# Patient Record
Sex: FEMALE | Race: WHITE | Employment: OTHER | URBAN - NONMETROPOLITAN AREA
[De-identification: names, ages, dates, MRNs, and addresses within clinical notes are randomized per-mention and may not be internally consistent; named-entity substitution may affect disease eponyms.]

---

## 2018-11-30 RX ORDER — INDAPAMIDE 2.5 MG/1
2.5 TABLET, FILM COATED ORAL EVERY MORNING
COMMUNITY
End: 2019-05-14 | Stop reason: SDUPTHER

## 2018-11-30 RX ORDER — CLOPIDOGREL BISULFATE 75 MG/1
75 TABLET ORAL DAILY
COMMUNITY

## 2018-11-30 RX ORDER — CYANOCOBALAMIN (VITAMIN B-12) 1000 MCG
1 TABLET, EXTENDED RELEASE ORAL 2 TIMES DAILY WITH MEALS
COMMUNITY

## 2018-11-30 RX ORDER — ASPIRIN 325 MG
325 TABLET ORAL DAILY
Status: ON HOLD | COMMUNITY
End: 2019-01-15 | Stop reason: HOSPADM

## 2018-11-30 RX ORDER — LOSARTAN POTASSIUM 100 MG/1
100 TABLET ORAL DAILY
COMMUNITY

## 2018-11-30 RX ORDER — RANOLAZINE 1000 MG/1
500 TABLET, EXTENDED RELEASE ORAL 2 TIMES DAILY
COMMUNITY

## 2018-11-30 RX ORDER — M-VIT,TX,IRON,MINS/CALC/FOLIC 27MG-0.4MG
1 TABLET ORAL DAILY
COMMUNITY

## 2018-11-30 RX ORDER — ATORVASTATIN CALCIUM 80 MG/1
80 TABLET, FILM COATED ORAL DAILY
COMMUNITY

## 2018-12-03 ENCOUNTER — OFFICE VISIT (OUTPATIENT)
Dept: CARDIOLOGY | Age: 68
End: 2018-12-03
Payer: MEDICARE

## 2018-12-03 VITALS
DIASTOLIC BLOOD PRESSURE: 68 MMHG | HEIGHT: 61 IN | BODY MASS INDEX: 43.46 KG/M2 | WEIGHT: 230.2 LBS | SYSTOLIC BLOOD PRESSURE: 134 MMHG | OXYGEN SATURATION: 96 % | HEART RATE: 52 BPM

## 2018-12-03 DIAGNOSIS — R07.89 OTHER CHEST PAIN: Primary | ICD-10-CM

## 2018-12-03 DIAGNOSIS — E78.2 MIXED HYPERLIPIDEMIA: ICD-10-CM

## 2018-12-03 DIAGNOSIS — I10 ESSENTIAL HYPERTENSION: ICD-10-CM

## 2018-12-03 DIAGNOSIS — R07.2 PRECORDIAL PAIN: ICD-10-CM

## 2018-12-03 DIAGNOSIS — E74.39 GLUCOSE INTOLERANCE: ICD-10-CM

## 2018-12-03 DIAGNOSIS — M47.27 LUMBOSACRAL RADICULOPATHY DUE TO DEGENERATIVE JOINT DISEASE OF SPINE: ICD-10-CM

## 2018-12-03 DIAGNOSIS — I25.118 CORONARY ARTERY DISEASE INVOLVING NATIVE CORONARY ARTERY OF NATIVE HEART WITH OTHER FORM OF ANGINA PECTORIS (HCC): ICD-10-CM

## 2018-12-03 PROBLEM — M19.90 DJD (DEGENERATIVE JOINT DISEASE): Status: ACTIVE | Noted: 2018-12-03

## 2018-12-03 PROCEDURE — G8417 CALC BMI ABV UP PARAM F/U: HCPCS | Performed by: INTERNAL MEDICINE

## 2018-12-03 PROCEDURE — G8427 DOCREV CUR MEDS BY ELIG CLIN: HCPCS | Performed by: INTERNAL MEDICINE

## 2018-12-03 PROCEDURE — G8598 ASA/ANTIPLAT THER USED: HCPCS | Performed by: INTERNAL MEDICINE

## 2018-12-03 PROCEDURE — 1036F TOBACCO NON-USER: CPT | Performed by: INTERNAL MEDICINE

## 2018-12-03 PROCEDURE — 93000 ELECTROCARDIOGRAM COMPLETE: CPT | Performed by: INTERNAL MEDICINE

## 2018-12-03 PROCEDURE — 99212 OFFICE O/P EST SF 10 MIN: CPT | Performed by: INTERNAL MEDICINE

## 2018-12-03 PROCEDURE — G8400 PT W/DXA NO RESULTS DOC: HCPCS | Performed by: INTERNAL MEDICINE

## 2018-12-03 PROCEDURE — 1101F PT FALLS ASSESS-DOCD LE1/YR: CPT | Performed by: INTERNAL MEDICINE

## 2018-12-03 PROCEDURE — G8484 FLU IMMUNIZE NO ADMIN: HCPCS | Performed by: INTERNAL MEDICINE

## 2018-12-03 PROCEDURE — 1090F PRES/ABSN URINE INCON ASSESS: CPT | Performed by: INTERNAL MEDICINE

## 2018-12-03 PROCEDURE — 3017F COLORECTAL CA SCREEN DOC REV: CPT | Performed by: INTERNAL MEDICINE

## 2018-12-03 PROCEDURE — 4040F PNEUMOC VAC/ADMIN/RCVD: CPT | Performed by: INTERNAL MEDICINE

## 2018-12-03 PROCEDURE — 1123F ACP DISCUSS/DSCN MKR DOCD: CPT | Performed by: INTERNAL MEDICINE

## 2018-12-03 RX ORDER — CETIRIZINE HYDROCHLORIDE 10 MG/1
10 TABLET ORAL DAILY
COMMUNITY

## 2018-12-03 RX ORDER — KRILL/OM-3/DHA/EPA/PHOSPHO/AST 500-110 MG
CAPSULE ORAL DAILY
COMMUNITY

## 2018-12-03 RX ORDER — CARVEDILOL 12.5 MG/1
12.5 TABLET ORAL 2 TIMES DAILY WITH MEALS
COMMUNITY
End: 2022-01-25 | Stop reason: DRUGHIGH

## 2018-12-03 ASSESSMENT — ENCOUNTER SYMPTOMS
COUGH: 0
SHORTNESS OF BREATH: 1
CHEST TIGHTNESS: 0
WHEEZING: 0
APNEA: 1

## 2018-12-03 NOTE — LETTER
Dear Oscar Santso,    Thank you for allowing me to participate in the care of Ms. Erich Light. She presents today at the [address] [place] [address] in the [facility]. 41-year-old lady with a history of hypertension dyslipidemia glucose intolerance and coronary disease returns for follow-up. Cardiac history dates back to 2008 at which time she underwent bypass grafting ×3. Restudy that same year demonstrated occlusion of her RCA graft with subsequent attempt at stenting unsuccessful. In 2011 they succeeded in placing a stent in her right coronary artery. She was restudied in 2015 first in March and subsequently in September with the latter showing preserved left ventricular function, both vein grafts occluded, patent LIMA to the LAD, patent RCA stent, and moderate circumflex disease. Most recently she has been exercising regularly without difficulty. She has had multiple episodes of nocturnal precordial chest discomfort that is described as an aching pain that responds to one or 2 nitroglycerin over about 1-3 minute period. The rest of the recent review of systems is benign  she has recently lost 20 pounds. Patient Active Problem List   Diagnosis    Coronary artery disease    Hypertension    Hyperlipidemia    Sleep apnea    Glucose intolerance    DJD (degenerative joint disease)    Lumbosacral radiculopathy due to degenerative joint disease of spine    Precordial pain         1. CAD - given moderate circumflex disease in 2015 the presence of recurrent chest discomfort relieved by angina warrants a stress test.   2. Hypertension - marginal control  3.  Bradycardia - slow given only Carvedilol as a negative chrontrophic agent      Sincerely yours,    Eloy Martell MD  [facility]

## 2018-12-14 ENCOUNTER — HOSPITAL ENCOUNTER (OUTPATIENT)
Dept: NON INVASIVE DIAGNOSTICS | Age: 68
Discharge: HOME | End: 2018-12-14
Payer: MEDICARE

## 2018-12-14 VITALS
BODY MASS INDEX: 43.32 KG/M2 | WEIGHT: 229.28 LBS | SYSTOLIC BLOOD PRESSURE: 114 MMHG | DIASTOLIC BLOOD PRESSURE: 63 MMHG | HEART RATE: 89 BPM

## 2018-12-14 DIAGNOSIS — I25.118 CORONARY ARTERY DISEASE INVOLVING NATIVE CORONARY ARTERY OF NATIVE HEART WITH OTHER FORM OF ANGINA PECTORIS (HCC): ICD-10-CM

## 2018-12-14 DIAGNOSIS — R07.89 OTHER CHEST PAIN: ICD-10-CM

## 2018-12-14 DIAGNOSIS — R07.2 PRECORDIAL PAIN: ICD-10-CM

## 2018-12-14 DIAGNOSIS — E78.2 MIXED HYPERLIPIDEMIA: ICD-10-CM

## 2018-12-14 PROCEDURE — 6370000000 HC RX 637 (ALT 250 FOR IP): Performed by: INTERNAL MEDICINE

## 2018-12-14 PROCEDURE — 6360000002 HC RX W HCPCS: Performed by: INTERNAL MEDICINE

## 2018-12-14 PROCEDURE — C8928 TTE W OR W/O FOL W/CON,STRES: HCPCS

## 2018-12-14 PROCEDURE — 2580000003 HC RX 258: Performed by: INTERNAL MEDICINE

## 2018-12-14 RX ORDER — SODIUM CHLORIDE 9 MG/ML
INJECTION, SOLUTION INTRAVENOUS
Status: COMPLETED | OUTPATIENT
Start: 2018-12-14 | End: 2018-12-14

## 2018-12-14 RX ORDER — NITROGLYCERIN 0.4 MG/1
0.4 TABLET SUBLINGUAL EVERY 5 MIN PRN
Status: DISCONTINUED | OUTPATIENT
Start: 2018-12-14 | End: 2018-12-16 | Stop reason: HOSPADM

## 2018-12-14 RX ORDER — DOBUTAMINE HYDROCHLORIDE 200 MG/100ML
10 INJECTION INTRAVENOUS CONTINUOUS PRN
Status: ACTIVE | OUTPATIENT
Start: 2018-12-14 | End: 2018-12-15

## 2018-12-14 RX ADMIN — NITROGLYCERIN 0.4 MG: 0.4 TABLET SUBLINGUAL at 09:04

## 2018-12-14 RX ADMIN — DOBUTAMINE HYDROCHLORIDE 20 MCG/KG/MIN: 200 INJECTION INTRAVENOUS at 08:54

## 2018-12-14 RX ADMIN — DOBUTAMINE HYDROCHLORIDE 10 MCG/KG/MIN: 200 INJECTION INTRAVENOUS at 08:50

## 2018-12-14 RX ADMIN — SODIUM CHLORIDE: 9 INJECTION, SOLUTION INTRAVENOUS at 08:50

## 2018-12-26 ENCOUNTER — OFFICE VISIT (OUTPATIENT)
Dept: CARDIOLOGY | Age: 68
End: 2018-12-26
Payer: MEDICARE

## 2018-12-26 ENCOUNTER — HOSPITAL ENCOUNTER (OUTPATIENT)
Dept: GENERAL RADIOLOGY | Age: 68
Discharge: HOME | End: 2018-12-26
Payer: MEDICARE

## 2018-12-26 VITALS
HEART RATE: 50 BPM | HEIGHT: 61 IN | DIASTOLIC BLOOD PRESSURE: 68 MMHG | SYSTOLIC BLOOD PRESSURE: 110 MMHG | WEIGHT: 228 LBS | BODY MASS INDEX: 43.05 KG/M2

## 2018-12-26 DIAGNOSIS — R07.2 PRECORDIAL PAIN: ICD-10-CM

## 2018-12-26 DIAGNOSIS — E78.2 MIXED HYPERLIPIDEMIA: ICD-10-CM

## 2018-12-26 DIAGNOSIS — I25.118 CORONARY ARTERY DISEASE INVOLVING NATIVE CORONARY ARTERY OF NATIVE HEART WITH OTHER FORM OF ANGINA PECTORIS (HCC): ICD-10-CM

## 2018-12-26 DIAGNOSIS — I25.118 CORONARY ARTERY DISEASE INVOLVING NATIVE CORONARY ARTERY OF NATIVE HEART WITH OTHER FORM OF ANGINA PECTORIS (HCC): Primary | ICD-10-CM

## 2018-12-26 LAB
ANION GAP SERPL CALCULATED.3IONS-SCNC: 13 MMOL/L (ref 7–19)
BUN BLDV-MCNC: 14 MG/DL (ref 8–23)
CALCIUM SERPL-MCNC: 10.4 MG/DL (ref 8.8–10.2)
CHLORIDE BLD-SCNC: 98 MMOL/L (ref 98–111)
CO2: 29 MMOL/L (ref 22–29)
CREAT SERPL-MCNC: 0.7 MG/DL (ref 0.5–0.9)
GFR NON-AFRICAN AMERICAN: >60
GLUCOSE BLD-MCNC: 81 MG/DL (ref 74–109)
HCT VFR BLD CALC: 41.4 % (ref 37–47)
HEMOGLOBIN: 13.8 G/DL (ref 12–16)
MCH RBC QN AUTO: 30.3 PG (ref 27–31)
MCHC RBC AUTO-ENTMCNC: 33.3 G/DL (ref 33–37)
MCV RBC AUTO: 90.8 FL (ref 81–99)
PDW BLD-RTO: 13 % (ref 11.5–14.5)
PLATELET # BLD: 178 K/UL (ref 130–400)
PMV BLD AUTO: 10.7 FL (ref 9.4–12.3)
POTASSIUM SERPL-SCNC: 3.4 MMOL/L (ref 3.5–5)
RBC # BLD: 4.56 M/UL (ref 4.2–5.4)
SODIUM BLD-SCNC: 140 MMOL/L (ref 136–145)
WBC # BLD: 7.4 K/UL (ref 4.8–10.8)

## 2018-12-26 PROCEDURE — 71046 X-RAY EXAM CHEST 2 VIEWS: CPT

## 2018-12-26 PROCEDURE — G8427 DOCREV CUR MEDS BY ELIG CLIN: HCPCS | Performed by: INTERNAL MEDICINE

## 2018-12-26 PROCEDURE — 99213 OFFICE O/P EST LOW 20 MIN: CPT | Performed by: INTERNAL MEDICINE

## 2018-12-26 PROCEDURE — 1090F PRES/ABSN URINE INCON ASSESS: CPT | Performed by: INTERNAL MEDICINE

## 2018-12-26 PROCEDURE — 4040F PNEUMOC VAC/ADMIN/RCVD: CPT | Performed by: INTERNAL MEDICINE

## 2018-12-26 PROCEDURE — G8417 CALC BMI ABV UP PARAM F/U: HCPCS | Performed by: INTERNAL MEDICINE

## 2018-12-26 PROCEDURE — G8400 PT W/DXA NO RESULTS DOC: HCPCS | Performed by: INTERNAL MEDICINE

## 2018-12-26 PROCEDURE — 1036F TOBACCO NON-USER: CPT | Performed by: INTERNAL MEDICINE

## 2018-12-26 PROCEDURE — G8598 ASA/ANTIPLAT THER USED: HCPCS | Performed by: INTERNAL MEDICINE

## 2018-12-26 PROCEDURE — 3017F COLORECTAL CA SCREEN DOC REV: CPT | Performed by: INTERNAL MEDICINE

## 2018-12-26 PROCEDURE — 1123F ACP DISCUSS/DSCN MKR DOCD: CPT | Performed by: INTERNAL MEDICINE

## 2018-12-26 PROCEDURE — 1101F PT FALLS ASSESS-DOCD LE1/YR: CPT | Performed by: INTERNAL MEDICINE

## 2018-12-26 PROCEDURE — G8484 FLU IMMUNIZE NO ADMIN: HCPCS | Performed by: INTERNAL MEDICINE

## 2018-12-26 NOTE — PROGRESS NOTES
72-year-old lady with long history of coronary disease returns for follow-up after stress echo. Underwent bypass grafting in 2008 with the need for stenting 3 years later in 2011. In 2015 underwent restudy revealing patent stent with both vein grafts occluded and moderate circumflex disease. She was referred for stress testing because of episodic aching precordial chest pain responded to sublingual nitroglycerin. On exam today carries 228 pounds on a 5 foot 1 inch frame. Pressure is 110/68 with a pulse of 50. EOMs full sclerae and conjunctiva normal.  Oriented ×3 in judgment normal.  Stress echo revealed no EKG or echocardiographic evidence of ischemia. She developed discomfort under her left breast which reportedly was responsive to nitroglycerin [but she denies]. Assessment/plan:  1. CAD - given her history of moderate disease in her circumflex 3 years ago with early graft occlusion and episodic chest discomfort feel that she deserves restudy. Stress echo was equivocal things considered. 2. Hypertension - controlled  3. Lipids - last reported 7/17 were well controlled.

## 2018-12-27 ENCOUNTER — TELEPHONE (OUTPATIENT)
Dept: CARDIOLOGY | Age: 68
End: 2018-12-27

## 2019-01-14 ENCOUNTER — HOSPITAL ENCOUNTER (OUTPATIENT)
Dept: CARDIAC CATH/INVASIVE PROCEDURES | Age: 69
Setting detail: OBSERVATION
Discharge: HOME | End: 2019-01-15
Attending: INTERNAL MEDICINE | Admitting: INTERNAL MEDICINE
Payer: MEDICARE

## 2019-01-14 PROBLEM — R94.39 ABNORMAL STRESS ECHO: Status: ACTIVE | Noted: 2019-01-14

## 2019-01-14 LAB
CHOLESTEROL, TOTAL: 130 MG/DL (ref 160–199)
HDLC SERPL-MCNC: 56 MG/DL (ref 65–121)
LDL CHOLESTEROL CALCULATED: 54 MG/DL
TRIGL SERPL-MCNC: 101 MG/DL (ref 0–149)
TROPONIN: <0.01 NG/ML (ref 0–0.03)

## 2019-01-14 PROCEDURE — C1725 CATH, TRANSLUMIN NON-LASER: HCPCS

## 2019-01-14 PROCEDURE — 93459 L HRT ART/GRFT ANGIO: CPT | Performed by: INTERNAL MEDICINE

## 2019-01-14 PROCEDURE — 93005 ELECTROCARDIOGRAM TRACING: CPT

## 2019-01-14 PROCEDURE — 2580000003 HC RX 258: Performed by: INTERNAL MEDICINE

## 2019-01-14 PROCEDURE — 99152 MOD SED SAME PHYS/QHP 5/>YRS: CPT | Performed by: INTERNAL MEDICINE

## 2019-01-14 PROCEDURE — 84484 ASSAY OF TROPONIN QUANT: CPT

## 2019-01-14 PROCEDURE — 36415 COLL VENOUS BLD VENIPUNCTURE: CPT

## 2019-01-14 PROCEDURE — 92928 PRQ TCAT PLMT NTRAC ST 1 LES: CPT | Performed by: INTERNAL MEDICINE

## 2019-01-14 PROCEDURE — 80061 LIPID PANEL: CPT

## 2019-01-14 PROCEDURE — C1894 INTRO/SHEATH, NON-LASER: HCPCS

## 2019-01-14 PROCEDURE — 2709999900 HC NON-CHARGEABLE SUPPLY

## 2019-01-14 PROCEDURE — 6370000000 HC RX 637 (ALT 250 FOR IP)

## 2019-01-14 PROCEDURE — C1760 CLOSURE DEV, VASC: HCPCS

## 2019-01-14 PROCEDURE — C1769 GUIDE WIRE: HCPCS

## 2019-01-14 PROCEDURE — 6360000002 HC RX W HCPCS: Performed by: INTERNAL MEDICINE

## 2019-01-14 PROCEDURE — C1874 STENT, COATED/COV W/DEL SYS: HCPCS

## 2019-01-14 PROCEDURE — 6360000002 HC RX W HCPCS

## 2019-01-14 PROCEDURE — 99153 MOD SED SAME PHYS/QHP EA: CPT | Performed by: INTERNAL MEDICINE

## 2019-01-14 PROCEDURE — C1887 CATHETER, GUIDING: HCPCS

## 2019-01-14 PROCEDURE — G0378 HOSPITAL OBSERVATION PER HR: HCPCS

## 2019-01-14 PROCEDURE — 6370000000 HC RX 637 (ALT 250 FOR IP): Performed by: INTERNAL MEDICINE

## 2019-01-14 RX ORDER — CETIRIZINE HYDROCHLORIDE 10 MG/1
10 TABLET ORAL DAILY
Status: DISCONTINUED | OUTPATIENT
Start: 2019-01-15 | End: 2019-01-15 | Stop reason: HOSPADM

## 2019-01-14 RX ORDER — OYSTER SHELL CALCIUM WITH VITAMIN D 500; 200 MG/1; [IU]/1
1 TABLET, FILM COATED ORAL 2 TIMES DAILY WITH MEALS
Status: DISCONTINUED | OUTPATIENT
Start: 2019-01-14 | End: 2019-01-15 | Stop reason: HOSPADM

## 2019-01-14 RX ORDER — LOSARTAN POTASSIUM 100 MG/1
100 TABLET ORAL DAILY
Status: DISCONTINUED | OUTPATIENT
Start: 2019-01-14 | End: 2019-01-15 | Stop reason: HOSPADM

## 2019-01-14 RX ORDER — ATORVASTATIN CALCIUM 80 MG/1
80 TABLET, FILM COATED ORAL NIGHTLY
Status: DISCONTINUED | OUTPATIENT
Start: 2019-01-14 | End: 2019-01-15 | Stop reason: HOSPADM

## 2019-01-14 RX ORDER — HYDROCHLOROTHIAZIDE 25 MG/1
50 TABLET ORAL DAILY
Status: DISCONTINUED | OUTPATIENT
Start: 2019-01-14 | End: 2019-01-15 | Stop reason: HOSPADM

## 2019-01-14 RX ORDER — ASPIRIN 81 MG/1
81 TABLET, CHEWABLE ORAL DAILY
Status: DISCONTINUED | OUTPATIENT
Start: 2019-01-15 | End: 2019-01-15 | Stop reason: HOSPADM

## 2019-01-14 RX ORDER — RANOLAZINE 500 MG/1
500 TABLET, EXTENDED RELEASE ORAL 2 TIMES DAILY
Status: DISCONTINUED | OUTPATIENT
Start: 2019-01-14 | End: 2019-01-15 | Stop reason: HOSPADM

## 2019-01-14 RX ORDER — ACETAMINOPHEN 325 MG/1
650 TABLET ORAL EVERY 4 HOURS PRN
Status: DISCONTINUED | OUTPATIENT
Start: 2019-01-14 | End: 2019-01-15 | Stop reason: HOSPADM

## 2019-01-14 RX ORDER — SODIUM CHLORIDE 9 MG/ML
INJECTION, SOLUTION INTRAVENOUS CONTINUOUS
Status: DISCONTINUED | OUTPATIENT
Start: 2019-01-14 | End: 2019-01-15 | Stop reason: HOSPADM

## 2019-01-14 RX ORDER — ONDANSETRON 2 MG/ML
4 INJECTION INTRAMUSCULAR; INTRAVENOUS EVERY 6 HOURS PRN
Status: DISCONTINUED | OUTPATIENT
Start: 2019-01-14 | End: 2019-01-15 | Stop reason: HOSPADM

## 2019-01-14 RX ORDER — SODIUM CHLORIDE 0.9 % (FLUSH) 0.9 %
10 SYRINGE (ML) INJECTION PRN
Status: DISCONTINUED | OUTPATIENT
Start: 2019-01-14 | End: 2019-01-15 | Stop reason: HOSPADM

## 2019-01-14 RX ORDER — CLOPIDOGREL BISULFATE 75 MG/1
75 TABLET ORAL DAILY
Status: DISCONTINUED | OUTPATIENT
Start: 2019-01-15 | End: 2019-01-15 | Stop reason: HOSPADM

## 2019-01-14 RX ORDER — M-VIT,TX,IRON,MINS/CALC/FOLIC 27MG-0.4MG
1 TABLET ORAL DAILY
Status: DISCONTINUED | OUTPATIENT
Start: 2019-01-15 | End: 2019-01-15 | Stop reason: HOSPADM

## 2019-01-14 RX ORDER — SODIUM CHLORIDE 0.9 % (FLUSH) 0.9 %
10 SYRINGE (ML) INJECTION EVERY 12 HOURS SCHEDULED
Status: DISCONTINUED | OUTPATIENT
Start: 2019-01-14 | End: 2019-01-15 | Stop reason: HOSPADM

## 2019-01-14 RX ORDER — CARVEDILOL 12.5 MG/1
12.5 TABLET ORAL 2 TIMES DAILY WITH MEALS
Status: DISCONTINUED | OUTPATIENT
Start: 2019-01-14 | End: 2019-01-15 | Stop reason: HOSPADM

## 2019-01-14 RX ADMIN — HYDROCHLOROTHIAZIDE 50 MG: 25 TABLET ORAL at 17:41

## 2019-01-14 RX ADMIN — LOSARTAN POTASSIUM 100 MG: 100 TABLET ORAL at 21:19

## 2019-01-14 RX ADMIN — ATORVASTATIN CALCIUM 80 MG: 80 TABLET, FILM COATED ORAL at 21:20

## 2019-01-14 RX ADMIN — CARVEDILOL 12.5 MG: 12.5 TABLET, FILM COATED ORAL at 17:41

## 2019-01-14 RX ADMIN — CALCIUM CARBONATE-VITAMIN D TAB 500 MG-200 UNIT 1 TABLET: 500-200 TAB at 21:20

## 2019-01-14 RX ADMIN — SODIUM CHLORIDE: 9 INJECTION, SOLUTION INTRAVENOUS at 15:53

## 2019-01-14 RX ADMIN — BIVALIRUDIN 1 MG/KG/HR: 250 INJECTION, POWDER, LYOPHILIZED, FOR SOLUTION INTRAVENOUS at 22:41

## 2019-01-14 RX ADMIN — RANOLAZINE 500 MG: 500 TABLET, FILM COATED, EXTENDED RELEASE ORAL at 21:20

## 2019-01-14 ASSESSMENT — PAIN SCALES - GENERAL
PAINLEVEL_OUTOF10: 0

## 2019-01-15 VITALS
WEIGHT: 227.8 LBS | TEMPERATURE: 96.8 F | OXYGEN SATURATION: 97 % | SYSTOLIC BLOOD PRESSURE: 128 MMHG | DIASTOLIC BLOOD PRESSURE: 74 MMHG | RESPIRATION RATE: 18 BRPM | HEART RATE: 54 BPM | HEIGHT: 61 IN | BODY MASS INDEX: 43.01 KG/M2

## 2019-01-15 LAB
EKG P AXIS: 19 DEGREES
EKG P-R INTERVAL: 174 MS
EKG Q-T INTERVAL: 482 MS
EKG QRS DURATION: 96 MS
EKG QTC CALCULATION (BAZETT): 456 MS
EKG T AXIS: 77 DEGREES
HCT VFR BLD CALC: 37.8 % (ref 37–47)
HEMOGLOBIN: 13.1 G/DL (ref 12–16)
LV EF: 55 %
LVEF MODALITY: NORMAL
MCH RBC QN AUTO: 30.7 PG (ref 27–31)
MCHC RBC AUTO-ENTMCNC: 34.7 G/DL (ref 33–37)
MCV RBC AUTO: 88.5 FL (ref 81–99)
PDW BLD-RTO: 13.2 % (ref 11.5–14.5)
PLATELET # BLD: 148 K/UL (ref 130–400)
PMV BLD AUTO: 10.5 FL (ref 9.4–12.3)
RBC # BLD: 4.27 M/UL (ref 4.2–5.4)
TROPONIN: <0.01 NG/ML (ref 0–0.03)
TROPONIN: <0.01 NG/ML (ref 0–0.03)
WBC # BLD: 6.5 K/UL (ref 4.8–10.8)

## 2019-01-15 PROCEDURE — 84484 ASSAY OF TROPONIN QUANT: CPT

## 2019-01-15 PROCEDURE — 6370000000 HC RX 637 (ALT 250 FOR IP): Performed by: INTERNAL MEDICINE

## 2019-01-15 PROCEDURE — 2580000003 HC RX 258: Performed by: INTERNAL MEDICINE

## 2019-01-15 PROCEDURE — G0378 HOSPITAL OBSERVATION PER HR: HCPCS

## 2019-01-15 PROCEDURE — 36415 COLL VENOUS BLD VENIPUNCTURE: CPT

## 2019-01-15 PROCEDURE — 99217 PR OBSERVATION CARE DISCHARGE MANAGEMENT: CPT | Performed by: INTERNAL MEDICINE

## 2019-01-15 PROCEDURE — 85027 COMPLETE CBC AUTOMATED: CPT

## 2019-01-15 RX ORDER — ASPIRIN 81 MG/1
81 TABLET, CHEWABLE ORAL DAILY
Qty: 30 TABLET | Refills: 3 | COMMUNITY
Start: 2019-01-15

## 2019-01-15 RX ADMIN — CARVEDILOL 12.5 MG: 12.5 TABLET, FILM COATED ORAL at 08:05

## 2019-01-15 RX ADMIN — Medication 10 ML: at 08:05

## 2019-01-15 RX ADMIN — MULTIPLE VITAMINS W/ MINERALS TAB 1 TABLET: TAB at 08:04

## 2019-01-15 RX ADMIN — RANOLAZINE 500 MG: 500 TABLET, FILM COATED, EXTENDED RELEASE ORAL at 08:04

## 2019-01-15 RX ADMIN — CETIRIZINE HYDROCHLORIDE 10 MG: 10 TABLET, FILM COATED ORAL at 08:04

## 2019-01-15 RX ADMIN — CALCIUM CARBONATE-VITAMIN D TAB 500 MG-200 UNIT 1 TABLET: 500-200 TAB at 08:04

## 2019-01-15 RX ADMIN — VITAMIN D, TAB 1000IU (100/BT) 1000 UNITS: 25 TAB at 08:04

## 2019-01-15 RX ADMIN — CLOPIDOGREL BISULFATE 75 MG: 75 TABLET ORAL at 08:05

## 2019-01-15 ASSESSMENT — PAIN SCALES - GENERAL
PAINLEVEL_OUTOF10: 0

## 2019-02-28 ENCOUNTER — OFFICE VISIT (OUTPATIENT)
Dept: CARDIOLOGY | Age: 69
End: 2019-02-28
Payer: MEDICARE

## 2019-02-28 VITALS
DIASTOLIC BLOOD PRESSURE: 74 MMHG | WEIGHT: 231 LBS | HEART RATE: 70 BPM | HEIGHT: 62 IN | BODY MASS INDEX: 42.51 KG/M2 | SYSTOLIC BLOOD PRESSURE: 134 MMHG

## 2019-02-28 DIAGNOSIS — E78.2 MIXED HYPERLIPIDEMIA: ICD-10-CM

## 2019-02-28 DIAGNOSIS — Z95.1 HX OF CABG: ICD-10-CM

## 2019-02-28 DIAGNOSIS — Z95.5 PRESENCE OF DRUG-ELUTING STENT IN LEFT CIRCUMFLEX CORONARY ARTERY: ICD-10-CM

## 2019-02-28 DIAGNOSIS — I10 ESSENTIAL HYPERTENSION: ICD-10-CM

## 2019-02-28 DIAGNOSIS — I25.10 CORONARY ARTERY DISEASE INVOLVING NATIVE CORONARY ARTERY OF NATIVE HEART WITHOUT ANGINA PECTORIS: Primary | ICD-10-CM

## 2019-02-28 PROCEDURE — 1090F PRES/ABSN URINE INCON ASSESS: CPT | Performed by: NURSE PRACTITIONER

## 2019-02-28 PROCEDURE — 3017F COLORECTAL CA SCREEN DOC REV: CPT | Performed by: NURSE PRACTITIONER

## 2019-02-28 PROCEDURE — 1123F ACP DISCUSS/DSCN MKR DOCD: CPT | Performed by: NURSE PRACTITIONER

## 2019-02-28 PROCEDURE — 99213 OFFICE O/P EST LOW 20 MIN: CPT | Performed by: NURSE PRACTITIONER

## 2019-02-28 PROCEDURE — G8427 DOCREV CUR MEDS BY ELIG CLIN: HCPCS | Performed by: NURSE PRACTITIONER

## 2019-02-28 PROCEDURE — 1036F TOBACCO NON-USER: CPT | Performed by: NURSE PRACTITIONER

## 2019-02-28 PROCEDURE — G8484 FLU IMMUNIZE NO ADMIN: HCPCS | Performed by: NURSE PRACTITIONER

## 2019-02-28 PROCEDURE — G8400 PT W/DXA NO RESULTS DOC: HCPCS | Performed by: NURSE PRACTITIONER

## 2019-02-28 PROCEDURE — 4040F PNEUMOC VAC/ADMIN/RCVD: CPT | Performed by: NURSE PRACTITIONER

## 2019-02-28 PROCEDURE — G8598 ASA/ANTIPLAT THER USED: HCPCS | Performed by: NURSE PRACTITIONER

## 2019-02-28 PROCEDURE — G8417 CALC BMI ABV UP PARAM F/U: HCPCS | Performed by: NURSE PRACTITIONER

## 2019-02-28 PROCEDURE — 1101F PT FALLS ASSESS-DOCD LE1/YR: CPT | Performed by: NURSE PRACTITIONER

## 2019-03-29 ENCOUNTER — OFFICE VISIT (OUTPATIENT)
Dept: CARDIOLOGY | Age: 69
End: 2019-03-29
Payer: MEDICARE

## 2019-03-29 VITALS
SYSTOLIC BLOOD PRESSURE: 90 MMHG | HEART RATE: 50 BPM | BODY MASS INDEX: 42.48 KG/M2 | WEIGHT: 225 LBS | HEIGHT: 61 IN | DIASTOLIC BLOOD PRESSURE: 62 MMHG

## 2019-03-29 DIAGNOSIS — I10 ESSENTIAL HYPERTENSION: ICD-10-CM

## 2019-03-29 DIAGNOSIS — R07.2 PRECORDIAL PAIN: Primary | ICD-10-CM

## 2019-03-29 PROCEDURE — 4040F PNEUMOC VAC/ADMIN/RCVD: CPT | Performed by: INTERNAL MEDICINE

## 2019-03-29 PROCEDURE — 1123F ACP DISCUSS/DSCN MKR DOCD: CPT | Performed by: INTERNAL MEDICINE

## 2019-03-29 PROCEDURE — 1036F TOBACCO NON-USER: CPT | Performed by: INTERNAL MEDICINE

## 2019-03-29 PROCEDURE — 99212 OFFICE O/P EST SF 10 MIN: CPT | Performed by: INTERNAL MEDICINE

## 2019-03-29 PROCEDURE — 93000 ELECTROCARDIOGRAM COMPLETE: CPT | Performed by: INTERNAL MEDICINE

## 2019-03-29 PROCEDURE — G8400 PT W/DXA NO RESULTS DOC: HCPCS | Performed by: INTERNAL MEDICINE

## 2019-03-29 PROCEDURE — G8598 ASA/ANTIPLAT THER USED: HCPCS | Performed by: INTERNAL MEDICINE

## 2019-03-29 PROCEDURE — G8417 CALC BMI ABV UP PARAM F/U: HCPCS | Performed by: INTERNAL MEDICINE

## 2019-03-29 PROCEDURE — 3017F COLORECTAL CA SCREEN DOC REV: CPT | Performed by: INTERNAL MEDICINE

## 2019-03-29 PROCEDURE — G8427 DOCREV CUR MEDS BY ELIG CLIN: HCPCS | Performed by: INTERNAL MEDICINE

## 2019-03-29 PROCEDURE — 1090F PRES/ABSN URINE INCON ASSESS: CPT | Performed by: INTERNAL MEDICINE

## 2019-03-29 PROCEDURE — G8484 FLU IMMUNIZE NO ADMIN: HCPCS | Performed by: INTERNAL MEDICINE

## 2019-05-14 RX ORDER — INDAPAMIDE 2.5 MG/1
2.5 TABLET, FILM COATED ORAL EVERY MORNING
Qty: 90 TABLET | Refills: 1 | Status: SHIPPED | OUTPATIENT
Start: 2019-05-14 | End: 2019-11-04 | Stop reason: SDUPTHER

## 2019-08-01 ENCOUNTER — TELEPHONE (OUTPATIENT)
Dept: CARDIOLOGY | Age: 69
End: 2019-08-01

## 2019-08-01 NOTE — TELEPHONE ENCOUNTER
NO Answer; Unable to contact pt; Pt appt location has changed to [address] [place] [address]; This is located in the [facility]; the building with the [employer]"; Please confirm in the appt notes.   Thank you

## 2019-11-04 RX ORDER — INDAPAMIDE 2.5 MG/1
2.5 TABLET, FILM COATED ORAL EVERY MORNING
Qty: 90 TABLET | Refills: 3 | Status: SHIPPED | OUTPATIENT
Start: 2019-11-04 | End: 2020-09-30

## 2019-11-11 ENCOUNTER — OFFICE VISIT (OUTPATIENT)
Dept: CARDIOLOGY | Age: 69
End: 2019-11-11
Payer: MEDICARE

## 2019-11-11 VITALS
HEIGHT: 60 IN | WEIGHT: 225 LBS | SYSTOLIC BLOOD PRESSURE: 130 MMHG | BODY MASS INDEX: 44.17 KG/M2 | DIASTOLIC BLOOD PRESSURE: 84 MMHG | HEART RATE: 54 BPM

## 2019-11-11 DIAGNOSIS — R07.89 OTHER CHEST PAIN: ICD-10-CM

## 2019-11-11 DIAGNOSIS — R94.31 ABNORMAL EKG: ICD-10-CM

## 2019-11-11 DIAGNOSIS — I10 ESSENTIAL HYPERTENSION: Primary | ICD-10-CM

## 2019-11-11 PROCEDURE — 93000 ELECTROCARDIOGRAM COMPLETE: CPT | Performed by: INTERNAL MEDICINE

## 2019-11-11 PROCEDURE — 99213 OFFICE O/P EST LOW 20 MIN: CPT | Performed by: INTERNAL MEDICINE

## 2019-11-11 PROCEDURE — G8427 DOCREV CUR MEDS BY ELIG CLIN: HCPCS | Performed by: INTERNAL MEDICINE

## 2019-11-11 PROCEDURE — 1123F ACP DISCUSS/DSCN MKR DOCD: CPT | Performed by: INTERNAL MEDICINE

## 2019-11-11 PROCEDURE — 3017F COLORECTAL CA SCREEN DOC REV: CPT | Performed by: INTERNAL MEDICINE

## 2019-11-11 PROCEDURE — 4040F PNEUMOC VAC/ADMIN/RCVD: CPT | Performed by: INTERNAL MEDICINE

## 2019-11-11 PROCEDURE — G8400 PT W/DXA NO RESULTS DOC: HCPCS | Performed by: INTERNAL MEDICINE

## 2019-11-11 PROCEDURE — G8484 FLU IMMUNIZE NO ADMIN: HCPCS | Performed by: INTERNAL MEDICINE

## 2019-11-11 PROCEDURE — G8417 CALC BMI ABV UP PARAM F/U: HCPCS | Performed by: INTERNAL MEDICINE

## 2019-11-11 PROCEDURE — 1090F PRES/ABSN URINE INCON ASSESS: CPT | Performed by: INTERNAL MEDICINE

## 2019-11-11 PROCEDURE — G8598 ASA/ANTIPLAT THER USED: HCPCS | Performed by: INTERNAL MEDICINE

## 2019-11-11 PROCEDURE — 1036F TOBACCO NON-USER: CPT | Performed by: INTERNAL MEDICINE

## 2019-11-21 ENCOUNTER — HOSPITAL ENCOUNTER (OUTPATIENT)
Dept: NON INVASIVE DIAGNOSTICS | Age: 69
Discharge: HOME | End: 2019-11-21
Payer: MEDICARE

## 2019-11-21 VITALS — WEIGHT: 225 LBS | BODY MASS INDEX: 43.94 KG/M2

## 2019-11-21 DIAGNOSIS — R07.89 OTHER CHEST PAIN: ICD-10-CM

## 2019-11-21 DIAGNOSIS — R94.31 ABNORMAL EKG: ICD-10-CM

## 2019-11-21 PROCEDURE — 6360000002 HC RX W HCPCS: Performed by: INTERNAL MEDICINE

## 2019-11-21 PROCEDURE — 93350 STRESS TTE ONLY: CPT

## 2019-11-21 PROCEDURE — 2580000003 HC RX 258: Performed by: INTERNAL MEDICINE

## 2019-11-21 RX ORDER — ATROPINE SULFATE 0.1 MG/ML
1 INJECTION INTRAVENOUS PRN
Status: DISCONTINUED | OUTPATIENT
Start: 2019-11-21 | End: 2019-11-22 | Stop reason: HOSPADM

## 2019-11-21 RX ORDER — DOBUTAMINE HYDROCHLORIDE 200 MG/100ML
10 INJECTION INTRAVENOUS CONTINUOUS PRN
Status: DISCONTINUED | OUTPATIENT
Start: 2019-11-21 | End: 2019-11-22 | Stop reason: HOSPADM

## 2019-11-21 RX ORDER — SODIUM CHLORIDE 9 MG/ML
INJECTION, SOLUTION INTRAVENOUS
Status: COMPLETED | OUTPATIENT
Start: 2019-11-21 | End: 2019-11-21

## 2019-11-21 RX ADMIN — ATROPINE SULFATE 1 MG: 0.1 INJECTION PARENTERAL at 14:19

## 2019-11-21 RX ADMIN — SODIUM CHLORIDE: 9 INJECTION, SOLUTION INTRAVENOUS at 14:05

## 2019-11-21 RX ADMIN — DOBUTAMINE HYDROCHLORIDE 10 MCG/KG/MIN: 200 INJECTION INTRAVENOUS at 14:05

## 2020-02-13 ENCOUNTER — TELEPHONE (OUTPATIENT)
Dept: CARDIOLOGY | Age: 70
End: 2020-02-13

## 2020-05-01 ENCOUNTER — VIRTUAL VISIT (OUTPATIENT)
Dept: CARDIOLOGY | Age: 70
End: 2020-05-01
Payer: MEDICARE

## 2020-05-01 VITALS — WEIGHT: 218 LBS | HEIGHT: 62 IN | BODY MASS INDEX: 40.12 KG/M2

## 2020-05-01 PROCEDURE — 99441 PR PHYS/QHP TELEPHONE EVALUATION 5-10 MIN: CPT | Performed by: INTERNAL MEDICINE

## 2020-05-01 NOTE — PROGRESS NOTES
scheduled within the next 24 hours.     Patient identification was verified at the start of the visit: Yes    Total Time: 5-10 Minutes    Note: not billable if this call serves to triage the patient into an appointment for the relevant concern      [address]

## 2020-05-01 NOTE — PATIENT INSTRUCTIONS
1.  Exercise regularly and contact us in the event of any change in status  2. Avoid people and wear a mask when out  3.   Get lipid recheck when safe

## 2020-09-30 RX ORDER — INDAPAMIDE 2.5 MG/1
TABLET, FILM COATED ORAL
Qty: 90 TABLET | Refills: 3 | Status: SHIPPED | OUTPATIENT
Start: 2020-09-30

## 2021-10-14 RX ORDER — INDAPAMIDE 2.5 MG/1
TABLET, FILM COATED ORAL
Qty: 90 TABLET | Refills: 3 | OUTPATIENT
Start: 2021-10-14

## 2021-11-16 ENCOUNTER — TELEPHONE (OUTPATIENT)
Dept: CARDIOLOGY CLINIC | Age: 71
End: 2021-11-16

## 2021-11-16 NOTE — TELEPHONE ENCOUNTER
Patient called stating she was having arm pain and sweating yesterday. She feels ok today. She wanted to see Dr. Iza Arora. Offered her his first available of 1/25/22 at 215. Strongly encouraged that she she APRN sooner but she stated, \"I don't want to see second in command. \" Advised that if she has symptoms again to report to ED. She voiced understanding.

## 2022-01-25 ENCOUNTER — OFFICE VISIT (OUTPATIENT)
Dept: CARDIOLOGY CLINIC | Age: 72
End: 2022-01-25
Payer: MEDICARE

## 2022-01-25 VITALS
WEIGHT: 239 LBS | SYSTOLIC BLOOD PRESSURE: 120 MMHG | DIASTOLIC BLOOD PRESSURE: 66 MMHG | BODY MASS INDEX: 45.12 KG/M2 | HEART RATE: 48 BPM | HEIGHT: 61 IN | OXYGEN SATURATION: 97 %

## 2022-01-25 DIAGNOSIS — E78.2 MIXED HYPERLIPIDEMIA: ICD-10-CM

## 2022-01-25 DIAGNOSIS — Z95.5 PRESENCE OF DRUG-ELUTING STENT IN LEFT CIRCUMFLEX CORONARY ARTERY: ICD-10-CM

## 2022-01-25 DIAGNOSIS — I10 HYPERTENSION, UNSPECIFIED TYPE: ICD-10-CM

## 2022-01-25 DIAGNOSIS — I25.10 CAD IN NATIVE ARTERY: Primary | ICD-10-CM

## 2022-01-25 DIAGNOSIS — R00.1 BRADYCARDIA: ICD-10-CM

## 2022-01-25 DIAGNOSIS — Z95.1 HX OF CABG: ICD-10-CM

## 2022-01-25 PROCEDURE — G8427 DOCREV CUR MEDS BY ELIG CLIN: HCPCS | Performed by: NURSE PRACTITIONER

## 2022-01-25 PROCEDURE — G8484 FLU IMMUNIZE NO ADMIN: HCPCS | Performed by: NURSE PRACTITIONER

## 2022-01-25 PROCEDURE — 99214 OFFICE O/P EST MOD 30 MIN: CPT | Performed by: NURSE PRACTITIONER

## 2022-01-25 PROCEDURE — G8417 CALC BMI ABV UP PARAM F/U: HCPCS | Performed by: NURSE PRACTITIONER

## 2022-01-25 PROCEDURE — 1036F TOBACCO NON-USER: CPT | Performed by: NURSE PRACTITIONER

## 2022-01-25 PROCEDURE — G8400 PT W/DXA NO RESULTS DOC: HCPCS | Performed by: NURSE PRACTITIONER

## 2022-01-25 PROCEDURE — 93000 ELECTROCARDIOGRAM COMPLETE: CPT | Performed by: NURSE PRACTITIONER

## 2022-01-25 PROCEDURE — 4040F PNEUMOC VAC/ADMIN/RCVD: CPT | Performed by: NURSE PRACTITIONER

## 2022-01-25 PROCEDURE — 3017F COLORECTAL CA SCREEN DOC REV: CPT | Performed by: NURSE PRACTITIONER

## 2022-01-25 PROCEDURE — 1090F PRES/ABSN URINE INCON ASSESS: CPT | Performed by: NURSE PRACTITIONER

## 2022-01-25 PROCEDURE — 1123F ACP DISCUSS/DSCN MKR DOCD: CPT | Performed by: NURSE PRACTITIONER

## 2022-01-25 RX ORDER — CARVEDILOL 6.25 MG/1
6.25 TABLET ORAL 2 TIMES DAILY
Qty: 180 TABLET | Refills: 3 | Status: SHIPPED | OUTPATIENT
Start: 2022-01-25

## 2022-01-25 NOTE — PATIENT INSTRUCTIONS
New instructions for today:  Reduce Coreg to 6.25 mg (1) twice daily. This was done for slow heart rate. Monitor your blood pressure and heart rate with the medication change. Indapamide 2.5 mg - this is your water pill. Take in AM.     Patient Instructions:  Continue current medications as prescribed. Always keep a current medication list. Bring your medications to every office visit. Continue to follow up with primary care provider for non cardiac medical problems. Call the office with any problems, questions or concerns at [phone]. If you have been asked to keep a blood pressure log, do so for 2 weeks. Call the office to report readings to the triage nurse at [phone]. Follow up with cardiologist as scheduled. The following educational material has been included in this after visit summary for your review: Life simple 7. Heart health. Life simple 7  1) Manage blood pressure - high blood pressure is a major risk factor for heart disease and stroke. Keeping blood pressure in health range reduces strain on your heart, arteries and kidneys. Blood pressure goal is less than 130/80. 2) Control cholesterol - contributes to plaque, which can clog arteries and lead to heart disease and stroke. When you control your cholesterol you are giving your arteries their best chance to remain clear. It is recommended that you get cholesterol lab work done once a year. 3) Reduce blood sugar - most of the food we eat is turning into glucose or blood sugar that our body uses for energy. Over time, high levels of blood sugar can damage your heart, kidneys, eyes and nerves. 4) Get active - living an active life is one of the most rewarding gifts you can give yourself and those you love. Simply put, daily physical activity increases your length and quality of life. Strive to exercise 15 minutes most days of the week.   5)  Eat better - A healthy diet is one of your best weapons for fighting cardiovascular disease. When you eat a heart healthy diet, you improve your chances for feeling good and staying healthy for life. 6)  Lose weight - when you shed extra fat an unnecessary pounds, you reduce the burden on your hear, lungs, blood vessels and skeleton. You give yourself the gift of active living, you lower your blood pressure and help yourself feel better. 7) Stop smoking - cigarette smokers have a higher risk of developing cardiovascular disease. If  You smoke, quitting is the best thing you can do for your health. Check American Heart Association on line for more information on Life's Simple 7 and tips for healthy living. A Healthy Heart: Care Instructions  Your Care Instructions     Coronary artery disease, also called heart disease, occurs when a substance called plaque builds up in the vessels that supply oxygen-rich blood to your heart muscle. This can narrow the blood vessels and reduce blood flow. A heart attack happens when blood flow is completely blocked. A high-fat diet, smoking, and other factors increase the risk of heart disease. Your doctor has found that you have a chance of having heart disease. You can do lots of things to keep your heart healthy. It may not be easy, but you can change your diet, exercise more, and quit smoking. These steps really work to lower your chance of heart disease. Follow-up care is a key part of your treatment and safety. Be sure to make and go to all appointments, and call your doctor if you are having problems. It's also a good idea to know your test results and keep a list of the medicines you take. How can you care for yourself at home? Diet  · Use less salt when you cook and eat. This helps lower your blood pressure. Taste food before salting. Add only a little salt when you think you need it. With time, your taste buds will adjust to less salt.   · Eat fewer snack items, fast foods, canned soups, and other high-salt, high-fat, processed foods.  · Read food labels and try to avoid saturated and trans fats. They increase your risk of heart disease by raising cholesterol levels. · Limit the amount of solid fat-butter, margarine, and shortening-you eat. Use olive, peanut, or canola oil when you cook. Bake, broil, and steam foods instead of frying them. · Eat a variety of fruit and vegetables every day. Dark green, deep orange, red, or yellow fruits and vegetables are especially good for you. Examples include spinach, carrots, peaches, and berries. · Foods high in fiber can reduce your cholesterol and provide important vitamins and minerals. High-fiber foods include whole-grain cereals and breads, oatmeal, beans, brown rice, citrus fruits, and apples. · Eat lean proteins. Heart-healthy proteins include seafood, lean meats and poultry, eggs, beans, peas, nuts, seeds, and soy products. · Limit drinks and foods with added sugar. These include candy, desserts, and soda pop. Lifestyle changes  · If your doctor recommends it, get more exercise. Walking is a good choice. Bit by bit, increase the amount you walk every day. Try for at least 30 minutes on most days of the week. You also may want to swim, bike, or do other activities. · Do not smoke. If you need help quitting, talk to your doctor about stop-smoking programs and medicines. These can increase your chances of quitting for good. Quitting smoking may be the most important step you can take to protect your heart. It is never too late to quit. · Limit alcohol to 2 drinks a day for men and 1 drink a day for women. Too much alcohol can cause health problems. · Manage other health problems such as diabetes, high blood pressure, and high cholesterol. If you think you may have a problem with alcohol or drug use, talk to your doctor. Medicines  · Take your medicines exactly as prescribed. Call your doctor if you think you are having a problem with your medicine.   · If your doctor recommends aspirin, take the amount directed each day. Make sure you take aspirin and not another kind of pain reliever, such as acetaminophen (Tylenol). When should you call for help? [id number] if you have symptoms of a heart attack. These may include:  · Chest pain or pressure, or a strange feeling in the chest.  · Sweating. · Shortness of breath. · Pain, pressure, or a strange feeling in the back, neck, jaw, or upper belly or in one or both shoulders or arms. · Lightheadedness or sudden weakness. · A fast or irregular heartbeat. After you call 911, the  may tell you to chew 1 adult-strength or 2 to 4 low-dose aspirin. Wait for an ambulance. Do not try to drive yourself. Watch closely for changes in your health, and be sure to contact your doctor if you have any problems. Where can you learn more? Go to https://Kextil.[employer]ister[employer]. org and sign in to your [employer] account. Enter M928 in the Enswers box to learn more about \"A Healthy Heart: Care Instructions. \"     If you do not have an account, please click on the \"Sign Up Now\" link. Current as of: December 16, 2019               Content Version: 12.5  © 9412-5901 Healthwise, [employer]. Care instructions adapted under license by [facility] ([facility]). If you have questions about a medical condition or this instruction, always ask your healthcare professional. [place] [address] any warranty or liability for your use of this information. Patient Education        Leg and Ankle Edema: Care Instructions  Your Care Instructions  Swelling in the legs, ankles, and feet is called edema. It is common after you sit or stand for a while. Long plane flights or car rides often cause swelling in the legs and feet. You may also have swelling if you have to stand for long periods of time at your job. Problems with the veins in the legs (varicose veins) and changes in hormones can also cause swelling.  Sometimes the swelling in the ankles and feet is caused by a more serious problem, such as heart failure, infection, blood clots, or liver or kidney disease. Follow-up care is a key part of your treatment and safety. Be sure to make and go to all appointments, and call your doctor if you are having problems. It's also a good idea to know your test results and keep a list of the medicines you take. How can you care for yourself at home? · If your doctor gave you medicine, take it as prescribed. Call your doctor if you think you are having a problem with your medicine. · Whenever you are resting, raise your legs up. Try to keep the swollen area higher than the level of your heart. · Take breaks from standing or sitting in one position. ? Walk around to increase the blood flow in your lower legs. ? Move your feet and ankles often while you stand, or tighten and relax your leg muscles. · Wear support stockings. Put them on in the morning, before swelling gets worse. · Eat a balanced diet. Lose weight if you need to. · Limit the amount of salt (sodium) in your diet. Salt holds fluid in the body and may increase swelling. When should you call for help? Call 911 anytime you think you may need emergency care. For example, call if:    · You have symptoms of a blood clot in your lung (called a pulmonary embolism). These may include:  ? Sudden chest pain. ? Trouble breathing. ? Coughing up blood. Call your doctor now or seek immediate medical care if:    · You have signs of a blood clot, such as:  ? Pain in your calf, back of the knee, thigh, or groin. ? Redness and swelling in your leg or groin.     · You have symptoms of infection, such as:  ? Increased pain, swelling, warmth, or redness. ? Red streaks or pus. ? A fever. Watch closely for changes in your health, and be sure to contact your doctor if:    · Your swelling is getting worse.     · You have new or worsening pain in your legs.     · You do not get better as expected.    Where can you learn more? Go to https://chpepiceweb."[employer]. org and sign in to your [employer] account. Enter V433 in the [facility] box to learn more about \"Leg and Ankle Edema: Care Instructions. \"     If you do not have an account, please click on the \"Sign Up Now\" link. Current as of: July 1, 2021               Content Version: 13.1  © 2006-2021 [employer]. Care instructions adapted under license by [facility] ([facility]). If you have questions about a medical condition or this instruction, always ask your healthcare professional. [place] [address] any warranty or liability for your use of this information. How to take:  NITROGLYCERIN (Nitrostat) 0.4 mg tablets, sublingual.  Nitroglycerin is in a group of drugs called nitrates. Nitroglycerin dilates (widens) blood vessels, making it easier for blood to flow through them and easier for the heart to pump. Dosing Guidelines for Nitroglycerin Tablets  · At the start of an angina (chest pain) attack, place one tablet under the tongue or between the cheek and gum. Do not swallow or chew the tablet; let it dissolve on its own. If necessary, a second and third tablet may be used, with five minutes between using each tablet. If you use a third tablet and your chest pain continues, it is time to seek immediate medical attention. Call 911 immediately and have someone drive you to the emergency room. You may be having a heart attack or other serious heart problem. · To prevent angina from exercise or stress, use 1 tablet 5 to 10 minutes before the activity.

## 2022-01-25 NOTE — PROGRESS NOTES
[facility], [place]. [address] On license of [facility]  [phone] office  [phone] fax      OFFICE VISIT:  2022    Lauri Hannah - : 1950  Reason For Visit:  Uri Johnson is a 80 y.o. female who is here for Follow-up (Patient complains of edema in both ankles off and on. ), Coronary Artery Disease, and Hypertension    History:   Diagnosis Orders   1. CAD in native artery     2. Presence of drug-eluting stent in left circumflex coronary artery     3. Hx of CABG     4. Hypertension, unspecified type  EKG 12 lead   5. Mixed hyperlipidemia     6. Bradycardia       The patient presents today for cardiology follow up. Uri Johnson reports no incident of angina. Over the past few days, she has noticed some mild ankle swelling L>R. She reports \"I really think it is because I have been keeping a baby and sitting a lot more. \"  No report of calf pain, SOA or orthopnea. The patient has sleep apnea and is compliant with CPAP. The patient reports having COVID 19 in . She has opted not to get vaccinated. The patient denies symptoms to suggest myocardial ischemia, overt heart failure or arrhythmia. BP is well controlled on current regimen. EKG today shows heart rate at 49 bpm.  She reports occasional lightheadedness but no syncope or near syncope. The patient's PCP monitors cholesterol. Subjective  Uri Johnson denies exertional chest pain, shortness of breath, orthopnea, paroxysmal nocturnal dyspnea, syncope, presyncope, sensed arrhythmia and fatigue. The patient denies numbness or weakness to suggest cerebrovascular accident or transient ischemic attack. + mild ankle edema last few days.       Lauri Hannah has the following history as recorded in [facility]:  Patient Active Problem List   Diagnosis Code    Coronary artery disease I25.10    Hypertension I10    Hyperlipidemia E78.5    Sleep apnea G47.30    Glucose intolerance E74.39  DJD (degenerative joint disease) M19.90    Lumbosacral radiculopathy due to degenerative joint disease of spine M47.27    Precordial pain R07.2    Abnormal stress echo R94.39    Hx of CABG Z95.1    Presence of drug-eluting stent in left circumflex coronary artery Z95.5     Past Medical History:   Diagnosis Date    Arthritis     Cerebral artery occlusion with cerebral infarction ([address]     TIA    Coronary artery disease     Hyperlipidemia     Hypertension     Sleep apnea      Past Surgical History:   Procedure Laterality Date    CARDIAC CATHETERIZATION  3/23/15  MDL    EF 60%    CHOLECYSTECTOMY      CORONARY ARTERY BYPASS GRAFT      DIAGNOSTIC CARDIAC CATH LAB PROCEDURE      JOINT REPLACEMENT Right     Knee    KNEE SURGERY      TUBAL LIGATION       Family History   Problem Relation Age of Onset    Diabetes Mother     Heart Disease Father     Cancer Father     High Blood Pressure Father      Social History     Tobacco Use    Smoking status: Never Smoker    Smokeless tobacco: Never Used   Substance Use Topics    Alcohol use: No      Current Outpatient Medications   Medication Sig Dispense Refill    indapamide (LOZOL) 2.5 MG tablet TAKE ONE TABLET BY MOUTH EVERY MORNING 90 tablet 3    Turmeric Curcumin 500 MG CAPS Take by mouth      aspirin 81 MG chewable tablet Take 1 tablet by mouth daily 30 tablet 3    Krill Oil (OMEGA-3) 500 MG CAPS Take by mouth daily      cetirizine (ZYRTEC) 10 MG tablet Take 10 mg by mouth daily      carvedilol (COREG) 12.5 MG tablet Take 12.5 mg by mouth 2 times daily (with meals)      calcium citrate-vitamin D (CITRICAL + D) 315-250 MG-UNIT TABS per tablet Take 1 tablet by mouth 2 times daily (with meals)      atorvastatin (LIPITOR) 80 MG tablet Take 80 mg by mouth daily      Multiple Vitamins-Minerals (THERAPEUTIC MULTIVITAMIN-MINERALS) tablet Take 1 tablet by mouth daily      clopidogrel (PLAVIX) 75 MG tablet Take 75 mg by mouth daily      ranolazine (RANEXA) 1000 MG extended release tablet Take 500 mg by mouth 2 times daily      vitamin D (CHOLECALCIFEROL) 1000 UNIT TABS tablet Take 1,000 Units by mouth daily      losartan (COZAAR) 100 MG tablet Take 100 mg by mouth daily       No current facility-administered medications for this visit. Allergies: Azithromycin, Codeine, Tramadol, and Valium [diazepam]    Review of Systems  Constitutional  no appetite change, or unexpected weight change. No fever, chills or diaphoresis. No significant change in activity level or new onset of fatigue. HEENT  no significant rhinorrhea or epistaxis. No tinnitus or significant hearing loss. Eyes  no sudden vision change or amaurosis. No corneal arcus, xantholasma, subconjunctival hemorrhage or discharge. Respiratory  no significant wheezing, stridor, apnea or cough. No dyspnea on exertion or shortness of air. Cardiovascular  no exertional chest pain to suggest myocardial ischemia. No orthopnea or PND. No sensation of sustained arrythmia. No occurrence of slow heart rate. No palpitations. No claudication. Gastrointestinal  no abdominal swelling or pain. No blood in stool. No severe constipation, diarrhea, nausea, or vomiting. Genitourinary  no dysuria, frequency, or urgency. No flank pain or hematuria. Musculoskeletal  no back pain or myalgia. No problems with gait. Extremities - no clubbing or cyanosis. + mild ankle edema last few days. Skin  no color change or rash. No pallor. No new surgical incision. Neurologic  no speech difficulty, facial asymmetry or lateralizing weakness. No seizures, presyncope or syncope.  + occasional lightheadedness. Hematologic  no easy bruising or excessive bleeding. Psychiatric  no severe anxiety or insomnia. No confusion. All other review of systems are negative.     Objective  Vital Signs - /66   Pulse (!) 48   Ht 5' 1\" (1.549 m)   Wt 239 lb (108.4 kg)   SpO2 97%   BMI 45.16 kg/m² General - Charlee Hernandez is alert, cooperative, and pleasant. Well groomed. No acute distress. Body habitus - Body mass index is 45.16 kg/m². HEENT  Head is normocephalic. No circumoral cyanosis. Dentition is normal.  EYES -   Lids normal without ptosis. No discharge, edema or subconjunctival hemorrhage. Neck - Symmetrical without apparent mass or lymphadenopathy. Respiratory - Normal respiratory effort without use of accessory muscles. Ausculatation reveals vesicular breath sounds without crackles, wheezes, rub or rhonchi. Cardiovascular  No jugular venous distention. Auscultation reveals regular rate and rhythm. No audible clicks, gallop or rub. No murmur. No lower extremity varicosities. No carotid bruits. Abdominal -  No visible distention, mass or pulsations. Extremities - No clubbing or cyanosis. No statis dermatitis or ulcers. No edema. Musculoskeletal -   No Osler's nodes. No kyphosis or scoliosis. Gait is even and regular without limp or shuffle. Ambulates without assistance. Skin -  Warm and dry; no rash or pallor. No new surgical wound. Neurological - No focal neurological deficits. Thought processes coherent. No apparent tremor. Oriented to person, place and time. Psychiatric -  Appropriate affect and mood. Data reviewed:  Prior Cardiac History -   7/08 CABG x 3  2009 Failed RCA PTCA  2011 Stent RCA  9/15 Restudy LV Normal, Occluded SVGs, LIMA-LAD Patent, Cx Mod disease, RCA Stent Patent  8/16 Negative Stress ECHO  12/14/18 Clinically positive stress ECHO (EKG and ECHO negative), AUC indication 20, AUC score 7   1/14/19  Cath  80% mid circ, 2.5 x 8 12 GERBER, closed vein grafts, patent left ANDREZ, normal LVFX     EKG today reviewed: NSR 49 bpm; QTc .461; no acute ischemic changes or ectopy.      BP Readings from Last 3 Encounters:   01/25/22 120/66   11/11/19 130/84   03/29/19 90/62    Pulse Readings from Last 3 Encounters:   01/25/22 (!) 48   11/11/19 54   03/29/19 50 Wt Readings from Last 3 Encounters:   01/25/22 239 lb (108.4 kg)   05/01/20 218 lb (98.9 kg)   11/21/19 225 lb (102.1 kg)     Assessment/Plan:   Diagnosis Orders   1. CAD in native artery     2. Presence of drug-eluting stent in left circumflex coronary artery     3. Hx of CABG     4. Hypertension, unspecified type  EKG 12 lead   5. Mixed hyperlipidemia     6. Bradycardia       Stable CV status without overt heart failure, sensed arrhythmia or angina. CAD s/p CABG and stenting - stable on current medical management. HTN - normotensive on current regimen. BP goal lesss than 130/80. Continue same. Bradycardia - decrease Coreg to 6.25 mg BID. Advised to monitor BP and heart rate with change. Hyperlipidemia - PCP follows lipids. Obtain labs for review. Continue Lipitor 80 mg daily. Patient is compliant with medication regimen. Previous cardiac history and records reviewed. Continue current medical management for cardiac related condition. Continue other current medications as directed. Continue to follow up with primary care provider for non cardiac medical problems. [place] [facility] [place] please request that your labs be faxed to this office at [phone]. BP goal 130/80 or less. Call the office with any problems, questions or concerns at [phone]. Cardiology follow up as scheduled in [address] appointments. Educational included in patient instructions. Heart health. NTG SL.      MAR Lawrence

## 2022-03-29 RX ORDER — LOSARTAN POTASSIUM 50 MG/1
50 TABLET ORAL DAILY
Qty: 90 TABLET | Refills: 3 | Status: SHIPPED | OUTPATIENT
Start: 2022-03-29 | End: 2022-04-20 | Stop reason: DRUGHIGH

## 2022-03-29 RX ORDER — CLOPIDOGREL BISULFATE 75 MG/1
75 TABLET ORAL DAILY
Qty: 90 TABLET | Refills: 3 | Status: SHIPPED | OUTPATIENT
Start: 2022-03-29 | End: 2023-03-27

## 2022-03-29 RX ORDER — INDAPAMIDE 2.5 MG/1
2.5 TABLET, FILM COATED ORAL EVERY MORNING
Qty: 90 TABLET | Refills: 3 | Status: SHIPPED | OUTPATIENT
Start: 2022-03-29

## 2022-03-29 NOTE — TELEPHONE ENCOUNTER
Rx Refill Note  Requested Prescriptions     Pending Prescriptions Disp Refills   • indapamide (LOZOL) 2.5 MG tablet 90 tablet 1     Sig: Take 1 tablet by mouth Every Morning.      Last office visit with prescribing clinician: Visit date not found      Next office visit with prescribing clinician: Visit date not found   Barbie Gonzalez MA  03/29/22, 15:00 CDT

## 2022-03-29 NOTE — TELEPHONE ENCOUNTER
Rx Refill Note  Requested Prescriptions     Pending Prescriptions Disp Refills   • clopidogrel (PLAVIX) 75 MG tablet 90 tablet 1     Sig: Take 1 tablet by mouth Daily.      Last office visit with prescribing clinician: Visit date not found      Next office visit with prescribing clinician: Visit date not found   Barbie Gonzalez MA  03/29/22, 15:01 CDT

## 2022-03-29 NOTE — TELEPHONE ENCOUNTER
Rx Refill Note  Requested Prescriptions     Pending Prescriptions Disp Refills   • losartan (Cozaar) 50 MG tablet 90 tablet 1     Sig: Take 1 tablet by mouth Daily.      Last office visit with prescribing clinician: Visit date not found      Next office visit with prescribing clinician: Visit date not found   {Barbie Gonzalez MA  03/29/22, 14:57 CDT

## 2022-03-30 RX ORDER — HYDROCHLOROTHIAZIDE 12.5 MG/1
12.5 TABLET ORAL DAILY
Qty: 90 TABLET | Refills: 3 | Status: SHIPPED | OUTPATIENT
Start: 2022-03-30 | End: 2023-03-15

## 2022-03-30 NOTE — TELEPHONE ENCOUNTER
Rx Refill Note  Requested Prescriptions     Pending Prescriptions Disp Refills   • hydroCHLOROthiazide (HYDRODIURIL) 12.5 MG tablet 90 tablet 3     Sig: Take 1 tablet by mouth Daily.      Last office visit with prescribing clinician: Visit date not found      Next office visit with prescribing clinician: Visit date not found   Barbie Gonzalez MA  03/30/22, 09:21 CDT

## 2022-04-15 ENCOUNTER — LAB (OUTPATIENT)
Dept: FAMILY MEDICINE CLINIC | Facility: CLINIC | Age: 72
End: 2022-04-15

## 2022-04-15 DIAGNOSIS — I10 ESSENTIAL HYPERTENSION, MALIGNANT: Primary | ICD-10-CM

## 2022-04-15 DIAGNOSIS — E78.2 MIXED HYPERLIPIDEMIA: ICD-10-CM

## 2022-04-15 DIAGNOSIS — R53.83 FATIGUE, UNSPECIFIED TYPE: ICD-10-CM

## 2022-04-16 LAB
ALBUMIN SERPL-MCNC: 3.8 G/DL (ref 3.8–4.8)
ALBUMIN/GLOB SERPL: 1.1 {RATIO} (ref 1.2–2.2)
ALP SERPL-CCNC: 50 IU/L (ref 44–121)
ALT SERPL-CCNC: 36 IU/L (ref 0–32)
AST SERPL-CCNC: 40 IU/L (ref 0–40)
BASOPHILS # BLD AUTO: 0 X10E3/UL (ref 0–0.2)
BASOPHILS NFR BLD AUTO: 1 %
BILIRUB DIRECT SERPL-MCNC: 0.19 MG/DL (ref 0–0.4)
BILIRUB SERPL-MCNC: 0.5 MG/DL (ref 0–1.2)
BUN SERPL-MCNC: 20 MG/DL (ref 8–27)
BUN/CREAT SERPL: 26 (ref 12–28)
CALCIUM SERPL-MCNC: 9.7 MG/DL (ref 8.7–10.3)
CHLORIDE SERPL-SCNC: 100 MMOL/L (ref 96–106)
CHOLEST SERPL-MCNC: 134 MG/DL (ref 100–199)
CO2 SERPL-SCNC: 24 MMOL/L (ref 20–29)
CREAT SERPL-MCNC: 0.77 MG/DL (ref 0.57–1)
EGFRCR SERPLBLD CKD-EPI 2021: 83 ML/MIN/1.73
EOSINOPHIL # BLD AUTO: 0.2 X10E3/UL (ref 0–0.4)
EOSINOPHIL NFR BLD AUTO: 3 %
ERYTHROCYTE [DISTWIDTH] IN BLOOD BY AUTOMATED COUNT: 12.5 % (ref 11.7–15.4)
GLOBULIN SER CALC-MCNC: 3.6 G/DL (ref 1.5–4.5)
GLUCOSE SERPL-MCNC: 130 MG/DL (ref 65–99)
HCT VFR BLD AUTO: 36.9 % (ref 34–46.6)
HDLC SERPL-MCNC: 61 MG/DL
HGB BLD-MCNC: 12.7 G/DL (ref 11.1–15.9)
IMM GRANULOCYTES # BLD AUTO: 0 X10E3/UL (ref 0–0.1)
IMM GRANULOCYTES NFR BLD AUTO: 0 %
LDLC SERPL CALC-MCNC: 56 MG/DL (ref 0–99)
LDLC/HDLC SERPL: 0.9 RATIO (ref 0–3.2)
LYMPHOCYTES # BLD AUTO: 2.2 X10E3/UL (ref 0.7–3.1)
LYMPHOCYTES NFR BLD AUTO: 40 %
MCH RBC QN AUTO: 31.9 PG (ref 26.6–33)
MCHC RBC AUTO-ENTMCNC: 34.4 G/DL (ref 31.5–35.7)
MCV RBC AUTO: 93 FL (ref 79–97)
MONOCYTES # BLD AUTO: 0.6 X10E3/UL (ref 0.1–0.9)
MONOCYTES NFR BLD AUTO: 12 %
NEUTROPHILS # BLD AUTO: 2.4 X10E3/UL (ref 1.4–7)
NEUTROPHILS NFR BLD AUTO: 44 %
PLATELET # BLD AUTO: 135 X10E3/UL (ref 150–450)
POTASSIUM SERPL-SCNC: 3.6 MMOL/L (ref 3.5–5.2)
PROT SERPL-MCNC: 7.4 G/DL (ref 6–8.5)
RBC # BLD AUTO: 3.98 X10E6/UL (ref 3.77–5.28)
SODIUM SERPL-SCNC: 137 MMOL/L (ref 134–144)
T4 SERPL-MCNC: 9.5 UG/DL (ref 4.5–12)
TRIGL SERPL-MCNC: 91 MG/DL (ref 0–149)
TSH SERPL DL<=0.005 MIU/L-ACNC: 1.86 UIU/ML (ref 0.45–4.5)
VLDLC SERPL CALC-MCNC: 17 MG/DL (ref 5–40)
WBC # BLD AUTO: 5.5 X10E3/UL (ref 3.4–10.8)

## 2022-04-19 LAB
HBA1C MFR BLD: 5.4 % (ref 4.8–5.6)
WRITTEN AUTHORIZATION: NORMAL

## 2022-04-20 ENCOUNTER — OFFICE VISIT (OUTPATIENT)
Dept: FAMILY MEDICINE CLINIC | Facility: CLINIC | Age: 72
End: 2022-04-20

## 2022-04-20 VITALS
HEART RATE: 53 BPM | HEIGHT: 63 IN | OXYGEN SATURATION: 96 % | BODY MASS INDEX: 42.7 KG/M2 | SYSTOLIC BLOOD PRESSURE: 115 MMHG | TEMPERATURE: 97.8 F | DIASTOLIC BLOOD PRESSURE: 68 MMHG | WEIGHT: 241 LBS

## 2022-04-20 DIAGNOSIS — E78.2 MIXED HYPERLIPIDEMIA: Primary | ICD-10-CM

## 2022-04-20 DIAGNOSIS — I10 PRIMARY HYPERTENSION: ICD-10-CM

## 2022-04-20 PROCEDURE — 99202 OFFICE O/P NEW SF 15 MIN: CPT | Performed by: NURSE PRACTITIONER

## 2022-04-20 RX ORDER — MELATONIN
1000 DAILY
COMMUNITY

## 2022-04-20 RX ORDER — MULTIPLE VITAMINS W/ MINERALS TAB 9MG-400MCG
1 TAB ORAL DAILY
COMMUNITY

## 2022-04-20 RX ORDER — ATORVASTATIN CALCIUM 80 MG/1
80 TABLET, FILM COATED ORAL DAILY
COMMUNITY
End: 2023-03-27

## 2022-04-20 RX ORDER — LOSARTAN POTASSIUM 100 MG/1
100 TABLET ORAL DAILY
COMMUNITY

## 2022-04-20 RX ORDER — KRILL/OM-3/DHA/EPA/PHOSPHO/AST 500-110 MG
1 CAPSULE ORAL DAILY
COMMUNITY

## 2022-04-20 RX ORDER — CARVEDILOL 6.25 MG/1
1 TABLET ORAL 2 TIMES DAILY
COMMUNITY
Start: 2022-01-25

## 2022-04-20 RX ORDER — RANOLAZINE 1000 MG/1
500 TABLET, EXTENDED RELEASE ORAL EVERY 12 HOURS SCHEDULED
COMMUNITY
End: 2023-03-27 | Stop reason: SDUPTHER

## 2022-04-20 RX ORDER — CETIRIZINE HYDROCHLORIDE 10 MG/1
1 TABLET ORAL DAILY
COMMUNITY

## 2022-04-20 NOTE — PROGRESS NOTES
"Chief Complaint   Patient presents with   • Follow-up     Medication refill        Subjective   Priscilla Santos is a 71 y.o. female who presents today for fu 6 mo labs    HPI   Pt with hyperlipidemia and HTN. Verbalizes no complaints today.    No Known Allergies      OBJECTIVE:  Vitals:    04/20/22 1251   BP: 115/68   BP Location: Right arm   Patient Position: Sitting   Cuff Size: Adult   Pulse: 53   Temp: 97.8 °F (36.6 °C)   SpO2: 96%   Weight: 109 kg (241 lb)   Height: 160 cm (63\")     A1C-5.4  AST-40  ALT-36  GFR-83  All labs reviewed with patient.    Physical Exam  Vitals and nursing note reviewed.   Constitutional:       Appearance: Normal appearance.   HENT:      Head: Normocephalic and atraumatic.      Nose: Nose normal.      Mouth/Throat:      Mouth: Mucous membranes are moist.   Eyes:      Extraocular Movements: Extraocular movements intact.      Pupils: Pupils are equal, round, and reactive to light.   Cardiovascular:      Rate and Rhythm: Normal rate and regular rhythm.      Pulses: Normal pulses.      Heart sounds: Normal heart sounds.   Pulmonary:      Effort: Pulmonary effort is normal.      Breath sounds: Normal breath sounds.   Abdominal:      General: Abdomen is flat. Bowel sounds are normal.   Musculoskeletal:         General: Normal range of motion.      Cervical back: Normal range of motion and neck supple.   Skin:     General: Skin is warm.      Capillary Refill: Capillary refill takes less than 2 seconds.   Neurological:      General: No focal deficit present.      Mental Status: She is alert and oriented to person, place, and time.   Psychiatric:         Mood and Affect: Mood normal.         Behavior: Behavior normal.         Patient's Body mass index is 42.69 kg/m². indicating that she is morbidly obese (BMI > 40 or > 35 with obesity - related health condition). Obesity-related health conditions include the following: hypertension and dyslipidemias. Obesity is unchanged. BMI is is above " average; BMI management plan is completed. We discussed low calorie, low carb based diet program, portion control and increasing exercise..        ASSESSMENT/ PLAN:    Diagnoses and all orders for this visit:    1. Mixed hyperlipidemia (Primary)    2. Primary hypertension          Management Plan:     An After Visit Summary was printed and given to the patient at discharge.    Follow-up: No follow-ups on file.    I spent 20 minutes caring for Priscilla on this date of service. This time includes time spent by me in the following activities: preparing for the visit, obtaining and/or reviewing a separately obtained history, performing a medically appropriate examination and/or evaluation, ordering medications, tests, or procedures and documenting information in the medical record     KENDRA Epperson 4/20/2022 13:20 CDT  This note was electronically signed.

## 2022-05-04 ENCOUNTER — OFFICE VISIT (OUTPATIENT)
Dept: FAMILY MEDICINE CLINIC | Facility: CLINIC | Age: 72
End: 2022-05-04

## 2022-05-04 VITALS
OXYGEN SATURATION: 100 % | HEIGHT: 63 IN | DIASTOLIC BLOOD PRESSURE: 65 MMHG | SYSTOLIC BLOOD PRESSURE: 100 MMHG | HEART RATE: 62 BPM | WEIGHT: 234.2 LBS | TEMPERATURE: 97.9 F | BODY MASS INDEX: 41.5 KG/M2

## 2022-05-04 DIAGNOSIS — S05.02XA: Primary | ICD-10-CM

## 2022-05-04 DIAGNOSIS — E66.01 CLASS 3 SEVERE OBESITY DUE TO EXCESS CALORIES WITH SERIOUS COMORBIDITY AND BODY MASS INDEX (BMI) OF 40.0 TO 44.9 IN ADULT: ICD-10-CM

## 2022-05-04 PROCEDURE — 99213 OFFICE O/P EST LOW 20 MIN: CPT | Performed by: NURSE PRACTITIONER

## 2022-05-04 RX ORDER — PREDNISOLONE ACETATE 10 MG/ML
2 SUSPENSION/ DROPS OPHTHALMIC 4 TIMES DAILY
Qty: 10 ML | Refills: 2 | Status: SHIPPED | OUTPATIENT
Start: 2022-05-04 | End: 2022-09-09

## 2022-05-04 NOTE — PROGRESS NOTES
"Chief Complaint   Patient presents with   • Eye Problem     Left eye swollen and red. Patient states she got sawdust in eye on 5/3/22 and it has irritated her eye        Subjective   Priscilla Santos is a 71 y.o. female who presents today for left eye.    Eye Problem   The left eye is affected. This is a new problem. The current episode started yesterday. The problem occurs constantly. The problem has been unchanged. The injury mechanism was a foreign body. The pain is at a severity of 7/10. The pain is moderate. There is no known exposure to pink eye. She does not wear contacts. Associated symptoms include a foreign body sensation. She has tried nothing for the symptoms.          Allergies   Allergen Reactions   • Azithromycin Provider Review Needed   • Codeine Provider Review Needed   • Diazepam Provider Review Needed   • Tramadol Provider Review Needed         OBJECTIVE:  Vitals:    05/04/22 1007   BP: 100/65   BP Location: Right arm   Patient Position: Sitting   Cuff Size: Large Adult   Pulse: 62   Temp: 97.9 °F (36.6 °C)   TempSrc: Infrared   SpO2: 100%   Weight: 106 kg (234 lb 3.2 oz)   Height: 160 cm (62.99\")     Physical Exam  Constitutional:       Appearance: Normal appearance.   HENT:      Mouth/Throat:      Mouth: Mucous membranes are moist.   Eyes:     Cardiovascular:      Pulses: Normal pulses.      Heart sounds: Normal heart sounds.   Pulmonary:      Effort: Pulmonary effort is normal.      Breath sounds: Normal breath sounds.   Skin:     General: Skin is warm and dry.   Neurological:      General: No focal deficit present.      Mental Status: She is alert and oriented to person, place, and time.   Psychiatric:         Mood and Affect: Mood normal.         Behavior: Behavior normal.         Thought Content: Thought content normal.         Judgment: Judgment normal.         Class 3 Severe Obesity (BMI >=40). Obesity-related health conditions include the following: hypertension, coronary heart disease and " dyslipidemias. Obesity is unchanged. BMI is is above average; BMI management plan is completed. We discussed low calorie, low carb based diet program, portion control and increasing exercise.        ASSESSMENT/ PLAN:    Diagnoses and all orders for this visit:    1. Scratched cornea, left, initial encounter (Primary)  -     prednisoLONE acetate (Pred Forte) 1 % ophthalmic suspension; Administer 2 drops into the left eye 4 (Four) Times a Day.  Dispense: 10 mL; Refill: 2    2. Class 3 severe obesity due to excess calories with serious comorbidity and body mass index (BMI) of 40.0 to 44.9 in adult (HCC)    Patch applied to left eye      Management Plan:     An After Visit Summary was printed and given to the patient at discharge.    Follow-up: Return if symptoms worsen or fail to improve.    I spent 20 minutes caring for Priscilla on this date of service. This time includes time spent by me in the following activities: preparing for the visit, obtaining and/or reviewing a separately obtained history, performing a medically appropriate examination and/or evaluation, ordering medications, tests, or procedures and documenting information in the medical record     Reza Charles, KENDRA 5/4/2022 10:33 CDT  This note was electronically signed.

## 2022-05-25 ENCOUNTER — OFFICE VISIT (OUTPATIENT)
Dept: CARDIOLOGY CLINIC | Age: 72
End: 2022-05-25
Payer: MEDICARE

## 2022-05-25 ENCOUNTER — TELEPHONE (OUTPATIENT)
Dept: CARDIOLOGY CLINIC | Age: 72
End: 2022-05-25

## 2022-05-25 VITALS
WEIGHT: 238 LBS | HEIGHT: 61 IN | HEART RATE: 64 BPM | SYSTOLIC BLOOD PRESSURE: 132 MMHG | BODY MASS INDEX: 44.93 KG/M2 | DIASTOLIC BLOOD PRESSURE: 78 MMHG

## 2022-05-25 DIAGNOSIS — I25.10 CORONARY ARTERY DISEASE INVOLVING NATIVE CORONARY ARTERY OF NATIVE HEART WITHOUT ANGINA PECTORIS: Primary | ICD-10-CM

## 2022-05-25 PROCEDURE — 1090F PRES/ABSN URINE INCON ASSESS: CPT | Performed by: INTERNAL MEDICINE

## 2022-05-25 PROCEDURE — 99214 OFFICE O/P EST MOD 30 MIN: CPT | Performed by: INTERNAL MEDICINE

## 2022-05-25 PROCEDURE — G8400 PT W/DXA NO RESULTS DOC: HCPCS | Performed by: INTERNAL MEDICINE

## 2022-05-25 PROCEDURE — G8417 CALC BMI ABV UP PARAM F/U: HCPCS | Performed by: INTERNAL MEDICINE

## 2022-05-25 PROCEDURE — 1036F TOBACCO NON-USER: CPT | Performed by: INTERNAL MEDICINE

## 2022-05-25 PROCEDURE — G8427 DOCREV CUR MEDS BY ELIG CLIN: HCPCS | Performed by: INTERNAL MEDICINE

## 2022-05-25 PROCEDURE — 1123F ACP DISCUSS/DSCN MKR DOCD: CPT | Performed by: INTERNAL MEDICINE

## 2022-05-25 PROCEDURE — 3017F COLORECTAL CA SCREEN DOC REV: CPT | Performed by: INTERNAL MEDICINE

## 2022-05-25 NOTE — PROGRESS NOTES
HISTORY  78-year-old lady with a history of glucose intolerance, sleep apnea, dyslipidemia, hypertension, and coronary disease returns for routine follow-up visit. History of bypass grafting in 2008 with stenting of her right coronary artery in 2011 and of her mid circumflex in January 2019 at which time her vein grafts were occluded, the mammary to the LAD was patent, and her left ventricular function was preserved. On return today she relates no change in her chronic nonexertional chest discomfort. She denies symptoms of overt left ventricular dysfunction or dysrhythmia. She has not been vaccinated for COVID-19. PHYSICAL EXAM  On exam she carries 238 pounds in a 5 feet 1 inch frame. Pressure is 132/78 pulse is 64. Obese middle-aged lady. EOMs full, sclerae and conjunctiva normal. PERRLA. Mask in place. Trachea midline with no neck masses. Assessment of internal jugular veins reveals no elevation of central venous pressure at 45 degrees. Carotid pulses normal without delay or bruit. Thyroid normal to palpation. Midline sternotomy scar. Chest exam reveals normal respiratory effort, no abnormal breath sounds and normal expiratory phase. No skin lesions seen. PMI normal. S1, S2 normal with 1/6 systolic murmur. Obese abdomen. Normal bowel sounds without palpable mass or bruit. No clubbing or acrocyanosis. Plus pretibial edema. .  General motor strength appears to be within normal limits. Normal range of motion with normal gait. Alert, oriented x 3, memory and cognition normal as reflected by history and conversation. ASSESSMENT/PLAN:   1. Coronary disease -[place] stable now 3 years post circumflex stenting. Continue carvedilol, Ranexa, urine, and Plavix. 2.  Hypertension -marginal control. Continue carvedilol, famotidine losartan  3. Dyslipidemia -recent values LDL 56, HDL 61, triglycerides 91.   Continue Lipitor and krill oil

## 2022-07-05 DIAGNOSIS — M62.838 MUSCLE SPASM: Primary | ICD-10-CM

## 2022-07-05 RX ORDER — CYCLOBENZAPRINE HCL 10 MG
10 TABLET ORAL 3 TIMES DAILY PRN
Qty: 90 TABLET | Refills: 0 | Status: SHIPPED | OUTPATIENT
Start: 2022-07-05

## 2022-07-06 ENCOUNTER — TELEPHONE (OUTPATIENT)
Dept: FAMILY MEDICINE CLINIC | Facility: CLINIC | Age: 72
End: 2022-07-06

## 2022-07-06 NOTE — TELEPHONE ENCOUNTER
PA approved cyclobenzaprine 10mg tablets 1/1/2022-10/4/2022. Spoke with Blaire at pharmacy to notify, will notify pt.

## 2022-07-14 RX ORDER — FLUTICASONE PROPIONATE 50 MCG
2 SPRAY, SUSPENSION (ML) NASAL DAILY
Qty: 18 ML | Refills: 5 | Status: SHIPPED | OUTPATIENT
Start: 2022-07-14 | End: 2023-01-27

## 2022-07-14 NOTE — TELEPHONE ENCOUNTER
Rx Refill Note  Requested Prescriptions     Pending Prescriptions Disp Refills   • fluticasone (Flonase) 50 MCG/ACT nasal spray       Si sprays into the nostril(s) as directed by provider Daily.      Last office visit with prescribing clinician: 2022      Next office visit with prescribing clinician: 10/27/2022     Daija Ng MA  22, 10:10 CDT

## 2022-09-09 ENCOUNTER — OFFICE VISIT (OUTPATIENT)
Dept: FAMILY MEDICINE CLINIC | Facility: CLINIC | Age: 72
End: 2022-09-09

## 2022-09-09 VITALS
TEMPERATURE: 97.5 F | DIASTOLIC BLOOD PRESSURE: 70 MMHG | SYSTOLIC BLOOD PRESSURE: 101 MMHG | WEIGHT: 227.6 LBS | OXYGEN SATURATION: 99 % | HEIGHT: 63 IN | HEART RATE: 52 BPM | BODY MASS INDEX: 40.33 KG/M2

## 2022-09-09 DIAGNOSIS — J06.9 VIRAL URI WITH COUGH: Primary | ICD-10-CM

## 2022-09-09 DIAGNOSIS — Z01.84 COVID-19 VIRUS ANTIBODY NEGATIVE: ICD-10-CM

## 2022-09-09 DIAGNOSIS — R11.0 NAUSEA: ICD-10-CM

## 2022-09-09 PROCEDURE — 87428 SARSCOV & INF VIR A&B AG IA: CPT | Performed by: NURSE PRACTITIONER

## 2022-09-09 PROCEDURE — 96372 THER/PROPH/DIAG INJ SC/IM: CPT | Performed by: NURSE PRACTITIONER

## 2022-09-09 PROCEDURE — 99213 OFFICE O/P EST LOW 20 MIN: CPT | Performed by: NURSE PRACTITIONER

## 2022-09-09 RX ORDER — ONDANSETRON 4 MG/1
4 TABLET, ORALLY DISINTEGRATING ORAL EVERY 8 HOURS PRN
Qty: 30 TABLET | Refills: 0 | Status: SHIPPED | OUTPATIENT
Start: 2022-09-09 | End: 2022-10-27

## 2022-09-09 RX ORDER — TRIAMCINOLONE ACETONIDE 40 MG/ML
80 INJECTION, SUSPENSION INTRA-ARTICULAR; INTRAMUSCULAR ONCE
Status: COMPLETED | OUTPATIENT
Start: 2022-09-09 | End: 2022-09-09

## 2022-09-09 RX ADMIN — TRIAMCINOLONE ACETONIDE 80 MG: 40 INJECTION, SUSPENSION INTRA-ARTICULAR; INTRAMUSCULAR at 16:24

## 2022-09-09 NOTE — PROGRESS NOTES
"Chief Complaint  Fatigue, Fever, Headache, Cough, and Shortness of Breath    Subjective        Priscilla Santos presents to [facility]  History of Present Illness  Symptoms present x 1 week.  Her symptoms have waxed and waned since then.   Main symptom still present is dry, sore throat and nausea.  She has had a close exposure to Covid.  She is not vaccinated.  \"We don't believe in Covid so you can't catch what you don't believe in.\"    Objective   Vital Signs:  /70 (BP Location: Right arm, Patient Position: Sitting, Cuff Size: Adult)   Pulse 52   Temp 97.5 °F (36.4 °C)   Ht 160 cm (62.99\")   Wt 103 kg (227 lb 9.6 oz)   SpO2 99%   BMI 40.33 kg/m²   Estimated body mass index is 40.33 kg/m² as calculated from the following:    Height as of this encounter: 160 cm (62.99\").    Weight as of this encounter: 103 kg (227 lb 9.6 oz).      Physical Exam  Vitals and nursing note reviewed.   Constitutional:       General: She is not in acute distress.     Appearance: Normal appearance. She is obese. She is not ill-appearing.   HENT:      Head: Normocephalic and atraumatic.      Right Ear: Tympanic membrane and ear canal normal.      Left Ear: Tympanic membrane and ear canal normal.      Nose: Nose normal.      Mouth/Throat:      Pharynx: Oropharynx is clear. No oropharyngeal exudate or posterior oropharyngeal erythema.      Comments: Oral space is dry.  Cardiovascular:      Rate and Rhythm: Bradycardia present.      Heart sounds: Normal heart sounds. No murmur heard.  Pulmonary:      Effort: Pulmonary effort is normal.      Breath sounds: Normal breath sounds.   Abdominal:      General: Bowel sounds are normal.      Palpations: Abdomen is soft.   Skin:     General: Skin is warm and dry.   Neurological:      Mental Status: She is alert and oriented to person, place, and time.        Result Review :                Assessment and Plan   Diagnoses and all orders for this visit:    1. Viral " URI with cough (Primary)  -     triamcinolone acetonide (KENALOG-40) injection 80 mg    2. Nausea  -     ondansetron ODT (Zofran ODT) 4 MG disintegrating tablet; Place 1 tablet on the tongue Every 8 (Eight) Hours As Needed for Nausea.  Dispense: 30 tablet; Refill: 0             Follow Up   Return if symptoms worsen or fail to improve.  Patient was given instructions and counseling regarding her condition or for health maintenance advice. Please see specific information pulled into the AVS if appropriate.     KENDRA Canela  This note is electronically signed.

## 2022-09-12 LAB
EXPIRATION DATE: NORMAL
FLUAV AG UPPER RESP QL IA.RAPID: NOT DETECTED
FLUBV AG UPPER RESP QL IA.RAPID: NOT DETECTED
INTERNAL CONTROL: NORMAL
Lab: NORMAL
SARS-COV-2 AG UPPER RESP QL IA.RAPID: NOT DETECTED

## 2022-10-27 ENCOUNTER — OFFICE VISIT (OUTPATIENT)
Dept: FAMILY MEDICINE CLINIC | Facility: CLINIC | Age: 72
End: 2022-10-27

## 2022-10-27 VITALS
HEIGHT: 63 IN | WEIGHT: 222.8 LBS | HEART RATE: 57 BPM | SYSTOLIC BLOOD PRESSURE: 117 MMHG | OXYGEN SATURATION: 98 % | TEMPERATURE: 97.6 F | DIASTOLIC BLOOD PRESSURE: 78 MMHG | BODY MASS INDEX: 39.48 KG/M2

## 2022-10-27 DIAGNOSIS — Z78.0 POST-MENOPAUSAL: ICD-10-CM

## 2022-10-27 DIAGNOSIS — E66.01 CLASS 2 SEVERE OBESITY DUE TO EXCESS CALORIES WITH SERIOUS COMORBIDITY AND BODY MASS INDEX (BMI) OF 39.0 TO 39.9 IN ADULT: ICD-10-CM

## 2022-10-27 DIAGNOSIS — M25.50 MULTIPLE JOINT PAIN: ICD-10-CM

## 2022-10-27 DIAGNOSIS — Z96.653 HISTORY OF BILATERAL KNEE REPLACEMENT: ICD-10-CM

## 2022-10-27 DIAGNOSIS — Z12.11 SCREENING FOR COLON CANCER: ICD-10-CM

## 2022-10-27 DIAGNOSIS — Z00.00 ENCOUNTER FOR SUBSEQUENT ANNUAL WELLNESS VISIT IN MEDICARE PATIENT: Primary | ICD-10-CM

## 2022-10-27 DIAGNOSIS — I10 PRIMARY HYPERTENSION: ICD-10-CM

## 2022-10-27 DIAGNOSIS — K42.9 UMBILICAL HERNIA WITHOUT OBSTRUCTION AND WITHOUT GANGRENE: ICD-10-CM

## 2022-10-27 DIAGNOSIS — I10 HYPERTENSION, WELL CONTROLLED: ICD-10-CM

## 2022-10-27 DIAGNOSIS — Z95.5 HISTORY OF CORONARY ARTERY STENT PLACEMENT: ICD-10-CM

## 2022-10-27 DIAGNOSIS — E78.2 MIXED HYPERLIPIDEMIA: ICD-10-CM

## 2022-10-27 DIAGNOSIS — Z12.31 ENCOUNTER FOR SCREENING MAMMOGRAM FOR MALIGNANT NEOPLASM OF BREAST: ICD-10-CM

## 2022-10-27 DIAGNOSIS — E55.9 VITAMIN D DEFICIENCY: ICD-10-CM

## 2022-10-27 PROBLEM — E66.812 CLASS 2 SEVERE OBESITY DUE TO EXCESS CALORIES WITH SERIOUS COMORBIDITY AND BODY MASS INDEX (BMI) OF 39.0 TO 39.9 IN ADULT: Status: ACTIVE | Noted: 2022-10-27

## 2022-10-27 PROCEDURE — 1126F AMNT PAIN NOTED NONE PRSNT: CPT | Performed by: NURSE PRACTITIONER

## 2022-10-27 PROCEDURE — 1159F MED LIST DOCD IN RCRD: CPT | Performed by: NURSE PRACTITIONER

## 2022-10-27 PROCEDURE — 99213 OFFICE O/P EST LOW 20 MIN: CPT | Performed by: NURSE PRACTITIONER

## 2022-10-27 PROCEDURE — 1170F FXNL STATUS ASSESSED: CPT | Performed by: NURSE PRACTITIONER

## 2022-10-27 PROCEDURE — G0439 PPPS, SUBSEQ VISIT: HCPCS | Performed by: NURSE PRACTITIONER

## 2022-10-27 NOTE — PROGRESS NOTES
"The ABCs of the Annual Wellness Visit  Subsequent Medicare Wellness Visit    Chief Complaint   Patient presents with   • Medicare Wellness-subsequent      Subjective    History of Present Illness:  Priscilla Santos is a 71 y.o. female who presents for a Subsequent Medicare Wellness Visit.  She complains of a hernia that is beneath the site where she had a hernia repair surgery before. She states that sometimes she can't \"push it back in\" and it gives her pain. She would like to see a surgeon.   The following portions of the patient's history were reviewed and   updated as appropriate: allergies, current medications, past family history, past medical history, past social history, past surgical history and problem list.    Compared to one year ago, the patient feels her physical   health is the same.    Compared to one year ago, the patient feels her mental   health is the same.    Recent Hospitalizations:  She was not admitted to the hospital during the last year.       Current Medical Providers:  Patient Care Team:  Reza Charles APRN as PCP - General (Family Medicine)    Outpatient Medications Prior to Visit   Medication Sig Dispense Refill   • atorvastatin (LIPITOR) 80 MG tablet Take 80 mg by mouth Daily.     • calcium citrate-vitamin d (CALCITRATE) 315-250 MG-UNIT tablet tablet Take 1 tablet by mouth.     • carvedilol (COREG) 6.25 MG tablet Take 1 tablet by mouth 2 (Two) Times a Day.     • cetirizine (zyrTEC) 10 MG tablet Take 1 tablet by mouth Daily.     • cholecalciferol (VITAMIN D3) 25 MCG (1000 UT) tablet Take 1,000 Units by mouth.     • clopidogrel (PLAVIX) 75 MG tablet Take 1 tablet by mouth Daily. 90 tablet 3   • cyclobenzaprine (FLEXERIL) 10 MG tablet Take 1 tablet by mouth 3 (Three) Times a Day As Needed for Muscle Spasms. 90 tablet 0   • fluticasone (Flonase) 50 MCG/ACT nasal spray 2 sprays into the nostril(s) as directed by provider Daily. 18 mL 5   • hydroCHLOROthiazide (HYDRODIURIL) 12.5 MG tablet " "Take 1 tablet by mouth Daily. 90 tablet 3   • indapamide (LOZOL) 2.5 MG tablet Take 1 tablet by mouth Every Morning. 90 tablet 3   • Krill Oil (Omega-3) 500 MG capsule Take  by mouth.     • losartan (COZAAR) 100 MG tablet Take 100 mg by mouth Daily.     • multivitamin with minerals tablet tablet Take 1 tablet by mouth Daily.     • ranolazine (RANEXA) 1000 MG 12 hr tablet Take 500 mg by mouth.     • Turmeric Curcumin 500 MG capsule Take 1 capsule by mouth Daily.     • ondansetron ODT (Zofran ODT) 4 MG disintegrating tablet Place 1 tablet on the tongue Every 8 (Eight) Hours As Needed for Nausea. 30 tablet 0     No facility-administered medications prior to visit.       No opioid medication identified on active medication list. I have reviewed chart for other potential  high risk medication/s and harmful drug interactions in the elderly.          Aspirin is not on active medication list.  Patient takes an aspirin a day.    Patient Active Problem List   Diagnosis   • Umbilical hernia without obstruction and without gangrene   • Post-menopausal   • Multiple joint pain   • History of coronary artery stent placement   • Hypertension, well controlled   • Vitamin D deficiency   • Mixed hyperlipidemia     Advance Care Planning  Advance Directive is not on file.  ACP discussion was declined by the patient. Patient does not have an advance directive, declines further assistance.          Objective    Vitals:    10/27/22 1312   BP: 117/78   BP Location: Left arm   Patient Position: Sitting   Cuff Size: Adult   Pulse: 57   Temp: 97.6 °F (36.4 °C)   SpO2: 98%   Weight: 101 kg (222 lb 12.8 oz)   Height: 160 cm (62.99\")   PainSc: 0-No pain     Estimated body mass index is 39.48 kg/m² as calculated from the following:    Height as of this encounter: 160 cm (62.99\").    Weight as of this encounter: 101 kg (222 lb 12.8 oz).    Class 2 Severe Obesity (BMI >=35 and <=39.9). Obesity-related health conditions include the following: " hypertension, coronary heart disease, dyslipidemias, GERD and osteoarthritis. Obesity is unchanged. BMI is is above average; BMI management plan is completed. We discussed low calorie, low carb based diet program, portion control and increasing exercise.      Does the patient have evidence of cognitive impairment? No    Physical Exam  Vitals and nursing note reviewed.   Constitutional:       Appearance: Normal appearance.   HENT:      Head: Normocephalic and atraumatic.      Right Ear: Tympanic membrane, ear canal and external ear normal.      Left Ear: Tympanic membrane, ear canal and external ear normal.      Nose: Nose normal.      Mouth/Throat:      Mouth: Mucous membranes are moist.      Pharynx: Oropharynx is clear.   Eyes:      Extraocular Movements: Extraocular movements intact.      Pupils: Pupils are equal, round, and reactive to light.   Cardiovascular:      Rate and Rhythm: Normal rate and regular rhythm.      Pulses: Normal pulses.      Heart sounds: Normal heart sounds.   Pulmonary:      Effort: Pulmonary effort is normal.      Breath sounds: Normal breath sounds.   Abdominal:      General: Abdomen is flat. Bowel sounds are normal.      Palpations: Abdomen is soft.      Hernia: A hernia is present.      Comments: Hernia to the right of the umbilicus. Reduceable.   Musculoskeletal:         General: Normal range of motion.      Cervical back: Normal range of motion and neck supple.      Comments: Bilateral knees with vertical scars from total knee replacements.   Skin:     General: Skin is warm and dry.      Capillary Refill: Capillary refill takes less than 2 seconds.   Neurological:      General: No focal deficit present.      Mental Status: She is alert and oriented to person, place, and time.   Psychiatric:         Mood and Affect: Mood normal.         Behavior: Behavior normal.         Thought Content: Thought content normal.         Judgment: Judgment normal.       Lab Results   Component Value  Date    CHLPL 136 10/24/2022    TRIG 69 10/24/2022    HDL 69 10/24/2022    LDL 53 10/24/2022    VLDL 14 10/24/2022    HGBA1C 5.4 10/24/2022   All labs reviewed with patient who voices understanding.          HEALTH RISK ASSESSMENT    Smoking Status:  Social History     Tobacco Use   Smoking Status Never   Smokeless Tobacco Never     Alcohol Consumption:  Social History     Substance and Sexual Activity   Alcohol Use Never     Fall Risk Screen:    STEADI Fall Risk Assessment was completed, and patient is at LOW risk for falls.Assessment completed on:10/27/2022    Depression Screening:  PHQ-2/PHQ-9 Depression Screening 10/27/2022   Little Interest or Pleasure in Doing Things 0-->not at all   Feeling Down, Depressed or Hopeless 0-->not at all   PHQ-9: Brief Depression Severity Measure Score 0       Health Habits and Functional and Cognitive Screening:  Functional & Cognitive Status 10/27/2022   Do you have difficulty preparing food and eating? No   Do you have difficulty bathing yourself, getting dressed or grooming yourself? No   Do you have difficulty using the toilet? No   Do you have difficulty moving around from place to place? No   Do you have trouble with steps or getting out of a bed or a chair? No   Current Diet Well Balanced Diet   Dental Exam Up to date   Eye Exam Up to date   Exercise (times per week) 0 times per week   Current Exercises Include No Regular Exercise;House Cleaning   Do you need help using the phone?  No   Are you deaf or do you have serious difficulty hearing?  No   Do you need help with transportation? No   Do you need help shopping? No   Do you need help preparing meals?  No   Do you need help with housework?  No   Do you need help with laundry? No   Do you need help taking your medications? No   Do you need help managing money? No   Do you ever drive or ride in a car without wearing a seat belt? No   Have you felt unusual stress, anger or loneliness in the last month? No   Who do you live  with? Spouse   If you need help, do you have trouble finding someone available to you? No   Have you been bothered in the last four weeks by sexual problems? No   Do you have difficulty concentrating, remembering or making decisions? No       Age-appropriate Screening Schedule:  Refer to the list below for future screening recommendations based on patient's age, sex and/or medical conditions. Orders for these recommended tests are listed in the plan section. The patient has been provided with a written plan.    Health Maintenance   Topic Date Due   • MAMMOGRAM  Never done   • URINE MICROALBUMIN  Never done   • DXA SCAN  Never done   • ZOSTER VACCINE (1 of 2) Never done   • TDAP/TD VACCINES (2 - Td or Tdap) 02/20/2022   • DIABETIC FOOT EXAM  Never done   • DIABETIC EYE EXAM  Never done   • INFLUENZA VACCINE  Never done   • HEMOGLOBIN A1C  04/24/2023   • LIPID PANEL  10/24/2023              Assessment & Plan   CMS Preventative Services Quick Reference  Risk Factors Identified During Encounter  Cardiovascular Disease  Immunizations Discussed/Encouraged (specific Immunizations; Tdap, Influenza, Pneumococcal 23, Prevnar 20 (Pneumococcal 20-valent conjugate), Shingrix and COVID19  Inactivity/Sedentary  Obesity/Overweight   Polypharmacy  The above risks/problems have been discussed with the patient.  Follow up actions/plans if indicated are seen below in the Assessment/Plan Section.  Pertinent information has been shared with the patient in the After Visit Summary.    Diagnoses and all orders for this visit:    1. Encounter for subsequent annual wellness visit in Medicare patient (Primary)    2. Mixed hyperlipidemia    3. Vitamin D deficiency    4. Primary hypertension    5. Hypertension, well controlled    6. History of coronary artery stent placement    7. Multiple joint pain    8. Encounter for screening mammogram for malignant neoplasm of breast  -     Mammo Screening Bilateral With CAD; Future    9. Post-menopausal  -      DEXA Bone Density Axial; Future    10. Screening for colon cancer  -     Cologuard - Stool, Per Rectum; Future    11. Umbilical hernia without obstruction and without gangrene  -     Ambulatory Referral to General Surgery    12. History of bilateral knee replacement    13. Class 2 severe obesity due to excess calories with serious comorbidity and body mass index (BMI) of 39.0 to 39.9 in adult (HCC)    Hyperlipidemia and HTN controlled with medication.  Discontinue A1Cs  Encouraged to eat a diet rich in fruit, vegetables, and protein.  Encouraged to start an exercise plan.   Encouraged to drink 64 oz of water daily.   Encouraged to keep appointments with cardiology.  Follow Up:   Return in about 6 months (around 4/27/2023) for Next scheduled follow up with 6 month labs prior.     An After Visit Summary and PPPS were made available to the patient.

## 2022-11-10 NOTE — PROGRESS NOTES
Lara Conley MD FACS History and Physical     Referring Provider: Reza Charles APRN      Chief complaint   Chief Complaint   Patient presents with   • Hernia     Mrs. Santos is here today wit complaints of a hernia         Subjective .     History of present illness:  Priscilla Santos is a 71 y.o. female who presents complaining of a bulge at the umbilicus.  She denies any associated fevers, chills, nausea, or vomiting.  She is status post primary repair several years ago.    History  Past Medical History:   Diagnosis Date   • Hyperlipidemia    • Hypertension    ,   Past Surgical History:   Procedure Laterality Date   • ADENOIDECTOMY     • APPENDECTOMY     • CARDIAC SURGERY      Tripple bypass   • CAROTID STENT     • CARPAL TUNNEL RELEASE Bilateral    • CHOLECYSTECTOMY     • COLON SURGERY     • CORONARY STENT PLACEMENT     • HERNIA REPAIR     • JOINT REPLACEMENT     • KNEE SURGERY Bilateral    • TONSILLECTOMY     • TUBAL ABDOMINAL LIGATION     ,   Family History   Problem Relation Age of Onset   • Diabetes Mother    • Cancer Father    • Heart disease Father    • No Known Problems Maternal Grandmother    • Diabetes Maternal Grandfather    • No Known Problems Paternal Grandmother    • Cancer Paternal Grandfather    ,   Social History     Tobacco Use   • Smoking status: Never   • Smokeless tobacco: Never   Vaping Use   • Vaping Use: Never used   Substance Use Topics   • Alcohol use: Never   • Drug use: Never   , (Not in a hospital admission)   and Allergies:  Azithromycin, Codeine, Diazepam, and Tramadol    Current Outpatient Medications:   •  atorvastatin (LIPITOR) 80 MG tablet, Take 80 mg by mouth Daily., Disp: , Rfl:   •  calcium citrate-vitamin d (CALCITRATE) 315-250 MG-UNIT tablet tablet, Take 1 tablet by mouth., Disp: , Rfl:   •  carvedilol (COREG) 6.25 MG tablet, Take 1 tablet by mouth 2 (Two) Times a Day., Disp: , Rfl:   •  cetirizine (zyrTEC) 10 MG tablet, Take 1 tablet by mouth Daily., Disp: , Rfl:  "  •  cholecalciferol (VITAMIN D3) 25 MCG (1000 UT) tablet, Take 1,000 Units by mouth., Disp: , Rfl:   •  clopidogrel (PLAVIX) 75 MG tablet, Take 1 tablet by mouth Daily., Disp: 90 tablet, Rfl: 3  •  cyclobenzaprine (FLEXERIL) 10 MG tablet, Take 1 tablet by mouth 3 (Three) Times a Day As Needed for Muscle Spasms., Disp: 90 tablet, Rfl: 0  •  fluticasone (Flonase) 50 MCG/ACT nasal spray, 2 sprays into the nostril(s) as directed by provider Daily., Disp: 18 mL, Rfl: 5  •  hydroCHLOROthiazide (HYDRODIURIL) 12.5 MG tablet, Take 1 tablet by mouth Daily., Disp: 90 tablet, Rfl: 3  •  indapamide (LOZOL) 2.5 MG tablet, Take 1 tablet by mouth Every Morning., Disp: 90 tablet, Rfl: 3  •  Krill Oil (Omega-3) 500 MG capsule, Take  by mouth., Disp: , Rfl:   •  losartan (COZAAR) 100 MG tablet, Take 100 mg by mouth Daily., Disp: , Rfl:   •  multivitamin with minerals tablet tablet, Take 1 tablet by mouth Daily., Disp: , Rfl:   •  prednisoLONE acetate (PRED FORTE) 1 % ophthalmic suspension, , Disp: , Rfl:   •  ranolazine (RANEXA) 1000 MG 12 hr tablet, Take 500 mg by mouth., Disp: , Rfl:   •  Turmeric Curcumin 500 MG capsule, Take 1 capsule by mouth Daily., Disp: , Rfl:     Review of Systems:    All organ systems were evaluated and found negative except those which are mentioned in the History of Present Illness.    Objective     Physical Exam:    Vital Signs   /72   Pulse 64   Temp 97.8 °F (36.6 °C)   Ht 157.5 cm (62\")   Wt 101 kg (222 lb)   SpO2 98%   BMI 40.60 kg/m²        Constitutional:    Well-developed, well-nourished in no acute distress  Eyes:     Extraocular movements intact; pupils equal, round, and reactive  Ears, Nose, Mouth, Throat:  Hearing intact, nose midline, no mucosal lesions  Cardiovascular:   Regular rate and rhythm   Respiratory:    Clear to auscultation bilaterally  Gastrointestinal:   Soft, nontender, nondistended, bulge at umbilicus, partially " reducible  Genitourinary:    Deferred  Musculoskeletal:   Full range of motion, no muscle wasting, no weakness  Skin:     No rashes or excoriations  Neurological:    Moves all extremities, sensation intact  Psychiatric:    Alert and oriented to person, place, and time  Hematologic/Lymphatic/Immune: No lymphadenopathy       Results Review:    Class 3 Severe Obesity (BMI >=40). Obesity-related health conditions include the following: none. Obesity is newly identified. BMI is is above average; BMI management plan is completed. We discussed portion control and increasing exercise.    Assessment & Plan       Diagnoses and all orders for this visit:    1. Umbilical hernia without obstruction and without gangrene (Primary)           The patient will be scheduled for open umbilical hernia repair with possible placement of mesh.    The patient will be scheduled for prework testing including:  COVID; cbc, cmp, EKG, and CXR will be performed dependent upon anesthesia requirements.    An extensive review of patient intake forms, referring physician documents, laboratories, and imaging was performed in the medical decision making and surgical planning of this patient.      The risks including:  bleeding, infection, injury to surrounding structures, seroma formation, chronic pain, and recurrence were discussed as well as the benefits, complications, and possible alternatives of the above procedures and the patient agreed to proceed.      Lara Conley MD

## 2022-11-14 ENCOUNTER — OFFICE VISIT (OUTPATIENT)
Dept: SURGERY | Facility: CLINIC | Age: 72
End: 2022-11-14

## 2022-11-14 ENCOUNTER — PREP FOR SURGERY (OUTPATIENT)
Dept: OTHER | Facility: HOSPITAL | Age: 72
End: 2022-11-14

## 2022-11-14 VITALS
BODY MASS INDEX: 40.85 KG/M2 | TEMPERATURE: 97.8 F | HEIGHT: 62 IN | WEIGHT: 222 LBS | DIASTOLIC BLOOD PRESSURE: 72 MMHG | HEART RATE: 64 BPM | SYSTOLIC BLOOD PRESSURE: 153 MMHG | OXYGEN SATURATION: 98 %

## 2022-11-14 DIAGNOSIS — K42.9 UMBILICAL HERNIA WITHOUT OBSTRUCTION AND WITHOUT GANGRENE: Primary | ICD-10-CM

## 2022-11-14 PROCEDURE — 99203 OFFICE O/P NEW LOW 30 MIN: CPT | Performed by: SPECIALIST

## 2022-11-14 RX ORDER — BUPIVACAINE HCL/0.9 % NACL/PF 0.1 %
2 PLASTIC BAG, INJECTION (ML) EPIDURAL ONCE
Status: CANCELLED | OUTPATIENT
Start: 2022-11-14 | End: 2022-11-14

## 2022-11-14 RX ORDER — PREDNISOLONE ACETATE 10 MG/ML
1 SUSPENSION/ DROPS OPHTHALMIC DAILY
COMMUNITY
Start: 2022-10-03

## 2022-11-14 NOTE — PATIENT INSTRUCTIONS
Surgery is scheduled for 1/5/2023 at 6am arrival .  Prework is scheduled for 12/29 at 1145.  Nothing to eat or drink after midnight before surgery.  No Aspirin, Vitamins or Blood Thinners 5 days prior to surgery.  Please report to the hospital main registration for check in on both days.

## 2022-12-21 ENCOUNTER — OFFICE VISIT (OUTPATIENT)
Dept: CARDIOLOGY CLINIC | Age: 72
End: 2022-12-21
Payer: MEDICARE

## 2022-12-21 VITALS
HEART RATE: 69 BPM | SYSTOLIC BLOOD PRESSURE: 128 MMHG | DIASTOLIC BLOOD PRESSURE: 80 MMHG | HEIGHT: 61 IN | WEIGHT: 219 LBS | BODY MASS INDEX: 41.35 KG/M2

## 2022-12-21 DIAGNOSIS — R60.0 EDEMA OF LEFT LOWER EXTREMITY: ICD-10-CM

## 2022-12-21 DIAGNOSIS — M79.605 LEFT LEG PAIN: ICD-10-CM

## 2022-12-21 DIAGNOSIS — I25.10 CAD IN NATIVE ARTERY: Primary | ICD-10-CM

## 2022-12-21 LAB — D DIMER: 0.62 UG/ML FEU (ref 0–0.48)

## 2022-12-21 PROCEDURE — 1036F TOBACCO NON-USER: CPT | Performed by: INTERNAL MEDICINE

## 2022-12-21 PROCEDURE — G8427 DOCREV CUR MEDS BY ELIG CLIN: HCPCS | Performed by: INTERNAL MEDICINE

## 2022-12-21 PROCEDURE — 3078F DIAST BP <80 MM HG: CPT | Performed by: INTERNAL MEDICINE

## 2022-12-21 PROCEDURE — 1090F PRES/ABSN URINE INCON ASSESS: CPT | Performed by: INTERNAL MEDICINE

## 2022-12-21 PROCEDURE — 3074F SYST BP LT 130 MM HG: CPT | Performed by: INTERNAL MEDICINE

## 2022-12-21 PROCEDURE — 99214 OFFICE O/P EST MOD 30 MIN: CPT | Performed by: INTERNAL MEDICINE

## 2022-12-21 PROCEDURE — 3017F COLORECTAL CA SCREEN DOC REV: CPT | Performed by: INTERNAL MEDICINE

## 2022-12-21 PROCEDURE — G8400 PT W/DXA NO RESULTS DOC: HCPCS | Performed by: INTERNAL MEDICINE

## 2022-12-21 PROCEDURE — 1123F ACP DISCUSS/DSCN MKR DOCD: CPT | Performed by: INTERNAL MEDICINE

## 2022-12-21 PROCEDURE — 93000 ELECTROCARDIOGRAM COMPLETE: CPT | Performed by: INTERNAL MEDICINE

## 2022-12-21 PROCEDURE — G8484 FLU IMMUNIZE NO ADMIN: HCPCS | Performed by: INTERNAL MEDICINE

## 2022-12-21 PROCEDURE — G8417 CALC BMI ABV UP PARAM F/U: HCPCS | Performed by: INTERNAL MEDICINE

## 2022-12-21 NOTE — PATIENT INSTRUCTIONS
Patient's contact number:  [phone] (home)      Venous Ultrasound of the Legs (Lower Extremity Doppler)    No preparation is necessary. What happens when the test is performed? After squirting some clear jelly onto the inside of one of your thighs to help the ultrasound sensor slide around easily, a technician or doctor places the sensor against your skin. Once it's in place, an image appears on a video screen, and the technician or doctor moves the sensor up and down along your leg - from the groin to the calf - to view the veins from different angles. The examiner presses the sensor into your skin firmly every few inches to see if the veins change shape under pressure. He or she then checks your other leg in the same way. As the machine measures the blood flowing through a vein, it makes a swishing noise in time with the rhythm of your heartbeat. This test usually takes 15-30 minutes. Most people don't feel any discomfort, but if your leg was swollen and sensitive to the touch before the test, the pressure of the sensor might cause some tenderness.

## 2022-12-21 NOTE — PROGRESS NOTES
HISTORY  36-year-old lady with history of glucose intolerance, sleep apnea, dyslipidemia, hypertension, and coronary disease returns for follow-up visit. History of bypass grafting in 2008 with stenting of the right coronary artery 3 years later in 2011 and of her circumflex in January 2019. At the time of this letter restudy her vein grafts were demonstrated to be occluded and her mammary was patent. Her left ventricular systolic function has remained normal.  Her blood pressure has been addressed and managed over the years as have her lipids with most recent profile from October LDL 53, HDL 69, and triglycerides also 69. On return today she relates some swelling of her left lower extremity over the past 2 weeks. No recognized trauma to her leg. On questioning she gives a history of having had a pulmonary embolus some 30 years ago. He has had no chest discomfort or episodic shortness of breath. She has not been vaccinated for COVID-19. PHYSICAL EXAM  On exam she carries 220 pounds in a 5 feet 1 inch frame. Pressure is 120/80 with a pulse of 69. Pleasant endomorphic middle-aged lady. EOMs full, sclerae and conjunctiva normal. PERRLA. Mask in place. Trachea midline with no neck masses. Assessment of internal jugular veins reveals no elevation of central venous pressure at 45 degrees. Carotid pulses normal without delay or bruit. Thyroid normal to palpation. Chest exam reveals normal respiratory effort, no abnormal breath sounds and normal expiratory phase. No skin lesions seen. PMI normal. S1, S2 normal without murmur or [place] or click. Abdominal obesity. Normal bowel sounds without palpable mass or bruit. No clubbing or acrocyanosis. Trace right lower extremity edema with 1+ left lower extremity edema. Left calf dimension appears slightly larger than the right. Negative Homans. General motor strength appears to be within normal limits. Normal range of motion with normal gait.  Alert, oriented x 3, memory and cognition normal as reflected by history and conversation. EKG reveals normal sinus rhythm with a right ventricular conduction delay and poor initial anterior forces. ASSESSMENT/PLAN:   Left lower extremity swelling -we will obtain a D-dimer today and a venous Doppler tomorrow. Discussed the concerns with patient. Hypertension -adequate control. Continue carvedilol, indapamide, and losartan  Dyslipidemia -well-controlled as noted above. Continue Krill oil and Lipitor  Coronary disease -angina free. Negative dobutamine stress echo last obtained 11/19.   Continue carvedilol, aspirin, and Plavix along with Ranexa  Obstructive sleep apnea -continue CPAP  Endemic response -unvaccinated

## 2022-12-22 ENCOUNTER — HOSPITAL ENCOUNTER (OUTPATIENT)
Dept: VASCULAR LAB | Age: 72
Discharge: HOME | End: 2022-12-22
Payer: MEDICARE

## 2022-12-22 ENCOUNTER — TELEPHONE (OUTPATIENT)
Dept: CARDIOLOGY CLINIC | Age: 72
End: 2022-12-22

## 2022-12-22 DIAGNOSIS — M79.605 LEFT LEG PAIN: ICD-10-CM

## 2022-12-22 DIAGNOSIS — R60.0 EDEMA OF LEFT LOWER EXTREMITY: ICD-10-CM

## 2022-12-22 PROCEDURE — 93970 EXTREMITY STUDY: CPT

## 2022-12-22 NOTE — TELEPHONE ENCOUNTER
Assessment/Plan:    1  Moderate episode of recurrent major depressive disorder [facility])  Assessment & Plan:  Not controlled  Depression had worsened when she stopped her lexapro  lexapro restarted and will continue wellbutrin  I let her know that I strongly support her starting therapy this week as scheduled and going to [facility]     Orders:  -     escitalopram (LEXAPRO) 20 mg tablet; Take 1 tablet (20 mg total) by mouth daily    2  Primary insomnia  -     zolpidem (AMBIEN CR) 12 5 MG CR tablet; Take 1 tablet (12 5 mg total) by mouth daily at bedtime as needed for sleep            [place] prescription monitoring program report reviewed and is appropriate  There are no Patient Instructions on file for this visit  Return in about 1 month (around 3/21/2022) for Next scheduled follow up with Dr Sang Barajas  Subjective:      Patient ID: Dusty Wise is a 69 y o  female  Chief Complaint   Patient presents with    Depression     mz cma       She has been feeling really depressed for the past month  She is having memory issues  She doesn't want to hurt herself, but doesn't want to be alive  She is having a hard time sleeping  She is waking up tired  She has an appointment with a therapist this week  After discussing her medication history it was discovered that her symptoms for depression started worsening after she changed from Lexapro to Wellbutrin  Even the Lexapro still her medication list she thought she was supposed to stop it  The following portions of the patient's history were reviewed and updated as appropriate: allergies, current medications, past family history, past medical history, past social history, past surgical history and problem list     Review of Systems      Current Outpatient Medications   Medication Sig Dispense Refill    acetaminophen (TYLENOL) 325 mg tablet Take 2 tablets (650 mg total) by mouth every 6 (six) hours as needed for mild pain, headaches or fever  Reviewed results with , Per  informed Pt that D-Dimer was mildly abnormal, but the more definitive test, the Venous doppler, was normal, so not too concerned with possibility of clot. Continue current treatment plan, and scheduled to see APRN in 6 mon. Discussed with the PT, Pt voiced her understanding and scheduled a 6mon appt as well as 1 yr appt with Rashel Nance. 30 tablet 0    albuterol (2 5 mg/3 mL) 0 083 % nebulizer solution Take 3 mL (2 5 mg total) by nebulization every 6 (six) hours as needed for wheezing or shortness of breath 150 mL 3    albuterol (PROVENTIL HFA,VENTOLIN HFA) 90 mcg/act inhaler Inhale 2 puffs every 6 (six) hours as needed for wheezing 54 g 3    allopurinol (ZYLOPRIM) 100 mg tablet Take 1 tablet (100 mg total) by mouth daily 90 tablet 3    ALPRAZolam (XANAX) 0 25 mg tablet Take 1 tablet (0 25 mg total) by mouth daily as needed for anxiety 30 tablet 0    Apple Cider Vinegar 188 MG CAPS Take by mouth daily      aspirin (ECOTRIN LOW STRENGTH) 81 mg EC tablet Take 81 mg by mouth daily      buPROPion (WELLBUTRIN XL) 150 mg 24 hr tablet Take 1 tablet (150 mg total) by mouth every morning 30 tablet 5    Diclofenac Sodium (VOLTAREN) 1 % Apply 2 g topically 4 (four) times a day 3 g 3    ergocalciferol (VITAMIN D2) 50,000 units Take 50,000 Units by mouth once a week  On wednesday      escitalopram (LEXAPRO) 20 mg tablet Take 1 tablet (20 mg total) by mouth daily 90 tablet 1    esomeprazole (NexIUM) 20 mg capsule Take 1 capsule (20 mg total) by mouth daily in the early morning 90 capsule 3    fluticasone (FLONASE) 50 mcg/act nasal spray 2 sprays into each nostril daily 48 g 3    fluticasone-salmeterol (ADVAIR HFA) 115-21 MCG/ACT inhaler Inhale 1 puff daily 48 g 3    Multiple Vitamin (MULTIVITAMIN) tablet Take 1 tablet by mouth daily   nebivolol (BYSTOLIC) 2 5 mg tablet Take 1 tablet (2 5 mg total) by mouth every morning 90 tablet 3    olopatadine (Patanol) 0 1 % ophthalmic solution Apply 1 drop to eye 2 (two) times a day 15 mL 3    zolpidem (AMBIEN CR) 12 5 MG CR tablet Take 1 tablet (12 5 mg total) by mouth daily at bedtime as needed for sleep 30 tablet 1     No current facility-administered medications for this visit         Objective:    /78   Pulse 61   Temp (!) 97 4 °F (36 3 °C)   Resp 16   Ht 4' 11" (1 499 m)   Wt 64 4 kg (142 lb)   LMP  (LMP Unknown)   SpO2 98%   BMI 28 68 kg/m²        Physical Exam  Vitals and nursing note reviewed  Constitutional:       Appearance: She is well-developed  HENT:      Head: Normocephalic and atraumatic  Right Ear: Tympanic membrane and external ear normal       Left Ear: Tympanic membrane and external ear normal    Cardiovascular:      Rate and Rhythm: Normal rate and regular rhythm  Heart sounds: Normal heart sounds  No murmur heard  No friction rub  Pulmonary:      Effort: Pulmonary effort is normal  No respiratory distress  Breath sounds: Normal breath sounds  No wheezing or rales  Musculoskeletal:      Right lower leg: No edema  Left lower leg: No edema  Psychiatric:         Mood and Affect: Affect is tearful                  Usha Kwong,

## 2022-12-22 NOTE — TELEPHONE ENCOUNTER
----- Message from Selena Guzmáng sent at 12/21/2022  3:29 PM CST -----  Dr. Rashel Nance ordered a STAT LE venous scan to screen for DVT due to swelling/pain of left leg. He is also getting a D-dimer. I am off tomorrow but Dr. Rashel Nance will be reading downstairs. If someone wouldn't mind to just look at the scan and see if it shows evidence of DVT tomorrow after she has it, Dr. Rashel Nance should be able to advise what to do. If not him, any of the NP's in the office can look at it as well. Thank you guys so much!

## 2022-12-30 ENCOUNTER — TELEPHONE (OUTPATIENT)
Dept: SURGERY | Facility: CLINIC | Age: 72
End: 2022-12-30

## 2023-01-26 ENCOUNTER — PRE-ADMISSION TESTING (OUTPATIENT)
Dept: PREADMISSION TESTING | Facility: HOSPITAL | Age: 73
End: 2023-01-26
Payer: MEDICARE

## 2023-01-26 VITALS
SYSTOLIC BLOOD PRESSURE: 120 MMHG | RESPIRATION RATE: 22 BRPM | DIASTOLIC BLOOD PRESSURE: 67 MMHG | OXYGEN SATURATION: 100 % | HEIGHT: 62 IN | WEIGHT: 217.15 LBS | HEART RATE: 60 BPM | BODY MASS INDEX: 39.96 KG/M2

## 2023-01-26 LAB
ANION GAP SERPL CALCULATED.3IONS-SCNC: 11 MMOL/L (ref 5–15)
BUN SERPL-MCNC: 11 MG/DL (ref 8–23)
BUN/CREAT SERPL: 15.3 (ref 7–25)
CALCIUM SPEC-SCNC: 9.7 MG/DL (ref 8.6–10.5)
CHLORIDE SERPL-SCNC: 100 MMOL/L (ref 98–107)
CO2 SERPL-SCNC: 28 MMOL/L (ref 22–29)
CREAT SERPL-MCNC: 0.72 MG/DL (ref 0.57–1)
DEPRECATED RDW RBC AUTO: 43.3 FL (ref 37–54)
EGFRCR SERPLBLD CKD-EPI 2021: 89.5 ML/MIN/1.73
ERYTHROCYTE [DISTWIDTH] IN BLOOD BY AUTOMATED COUNT: 12.4 % (ref 12.3–15.4)
GLUCOSE SERPL-MCNC: 180 MG/DL (ref 65–99)
HCT VFR BLD AUTO: 40.6 % (ref 34–46.6)
HGB BLD-MCNC: 13.4 G/DL (ref 12–15.9)
MCH RBC QN AUTO: 31.1 PG (ref 26.6–33)
MCHC RBC AUTO-ENTMCNC: 33 G/DL (ref 31.5–35.7)
MCV RBC AUTO: 94.2 FL (ref 79–97)
PLATELET # BLD AUTO: 148 10*3/MM3 (ref 140–450)
PMV BLD AUTO: 10.2 FL (ref 6–12)
POTASSIUM SERPL-SCNC: 3.1 MMOL/L (ref 3.5–5.2)
RBC # BLD AUTO: 4.31 10*6/MM3 (ref 3.77–5.28)
SODIUM SERPL-SCNC: 139 MMOL/L (ref 136–145)
WBC NRBC COR # BLD: 6.45 10*3/MM3 (ref 3.4–10.8)

## 2023-01-26 PROCEDURE — 80048 BASIC METABOLIC PNL TOTAL CA: CPT

## 2023-01-26 PROCEDURE — 85027 COMPLETE CBC AUTOMATED: CPT

## 2023-01-26 PROCEDURE — 36415 COLL VENOUS BLD VENIPUNCTURE: CPT

## 2023-01-26 PROCEDURE — 93010 ELECTROCARDIOGRAM REPORT: CPT | Performed by: INTERNAL MEDICINE

## 2023-01-26 PROCEDURE — 93005 ELECTROCARDIOGRAM TRACING: CPT

## 2023-01-26 RX ORDER — ASPIRIN 81 MG/1
81 TABLET ORAL DAILY
COMMUNITY

## 2023-01-26 NOTE — DISCHARGE INSTRUCTIONS
Before you come to the hospital        Arrival time: AS DIRECTED BY OFFICE     YOU MAY TAKE THE FOLLOWING MEDICATION(S) THE MORNING OF SURGERY WITH A SIP OF WATER: CoReg   Hold Losartan(Cozaar)           ALL OTHER HOME MEDICATION CHECK WITH YOUR PHYSICIAN (especially if   you are taking diabetes medicines or blood thinners)          If you were given and instructed to use a germ- killing soap, use as directed the night before surgery and again the morning of surgery or as directed by your surgeon. (Use one-half of the bottle with each shower.)   See attached information for How to Use Chlorhexidine for Bathing if applicable.            Eating and drinking restrictions prior to scheduled arrival time    2 Hours before arrival time STOP   Drinking Clear liquids (water, apple juice-no pulp)     6 Hours before arrival time STOP   Milk or drinks that contain milk, full liquids    6 Hours before arrival time STOP   Light meals or foods, such as toast or cereal    8 Hours before arrival time STOP   Heavy foods, such as meat, fried foods, or fatty foods    (It is extremely important that you follow these guidelines to prevent delay or cancelation of your procedure)     Clear Liquids  Water and flavored water                                                                      Clear Fruit juices, such as cranberry juice and apple juice.  Black coffee (NO cream of any kind, including powdered).  Plain tea  Clear bouillon or broth.  Flavored gelatin.  Soda.  Gatorade or Powerade.  Full liquid examples  Juices that have pulp.  Frozen ice pops that contain fruit pieces.  Coffee with creamer  Milk.  Yogurt.                MANAGING PAIN AFTER SURGERY    We know you are probably wondering what your pain will be like after surgery.  Following surgery it is unrealistic to expect you will not have pain.   Pain is how our bodies let us know that something is wrong or cautions us to be careful.  That said, our goal is to make your pain  tolerable.    Methods we may use to treat your pain include (oral or IV medications, PCAs, epidurals, nerve blocks, etc.)   While some procedures require IV pain medications for a short time after surgery, transitioning to pain medications by mouth allows for better management of pain.   Your nurse will encourage you to take oral pain medications whenever possible.  IV medications work almost immediately, but only last a short while.  Taking medications by mouth allows for a more constant level of medication in your blood stream for a longer period of time.      Once your pain is out of control it is harder to get back under control.  It is important you are aware when your next dose of pain medication is due.  If you are admitted, your nurse may write the time of your next dose on the white board in your room to help you remember.      We are interested in your pain and encourage you to inform us about aggravating factors during your visit.   Many times a simple repositioning every few hours can make a big difference.    If your physician says it is okay, do not let your pain prevent you from getting out of bed. Be sure to call your nurse for assistance prior to getting up so you do not fall.      Before surgery, please decide your tolerable pain goal.  These faces help describe the pain ratings we use on a 0-10 scale.   Be prepared to tell us your goal and whether or not you take pain or anxiety medications at home.          Preparing for Surgery  Preparing for surgery is an important part of your care. It can make things go more smoothly and help you avoid complications. The steps leading up to surgery may vary among hospitals. Follow all instructions given to you by your health care providers. Ask questions if you do not understand something. Talk about any concerns that you have.  Here are some questions to consider asking before your surgery:  If my surgery is not an emergency (is elective), when would be the  best time to have the surgery?  What arrangements do I need to make for work, home, or school?  What will my recovery be like? How long will it be before I can return to normal activities?  Will I need to prepare my home? Will I need to arrange care for me or my children?  Should I expect to have pain after surgery? What are my pain management options? Are there nonmedical options that I can try for pain?  Tell a health care provider about:  Any allergies you have.  All medicines you are taking, including vitamins, herbs, eye drops, creams, and over-the-counter medicines.  Any problems you or family members have had with anesthetic medicines.  Any blood disorders you have.  Any surgeries you have had.  Any medical conditions you have.  Whether you are pregnant or may be pregnant.  What are the risks?  The risks and complications of surgery depend on the specific procedure that you have. Discuss all the risks with your health care providers before your surgery. Ask about common surgical complications, which may include:  Infection.  Bleeding or a need for blood replacement (transfusion).  Allergic reactions to medicines.  Damage to surrounding nerves, tissues, or structures.  A blood clot.  Scarring.  Failure of the surgery to correct the problem.  Follow these instructions before the procedure:  Several days or weeks before your procedure  You may have a physical exam by your primary health care provider to make sure it is safe for you to have surgery.  You may have testing. This may include a chest X-ray, blood and urine tests, electrocardiogram (ECG), or other testing.  Ask your health care provider about:  Changing or stopping your regular medicines. This is especially important if you are taking diabetes medicines or blood thinners.  Taking medicines such as aspirin and ibuprofen. These medicines can thin your blood. Do not take these medicines unless your health care provider tells you to take them.  Taking  over-the-counter medicines, vitamins, herbs, and supplements.  Do not use any products that contain nicotine or tobacco, such as cigarettes and e-cigarettes. If you need help quitting, ask your health care provider.  Avoid alcohol.  Ask your health care provider if there are exercises you can do to prepare for surgery.  Eat a healthy diet.   Plan to have someone take you home from the hospital or clinic.  Plan to have a responsible adult care for you for at least 24 hours after you leave the hospital or clinic. This is important.  The day before your procedure  You may be given antibiotic medicine to take by mouth to help prevent infection. Take it as told by your health care provider.  You may be asked to shower with a germ-killing soap.  Follow instructions from your health care provider about eating and drinking restrictions. This includes gum, mints and hard candy.  Pack comfortable clothes according to your procedure.   The day of your procedure  You may need to take another shower with a germ-killing soap before you leave home in the morning.  With a small sip of water, take only the medicines that you are told to take.  Remove all jewelry including rings.   Leave anything you consider valuable at home except hearing aids if needed.  You do not need to bring your home medications into the hospital.   Do not wear any makeup, nail polish, powder, deodorant, lotion, hair accessories, or anything on your skin or body except your clothes.  If you will be staying in the hospital, bring a case to hold your glasses, contacts, or dentures. You may also want to bring your robe and non-skid footwear.       (Do not use denture adhesives since you will be asked to remove them during  surgery).   If you wear oxygen at home, bring it with you the day of surgery.  If instructed by your health care provider, bring your sleep apnea device with you on the day of your surgery (if this applies to you).  You may want to leave your  suitcase and sleep apnea device in the car until after surgery.   Arrive at the hospital as scheduled.  Bring a friend or family member with you who can help to answer questions and be present while you meet with your health care provider.  At the hospital  When you arrive at the hospital:  Go to registration located at the main entrance of the hospital. You will be registered and given a beeper and a sticker sheet. Take the stickers to the Outpatient nurses desk and place in the black tray. This is to notify staff that you have arrived. Then return to the lobby to wait.   When your beeper lights up and vibrates proceed through the double doors, under the stairs, and a member of the Outpatient Surgery staff will escort you to your preoperative room.  You may have to wear compression sleeves. These help to prevent blood clots and reduce swelling in your legs.  An IV may be inserted into one of your veins.              In the operating room, you may be given one or more of the following:        A medicine to help you relax (sedative).        A medicine to numb the area (local anesthetic).        A medicine to make you fall asleep (general anesthetic).        A medicine that is injected into an area of your body to numb everything below the                      injection site (regional anesthetic).  You may be given an antibiotic through your IV to help prevent infection.  Your surgical site will be marked or identified.    Contact a health care provider if you:  Develop a fever of more than 100.4°F (38°C) or other feelings of illness during the 48 hours before your surgery.  Have symptoms that get worse.  Have questions or concerns about your surgery.  Summary  Preparing for surgery can make the procedure go more smoothly and lower your risk of complications.  Before surgery, make a list of questions and concerns to discuss with your surgeon. Ask about the risks and possible complications.  In the days or weeks before  your surgery, follow all instructions from your health care provider. You may need to stop smoking, avoid alcohol, follow eating restrictions, and change or stop your regular medicines.  Contact your surgeon if you develop a fever or other signs of illness during the few days before your surgery.  This information is not intended to replace advice given to you by your health care provider. Make sure you discuss any questions you have with your health care provider.  Document Revised: 12/21/2018 Document Reviewed: 10/23/2018  Elsevier Patient Education © 2021 [employer]

## 2023-01-27 ENCOUNTER — TELEPHONE (OUTPATIENT)
Dept: CARDIOLOGY CLINIC | Age: 73
End: 2023-01-27

## 2023-01-27 LAB
QT INTERVAL: 444 MS
QTC INTERVAL: 439 MS

## 2023-01-27 RX ORDER — FLUTICASONE PROPIONATE 50 MCG
SPRAY, SUSPENSION (ML) NASAL
Qty: 18 G | Refills: 5 | Status: SHIPPED | OUTPATIENT
Start: 2023-01-27

## 2023-01-27 NOTE — TELEPHONE ENCOUNTER
Rx Refill Note  Requested Prescriptions     Pending Prescriptions Disp Refills   • fluticasone (FLONASE) 50 MCG/ACT nasal spray [Pharmacy Med Name: FLUTICASONE PROPIONATE 50 SUSP] 18 g 5     Sig: USE TWO SPRAYS INTO THE NOSTRIL(S) AS DIRECTED BY PROVIDER DAILY      Last office visit with prescribing clinician: 10/27/2022   Last telemedicine visit with prescribing clinician: Visit date not found   Next office visit with prescribing clinician: Visit date not found       Kia Nettles MA  01/27/23, 14:02 CST

## 2023-01-27 NOTE — TELEPHONE ENCOUNTER
Received a call from patient, she is scheduled to have surgery on 2/2/23 and they are asking her to hold her plavix 5 days prior to procedure. They have not requested a clearance and are not requesting one however patient is extremely concerned about holding her plavix for 5 days because she states she has a history of blood clots. She wants to make sure it's okay with Dr. Melo Martell office that she do this. She is extremely worried about being off of it so long and was upset that they did not check with our office prior.

## 2023-01-27 NOTE — TELEPHONE ENCOUNTER
No recent intervention and blood clot was 30+ years ago. Okay to hold Plavix 5 days. What type of surgery is she having?   Sometimes they can do surgery on Plavix

## 2023-02-02 ENCOUNTER — ANESTHESIA (OUTPATIENT)
Dept: PERIOP | Facility: HOSPITAL | Age: 73
End: 2023-02-02
Payer: MEDICARE

## 2023-02-02 ENCOUNTER — ANESTHESIA EVENT (OUTPATIENT)
Dept: PERIOP | Facility: HOSPITAL | Age: 73
End: 2023-02-02
Payer: MEDICARE

## 2023-02-02 ENCOUNTER — HOSPITAL ENCOUNTER (OUTPATIENT)
Facility: HOSPITAL | Age: 73
Setting detail: HOSPITAL OUTPATIENT SURGERY
Discharge: HOME | End: 2023-02-02
Attending: SPECIALIST | Admitting: SPECIALIST
Payer: MEDICARE

## 2023-02-02 VITALS
TEMPERATURE: 97.8 F | DIASTOLIC BLOOD PRESSURE: 56 MMHG | OXYGEN SATURATION: 95 % | SYSTOLIC BLOOD PRESSURE: 124 MMHG | HEART RATE: 51 BPM | RESPIRATION RATE: 16 BRPM

## 2023-02-02 DIAGNOSIS — K42.9 UMBILICAL HERNIA WITHOUT OBSTRUCTION AND WITHOUT GANGRENE: ICD-10-CM

## 2023-02-02 PROCEDURE — 25010000002 DROPERIDOL PER 5 MG: Performed by: ANESTHESIOLOGY

## 2023-02-02 PROCEDURE — C1781 MESH (IMPLANTABLE): HCPCS | Performed by: SPECIALIST

## 2023-02-02 PROCEDURE — 25010000002 FENTANYL CITRATE (PF) 100 MCG/2ML SOLUTION: Performed by: NURSE ANESTHETIST, CERTIFIED REGISTERED

## 2023-02-02 PROCEDURE — 25010000002 EPINEPHRINE PER 0.1 MG: Performed by: SPECIALIST

## 2023-02-02 PROCEDURE — 25010000002 DEXAMETHASONE PER 1 MG: Performed by: NURSE ANESTHETIST, CERTIFIED REGISTERED

## 2023-02-02 PROCEDURE — 25010000002 CEFAZOLIN PER 500 MG: Performed by: SPECIALIST

## 2023-02-02 PROCEDURE — 49593 RPR AA HRN 1ST 3-10 RDC: CPT | Performed by: SPECIALIST

## 2023-02-02 PROCEDURE — 25010000002 ONDANSETRON PER 1 MG: Performed by: NURSE ANESTHETIST, CERTIFIED REGISTERED

## 2023-02-02 PROCEDURE — S0260 H&P FOR SURGERY: HCPCS | Performed by: SPECIALIST

## 2023-02-02 PROCEDURE — 25010000002 VANCOMYCIN 1 G RECONSTITUTED SOLUTION 1 EACH VIAL: Performed by: SPECIALIST

## 2023-02-02 PROCEDURE — 25010000002 FENTANYL CITRATE (PF) 50 MCG/ML SOLUTION: Performed by: ANESTHESIOLOGY

## 2023-02-02 PROCEDURE — 25010000002 PROPOFOL 10 MG/ML EMULSION: Performed by: NURSE ANESTHETIST, CERTIFIED REGISTERED

## 2023-02-02 DEVICE — VENTRALEX HERNIA PATCH, 8.0 CM (3.2"), LARGE CIRCLE WITH STRAP
Type: IMPLANTABLE DEVICE | Site: ABDOMEN | Status: FUNCTIONAL
Brand: VENTRALEX

## 2023-02-02 RX ORDER — ASPIRIN 81 MG/1
81 TABLET, CHEWABLE ORAL ONCE
Status: COMPLETED | OUTPATIENT
Start: 2023-02-02 | End: 2023-02-02

## 2023-02-02 RX ORDER — ONDANSETRON 2 MG/ML
4 INJECTION INTRAMUSCULAR; INTRAVENOUS ONCE AS NEEDED
Status: DISCONTINUED | OUTPATIENT
Start: 2023-02-02 | End: 2023-02-02 | Stop reason: HOSPADM

## 2023-02-02 RX ORDER — SODIUM CHLORIDE 0.9 % (FLUSH) 0.9 %
3 SYRINGE (ML) INJECTION EVERY 12 HOURS SCHEDULED
Status: DISCONTINUED | OUTPATIENT
Start: 2023-02-02 | End: 2023-02-02 | Stop reason: HOSPADM

## 2023-02-02 RX ORDER — DROPERIDOL 2.5 MG/ML
0.62 INJECTION, SOLUTION INTRAMUSCULAR; INTRAVENOUS ONCE AS NEEDED
Status: COMPLETED | OUTPATIENT
Start: 2023-02-02 | End: 2023-02-02

## 2023-02-02 RX ORDER — CALCIUM CHLORIDE 100 MG/ML
INJECTION INTRAVENOUS; INTRAVENTRICULAR AS NEEDED
Status: DISCONTINUED | OUTPATIENT
Start: 2023-02-02 | End: 2023-02-02 | Stop reason: SURG

## 2023-02-02 RX ORDER — ACETAMINOPHEN 500 MG
1000 TABLET ORAL ONCE
Status: COMPLETED | OUTPATIENT
Start: 2023-02-02 | End: 2023-02-02

## 2023-02-02 RX ORDER — PROPOFOL 10 MG/ML
VIAL (ML) INTRAVENOUS AS NEEDED
Status: DISCONTINUED | OUTPATIENT
Start: 2023-02-02 | End: 2023-02-02 | Stop reason: SURG

## 2023-02-02 RX ORDER — ONDANSETRON 2 MG/ML
INJECTION INTRAMUSCULAR; INTRAVENOUS AS NEEDED
Status: DISCONTINUED | OUTPATIENT
Start: 2023-02-02 | End: 2023-02-02 | Stop reason: SURG

## 2023-02-02 RX ORDER — OXYCODONE AND ACETAMINOPHEN 10; 325 MG/1; MG/1
1 TABLET ORAL EVERY 4 HOURS PRN
Status: DISCONTINUED | OUTPATIENT
Start: 2023-02-02 | End: 2023-02-02 | Stop reason: HOSPADM

## 2023-02-02 RX ORDER — FENTANYL CITRATE 50 UG/ML
25 INJECTION, SOLUTION INTRAMUSCULAR; INTRAVENOUS
Status: DISCONTINUED | OUTPATIENT
Start: 2023-02-02 | End: 2023-02-02 | Stop reason: HOSPADM

## 2023-02-02 RX ORDER — SODIUM CHLORIDE 0.9 % (FLUSH) 0.9 %
3-10 SYRINGE (ML) INJECTION AS NEEDED
Status: DISCONTINUED | OUTPATIENT
Start: 2023-02-02 | End: 2023-02-02 | Stop reason: HOSPADM

## 2023-02-02 RX ORDER — SODIUM CHLORIDE, SODIUM LACTATE, POTASSIUM CHLORIDE, CALCIUM CHLORIDE 600; 310; 30; 20 MG/100ML; MG/100ML; MG/100ML; MG/100ML
100 INJECTION, SOLUTION INTRAVENOUS CONTINUOUS
Status: DISCONTINUED | OUTPATIENT
Start: 2023-02-02 | End: 2023-02-02 | Stop reason: HOSPADM

## 2023-02-02 RX ORDER — OXYCODONE HYDROCHLORIDE AND ACETAMINOPHEN 5; 325 MG/1; MG/1
1 TABLET ORAL ONCE AS NEEDED
Status: DISCONTINUED | OUTPATIENT
Start: 2023-02-02 | End: 2023-02-02 | Stop reason: HOSPADM

## 2023-02-02 RX ORDER — LABETALOL HYDROCHLORIDE 5 MG/ML
5 INJECTION, SOLUTION INTRAVENOUS
Status: DISCONTINUED | OUTPATIENT
Start: 2023-02-02 | End: 2023-02-02 | Stop reason: HOSPADM

## 2023-02-02 RX ORDER — NALOXONE HCL 0.4 MG/ML
0.4 VIAL (ML) INJECTION AS NEEDED
Status: DISCONTINUED | OUTPATIENT
Start: 2023-02-02 | End: 2023-02-02 | Stop reason: HOSPADM

## 2023-02-02 RX ORDER — SODIUM CHLORIDE, SODIUM LACTATE, POTASSIUM CHLORIDE, CALCIUM CHLORIDE 600; 310; 30; 20 MG/100ML; MG/100ML; MG/100ML; MG/100ML
1000 INJECTION, SOLUTION INTRAVENOUS CONTINUOUS
Status: DISCONTINUED | OUTPATIENT
Start: 2023-02-02 | End: 2023-02-02 | Stop reason: HOSPADM

## 2023-02-02 RX ORDER — ROCURONIUM BROMIDE 10 MG/ML
INJECTION, SOLUTION INTRAVENOUS AS NEEDED
Status: DISCONTINUED | OUTPATIENT
Start: 2023-02-02 | End: 2023-02-02 | Stop reason: SURG

## 2023-02-02 RX ORDER — SODIUM CHLORIDE 0.9 % (FLUSH) 0.9 %
3 SYRINGE (ML) INJECTION AS NEEDED
Status: DISCONTINUED | OUTPATIENT
Start: 2023-02-02 | End: 2023-02-02 | Stop reason: HOSPADM

## 2023-02-02 RX ORDER — EPHEDRINE SULFATE 50 MG/ML
INJECTION INTRAVENOUS AS NEEDED
Status: DISCONTINUED | OUTPATIENT
Start: 2023-02-02 | End: 2023-02-02 | Stop reason: SURG

## 2023-02-02 RX ORDER — LIDOCAINE HYDROCHLORIDE 10 MG/ML
0.5 INJECTION, SOLUTION EPIDURAL; INFILTRATION; INTRACAUDAL; PERINEURAL ONCE AS NEEDED
Status: DISCONTINUED | OUTPATIENT
Start: 2023-02-02 | End: 2023-02-02 | Stop reason: HOSPADM

## 2023-02-02 RX ORDER — OXYCODONE HYDROCHLORIDE AND ACETAMINOPHEN 5; 325 MG/1; MG/1
1 TABLET ORAL EVERY 4 HOURS PRN
Qty: 30 TABLET | Refills: 0 | Status: SHIPPED | OUTPATIENT
Start: 2023-02-02 | End: 2023-03-16

## 2023-02-02 RX ORDER — FENTANYL CITRATE 50 UG/ML
INJECTION, SOLUTION INTRAMUSCULAR; INTRAVENOUS AS NEEDED
Status: DISCONTINUED | OUTPATIENT
Start: 2023-02-02 | End: 2023-02-02 | Stop reason: SURG

## 2023-02-02 RX ORDER — SODIUM CHLORIDE 9 MG/ML
40 INJECTION, SOLUTION INTRAVENOUS AS NEEDED
Status: DISCONTINUED | OUTPATIENT
Start: 2023-02-02 | End: 2023-02-02 | Stop reason: HOSPADM

## 2023-02-02 RX ORDER — BUPIVACAINE HCL/0.9 % NACL/PF 0.1 %
2 PLASTIC BAG, INJECTION (ML) EPIDURAL ONCE
Status: COMPLETED | OUTPATIENT
Start: 2023-02-02 | End: 2023-02-02

## 2023-02-02 RX ORDER — LIDOCAINE HYDROCHLORIDE 20 MG/ML
INJECTION, SOLUTION EPIDURAL; INFILTRATION; INTRACAUDAL; PERINEURAL AS NEEDED
Status: DISCONTINUED | OUTPATIENT
Start: 2023-02-02 | End: 2023-02-02 | Stop reason: SURG

## 2023-02-02 RX ORDER — DEXAMETHASONE SODIUM PHOSPHATE 4 MG/ML
INJECTION, SOLUTION INTRA-ARTICULAR; INTRALESIONAL; INTRAMUSCULAR; INTRAVENOUS; SOFT TISSUE AS NEEDED
Status: DISCONTINUED | OUTPATIENT
Start: 2023-02-02 | End: 2023-02-02 | Stop reason: SURG

## 2023-02-02 RX ORDER — IBUPROFEN 600 MG/1
600 TABLET ORAL ONCE AS NEEDED
Status: DISCONTINUED | OUTPATIENT
Start: 2023-02-02 | End: 2023-02-02 | Stop reason: HOSPADM

## 2023-02-02 RX ADMIN — SUGAMMADEX 200 MG: 100 INJECTION, SOLUTION INTRAVENOUS at 11:01

## 2023-02-02 RX ADMIN — ONDANSETRON 4 MG: 2 INJECTION INTRAMUSCULAR; INTRAVENOUS at 10:35

## 2023-02-02 RX ADMIN — ACETAMINOPHEN 1000 MG: 500 TABLET, FILM COATED ORAL at 07:49

## 2023-02-02 RX ADMIN — Medication 2 G: at 10:16

## 2023-02-02 RX ADMIN — FENTANYL CITRATE 100 MCG: 50 INJECTION INTRAMUSCULAR; INTRAVENOUS at 10:11

## 2023-02-02 RX ADMIN — ASPIRIN 81 MG: 81 TABLET, CHEWABLE ORAL at 07:49

## 2023-02-02 RX ADMIN — CALCIUM CHLORIDE 200 MG: 100 INJECTION INTRAVENOUS; INTRAVENTRICULAR at 10:22

## 2023-02-02 RX ADMIN — SODIUM CHLORIDE, POTASSIUM CHLORIDE, SODIUM LACTATE AND CALCIUM CHLORIDE 1000 ML: 600; 310; 30; 20 INJECTION, SOLUTION INTRAVENOUS at 07:16

## 2023-02-02 RX ADMIN — DROPERIDOL 0.62 MG: 2.5 INJECTION, SOLUTION INTRAMUSCULAR; INTRAVENOUS at 11:21

## 2023-02-02 RX ADMIN — LIDOCAINE HYDROCHLORIDE 100 MG: 20 INJECTION, SOLUTION EPIDURAL; INFILTRATION; INTRACAUDAL; PERINEURAL at 11:08

## 2023-02-02 RX ADMIN — ROCURONIUM BROMIDE 30 MG: 10 SOLUTION INTRAVENOUS at 10:14

## 2023-02-02 RX ADMIN — CALCIUM CHLORIDE 200 MG: 100 INJECTION INTRAVENOUS; INTRAVENTRICULAR at 10:25

## 2023-02-02 RX ADMIN — FENTANYL CITRATE 25 MCG: 50 INJECTION INTRAMUSCULAR; INTRAVENOUS at 11:17

## 2023-02-02 RX ADMIN — EPHEDRINE SULFATE 20 MG: 50 INJECTION INTRAVENOUS at 10:19

## 2023-02-02 RX ADMIN — ROCURONIUM BROMIDE 20 MG: 10 SOLUTION INTRAVENOUS at 10:51

## 2023-02-02 RX ADMIN — LIDOCAINE HYDROCHLORIDE 50 MG: 20 INJECTION, SOLUTION EPIDURAL; INFILTRATION; INTRACAUDAL; PERINEURAL at 10:14

## 2023-02-02 RX ADMIN — PROPOFOL INJECTABLE EMULSION 30 MG: 10 INJECTION, EMULSION INTRAVENOUS at 10:51

## 2023-02-02 RX ADMIN — FENTANYL CITRATE 25 MCG: 50 INJECTION INTRAMUSCULAR; INTRAVENOUS at 11:22

## 2023-02-02 RX ADMIN — DEXAMETHASONE SODIUM PHOSPHATE 4 MG: 4 INJECTION, SOLUTION INTRA-ARTICULAR; INTRALESIONAL; INTRAMUSCULAR; INTRAVENOUS; SOFT TISSUE at 10:35

## 2023-02-02 RX ADMIN — PROPOFOL INJECTABLE EMULSION 125 MG: 10 INJECTION, EMULSION INTRAVENOUS at 10:14

## 2023-02-02 NOTE — ANESTHESIA PREPROCEDURE EVALUATION
Anesthesia Evaluation     no history of anesthetic complications:  NPO Solid Status: > 8 hours  NPO Liquid Status: > 8 hours           Airway   Mallampati: I  No difficulty expected  Dental    (+) edentulous    Pulmonary    (+) sleep apnea,   Cardiovascular   Exercise tolerance: poor (<4 METS)    (+) hypertension, CAD, CABG (2008) >6 Months, cardiac stents (multiple stents most recent 2019) more than 12 months ago CHF , hyperlipidemia,     ROS comment: Records reviewed- pt was approved to hold plavix by cardiologist, no recommendations were made for aspirin. Will give aspirin in preop    Neuro/Psych  (-) seizures, TIA  GI/Hepatic/Renal/Endo    (+) morbid obesity,    (-) liver disease, no renal disease, diabetes    Musculoskeletal     Abdominal    Substance History      OB/GYN          Other   arthritis,                      Anesthesia Plan    ASA 3     general     intravenous induction     Anesthetic plan, risks, benefits, and alternatives have been provided, discussed and informed consent has been obtained with: patient.        CODE STATUS:

## 2023-02-02 NOTE — OP NOTE
Preoperative diagnosis:     Ventral hernia  Postoperative diagnosis:    same  Surgeon:     Lara Conley MD FACS  Procedure:    Open ventral hernia repair with placement of mesh  Anesthesia:    Get loc   EBL:     minimal  IVF:     See anesthesia notes  Indications:     The patient is a 72-year-old female who presents for ventral incisional hernia repair.  The risks, benefits, complications, and possible alternatives of the procedure were discussed with the patient who agreed to proceed.  Description of procedure:  The patient was laid supine.  The abdomen was prepped and draped in the usual sterile fashion.  There was a 3cm midline infraumbilical incisional scar.  It was reopened.  A hernia sac was immediately encountered.  The sac was removed from the surrounding subcutaneous tissues with bovie.  The fascial defect was 4cm in diameter.  A prolene suture was identified from closure of the previous procedure.  It was removed.  A large ventralex mesh was placed in an underlay fashion.  It was anchored to the fascia with 0 prolene in interrupted vertical mattress fashion.  The wound was then irrigated with sterile saline with vancomycin.  The raven's fascia was closed with 2-0 vicryl.  The skin was closed with 3-0 and 4-0 vicyr.  Mastisol, steri strips, and dry dressings were applied.  The sponge, needle, and instrument counts were correct at the end of the case.  Findings:    4cm fascial defect just inferior to umbilicus  Complications:   None  Disposition:    Good to PACU

## 2023-02-02 NOTE — H&P
Lara Conley MD [facility] History and Physical     Referring Provider: Lara Conley MD    Patient Care Team:  Reza Charles APRN as PCP - General (Family Medicine)  Lara Conley MD as Surgeon (General Surgery)    Chief complaint:  hernia    Subjective .   History of present illness:  The patient is a 72 y.o. female who presents for umbilical hernia repair.    History:    Past Medical History:   Diagnosis Date   • Arthritis    • CHF (congestive heart failure) (HCC)    • Coronary artery disease    • History of transfusion    • Hyperlipidemia    • Hypertension    • Sleep apnea    • UTI (urinary tract infection)    ,   Past Surgical History:   Procedure Laterality Date   • ADENOIDECTOMY     • APPENDECTOMY     • CARDIAC SURGERY  2008    Tripple bypass   • CAROTID STENT     • CARPAL TUNNEL RELEASE Bilateral    • CHOLECYSTECTOMY     • COLON SURGERY     • CORONARY STENT PLACEMENT  2021    total of 4-5 heart stents in past   • HERNIA REPAIR     • JOINT REPLACEMENT     • KNEE SURGERY Bilateral    • TONSILLECTOMY     • TUBAL ABDOMINAL LIGATION  1977   ,   Family History   Problem Relation Age of Onset   • Diabetes Mother    • Cancer Father    • Heart disease Father    • No Known Problems Maternal Grandmother    • Diabetes Maternal Grandfather    • No Known Problems Paternal Grandmother    • Cancer Paternal Grandfather    ,   Social History     Tobacco Use   • Smoking status: Never   • Smokeless tobacco: Never   Vaping Use   • Vaping Use: Never used   Substance Use Topics   • Alcohol use: Never   • Drug use: Never   ,   Medications Prior to Admission   Medication Sig Dispense Refill Last Dose   • fluticasone (FLONASE) 50 MCG/ACT nasal spray USE TWO SPRAYS INTO THE NOSTRIL(S) AS DIRECTED BY PROVIDER DAILY 18 g 5    • Ascorbic Acid (VITAMIN C PO) Take 1 tablet by mouth Daily.      • aspirin 81 MG EC tablet Take 81 mg by mouth Daily.      • atorvastatin (LIPITOR) 80 MG tablet Take 80 mg by mouth Daily.      • calcium  citrate-vitamin d (CALCITRATE) 315-250 MG-UNIT tablet tablet Take 1 tablet by mouth Daily.      • carvedilol (COREG) 6.25 MG tablet Take 1 tablet by mouth 2 (Two) Times a Day.      • cetirizine (zyrTEC) 10 MG tablet Take 1 tablet by mouth Daily.      • cholecalciferol (VITAMIN D3) 25 MCG (1000 UT) tablet Take 1,000 Units by mouth Daily.      • clopidogrel (PLAVIX) 75 MG tablet Take 1 tablet by mouth Daily. 90 tablet 3    • cyclobenzaprine (FLEXERIL) 10 MG tablet Take 1 tablet by mouth 3 (Three) Times a Day As Needed for Muscle Spasms. 90 tablet 0    • hydroCHLOROthiazide (HYDRODIURIL) 12.5 MG tablet Take 1 tablet by mouth Daily. 90 tablet 3    • indapamide (LOZOL) 2.5 MG tablet Take 1 tablet by mouth Every Morning. 90 tablet 3    • Krill Oil (Omega-3) 500 MG capsule Take 1 tablet by mouth Daily.      • losartan (COZAAR) 100 MG tablet Take 100 mg by mouth Daily.      • multivitamin with minerals tablet tablet Take 1 tablet by mouth Daily.      • prednisoLONE acetate (PRED FORTE) 1 % ophthalmic suspension Administer 1 drop to both eyes Daily.      • ranolazine (RANEXA) 1000 MG 12 hr tablet Take 500 mg by mouth Every 12 (Twelve) Hours.      • Turmeric Curcumin 500 MG capsule Take 1 capsule by mouth Daily.       and Allergies:  Diazepam and Codeine  No current facility-administered medications for this encounter.    Review of Systems:    All organ systems were evaluated and found negative except those which are mentioned in the History of Present Illness.      Objective     Physical Exam:    Vital Signs:       Constitutional:    Well-developed, well-nourished in no acute distress  Eyes:     Extraocular movements intact; pupils equal, round, and reactive  Ears, Nose, Mouth, Throat:  Hearing intact, nose midline, no mucosal lesions  Cardiovascular:   Regular rate and rhythm   Respiratory:    Clear to auscultation bilaterally  Gastrointestinal:   Soft, partially reducible umbilical  hernia  Genitourinary:    Deferred  Musculoskeletal:   Full range of motion, no muscle wasting, no weakness  Skin:     No rashes or excoriations  Neurological:    Moves all extremities, sensation intact  Psychiatric:    Alert and oriented to person, place, and time  Hematologic/Lymphatic/Immune: No lymphadenopathy      Results Review:     Lab Results (last 24 hours)     ** No results found for the last 24 hours. **        Imaging Results (Last 24 Hours)     ** No results found for the last 24 hours. **            Assessment & Plan     Umbilical hernia    She will undergo open umbilical hernia repair with possible placement of mesh.  The risks, benefits, complications, and possible alternatives were discussed with the patient who agreed to proceed.      Lara Conley MD  02/02/23  06:42 CST

## 2023-02-02 NOTE — ANESTHESIA POSTPROCEDURE EVALUATION
Patient: Priscilla Santos    Procedure Summary     Date: 02/02/23 Room / Location:  [facility] OR 04 /  [facility]    Anesthesia Start: 1011 Anesthesia Stop: 1111    Procedure: UMBILICAL HERNIA REPAIR with possible mesh (Abdomen) Diagnosis:       Umbilical hernia without obstruction and without gangrene      (Umbilical hernia without obstruction and without gangrene [K42.9])    Surgeons: Lara Conley MD Provider: Calos Neri CRNA    Anesthesia Type: general ASA Status: 3          Anesthesia Type: general    Vitals  Vitals Value Taken Time   /63 02/02/23 1156   Temp 97.8 °F (36.6 °C) 02/02/23 1155   Pulse 57 02/02/23 1158   Resp 16 02/02/23 1155   SpO2 94 % 02/02/23 1158   Vitals shown include unvalidated device data.        Post Anesthesia Care and Evaluation    PONV Status: none  Comments: Patient d/c from PACU prior to anes eval based on James score.  Please see RN notes for details of d/c criteria.    Blood pressure 129/59, pulse 59, temperature 97.8 °F (36.6 °C), temperature source Temporal, resp. rate 16, SpO2 96 %, not currently breastfeeding.

## 2023-02-05 ENCOUNTER — HOSPITAL ENCOUNTER (EMERGENCY)
Facility: HOSPITAL | Age: 73
Discharge: HOME | End: 2023-02-05
Attending: EMERGENCY MEDICINE | Admitting: EMERGENCY MEDICINE
Payer: MEDICARE

## 2023-02-05 VITALS
WEIGHT: 218 LBS | TEMPERATURE: 97.8 F | RESPIRATION RATE: 18 BRPM | OXYGEN SATURATION: 99 % | DIASTOLIC BLOOD PRESSURE: 71 MMHG | SYSTOLIC BLOOD PRESSURE: 115 MMHG | BODY MASS INDEX: 40.12 KG/M2 | HEIGHT: 62 IN | HEART RATE: 62 BPM

## 2023-02-05 DIAGNOSIS — Z87.19 STATUS POST HERNIA REPAIR: Primary | ICD-10-CM

## 2023-02-05 DIAGNOSIS — R23.8 SKIN IRRITATION: ICD-10-CM

## 2023-02-05 DIAGNOSIS — Z98.890 STATUS POST HERNIA REPAIR: Primary | ICD-10-CM

## 2023-02-05 PROCEDURE — 99282 EMERGENCY DEPT VISIT SF MDM: CPT

## 2023-02-05 NOTE — ED PROVIDER NOTES
Subjective   History of Present Illness  Patient is a 72-year-old lady who had a hernia repair periumbilical hernia repair done recently she noticed some redness around the incision site and she wanted her dressing to be removed she had tried to remove the dressing at home but she was scared that she can take the Steri-Strips off therefore came to the ER there is no vomiting there is no abdominal pain there is no fever there is no discharge no other complaints or concerns.    Illness  Location:  Status post hernia repair concerned about skin infection.  Severity:  Mild  Chronicity:  New  Associated symptoms: no abdominal pain, no chest pain, no congestion, no cough, no diarrhea, no fatigue, no fever, no headaches, no loss of consciousness, no myalgias, no rash, no rhinorrhea, no shortness of breath, no sore throat, no vomiting and no wheezing        Review of Systems   Constitutional: Negative.  Negative for fatigue and fever.   HENT: Negative.  Negative for congestion, rhinorrhea and sore throat.    Eyes: Negative.    Respiratory: Negative.  Negative for cough, shortness of breath and wheezing.    Cardiovascular: Negative.  Negative for chest pain.   Gastrointestinal: Negative.  Negative for abdominal pain, diarrhea and vomiting.   Musculoskeletal: Negative.  Negative for back pain, myalgias and neck pain.   Skin: Negative.  Negative for rash.   Neurological: Negative.  Negative for loss of consciousness and headaches.   All other systems reviewed and are negative.      Past Medical History:   Diagnosis Date   • Arthritis    • CHF (congestive heart failure) (HCC)    • Coronary artery disease    • History of transfusion    • Hyperlipidemia    • Hypertension    • Sleep apnea    • UTI (urinary tract infection)        Allergies   Allergen Reactions   • Diazepam Other (See Comments)     Made her violent   • Codeine Nausea And Vomiting       Past Surgical History:   Procedure Laterality Date   • ADENOIDECTOMY     •  APPENDECTOMY     • CARDIAC SURGERY  2008    Tripple bypass   • CAROTID STENT     • CARPAL TUNNEL RELEASE Bilateral    • CHOLECYSTECTOMY     • COLON SURGERY     • CORONARY STENT PLACEMENT  2021    total of 4-5 heart stents in past   • HERNIA REPAIR     • JOINT REPLACEMENT     • KNEE SURGERY Bilateral    • TONSILLECTOMY     • TUBAL ABDOMINAL LIGATION  1977   • UMBILICAL HERNIA REPAIR N/A 2/2/2023    Procedure: UMBILICAL HERNIA REPAIR with mesh;  Surgeon: Lara Conley MD;  Location: [facility];  Service: General;  Laterality: N/A;       Family History   Problem Relation Age of Onset   • Diabetes Mother    • Cancer Father    • Heart disease Father    • No Known Problems Maternal Grandmother    • Diabetes Maternal Grandfather    • No Known Problems Paternal Grandmother    • Cancer Paternal Grandfather        Social History     Socioeconomic History   • Marital status:    Tobacco Use   • Smoking status: Never   • Smokeless tobacco: Never   Vaping Use   • Vaping Use: Never used   Substance and Sexual Activity   • Alcohol use: Never   • Drug use: Never   • Sexual activity: Defer           Objective   Physical Exam  Vitals and nursing note reviewed. Exam conducted with a chaperone present.   Constitutional:       General: She is awake.      Appearance: Normal appearance. She is well-developed. She is not ill-appearing.   HENT:      Head: Normocephalic and atraumatic.   Eyes:      General: Lids are normal.      Conjunctiva/sclera: Conjunctivae normal.      Pupils: Pupils are equal, round, and reactive to light.   Cardiovascular:      Rate and Rhythm: Normal rate and regular rhythm.      Chest Wall: PMI is not displaced.      Pulses: Normal pulses.      Heart sounds: Normal heart sounds.   Pulmonary:      Effort: Pulmonary effort is normal.      Breath sounds: Normal breath sounds. No decreased breath sounds.   Abdominal:      General: Abdomen is flat. Bowel sounds are normal.      Palpations: Abdomen is soft.       Tenderness: There is no abdominal tenderness.      Comments: The incision site appears healthy.  The dressing has been removed the Steri-Strips are intact.  Patient has got mild skin irritation probably caused by the dressing itself around the umbilicus but there is no evidence of any cellulitis there is no vesicles there is no drainage there is no hematoma formation there is no abscess and this area is nontender none crepitus.   Musculoskeletal:         General: Normal range of motion.      Cervical back: Full passive range of motion without pain, normal range of motion and neck supple.   Skin:     General: Skin is warm and dry.      Capillary Refill: Capillary refill takes less than 2 seconds.   Neurological:      General: No focal deficit present.      Mental Status: She is alert and oriented to person, place, and time.      Motor: Motor function is intact.      Deep Tendon Reflexes: Reflexes are normal and symmetric.   Psychiatric:         Behavior: Behavior is cooperative.         Procedures           ED Course                                           Medical Decision Making  Skin irritation.    Amount and/or Complexity of Data Reviewed  Discussion of management or test interpretation with external provider(s): Discussed with Dr. Lara Conley    Risk  Risk Details: I have discussed this case at length with the patient and she is comfortable going home.  She is going to follow-up with Dr. Conley tomorrow.        Final diagnoses:   Status post hernia repair   Skin irritation       ED Disposition  ED Disposition     ED Disposition   Discharge    Condition   Stable    Comment   --             Lara Conley MD  [address] [facility] [address]  [address] [address]  [phone]      Please follow-up with Dr. Lara Conley tomorrow between 8 AM to 12 PM         Medication List      No changes were made to your prescriptions during this visit.          Elmo De La Cruz MD  02/05/23 1037

## 2023-02-05 NOTE — DISCHARGE INSTRUCTIONS
Please return to the ER for any worsening symptoms fever increasing pain increasing redness abdominal pain or any other symptoms.  Risks and benefits of treatments given and alternative treatment options discussed with patient/family. I answered all the questions in simple, plain language, and there was voiced understanding and agreement with plan of care. There were no further questions. Differential diagnosis discussed. Patient/family was advised that the practice of medicine is not always an exact science, and sometimes tests, physical exam, or history may not show the underlying conditions with certainty. Additionally, the condition may change or show itself later after initial presentation. There was also expressed understanding and agreement with this limitation of emergency medicine practice. Patient/family was asked to return to ED if any problem or issues or if condition worsens or does not improved. Patient/family agreed to follow up with PCP/specialist as advised, or return to ED if unable to see a provider in a timely fashion for continued symptoms.

## 2023-02-09 RX ORDER — CARVEDILOL 6.25 MG/1
TABLET ORAL
Qty: 180 TABLET | Refills: 3 | Status: SHIPPED | OUTPATIENT
Start: 2023-02-09

## 2023-02-26 NOTE — PROGRESS NOTES
"Lara Conley MD [facility] Progress Note    Referring Provider: No ref. provider found      Chief complaint   Chief Complaint   Patient presents with   • Post-op Hernia        Subjective .     History of present illness:  Priscilla Santos  is a 72 y.o. female who presents status post open ventral hernia repair with placement of mesh.    History    The following portions of the patient's history were reviewed and updated as appropriate: allergies, current medications, past family history, past medical history, past social history, past surgical history, and problem list.    Objective     Vital Signs   /70 (BP Location: Left arm, Patient Position: Sitting, Cuff Size: Adult)   Pulse 59   Temp 98 °F (36.7 °C)   Ht 157.5 cm (62.01\")   Wt 98.9 kg (218 lb)   SpO2 93%   BMI 39.86 kg/m²      Physical Exam:    General The patient is well-developed, well-nourished, and in no acute distress.    Abdomen The incision is well-approximated and healing without signs of infection.       Class 2 Severe Obesity (BMI >=35 and <=39.9). Obesity-related health conditions include the following: none. Obesity is newly identified. BMI is is above average; BMI management plan is completed. We discussed portion control and increasing exercise.    Assessment & Plan       Diagnoses and all orders for this visit:    1. Postoperative visit (Primary)    2. Aftercare following surgery           The patient will return in one month.      Lara Conley MD              "

## 2023-02-27 ENCOUNTER — OFFICE VISIT (OUTPATIENT)
Dept: SURGERY | Facility: CLINIC | Age: 73
End: 2023-02-27
Payer: MEDICARE

## 2023-02-27 VITALS
OXYGEN SATURATION: 93 % | TEMPERATURE: 98 F | HEIGHT: 62 IN | HEART RATE: 59 BPM | DIASTOLIC BLOOD PRESSURE: 70 MMHG | WEIGHT: 218 LBS | BODY MASS INDEX: 40.12 KG/M2 | SYSTOLIC BLOOD PRESSURE: 117 MMHG

## 2023-02-27 DIAGNOSIS — Z48.89 AFTERCARE FOLLOWING SURGERY: ICD-10-CM

## 2023-02-27 DIAGNOSIS — Z48.89 POSTOPERATIVE VISIT: Primary | ICD-10-CM

## 2023-02-27 PROCEDURE — 99212 OFFICE O/P EST SF 10 MIN: CPT | Performed by: SPECIALIST

## 2023-03-15 RX ORDER — HYDROCHLOROTHIAZIDE 12.5 MG/1
CAPSULE, GELATIN COATED ORAL
Qty: 90 CAPSULE | Refills: 3 | Status: SHIPPED | OUTPATIENT
Start: 2023-03-15 | End: 2023-03-27

## 2023-03-15 NOTE — TELEPHONE ENCOUNTER
Rx Refill Note  Requested Prescriptions     Pending Prescriptions Disp Refills   • hydroCHLOROthiazide (MICROZIDE) 12.5 MG capsule [Pharmacy Med Name: HYDROCHLOROTHIAZIDE 12.5MG CAPS] 90 capsule 3     Sig: TAKE ONE CAPSULE BY MOUTH EVERY DAY      Last office visit with prescribing clinician: 10/27/2022   Last telemedicine visit with prescribing clinician: 3/16/2023   Next office visit with prescribing clinician: 3/16/2023         Kia Nettles MA  03/15/23, 10:21 CDT

## 2023-03-16 ENCOUNTER — OFFICE VISIT (OUTPATIENT)
Dept: FAMILY MEDICINE CLINIC | Facility: CLINIC | Age: 73
End: 2023-03-16
Payer: MEDICARE

## 2023-03-16 VITALS
HEART RATE: 55 BPM | OXYGEN SATURATION: 98 % | HEIGHT: 62 IN | SYSTOLIC BLOOD PRESSURE: 103 MMHG | BODY MASS INDEX: 39.34 KG/M2 | TEMPERATURE: 97.8 F | DIASTOLIC BLOOD PRESSURE: 70 MMHG | WEIGHT: 213.8 LBS

## 2023-03-16 DIAGNOSIS — Z13.1 SCREENING FOR DIABETES MELLITUS: Primary | ICD-10-CM

## 2023-03-16 PROCEDURE — 3074F SYST BP LT 130 MM HG: CPT | Performed by: NURSE PRACTITIONER

## 2023-03-16 PROCEDURE — 99212 OFFICE O/P EST SF 10 MIN: CPT | Performed by: NURSE PRACTITIONER

## 2023-03-16 PROCEDURE — 3044F HG A1C LEVEL LT 7.0%: CPT | Performed by: NURSE PRACTITIONER

## 2023-03-16 PROCEDURE — 3078F DIAST BP <80 MM HG: CPT | Performed by: NURSE PRACTITIONER

## 2023-03-16 NOTE — PROGRESS NOTES
"Chief Complaint   Patient presents with   • Diabetes        Subjective   Priscilla Santos is a 72 y.o. female who presents today for 3 month diabetes follow up.    HPI   She has no complaints today.  She has a very strong family history of diabetes.     Allergies   Allergen Reactions   • Diazepam Other (See Comments)     Made her violent   • Codeine Nausea And Vomiting         OBJECTIVE:  Vitals:    03/16/23 1340   BP: 103/70   BP Location: Left arm   Patient Position: Sitting   Cuff Size: Large Adult   Pulse: 55   Temp: 97.8 °F (36.6 °C)   SpO2: 98%   Weight: 97 kg (213 lb 12.8 oz)   Height: 157.5 cm (62\")     Physical Exam  Vitals and nursing note reviewed.   Constitutional:       Appearance: Normal appearance.   Cardiovascular:      Rate and Rhythm: Normal rate and regular rhythm.      Pulses: Normal pulses.      Heart sounds: Normal heart sounds.   Pulmonary:      Effort: Pulmonary effort is normal.      Breath sounds: Normal breath sounds.   Skin:     General: Skin is warm and dry.   Neurological:      General: No focal deficit present.      Mental Status: She is alert and oriented to person, place, and time.   Psychiatric:         Mood and Affect: Mood normal.         Behavior: Behavior normal.         Thought Content: Thought content normal.         Judgment: Judgment normal.         Class 2 Severe Obesity (BMI >=35 and <=39.9). Obesity-related health conditions include the following: hypertension and coronary heart disease. Obesity is unchanged. BMI is is above average; BMI management plan is completed. We discussed low calorie, low carb based diet program, portion control and increasing exercise.       CMP    CMP 10/24/22 1/26/23 3/14/23   Glucose 102 (A) 180 (A) 119 (A)   BUN 15 11 17   Creatinine 0.81 0.72 0.71   eGFR  89.5    Sodium 142 139 141   Potassium 3.7 3.1 (A) 3.5   Chloride 104 100 103   Calcium 9.6 9.7 9.4   Total Protein 7.4  7.4   Albumin 3.8  3.7   Globulin 3.6  3.7   Total Bilirubin 0.6  0.5 "   Alkaline Phosphatase 47  46   AST (SGOT) 40  52 (A)   ALT (SGPT) 38 (A)  43 (A)   BUN/Creatinine Ratio 19 15.3 24   Anion Gap  11.0    (A) Abnormal value            Most Recent A1C    HGBA1C Most Recent 3/14/23   Hemoglobin A1C 5.6      Comments are available for some flowsheets but are not being displayed.             ASSESSMENT/ PLAN:    Diagnoses and all orders for this visit:    1. Screening for diabetes mellitus (Primary)      Procedures     Management Plan:   Continue present treatment. No treatment necessary with normal A1C.   An After Visit Summary was printed and given to the patient at discharge.    Follow-up: No follow-ups on file.         Reza Charles, APRN 3/16/2023 16:00 CDT  This note was electronically signed.

## 2023-03-20 ENCOUNTER — OFFICE VISIT (OUTPATIENT)
Dept: SURGERY | Facility: CLINIC | Age: 73
End: 2023-03-20
Payer: MEDICARE

## 2023-03-20 VITALS
SYSTOLIC BLOOD PRESSURE: 115 MMHG | DIASTOLIC BLOOD PRESSURE: 75 MMHG | WEIGHT: 213.85 LBS | HEIGHT: 62 IN | HEART RATE: 68 BPM | OXYGEN SATURATION: 99 % | TEMPERATURE: 97.5 F | BODY MASS INDEX: 39.35 KG/M2

## 2023-03-20 DIAGNOSIS — Z48.89 AFTERCARE FOLLOWING SURGERY: ICD-10-CM

## 2023-03-20 DIAGNOSIS — Z48.89 POSTOPERATIVE VISIT: Primary | ICD-10-CM

## 2023-03-20 PROCEDURE — 3074F SYST BP LT 130 MM HG: CPT | Performed by: SPECIALIST

## 2023-03-20 PROCEDURE — 1159F MED LIST DOCD IN RCRD: CPT | Performed by: SPECIALIST

## 2023-03-20 PROCEDURE — 1160F RVW MEDS BY RX/DR IN RCRD: CPT | Performed by: SPECIALIST

## 2023-03-20 PROCEDURE — 3078F DIAST BP <80 MM HG: CPT | Performed by: SPECIALIST

## 2023-03-20 PROCEDURE — 99212 OFFICE O/P EST SF 10 MIN: CPT | Performed by: SPECIALIST

## 2023-03-20 NOTE — PROGRESS NOTES
"Lara Conley MD [facility] Progress Note    Referring Provider: No ref. provider found      Chief complaint   Chief Complaint   Patient presents with   • Follow-up     Mrs. Roper is here for a f/u hernia.         Subjective .     History of present illness:   Priscilla Santos  is a 72 y.o. female who presents status post open ventral hernia repair with placement of mesh.    History    The following portions of the patient's history were reviewed and updated as appropriate: allergies, current medications, past family history, past medical history, past social history, past surgical history, and problem list.    Objective     Vital Signs   /75 (BP Location: Left arm, Patient Position: Sitting, Cuff Size: Adult)   Pulse 68   Temp 97.5 °F (36.4 °C)   Ht 157.5 cm (62.01\")   Wt 97 kg (213 lb 13.5 oz)   SpO2 99%   BMI 39.10 kg/m²      Physical Exam:    General The patient is well-developed, well-nourished, and in no acute distress.    Abdomen The incision is well-approximated and healing without signs of infection.       Class 2 Severe Obesity (BMI >=35 and <=39.9). Obesity-related health conditions include the following: none. Obesity is newly identified. BMI is is above average; BMI management plan is completed. We discussed portion control and increasing exercise.    Assessment & Plan       Diagnoses and all orders for this visit:    1. Postoperative visit (Primary)    2. Aftercare following surgery           The patient will return 3 months.      Lara Conley MD              "

## 2023-03-24 NOTE — TELEPHONE ENCOUNTER
Rx Refill Note  Requested Prescriptions     Pending Prescriptions Disp Refills   • atorvastatin (LIPITOR) 80 MG tablet [Pharmacy Med Name: ATORVASTATIN CALCIUM 80MG TABS] 90 tablet 4     Sig: TAKE ONE TABLET BY MOUTH AT BEDTIME   • clopidogrel (PLAVIX) 75 MG tablet [Pharmacy Med Name: CLOPIDOGREL BISULFATE 75MG TABS] 90 tablet 3     Sig: TAKE ONE TABLET BY MOUTH EVERY DAY   • hydroCHLOROthiazide (MICROZIDE) 12.5 MG capsule [Pharmacy Med Name: HYDROCHLOROTHIAZIDE 12.5MG CAPS] 90 capsule 3     Sig: TAKE ONE CAPSULE BY MOUTH EVERY DAY      Last office visit with prescribing clinician: 3/16/2023   Last telemedicine visit with prescribing clinician: Visit date not found   Next office visit with prescribing clinician: Visit date not found    Protocol met      Kia Nettles MA  03/24/23, 13:33 CDT

## 2023-03-27 DIAGNOSIS — I20.8 ANGINA OF EFFORT: Primary | ICD-10-CM

## 2023-03-27 RX ORDER — RANOLAZINE 1000 MG/1
1000 TABLET, EXTENDED RELEASE ORAL EVERY 12 HOURS SCHEDULED
Qty: 180 TABLET | Refills: 3 | Status: SHIPPED | OUTPATIENT
Start: 2023-03-27

## 2023-03-27 RX ORDER — ATORVASTATIN CALCIUM 80 MG/1
TABLET, FILM COATED ORAL
Qty: 90 TABLET | Refills: 4 | Status: SHIPPED | OUTPATIENT
Start: 2023-03-27

## 2023-03-27 RX ORDER — HYDROCHLOROTHIAZIDE 12.5 MG/1
CAPSULE, GELATIN COATED ORAL
Qty: 90 CAPSULE | Refills: 3 | Status: SHIPPED | OUTPATIENT
Start: 2023-03-27

## 2023-03-27 RX ORDER — CLOPIDOGREL BISULFATE 75 MG/1
TABLET ORAL
Qty: 90 TABLET | Refills: 3 | Status: SHIPPED | OUTPATIENT
Start: 2023-03-27

## 2023-04-19 ENCOUNTER — OFFICE VISIT (OUTPATIENT)
Dept: CARDIOLOGY CLINIC | Age: 73
End: 2023-04-19
Payer: MEDICARE

## 2023-04-19 VITALS
HEIGHT: 61 IN | DIASTOLIC BLOOD PRESSURE: 74 MMHG | SYSTOLIC BLOOD PRESSURE: 122 MMHG | BODY MASS INDEX: 40.22 KG/M2 | WEIGHT: 213 LBS | HEART RATE: 60 BPM

## 2023-04-19 DIAGNOSIS — I25.10 CORONARY ARTERY DISEASE INVOLVING NATIVE CORONARY ARTERY OF NATIVE HEART WITHOUT ANGINA PECTORIS: Primary | ICD-10-CM

## 2023-04-19 PROCEDURE — 1090F PRES/ABSN URINE INCON ASSESS: CPT | Performed by: INTERNAL MEDICINE

## 2023-04-19 PROCEDURE — 3017F COLORECTAL CA SCREEN DOC REV: CPT | Performed by: INTERNAL MEDICINE

## 2023-04-19 PROCEDURE — 1036F TOBACCO NON-USER: CPT | Performed by: INTERNAL MEDICINE

## 2023-04-19 PROCEDURE — G8417 CALC BMI ABV UP PARAM F/U: HCPCS | Performed by: INTERNAL MEDICINE

## 2023-04-19 PROCEDURE — 1123F ACP DISCUSS/DSCN MKR DOCD: CPT | Performed by: INTERNAL MEDICINE

## 2023-04-19 PROCEDURE — G8427 DOCREV CUR MEDS BY ELIG CLIN: HCPCS | Performed by: INTERNAL MEDICINE

## 2023-04-19 PROCEDURE — 99212 OFFICE O/P EST SF 10 MIN: CPT | Performed by: INTERNAL MEDICINE

## 2023-04-19 PROCEDURE — 3074F SYST BP LT 130 MM HG: CPT | Performed by: INTERNAL MEDICINE

## 2023-04-19 PROCEDURE — G8400 PT W/DXA NO RESULTS DOC: HCPCS | Performed by: INTERNAL MEDICINE

## 2023-04-19 PROCEDURE — 3078F DIAST BP <80 MM HG: CPT | Performed by: INTERNAL MEDICINE

## 2023-04-19 NOTE — PROGRESS NOTES
HISTORY  71-year-old lady with a history of glucose intolerance, sleep apnea, dyslipidemia, hypertension, and coronary disease returns for follow-up visit. He previously underwent bypass grafting in 2008 with a restudy in 2011 demonstrating occlusion of her vein grafts with only the mammary patent. At that time she had a right coronary artery stented and in January 2019 underwent stenting of her circumflex. Her ventricular function previously has been demonstrated as normal.  Her lipids and blood pressure have been addressed in ongoing fashion. Last lipid profile was obtained in October 2022 with an LDL of 53, HDL 69, and triglycerides of 69. When seen in December 2022 she related some left lower extremity swelling with a remote history of a pulmonary embolus prompting a venous Doppler which was normal.  On return today she denies chest discomfort or change in exercise tolerance. She relates an intentional 26 pound weight loss. PHYSICAL EXAM  On exam she carries 213 pounds and 5 foot 1 inch frame. Pressure is 122/74 with a pulse of 60. ASSESSMENT/PLAN:   Hypertension -well-controlled. Continue carvedilol and indapamide  Dyslipidemia -well-controlled.   Continue Lipitor  Coronary disease -angina free

## 2023-05-01 RX ORDER — LOSARTAN POTASSIUM 50 MG/1
TABLET ORAL
Qty: 90 TABLET | Refills: 3 | Status: SHIPPED | OUTPATIENT
Start: 2023-05-01

## 2023-05-01 NOTE — TELEPHONE ENCOUNTER
Rx Refill Note  Requested Prescriptions     Pending Prescriptions Disp Refills   • losartan (COZAAR) 50 MG tablet [Pharmacy Med Name: LOSARTAN POTASSIUM 50MG TABS] 90 tablet 3     Sig: TAKE ONE TABLET BY MOUTH EVERY DAY      Last office visit with prescribing clinician: 3/16/2023   Last telemedicine visit with prescribing clinician: Visit date not found   Next office visit with prescribing clinician: Visit date not found      The original prescription was discontinued on 4/20/2022 by Kristen Mead MA for the following reason: Dose adjustment. Renewing this prescription may not be appropriate.         Kia Nettles MA  05/01/23, 15:37 CDT

## 2023-05-11 DIAGNOSIS — M62.838 MUSCLE SPASM: ICD-10-CM

## 2023-05-11 RX ORDER — INDAPAMIDE 2.5 MG/1
TABLET, FILM COATED ORAL
Qty: 90 TABLET | Refills: 1 | Status: SHIPPED | OUTPATIENT
Start: 2023-05-11

## 2023-05-11 RX ORDER — CYCLOBENZAPRINE HCL 10 MG
TABLET ORAL
Qty: 90 TABLET | Refills: 0 | Status: SHIPPED | OUTPATIENT
Start: 2023-05-11

## 2023-05-11 NOTE — TELEPHONE ENCOUNTER
Rx Refill Note  Requested Prescriptions     Pending Prescriptions Disp Refills   • cyclobenzaprine (FLEXERIL) 10 MG tablet [Pharmacy Med Name: CYCLOBENZAPRINE HCL 10MG TABS] 90 tablet 0     Sig: TAKE ONE TABLET BY MOUTH THREE TIMES A DAY AS NEEDED FOR MUSCLE SPASMS      Last office visit with prescribing clinician: 3/16/2023     Next office visit with prescribing clinician: Visit date not found     Daija Ng MA  05/11/23, 10:32 CDT

## 2023-05-11 NOTE — TELEPHONE ENCOUNTER
Rx Refill Note  Requested Prescriptions     Pending Prescriptions Disp Refills   • indapamide (LOZOL) 2.5 MG tablet [Pharmacy Med Name: INDAPAMIDE 2.5MG TABS] 90 tablet 3     Sig: TAKE ONE TABLET BY MOUTH EVERY MORNING      Last office visit with prescribing clinician: 3/16/2023   Next office visit with prescribing clinician: Visit date not found     Daija Ng MA  05/11/23, 10:33 CDT

## 2023-05-15 ENCOUNTER — TELEPHONE (OUTPATIENT)
Dept: FAMILY MEDICINE CLINIC | Facility: CLINIC | Age: 73
End: 2023-05-15

## 2023-06-18 NOTE — PROGRESS NOTES
Lara Conley MD [facility] Progress Note    Referring Provider: No ref. provider found      Chief complaint No chief complaint on file.       Subjective .     History of present illness:  Priscilla Santos  is a 72 y.o. female who presents status post open ventral hernia repair with placement of mesh.     History    The following portions of the patient's history were reviewed and updated as appropriate: allergies, current medications, past family history, past medical history, past social history, past surgical history, and problem list.    Objective     Vital Signs   There were no vitals taken for this visit.     Physical Exam:    General The patient is well-developed, well-nourished, and in no acute distress.    Abdomen The incision is well-approximated and healing without signs of infection.             Assessment & Plan       Diagnoses and all orders for this visit:    1. Postoperative visit (Primary)    2. Aftercare following surgery    3. S/P ventral herniorrhaphy           The patient will return prn.      Lara Conley MD

## 2023-06-19 ENCOUNTER — OFFICE VISIT (OUTPATIENT)
Dept: SURGERY | Facility: CLINIC | Age: 73
End: 2023-06-19
Payer: MEDICARE

## 2023-06-19 VITALS
HEIGHT: 62 IN | BODY MASS INDEX: 39.2 KG/M2 | OXYGEN SATURATION: 99 % | WEIGHT: 213 LBS | SYSTOLIC BLOOD PRESSURE: 127 MMHG | HEART RATE: 56 BPM | DIASTOLIC BLOOD PRESSURE: 83 MMHG

## 2023-06-19 DIAGNOSIS — Z98.890 S/P VENTRAL HERNIORRHAPHY: ICD-10-CM

## 2023-06-19 DIAGNOSIS — Z48.89 POSTOPERATIVE VISIT: Primary | ICD-10-CM

## 2023-06-19 DIAGNOSIS — Z48.89 AFTERCARE FOLLOWING SURGERY: ICD-10-CM

## 2023-06-19 DIAGNOSIS — I70.90 ATHEROSCLEROSIS: Primary | ICD-10-CM

## 2023-06-19 DIAGNOSIS — Z87.19 S/P VENTRAL HERNIORRHAPHY: ICD-10-CM

## 2023-06-19 PROCEDURE — 3074F SYST BP LT 130 MM HG: CPT | Performed by: SPECIALIST

## 2023-06-19 PROCEDURE — 3079F DIAST BP 80-89 MM HG: CPT | Performed by: SPECIALIST

## 2023-06-19 PROCEDURE — 1159F MED LIST DOCD IN RCRD: CPT | Performed by: SPECIALIST

## 2023-06-19 PROCEDURE — 1160F RVW MEDS BY RX/DR IN RCRD: CPT | Performed by: SPECIALIST

## 2023-06-19 PROCEDURE — 99212 OFFICE O/P EST SF 10 MIN: CPT | Performed by: SPECIALIST

## 2023-08-08 ENCOUNTER — OFFICE VISIT (OUTPATIENT)
Dept: FAMILY MEDICINE CLINIC | Facility: CLINIC | Age: 73
End: 2023-08-08
Payer: MEDICARE

## 2023-08-08 VITALS
HEART RATE: 69 BPM | HEIGHT: 62 IN | BODY MASS INDEX: 39.9 KG/M2 | TEMPERATURE: 97.1 F | SYSTOLIC BLOOD PRESSURE: 124 MMHG | WEIGHT: 216.8 LBS | DIASTOLIC BLOOD PRESSURE: 78 MMHG | OXYGEN SATURATION: 95 % | RESPIRATION RATE: 18 BRPM

## 2023-08-08 DIAGNOSIS — B37.31 VAGINAL CANDIDIASIS: Primary | ICD-10-CM

## 2023-08-08 DIAGNOSIS — R25.2 LEG CRAMPS: ICD-10-CM

## 2023-08-08 DIAGNOSIS — R39.89 BLADDER PAIN: ICD-10-CM

## 2023-08-08 LAB
BILIRUB BLD-MCNC: NEGATIVE MG/DL
CLARITY, POC: CLEAR
COLOR UR: YELLOW
GLUCOSE UR STRIP-MCNC: NEGATIVE MG/DL
KETONES UR QL: NEGATIVE
LEUKOCYTE EST, POC: NEGATIVE
NITRITE UR-MCNC: NEGATIVE MG/ML
PH UR: 6 [PH] (ref 5–8)
PROT UR STRIP-MCNC: NEGATIVE MG/DL
RBC # UR STRIP: NEGATIVE /UL
SP GR UR: 1.02 (ref 1–1.03)
UROBILINOGEN UR QL: NORMAL

## 2023-08-08 PROCEDURE — 81003 URINALYSIS AUTO W/O SCOPE: CPT | Performed by: NURSE PRACTITIONER

## 2023-08-08 PROCEDURE — 99213 OFFICE O/P EST LOW 20 MIN: CPT | Performed by: NURSE PRACTITIONER

## 2023-08-08 PROCEDURE — 3044F HG A1C LEVEL LT 7.0%: CPT | Performed by: NURSE PRACTITIONER

## 2023-08-08 PROCEDURE — 3078F DIAST BP <80 MM HG: CPT | Performed by: NURSE PRACTITIONER

## 2023-08-08 PROCEDURE — 1159F MED LIST DOCD IN RCRD: CPT | Performed by: NURSE PRACTITIONER

## 2023-08-08 PROCEDURE — 1160F RVW MEDS BY RX/DR IN RCRD: CPT | Performed by: NURSE PRACTITIONER

## 2023-08-08 PROCEDURE — 3074F SYST BP LT 130 MM HG: CPT | Performed by: NURSE PRACTITIONER

## 2023-08-08 RX ORDER — FLUCONAZOLE 150 MG/1
150 TABLET ORAL ONCE
Qty: 5 TABLET | Refills: 0 | Status: SHIPPED | OUTPATIENT
Start: 2023-08-08 | End: 2023-08-08

## 2023-08-08 NOTE — PROGRESS NOTES
"Chief Complaint   Patient presents with    Pain     Burning when urinating         Subjective   Priscilla Santos is a 72 y.o. female who presents today for dysuria and leg cramps.     HPI   She has had dysuria x 4 days. UA is negative.   She has been having leg cramps at night x 2-3 days.     Allergies   Allergen Reactions    Diazepam Other (See Comments)     Made her violent    Codeine Nausea And Vomiting         OBJECTIVE:  Vitals:    08/08/23 0916   BP: 124/78   BP Location: Left arm   Patient Position: Sitting   Cuff Size: Adult   Pulse: 69   Resp: 18   Temp: 97.1 øF (36.2 øC)   TempSrc: Temporal   SpO2: 95%   Weight: 98.3 kg (216 lb 12.8 oz)   Height: 157.7 cm (62.1\")     Physical Exam  Vitals and nursing note reviewed.   Constitutional:       Appearance: Normal appearance.   Cardiovascular:      Rate and Rhythm: Normal rate and regular rhythm.      Pulses: Normal pulses.      Heart sounds: Normal heart sounds.   Pulmonary:      Effort: Pulmonary effort is normal.      Breath sounds: Normal breath sounds.   Skin:     General: Skin is warm and dry.   Neurological:      General: No focal deficit present.      Mental Status: She is alert and oriented to person, place, and time.   Psychiatric:         Mood and Affect: Mood normal.         Behavior: Behavior normal.         Thought Content: Thought content normal.         Judgment: Judgment normal.       Class 2 Severe Obesity (BMI >=35 and <=39.9). Obesity-related health conditions include the following: hypertension and dyslipidemias. Obesity is unchanged. BMI is is above average; BMI management plan is completed. We discussed low calorie, low carb based diet program, portion control, and increasing exercise.            ASSESSMENT/ PLAN:    Diagnoses and all orders for this visit:    1. Vaginal candidiasis (Primary)  -     fluconazole (Diflucan) 150 MG tablet; Take 1 tablet by mouth 1 (One) Time for 1 dose.  Dispense: 5 tablet; Refill: 0    2. Bladder pain  -     " Cancel: POCT urinalysis dipstick, multipro  -     POCT urinalysis dipstick, multipro    3. Leg cramps  -     Cyclobenzaprine (Flexeril); Future    4. Class 2 severe obesity due to excess calories with serious comorbidity and body mass index (BMI) of 39.0 to 39.9 in adult      Procedures     Management Plan:   We discussed her negative UA. She states that it only burns when she urinates. I questioned her about maybe having a yeast infection and after some thought she admitted to having some vaginal itching as well.   An After Visit Summary was printed and given to the patient at discharge.    Follow-up: Return if symptoms worsen or fail to improve.         Reza Charles, APRN 8/17/2023 14:10 CDT  This note was electronically signed.

## 2023-08-10 DIAGNOSIS — R25.2 LEG CRAMPS: Primary | ICD-10-CM

## 2023-08-10 RX ORDER — CYCLOBENZAPRINE HCL 10 MG
10 TABLET ORAL NIGHTLY
Qty: 30 TABLET | Refills: 5 | Status: SHIPPED | OUTPATIENT
Start: 2023-08-10

## 2023-08-23 ENCOUNTER — TELEPHONE (OUTPATIENT)
Dept: FAMILY MEDICINE CLINIC | Facility: CLINIC | Age: 73
End: 2023-08-23
Payer: MEDICARE

## 2023-09-22 ENCOUNTER — TELEPHONE (OUTPATIENT)
Dept: FAMILY MEDICINE CLINIC | Facility: CLINIC | Age: 73
End: 2023-09-22
Payer: MEDICARE

## 2023-09-22 NOTE — TELEPHONE ENCOUNTER
Rx Refill Note  Requested Prescriptions     Pending Prescriptions Disp Refills    indapamide (LOZOL) 2.5 MG tablet [Pharmacy Med Name: INDAPAMIDE 2.5MG TABS] 90 tablet 1     Sig: TAKE ONE TABLET BY MOUTH EVERY MORNING     Joellen Tavares MA  09/22/23, 09:01 CDT

## 2023-09-25 RX ORDER — INDAPAMIDE 2.5 MG/1
TABLET ORAL
Qty: 90 TABLET | Refills: 1 | Status: SHIPPED | OUTPATIENT
Start: 2023-09-25

## 2023-10-02 RX ORDER — PREDNISOLONE ACETATE 10 MG/ML
SUSPENSION/ DROPS OPHTHALMIC
Qty: 10 ML | Refills: 2 | Status: SHIPPED | OUTPATIENT
Start: 2023-10-02

## 2023-11-17 ENCOUNTER — OFFICE VISIT (OUTPATIENT)
Dept: FAMILY MEDICINE CLINIC | Facility: CLINIC | Age: 73
End: 2023-11-17
Payer: MEDICARE

## 2023-11-17 VITALS
TEMPERATURE: 96 F | BODY MASS INDEX: 40.48 KG/M2 | HEART RATE: 61 BPM | RESPIRATION RATE: 18 BRPM | SYSTOLIC BLOOD PRESSURE: 112 MMHG | WEIGHT: 220 LBS | OXYGEN SATURATION: 98 % | HEIGHT: 62 IN | DIASTOLIC BLOOD PRESSURE: 78 MMHG

## 2023-11-17 DIAGNOSIS — E78.2 MIXED HYPERLIPIDEMIA: ICD-10-CM

## 2023-11-17 DIAGNOSIS — R74.8 ELEVATED LIVER ENZYMES: ICD-10-CM

## 2023-11-17 DIAGNOSIS — R09.81 HEAD CONGESTION: ICD-10-CM

## 2023-11-17 DIAGNOSIS — Z00.00 ENCOUNTER FOR SUBSEQUENT ANNUAL WELLNESS VISIT IN MEDICARE PATIENT: Primary | ICD-10-CM

## 2023-11-17 DIAGNOSIS — I10 PRIMARY HYPERTENSION: ICD-10-CM

## 2023-11-17 DIAGNOSIS — J01.00 ACUTE NON-RECURRENT MAXILLARY SINUSITIS: Primary | ICD-10-CM

## 2023-11-17 DIAGNOSIS — J30.2 SEASONAL ALLERGIES: ICD-10-CM

## 2023-11-17 PROCEDURE — 3044F HG A1C LEVEL LT 7.0%: CPT | Performed by: NURSE PRACTITIONER

## 2023-11-17 PROCEDURE — 3078F DIAST BP <80 MM HG: CPT | Performed by: NURSE PRACTITIONER

## 2023-11-17 PROCEDURE — G0439 PPPS, SUBSEQ VISIT: HCPCS | Performed by: NURSE PRACTITIONER

## 2023-11-17 PROCEDURE — 3074F SYST BP LT 130 MM HG: CPT | Performed by: NURSE PRACTITIONER

## 2023-11-17 PROCEDURE — 1159F MED LIST DOCD IN RCRD: CPT | Performed by: NURSE PRACTITIONER

## 2023-11-17 PROCEDURE — 1160F RVW MEDS BY RX/DR IN RCRD: CPT | Performed by: NURSE PRACTITIONER

## 2023-11-17 PROCEDURE — 1170F FXNL STATUS ASSESSED: CPT | Performed by: NURSE PRACTITIONER

## 2023-11-17 RX ORDER — AZITHROMYCIN 250 MG/1
TABLET, FILM COATED ORAL
Qty: 6 TABLET | Refills: 0 | Status: SHIPPED | OUTPATIENT
Start: 2023-11-17

## 2023-11-17 NOTE — PROGRESS NOTES
The ABCs of the Annual Wellness Visit  Subsequent Medicare Wellness Visit    Subjective      Priscilla Santos is a 72 y.o. female who presents for a Subsequent Medicare Wellness Visit.    The following portions of the patient's history were reviewed and   updated as appropriate: allergies, current medications, past family history, past medical history, past social history, past surgical history, and problem list.    Compared to one year ago, the patient feels her physical   health is the same.    Compared to one year ago, the patient feels her mental   health is the same.    Recent Hospitalizations:  She was not admitted to the hospital during the last year.       Current Medical Providers:  Patient Care Team:  Reza Charles APRN as PCP - General (Family Medicine)  Lara Conley MD as Surgeon (General Surgery)    Outpatient Medications Prior to Visit   Medication Sig Dispense Refill    Ascorbic Acid (VITAMIN C PO) Take 1 tablet by mouth Daily.      aspirin 81 MG EC tablet Take 1 tablet by mouth Daily.      atorvastatin (LIPITOR) 80 MG tablet TAKE ONE TABLET BY MOUTH AT BEDTIME 90 tablet 4    calcium citrate-vitamin d (CALCITRATE) 315-250 MG-UNIT tablet tablet Take 1 tablet by mouth Daily.      carvedilol (COREG) 6.25 MG tablet Take 1 tablet by mouth 2 (Two) Times a Day.      cetirizine (zyrTEC) 10 MG tablet Take 1 tablet by mouth Daily.      cholecalciferol (VITAMIN D3) 25 MCG (1000 UT) tablet Take 1 tablet by mouth Daily.      clopidogrel (PLAVIX) 75 MG tablet TAKE ONE TABLET BY MOUTH EVERY DAY 90 tablet 3    cyclobenzaprine (FLEXERIL) 10 MG tablet TAKE ONE TABLET BY MOUTH THREE TIMES A DAY AS NEEDED FOR MUSCLE SPASMS 90 tablet 0    fluticasone (FLONASE) 50 MCG/ACT nasal spray USE TWO SPRAYS INTO THE NOSTRIL(S) AS DIRECTED BY PROVIDER DAILY 18 g 5    hydroCHLOROthiazide (MICROZIDE) 12.5 MG capsule TAKE ONE CAPSULE BY MOUTH EVERY DAY 90 capsule 3    indapamide (LOZOL) 2.5 MG tablet TAKE ONE TABLET BY MOUTH  EVERY MORNING 90 tablet 1    Krill Oil (Omega-3) 500 MG capsule Take 1 tablet by mouth Daily.      losartan (COZAAR) 100 MG tablet Take 1 tablet by mouth Daily.      losartan (COZAAR) 50 MG tablet TAKE ONE TABLET BY MOUTH EVERY DAY 90 tablet 3    montelukast (Singulair) 10 MG tablet Take 1 tablet by mouth Every Night. 90 tablet 3    multivitamin with minerals tablet tablet Take 1 tablet by mouth Daily.      ondansetron ODT (ZOFRAN-ODT) 8 MG disintegrating tablet Place 1 tablet on the tongue Every 8 (Eight) Hours As Needed for Nausea or Vomiting. 30 tablet 0    ranolazine (RANEXA) 1000 MG 12 hr tablet Take 1 tablet by mouth Every 12 (Twelve) Hours. 180 tablet 3    Turmeric Curcumin 500 MG capsule Take 1 capsule by mouth Daily.      cyclobenzaprine (FLEXERIL) 10 MG tablet Take 1 tablet by mouth Every Night. 30 tablet 5    diphenoxylate-atropine (Lomotil) 2.5-0.025 MG per tablet Take 1 tablet by mouth 4 (Four) Times a Day As Needed for Diarrhea. 40 tablet 2    prednisoLONE acetate (PRED FORTE) 1 % ophthalmic suspension INSTILL TWO DROPS INTO THE LEFT EYE FOUR TIMES A DAY 10 mL 2    sulfamethoxazole-trimethoprim (Bactrim) 400-80 MG tablet Take 1 tablet by mouth 2 (Two) Times a Day. 20 tablet 0     No facility-administered medications prior to visit.       No opioid medication identified on active medication list. I have reviewed chart for other potential  high risk medication/s and harmful drug interactions in the elderly.        Aspirin is on active medication list.  Takes one daily .      Patient Active Problem List   Diagnosis    Umbilical hernia without obstruction and without gangrene    Post-menopausal    Multiple joint pain    History of coronary artery stent placement    Hypertension, well controlled    Vitamin D deficiency    Mixed hyperlipidemia    Class 2 severe obesity due to excess calories with serious comorbidity and body mass index (BMI) of 39.0 to 39.9 in adult    History of bilateral knee replacement  "    Advance Care Planning   Advance Care Planning     Advance Directive is not on file.  ACP discussion was declined by the patient. Patient does not have an advance directive, declines further assistance.     Objective    Vitals:    11/17/23 1006   BP: 112/78   Pulse: 61   Resp: 18   Temp: 96 °F (35.6 °C)   TempSrc: Temporal   SpO2: 98%   Weight: 99.8 kg (220 lb)   Height: 157.5 cm (62\")   PainSc:   6     Estimated body mass index is 40.24 kg/m² as calculated from the following:    Height as of this encounter: 157.5 cm (62\").    Weight as of this encounter: 99.8 kg (220 lb).    Class 3 Severe Obesity (BMI >=40). Obesity-related health conditions include the following: hypertension and dyslipidemias. Obesity is improving with treatment. BMI is is above average; BMI management plan is completed. We discussed low calorie, low carb based diet program, portion control, and increasing exercise.        Does the patient have evidence of cognitive impairment?   No    Lab Results   Component Value Date    CHLPL 123 11/14/2023    TRIG 107 11/14/2023    HDL 55 11/14/2023    LDL 48 11/14/2023    VLDL 20 11/14/2023    HGBA1C 5.5 11/14/2023   Lab Choices: CBC:  Lab Results - Last 18 Months   Lab Units 11/14/23  0730 01/26/23  0944 10/24/22  0720   WBC x10E3/uL 4.6 6.45 5.8   HEMOGLOBIN g/dL 12.9 13.4 13.0   HEMATOCRIT % 36.3 40.6 38.6   PLATELETS x10E3/uL 113* 148 139*      A1C:  Lab Results - Last 18 Months   Lab Units 11/14/23  0730 03/14/23  0756 10/24/22  0720   HEMOGLOBIN A1C % 5.5 5.6 5.4     BMP/CMP:  Lab Results - Last 18 Months   Lab Units 11/14/23  0730 03/14/23  0756 01/26/23  0944 10/24/22  0720   SODIUM mmol/L 141 141 139 142   POTASSIUM mmol/L 3.5 3.5 3.1* 3.7   CHLORIDE mmol/L 104 103 100 104   CO2 mmol/L 24 24 28.0 24   GLUCOSE mg/dL 116* 119* 180* 102*   BUN mg/dL 16 17 11 15   CREATININE mg/dL 0.98 0.71 0.72 0.81   EGFR RESULT mL/min/1.73 62 91  --  78   CALCIUM mg/dL 9.4 9.4 9.7 9.6   Blood work reviewed and " discussed with patient  Physical Exam  Vitals and nursing note reviewed.   Constitutional:       Appearance: Normal appearance. She is obese.   HENT:      Head: Normocephalic and atraumatic.      Right Ear: Tympanic membrane, ear canal and external ear normal.      Left Ear: Tympanic membrane, ear canal and external ear normal.      Nose: Nose normal. Congestion and rhinorrhea present.      Mouth/Throat:      Mouth: Mucous membranes are moist.      Pharynx: Oropharynx is clear.   Eyes:      Extraocular Movements: Extraocular movements intact.      Pupils: Pupils are equal, round, and reactive to light.   Cardiovascular:      Rate and Rhythm: Normal rate and regular rhythm.      Pulses: Normal pulses.      Heart sounds: Normal heart sounds.   Pulmonary:      Effort: Pulmonary effort is normal.      Breath sounds: Normal breath sounds.   Abdominal:      General: Abdomen is flat. Bowel sounds are normal.      Palpations: Abdomen is soft.   Musculoskeletal:         General: Normal range of motion.      Cervical back: Normal range of motion and neck supple.   Skin:     General: Skin is warm and dry.      Capillary Refill: Capillary refill takes less than 2 seconds.   Neurological:      General: No focal deficit present.      Mental Status: She is alert and oriented to person, place, and time.   Psychiatric:         Mood and Affect: Mood normal.         Behavior: Behavior normal.         Thought Content: Thought content normal.         Judgment: Judgment normal.             HEALTH RISK ASSESSMENT    Smoking Status:  Social History     Tobacco Use   Smoking Status Never    Passive exposure: Never   Smokeless Tobacco Never     Alcohol Consumption:  Social History     Substance and Sexual Activity   Alcohol Use Never     Fall Risk Screen:    STEADI Fall Risk Assessment was completed, and patient is at LOW risk for falls.Assessment completed on:2023    Depression Screenin/8/2023     9:15 AM   PHQ-2/PHQ-9  Depression Screening   Little Interest or Pleasure in Doing Things 0-->not at all   Feeling Down, Depressed or Hopeless 0-->not at all   PHQ-9: Brief Depression Severity Measure Score 0       Health Habits and Functional and Cognitive Screenin/17/2023    10:02 AM   Functional & Cognitive Status   Current Diet Well Balanced Diet   Dental Exam Up to date   Eye Exam Up to date   Exercise (times per week) 7 times per week   Current Exercises Include Walking   Do you need help using the phone?  No   Are you deaf or do you have serious difficulty hearing?  No   Do you need help to go to places out of walking distance? No   Do you need help shopping? No   Do you need help preparing meals?  No   Do you need help with housework?  No   Do you need help with laundry? No   Do you need help taking your medications? No   Do you need help managing money? No   Do you ever drive or ride in a car without wearing a seat belt? No   Have you felt unusual stress, anger or loneliness in the last month? No   Who do you live with? Spouse   If you need help, do you have trouble finding someone available to you? No   Have you been bothered in the last four weeks by sexual problems? No   Do you have difficulty concentrating, remembering or making decisions? No       Age-appropriate Screening Schedule:  Refer to the list below for future screening recommendations based on patient's age, sex and/or medical conditions. Orders for these recommended tests are listed in the plan section. The patient has been provided with a written plan.    Health Maintenance   Topic Date Due    MAMMOGRAM  Never done    DXA SCAN  Never done    COLORECTAL CANCER SCREENING  Never done    COVID-19 Vaccine (1) Never done    Pneumococcal Vaccine 65+ (1 - PCV) Never done    ZOSTER VACCINE (1 of 2) Never done    TDAP/TD VACCINES (2 - Td or Tdap) 2022    DIABETIC FOOT EXAM  Never done    DIABETIC EYE EXAM  2023    INFLUENZA VACCINE  2023    URINE  MICROALBUMIN  03/14/2024    HEMOGLOBIN A1C  05/14/2024    BMI FOLLOWUP  08/08/2024    LIPID PANEL  11/14/2024    ANNUAL WELLNESS VISIT  11/17/2024    HEPATITIS C SCREENING  Completed                  CMS Preventative Services Quick Reference  Risk Factors Identified During Encounter:    Immunizations Discussed/Encouraged: Tdap, Influenza, Prevnar 20 (Pneumococcal 20-valent conjugate), and Shingrix  Inactivity/Sedentary: Patient was advised to exercise at least 150 minutes a week per CDC recommendations.  Polypharmacy: Medication List reviewed and Medications are appropriate for patient    The above risks/problems have been discussed with the patient.  Pertinent information has been shared with the patient in the After Visit Summary.    Diagnoses and all orders for this visit:    1. Encounter for subsequent annual wellness visit in Medicare patient (Primary)    2. Elevated liver enzymes  -     Hepatic Function Panel; Future    3. Mixed hyperlipidemia    4. Primary hypertension    5. Head congestion    6. Seasonal allergies    Recommendation is that we decrease high cholesterol content foods in the diet (fatty meats including pacheco, sausage, fatty steaks, etc.) and increase lean meats (turkey, chicken, and fish). Also eat less fried and processed foods and more grilled and baked foods. Increase vegetables in diet. Also increase physical activity (moderate cardio for at least 30 minutes each day.)     Encouraged to drink at least 32 oz water daily.      Follow Up:   Next Medicare Wellness visit to be scheduled in 1 year.      An After Visit Summary and PPPS were made available to the patient.

## 2023-11-21 ENCOUNTER — PATIENT ROUNDING (BHMG ONLY) (OUTPATIENT)
Dept: FAMILY MEDICINE CLINIC | Facility: CLINIC | Age: 73
End: 2023-11-21
Payer: MEDICARE

## 2023-11-21 NOTE — PROGRESS NOTES
November 21, 2023    Hello, may I speak with Priscilla Santos?    My name is Kaylin     I am  with [facility]  [address] [facility] [id number] [phone].    Before we get started may I verify your date of birth? 1950    I am calling to officially welcome you to our practice and ask about your recent visit. Is this a good time to talk?  PT DID NOT ANSWER- LVM     Tell me about your visit with us. What things went well?         We're always looking for ways to make our patients' experiences even better. Do you have recommendations on ways we may improve?      Overall were you satisfied with your first visit to our practice?        I appreciate you taking the time to speak with me today. Is there anything else I can do for you?       Thank you, and have a great day.

## 2023-12-12 ENCOUNTER — LAB (OUTPATIENT)
Dept: LAB | Facility: HOSPITAL | Age: 73
End: 2023-12-12
Payer: MEDICARE

## 2023-12-12 ENCOUNTER — OFFICE VISIT (OUTPATIENT)
Dept: CARDIOLOGY | Facility: CLINIC | Age: 73
End: 2023-12-12
Payer: MEDICARE

## 2023-12-12 VITALS
BODY MASS INDEX: 40.85 KG/M2 | HEART RATE: 57 BPM | SYSTOLIC BLOOD PRESSURE: 111 MMHG | HEIGHT: 62 IN | WEIGHT: 222 LBS | DIASTOLIC BLOOD PRESSURE: 73 MMHG | OXYGEN SATURATION: 96 %

## 2023-12-12 DIAGNOSIS — I10 PRIMARY HYPERTENSION: ICD-10-CM

## 2023-12-12 DIAGNOSIS — R06.09 DOE (DYSPNEA ON EXERTION): ICD-10-CM

## 2023-12-12 DIAGNOSIS — E78.2 MIXED HYPERLIPIDEMIA: ICD-10-CM

## 2023-12-12 DIAGNOSIS — I25.10 CORONARY ARTERY DISEASE INVOLVING NATIVE HEART, UNSPECIFIED VESSEL OR LESION TYPE, UNSPECIFIED WHETHER ANGINA PRESENT: ICD-10-CM

## 2023-12-12 DIAGNOSIS — I25.10 CORONARY ARTERY DISEASE INVOLVING NATIVE HEART, UNSPECIFIED VESSEL OR LESION TYPE, UNSPECIFIED WHETHER ANGINA PRESENT: Primary | ICD-10-CM

## 2023-12-12 LAB — TROPONIN T SERPL HS-MCNC: 18 NG/L

## 2023-12-12 PROCEDURE — 3078F DIAST BP <80 MM HG: CPT | Performed by: INTERNAL MEDICINE

## 2023-12-12 PROCEDURE — 36415 COLL VENOUS BLD VENIPUNCTURE: CPT

## 2023-12-12 PROCEDURE — 93000 ELECTROCARDIOGRAM COMPLETE: CPT | Performed by: INTERNAL MEDICINE

## 2023-12-12 PROCEDURE — 1160F RVW MEDS BY RX/DR IN RCRD: CPT | Performed by: INTERNAL MEDICINE

## 2023-12-12 PROCEDURE — 84484 ASSAY OF TROPONIN QUANT: CPT

## 2023-12-12 PROCEDURE — 83880 ASSAY OF NATRIURETIC PEPTIDE: CPT

## 2023-12-12 PROCEDURE — 3074F SYST BP LT 130 MM HG: CPT | Performed by: INTERNAL MEDICINE

## 2023-12-12 PROCEDURE — 1159F MED LIST DOCD IN RCRD: CPT | Performed by: INTERNAL MEDICINE

## 2023-12-12 PROCEDURE — 80053 COMPREHEN METABOLIC PANEL: CPT

## 2023-12-12 PROCEDURE — 99214 OFFICE O/P EST MOD 30 MIN: CPT | Performed by: INTERNAL MEDICINE

## 2023-12-12 NOTE — PROGRESS NOTES
"Chief Complaint  Coronary Artery Disease (6 MO FU) and Chest Pain (RECENT CHEST PRESSURE/PAIN)    Subjective      Priscilla Santos presents to [facility]  Coronary Artery Disease  Symptoms include chest pain.   Chest Pain     Her past medical history is significant for CAD.     Priscilla has noted a gradual increase in exertional dyspnea.  This occurs when she walks any distance or goes up a flight of stairs etc.  She has had no PND or orthopnea.  There has been no anginal type chest discomfort or pleuritic type chest discomfort.  She has occasional twinges of discomfort in her left lateral chest.  She has had no syncope or near syncope.  She does not feel palpitations and has not had any increased peripheral edema.  There has been no fever or chills and no cough.  She has had no abdominal pain, nausea or vomiting.    She had a lipid panel and liver panel in November and had some mild elevation of transaminases.  She attributes this to Tylenol.  She is also on Lipitor 80 mg daily.      Objective   Vital Signs:  /73 (BP Location: Left arm, Patient Position: Sitting, Cuff Size: Adult)   Pulse 57   Ht 157.5 cm (62.01\")   Wt 101 kg (222 lb)   SpO2 96%   BMI 40.59 kg/m²   Estimated body mass index is 40.59 kg/m² as calculated from the following:    Height as of this encounter: 157.5 cm (62.01\").    Weight as of this encounter: 101 kg (222 lb).         Physical Exam    A 73-year-old female who is awake, alert and oriented.  She is in no distress.  HEENT: No scleral icterus  Neck: No jugular venous distention  Lungs: Clear to auscultation.  Heart: Regular rhythm with normal S1 and S2.  No rubs gallops clicks or murmurs.  Extremities trace pretibial edema.  Pulses are intact with no pulses paradoxus.      Result Review :              ECG 12 Lead    Date/Time: 12/12/2023 9:29 AM  Performed by: Max Milner MD    Authorized by: Max Milner MD  Comparison: compared " with previous ECG from 6/26/2023  Comparison to previous ECG: When compared to previous EKG, low voltage and mild diffuse J-point elevations have appeared.  Rhythm: sinus rhythm  Rate: normal  Q waves: II, III, aVF and V3    ST Elevation: all  T Waves: T waves normal  QRS axis: normal    Clinical impression: abnormal EKG  Comments: Diffuse mild J-point elevations, consider normal variant, pericarditis, injury.            Assessment and Plan   Diagnoses and all orders for this visit:    1. Coronary artery disease involving native heart, unspecified vessel or lesion type, unspecified whether angina present (Primary)  -     Adult Transthoracic Echo Complete W/ Cont if Necessary Per Protocol; Future  -     Troponin I; Future  -     ECG 12 Lead    2. Mixed hyperlipidemia  -     Adult Transthoracic Echo Complete W/ Cont if Necessary Per Protocol; Future  -     Troponin I; Future  -     ECG 12 Lead    3. DING (dyspnea on exertion)  -     Adult Transthoracic Echo Complete W/ Cont if Necessary Per Protocol; Future  -     Troponin I; Future  -     ECG 12 Lead    4. Primary hypertension    1.  Dyspnea on exertion.  She has no evidence for heart failure on exam.  She has evidence of anterior and inferior MIs on her current EKG however these changes were pre-existing.  Her left ventricular voltage is low at present and she has diffuse J-point elevations.  She has no other findings to suggest acute pericarditis.  Nevertheless, an echocardiogram is ordered to be done in the near future.  We will also order a complete metabolic panel, troponin and a BNP level to be performed today.    2.  Hyperlipidemia.  She is on high intensity statin therapy and her transaminases are elevated as of 11/14/2023.  At that time her AST was 71 and ALT was 58.  They were also slightly elevated in March.  We are repeating another CMP today.  I have asked her to hold her Lipitor until she hears from us.    3.  Coronary artery disease.  Continue DAPT and  antianginal therapy with ranolazine.    4.  Hypertension.  Blood pressure is 111/73 today continue losartan 50 mg daily and Lozol 2.5 mg daily and HCTZ 12.5 mg daily.    All questions are answered.  She is encouraged to contact us for any further concerns.  Again, an echocardiogram, CMP, troponin and BNP are ordered.  She will return to see me in 3 months.  If echocardiogram shows new wall motion abnormalities or significant pericardial effusion we will contact her for further plans.      There are no Patient Instructions on file for this visit.       Follow Up   Return in about 3 months (around 3/12/2024) for Next scheduled follow up.  Patient was given instructions and counseling regarding her condition or for health maintenance advice. Please see specific information pulled into the AVS if appropriate.

## 2023-12-13 DIAGNOSIS — I25.10 CORONARY ARTERY DISEASE INVOLVING NATIVE HEART, UNSPECIFIED VESSEL OR LESION TYPE, UNSPECIFIED WHETHER ANGINA PRESENT: ICD-10-CM

## 2023-12-13 DIAGNOSIS — R06.09 DOE (DYSPNEA ON EXERTION): Primary | ICD-10-CM

## 2023-12-13 DIAGNOSIS — I10 PRIMARY HYPERTENSION: ICD-10-CM

## 2023-12-13 LAB
ALBUMIN SERPL-MCNC: 3.4 G/DL (ref 3.5–5.2)
ALBUMIN/GLOB SERPL: 0.7 G/DL
ALP SERPL-CCNC: 57 U/L (ref 39–117)
ALT SERPL W P-5'-P-CCNC: 57 U/L (ref 1–33)
ANION GAP SERPL CALCULATED.3IONS-SCNC: 13 MMOL/L (ref 5–15)
AST SERPL-CCNC: 75 U/L (ref 1–32)
BILIRUB SERPL-MCNC: 0.7 MG/DL (ref 0–1.2)
BUN SERPL-MCNC: 14 MG/DL (ref 8–23)
BUN/CREAT SERPL: 16.9 (ref 7–25)
CALCIUM SPEC-SCNC: 9.8 MG/DL (ref 8.6–10.5)
CHLORIDE SERPL-SCNC: 102 MMOL/L (ref 98–107)
CO2 SERPL-SCNC: 24 MMOL/L (ref 22–29)
CREAT SERPL-MCNC: 0.83 MG/DL (ref 0.57–1)
EGFRCR SERPLBLD CKD-EPI 2021: 75 ML/MIN/1.73
GLOBULIN UR ELPH-MCNC: 4.7 GM/DL
GLUCOSE SERPL-MCNC: 146 MG/DL (ref 65–99)
NT-PROBNP SERPL-MCNC: 189.4 PG/ML (ref 0–900)
POTASSIUM SERPL-SCNC: 3.7 MMOL/L (ref 3.5–5.2)
PROT SERPL-MCNC: 8.1 G/DL (ref 6–8.5)
SODIUM SERPL-SCNC: 139 MMOL/L (ref 136–145)

## 2023-12-14 ENCOUNTER — HOSPITAL ENCOUNTER (OUTPATIENT)
Dept: CARDIOLOGY | Facility: HOSPITAL | Age: 73
Discharge: HOME | End: 2023-12-14
Payer: MEDICARE

## 2023-12-14 VITALS
WEIGHT: 222 LBS | DIASTOLIC BLOOD PRESSURE: 73 MMHG | SYSTOLIC BLOOD PRESSURE: 111 MMHG | BODY MASS INDEX: 40.85 KG/M2 | HEIGHT: 62 IN

## 2023-12-14 DIAGNOSIS — I25.10 CORONARY ARTERY DISEASE INVOLVING NATIVE HEART, UNSPECIFIED VESSEL OR LESION TYPE, UNSPECIFIED WHETHER ANGINA PRESENT: ICD-10-CM

## 2023-12-14 DIAGNOSIS — R06.09 DOE (DYSPNEA ON EXERTION): ICD-10-CM

## 2023-12-14 DIAGNOSIS — E78.2 MIXED HYPERLIPIDEMIA: ICD-10-CM

## 2023-12-14 PROCEDURE — 93306 TTE W/DOPPLER COMPLETE: CPT

## 2023-12-15 DIAGNOSIS — I77.89 ENLARGED THORACIC AORTA: Primary | ICD-10-CM

## 2023-12-15 LAB
BH CV ECHO MEAS - AI P1/2T: 479 MSEC
BH CV ECHO MEAS - AO MAX PG: 10.9 MMHG
BH CV ECHO MEAS - AO MEAN PG: 5 MMHG
BH CV ECHO MEAS - AO ROOT DIAM: 3.6 CM
BH CV ECHO MEAS - AO V2 MAX: 165 CM/SEC
BH CV ECHO MEAS - AO V2 VTI: 38.4 CM
BH CV ECHO MEAS - AVA(I,D): 2.8 CM2
BH CV ECHO MEAS - EDV(CUBED): 105.8 ML
BH CV ECHO MEAS - EDV(MOD-SP2): 79.1 ML
BH CV ECHO MEAS - EDV(MOD-SP4): 80.1 ML
BH CV ECHO MEAS - EF(MOD-BP): 67.3 %
BH CV ECHO MEAS - EF(MOD-SP2): 67.9 %
BH CV ECHO MEAS - EF(MOD-SP4): 66.5 %
BH CV ECHO MEAS - ESV(CUBED): 19.7 ML
BH CV ECHO MEAS - ESV(MOD-SP2): 25.4 ML
BH CV ECHO MEAS - ESV(MOD-SP4): 26.8 ML
BH CV ECHO MEAS - FS: 42.9 %
BH CV ECHO MEAS - IVS/LVPW: 1.01 CM
BH CV ECHO MEAS - IVSD: 1.06 CM
BH CV ECHO MEAS - LA DIMENSION: 4 CM
BH CV ECHO MEAS - LAT PEAK E' VEL: 10.6 CM/SEC
BH CV ECHO MEAS - LV DIASTOLIC VOL/BSA (35-75): 40.1 CM2
BH CV ECHO MEAS - LV MASS(C)D: 178.8 GRAMS
BH CV ECHO MEAS - LV MAX PG: 4.8 MMHG
BH CV ECHO MEAS - LV MEAN PG: 2 MMHG
BH CV ECHO MEAS - LV SYSTOLIC VOL/BSA (12-30): 13.4 CM2
BH CV ECHO MEAS - LV V1 MAX: 110 CM/SEC
BH CV ECHO MEAS - LV V1 VTI: 28.7 CM
BH CV ECHO MEAS - LVIDD: 4.7 CM
BH CV ECHO MEAS - LVIDS: 2.7 CM
BH CV ECHO MEAS - LVOT AREA: 3.8 CM2
BH CV ECHO MEAS - LVOT DIAM: 2.2 CM
BH CV ECHO MEAS - LVPWD: 1.05 CM
BH CV ECHO MEAS - MED PEAK E' VEL: 6.2 CM/SEC
BH CV ECHO MEAS - MV A MAX VEL: 86.1 CM/SEC
BH CV ECHO MEAS - MV DEC TIME: 0.25 SEC
BH CV ECHO MEAS - MV E MAX VEL: 104 CM/SEC
BH CV ECHO MEAS - MV E/A: 1.21
BH CV ECHO MEAS - MV MAX PG: 5.4 MMHG
BH CV ECHO MEAS - MV MEAN PG: 2 MMHG
BH CV ECHO MEAS - MV V2 VTI: 42.9 CM
BH CV ECHO MEAS - MVA(VTI): 2.5 CM2
BH CV ECHO MEAS - PA V2 MAX: 107 CM/SEC
BH CV ECHO MEAS - PI END-D VEL: 73 CM/SEC
BH CV ECHO MEAS - RAP SYSTOLE: 5 MMHG
BH CV ECHO MEAS - RV MAX PG: 2.41 MMHG
BH CV ECHO MEAS - RV V1 MAX: 77.7 CM/SEC
BH CV ECHO MEAS - RV V1 VTI: 19.5 CM
BH CV ECHO MEAS - RVDD: 4.5 CM
BH CV ECHO MEAS - RVSP: 25.1 MMHG
BH CV ECHO MEAS - SI(MOD-SP2): 26.9 ML/M2
BH CV ECHO MEAS - SI(MOD-SP4): 26.7 ML/M2
BH CV ECHO MEAS - SV(LVOT): 109.1 ML
BH CV ECHO MEAS - SV(MOD-SP2): 53.7 ML
BH CV ECHO MEAS - SV(MOD-SP4): 53.3 ML
BH CV ECHO MEAS - TAPSE (>1.6): 2.21 CM
BH CV ECHO MEAS - TR MAX PG: 20.1 MMHG
BH CV ECHO MEAS - TR MAX VEL: 224 CM/SEC
BH CV ECHO MEASUREMENTS AVERAGE E/E' RATIO: 12.38
BH CV XLRA - TDI S': 10.4 CM/SEC
LEFT ATRIUM VOLUME INDEX: 26.4 ML/M2
LEFT ATRIUM VOLUME: 55.2 ML

## 2023-12-21 ENCOUNTER — OFFICE VISIT (OUTPATIENT)
Dept: FAMILY MEDICINE CLINIC | Facility: CLINIC | Age: 73
End: 2023-12-21
Payer: MEDICARE

## 2023-12-21 ENCOUNTER — TELEPHONE (OUTPATIENT)
Dept: FAMILY MEDICINE CLINIC | Facility: CLINIC | Age: 73
End: 2023-12-21

## 2023-12-21 VITALS
SYSTOLIC BLOOD PRESSURE: 112 MMHG | HEIGHT: 62 IN | HEART RATE: 62 BPM | BODY MASS INDEX: 41.48 KG/M2 | OXYGEN SATURATION: 98 % | WEIGHT: 225.4 LBS | DIASTOLIC BLOOD PRESSURE: 72 MMHG | RESPIRATION RATE: 18 BRPM | TEMPERATURE: 97 F

## 2023-12-21 DIAGNOSIS — D69.6 THROMBOCYTOPENIA: ICD-10-CM

## 2023-12-21 DIAGNOSIS — R74.8 ELEVATED LIVER ENZYMES: Primary | ICD-10-CM

## 2023-12-21 PROCEDURE — 3074F SYST BP LT 130 MM HG: CPT | Performed by: NURSE PRACTITIONER

## 2023-12-21 PROCEDURE — 3078F DIAST BP <80 MM HG: CPT | Performed by: NURSE PRACTITIONER

## 2023-12-21 PROCEDURE — 1160F RVW MEDS BY RX/DR IN RCRD: CPT | Performed by: NURSE PRACTITIONER

## 2023-12-21 PROCEDURE — 99213 OFFICE O/P EST LOW 20 MIN: CPT | Performed by: NURSE PRACTITIONER

## 2023-12-21 PROCEDURE — 1159F MED LIST DOCD IN RCRD: CPT | Performed by: NURSE PRACTITIONER

## 2023-12-21 PROCEDURE — 3044F HG A1C LEVEL LT 7.0%: CPT | Performed by: NURSE PRACTITIONER

## 2023-12-21 NOTE — PROGRESS NOTES
"Chief Complaint   Patient presents with    Follow-up     Lab results        Subjective   Priscilla Santos is a 73 y.o. female who presents today for follow up labs.     HPI   Patient has no history of alcohol use and has been running elevated liver functions since March 2023. Her cardiologist suggested discontinuing lipitor but she has an extensive history of cardiac issues and it was questionable if she should be off a statin for any time period. She is overweight. Platelets decreased on lab dated 11/14/2023.    Allergies   Allergen Reactions    Diazepam Other (See Comments)     Made her violent    Codeine Nausea And Vomiting         OBJECTIVE:  Vitals:    12/21/23 0906   BP: 112/72   Pulse: 62   Resp: 18   Temp: 97 °F (36.1 °C)   TempSrc: Temporal   SpO2: 98%   Weight: 102 kg (225 lb 6.4 oz)   Height: 157.5 cm (62\")     Physical Exam  Vitals and nursing note reviewed.   Constitutional:       Appearance: Normal appearance.   Cardiovascular:      Rate and Rhythm: Normal rate and regular rhythm.      Pulses: Normal pulses.      Heart sounds: Normal heart sounds.   Pulmonary:      Effort: Pulmonary effort is normal.      Breath sounds: Normal breath sounds.   Abdominal:      General: Abdomen is flat. Bowel sounds are normal.      Palpations: Abdomen is soft.      Comments: No organomegaly. No abdominal tympany or dullness on percussion. No tenderness to light or deep palpation.   Skin:     General: Skin is warm and dry.   Neurological:      General: No focal deficit present.      Mental Status: She is alert and oriented to person, place, and time.   Psychiatric:         Mood and Affect: Mood normal.         Behavior: Behavior normal.         Thought Content: Thought content normal.         Judgment: Judgment normal.                    ASSESSMENT/ PLAN:    Diagnoses and all orders for this visit:    1. Elevated liver enzymes (Primary)  -     US Liver; Future    2. Thrombocytopenia  -     US Liver; Future      Procedures "     Management Plan:     An After Visit Summary was printed and given to the patient at discharge.    Follow-up: Return for Recheck after US.         KENDRA Epperson 12/21/2023 09:54 CST  This note was electronically signed.

## 2023-12-26 ENCOUNTER — TELEPHONE (OUTPATIENT)
Dept: CARDIOLOGY | Facility: CLINIC | Age: 73
End: 2023-12-26
Payer: MEDICARE

## 2023-12-26 NOTE — TELEPHONE ENCOUNTER
Caller: Priscilla Santos    Relationship: Self    Best call back number: [phone]    What is the best time to reach you: ANY    Who are you requesting to speak with (clinical staff, provider,  specific staff member): ANY    Do you know the name of the person who called: NO    What was the call regarding: PATIENT MISSED A CALL AND NO MESSAGE WAS LEFT. PLEASE CALL PATIENT BACK REGARDING MISSED CALL IF WE REACHED OUT, THANK YOU!    Is it okay if the provider responds through [facility]: PLEASE CALL

## 2023-12-26 NOTE — TELEPHONE ENCOUNTER
HUB CAN READ    I TRIED TO CALL BACK STRAIGHT TO  THAT HAS NOT BEEN SET UP.    'S MA IS OFF TODAY AND I HAVEN'T TRIED TO REACH THIS PATIENT IN NOT SURE WHY SHE HAD A MISSED CALL BUT IF IT WAS IMPORTANT THEY WILL TRY TO CALL BACK.

## 2023-12-27 DIAGNOSIS — K76.0 HEPATIC STEATOSIS: ICD-10-CM

## 2023-12-27 DIAGNOSIS — Z79.899 ENCOUNTER FOR LONG-TERM (CURRENT) USE OF OTHER MEDICATIONS: Primary | ICD-10-CM

## 2023-12-27 DIAGNOSIS — D72.829 LEUKOCYTOSIS, UNSPECIFIED TYPE: ICD-10-CM

## 2024-01-02 ENCOUNTER — HOSPITAL ENCOUNTER (OUTPATIENT)
Dept: CT IMAGING | Facility: HOSPITAL | Age: 74
Discharge: HOME | End: 2024-01-02
Admitting: INTERNAL MEDICINE
Payer: MEDICARE

## 2024-01-02 DIAGNOSIS — I77.89 ENLARGED THORACIC AORTA: ICD-10-CM

## 2024-01-02 DIAGNOSIS — K74.00 FIBROSIS OF LIVER: Primary | ICD-10-CM

## 2024-01-02 PROCEDURE — 25510000001 IOPAMIDOL PER 1 ML: Performed by: INTERNAL MEDICINE

## 2024-01-02 PROCEDURE — 71275 CT ANGIOGRAPHY CHEST: CPT

## 2024-01-02 RX ADMIN — IOPAMIDOL 87 ML: 755 INJECTION, SOLUTION INTRAVENOUS at 14:27

## 2024-01-22 ENCOUNTER — OFFICE VISIT (OUTPATIENT)
Dept: FAMILY MEDICINE CLINIC | Facility: CLINIC | Age: 74
End: 2024-01-22
Payer: MEDICARE

## 2024-01-22 VITALS
DIASTOLIC BLOOD PRESSURE: 77 MMHG | SYSTOLIC BLOOD PRESSURE: 127 MMHG | BODY MASS INDEX: 41.41 KG/M2 | HEIGHT: 62 IN | TEMPERATURE: 96 F | HEART RATE: 55 BPM | RESPIRATION RATE: 18 BRPM | OXYGEN SATURATION: 98 % | WEIGHT: 225 LBS

## 2024-01-22 DIAGNOSIS — K57.32 DIVERTICULITIS OF LARGE INTESTINE WITHOUT PERFORATION OR ABSCESS WITHOUT BLEEDING: Primary | ICD-10-CM

## 2024-01-22 DIAGNOSIS — B35.9 TINEA: ICD-10-CM

## 2024-01-22 PROCEDURE — 1160F RVW MEDS BY RX/DR IN RCRD: CPT | Performed by: NURSE PRACTITIONER

## 2024-01-22 PROCEDURE — 1159F MED LIST DOCD IN RCRD: CPT | Performed by: NURSE PRACTITIONER

## 2024-01-22 PROCEDURE — 3074F SYST BP LT 130 MM HG: CPT | Performed by: NURSE PRACTITIONER

## 2024-01-22 PROCEDURE — 3078F DIAST BP <80 MM HG: CPT | Performed by: NURSE PRACTITIONER

## 2024-01-22 PROCEDURE — 99213 OFFICE O/P EST LOW 20 MIN: CPT | Performed by: NURSE PRACTITIONER

## 2024-01-22 RX ORDER — CIPROFLOXACIN 750 MG/1
750 TABLET, FILM COATED ORAL 2 TIMES DAILY
Qty: 20 TABLET | Refills: 0 | Status: SHIPPED | OUTPATIENT
Start: 2024-01-22

## 2024-01-22 RX ORDER — PRENATAL VIT 91/IRON/FOLIC/DHA 28-975-200
1 COMBINATION PACKAGE (EA) ORAL 2 TIMES DAILY
Qty: 42 G | Refills: 0 | Status: SHIPPED | OUTPATIENT
Start: 2024-01-22

## 2024-01-22 NOTE — PROGRESS NOTES
"Chief Complaint   Patient presents with    Abdominal Pain     nausea        Subjective   Priscilla Santos is a 73 y.o. female who presents today for abdominal pain and nausea. She also has an abdominal rash.     HPI   She has had mid abdominal pain x 1 month. She wakes up in the middle of the night with severe abdominal pain. She has been nauseated but no vomiting. She states that her abdominal pain and nausea is worse at night, sometimes some burning. She states she is currently hurting >10 on a scale from 1-10. She denies constipation and has actually gained weight. She has had a cholecystectomy.   She also has a rash on her lower abdomen that is painful and weeping clear fluid. It is under her fatty apron and using baby powder does not help. It has been present 2 days.     Allergies   Allergen Reactions    Diazepam Other (See Comments)     Made her violent    Codeine Nausea And Vomiting         OBJECTIVE:  Vitals:    01/22/24 1255   BP: 127/77   Pulse: 55   Resp: 18   Temp: 96 °F (35.6 °C)   TempSrc: Temporal   SpO2: 98%   Weight: 102 kg (225 lb)   Height: 157.5 cm (62\")     Physical Exam  Vitals and nursing note reviewed.   Constitutional:       Appearance: Normal appearance.   Cardiovascular:      Rate and Rhythm: Normal rate and regular rhythm.      Pulses: Normal pulses.      Heart sounds: Normal heart sounds.   Pulmonary:      Effort: Pulmonary effort is normal.      Breath sounds: Normal breath sounds.   Abdominal:      General: Bowel sounds are normal.      Palpations: Abdomen is soft.      Comments: Pain mid abdomen on palpation. No organomegaly.   Area under fatty abdominal apron erythematous with slight clear exudate.    Skin:     General: Skin is moist.   Neurological:      General: No focal deficit present.      Mental Status: She is alert and oriented to person, place, and time.   Psychiatric:         Mood and Affect: Mood normal.         Behavior: Behavior normal.         Thought Content: Thought " content normal.         Judgment: Judgment normal.                    ASSESSMENT/ PLAN:    Diagnoses and all orders for this visit:    1. Diverticulitis of large intestine without perforation or abscess without bleeding (Primary)  -     ciprofloxacin (CIPRO) 750 MG tablet; Take 1 tablet by mouth 2 (Two) Times a Day.  Dispense: 20 tablet; Refill: 0    2. Tinea  -     terbinafine (LamISIL AT) 1 % cream; Apply 1 application  topically to the appropriate area as directed 2 (Two) Times a Day.  Dispense: 42 g; Refill: 0      Procedures     Management Plan:   Keep abdominal rash site as dry as possible. Complete course of antibiotics.   An After Visit Summary was printed and given to the patient at discharge.    Follow-up: Return if symptoms worsen or fail to improve.         Reza Charles, APRN 1/22/2024 13:37 CST  This note was electronically signed.           no

## 2024-01-23 ENCOUNTER — PATIENT ROUNDING (BHMG ONLY) (OUTPATIENT)
Dept: FAMILY MEDICINE CLINIC | Facility: CLINIC | Age: 74
End: 2024-01-23
Payer: MEDICARE

## 2024-01-23 NOTE — PROGRESS NOTES
"January 23, 2024    Hello, may I speak with Priscilla Santos?    My name is jose    I am  with [facility]  [address] [id number] [phone].    Before we get started may I verify your date of birth? 1950    I am calling to officially welcome you to our practice and ask about your recent visit. Is this a good time to talk? yes    Tell me about your visit with us. What things went well?  \"everything went wonderful, I have the best doctor and staff that I could ask for\"        We're always looking for ways to make our patients' experiences even better. Do you have recommendations on ways we may improve?  no    Overall were you satisfied with your first visit to our practice? yes       I appreciate you taking the time to speak with me today. Is there anything else I can do for you? no      Thank you, and have a great day.      "

## 2024-02-05 ENCOUNTER — LAB (OUTPATIENT)
Dept: LAB | Facility: HOSPITAL | Age: 74
End: 2024-02-05
Payer: MEDICARE

## 2024-02-05 ENCOUNTER — OFFICE VISIT (OUTPATIENT)
Dept: GASTROENTEROLOGY | Facility: CLINIC | Age: 74
End: 2024-02-05
Payer: MEDICARE

## 2024-02-05 VITALS
BODY MASS INDEX: 40.67 KG/M2 | WEIGHT: 221 LBS | HEART RATE: 54 BPM | HEIGHT: 62 IN | TEMPERATURE: 97.8 F | DIASTOLIC BLOOD PRESSURE: 70 MMHG | OXYGEN SATURATION: 98 % | SYSTOLIC BLOOD PRESSURE: 116 MMHG

## 2024-02-05 DIAGNOSIS — R79.89 ELEVATED LFTS: ICD-10-CM

## 2024-02-05 DIAGNOSIS — R94.5 ABNORMAL RESULTS OF LIVER FUNCTION STUDIES: ICD-10-CM

## 2024-02-05 DIAGNOSIS — K76.9 LIVER DISEASE, UNSPECIFIED: ICD-10-CM

## 2024-02-05 DIAGNOSIS — K76.0 FATTY LIVER: ICD-10-CM

## 2024-02-05 DIAGNOSIS — R10.13 EPIGASTRIC PAIN: Primary | ICD-10-CM

## 2024-02-05 LAB
HAV IGM SERPL QL IA: NORMAL
HBV CORE IGM SERPL QL IA: NORMAL
HBV SURFACE AG SERPL QL IA: NORMAL
HCV AB SER DONR QL: NORMAL

## 2024-02-05 PROCEDURE — 82390 ASSAY OF CERULOPLASMIN: CPT | Performed by: NURSE PRACTITIONER

## 2024-02-05 PROCEDURE — 3074F SYST BP LT 130 MM HG: CPT | Performed by: NURSE PRACTITIONER

## 2024-02-05 PROCEDURE — 36415 COLL VENOUS BLD VENIPUNCTURE: CPT | Performed by: NURSE PRACTITIONER

## 2024-02-05 PROCEDURE — 86225 DNA ANTIBODY NATIVE: CPT | Performed by: NURSE PRACTITIONER

## 2024-02-05 PROCEDURE — 1160F RVW MEDS BY RX/DR IN RCRD: CPT | Performed by: NURSE PRACTITIONER

## 2024-02-05 PROCEDURE — 80074 ACUTE HEPATITIS PANEL: CPT | Performed by: NURSE PRACTITIONER

## 2024-02-05 PROCEDURE — 86381 MITOCHONDRIAL ANTIBODY EACH: CPT | Performed by: NURSE PRACTITIONER

## 2024-02-05 PROCEDURE — 99214 OFFICE O/P EST MOD 30 MIN: CPT | Performed by: NURSE PRACTITIONER

## 2024-02-05 PROCEDURE — 1159F MED LIST DOCD IN RCRD: CPT | Performed by: NURSE PRACTITIONER

## 2024-02-05 PROCEDURE — 86015 ACTIN ANTIBODY EACH: CPT | Performed by: NURSE PRACTITIONER

## 2024-02-05 PROCEDURE — 82306 VITAMIN D 25 HYDROXY: CPT

## 2024-02-05 PROCEDURE — 86038 ANTINUCLEAR ANTIBODIES: CPT | Performed by: NURSE PRACTITIONER

## 2024-02-05 PROCEDURE — 82728 ASSAY OF FERRITIN: CPT | Performed by: NURSE PRACTITIONER

## 2024-02-05 PROCEDURE — 3078F DIAST BP <80 MM HG: CPT | Performed by: NURSE PRACTITIONER

## 2024-02-05 NOTE — ASSESSMENT & PLAN NOTE
..The principles of management of steatohepatitis are weight loss through a structured diet and exercise as well as meticulous control of any component of metabolic syndrome, including blood glucose levels, cholesterol and blood pressure.   The patient should strive to lose 2-4 monthly until reaching 7-10% reduction in weight.      Coffee consumption has been shown to decrease the risk of HCC in patients with chronic liver disease.  In these patients, coffee consumption should be encouraged.    I have advised to avoid fructose containing food and drink.     Daily alcohol intake should strickly be below 30gm in men and 20 gm in women.    A physical activity program should in 150-200 min/week of moderate intensity in 3-5 sessions. Resistance training to promote musculoskeletal fitness and improve metabolic factors was reviewed.

## 2024-02-05 NOTE — PROGRESS NOTES
"Primary Physician: Reza Charles, KENDRA    Chief Complaint   Patient presents with    Abnormal Imaging     Pt had US Liver for PCP 12/26/2023 that showed mild fatty liver-presents today to discuss    Elevated Hepatic Enzymes     Pt also had labs 12/29/2023 for PCP that showed her liver enzymes were elevated     Abdominal Pain     Pt does c/o epigastric pain that started last week-states she was given abx by her PCP for \"possible diverticulitis\"-pt does state they've helped but she doesn't feel like it's completely better; Pt states she had a colonoscopy \"years ago\"-at least more than 10 years ago        Subjective     Priscilla Santos is a 73 y.o. female.    HPI  Elevated LFT's  12/26/2023 liver ultrasound: Slightly coarse echogenicity liver parenchyma possibly indicating mild hepatic steatosis.    12/29/2023: ALT 85, AST 99, ALKP normal, bilirubin normal platelets 119K  Fib-4 Score= 6.5  ..Fib-4 scoring system  Points <1.45:                      Cirrhosis less likely  Points ?1.45 and ?3.25:       Indeterminate  Points >3.25:                      Cirrhosis more likely     BNP normal 12/12/2023  Does take daily Lipitor  No alcohol use, no smoking.  Fatty Liver per US    Abdominal Pain  Pt complains of upper abdominal pain for couple months. She saw her PCP who treated her with antibiotics in the event it was diverticulitis.  No change in her bowels. No diarrhea, constipation or rectal bleeding.  Milk does seem to irritate her abdomen.  Her last colonoscopy was > 10 years ago.    Past Medical History:   Diagnosis Date    Arthritis     CHF (congestive heart failure)     Coronary artery disease     History of transfusion     Hyperlipidemia     Hypertension     Sleep apnea     UTI (urinary tract infection)        Past Surgical History:   Procedure Laterality Date    APPENDECTOMY      CARDIAC SURGERY  2008    Tripple bypass    CAROTID STENT      CARPAL TUNNEL RELEASE Bilateral     CHOLECYSTECTOMY      COLON SURGERY      " CORONARY STENT PLACEMENT  2021    total of 4-5 heart stents in past    HERNIA REPAIR      JOINT REPLACEMENT      KNEE SURGERY Bilateral     TONSILLECTOMY AND ADENOIDECTOMY      TUBAL ABDOMINAL LIGATION  1977    UMBILICAL HERNIA REPAIR N/A 02/02/2023    Procedure: UMBILICAL HERNIA REPAIR with mesh;  Surgeon: Lara Conley MD;  Location: [facility];  Service: General;  Laterality: N/A;        Current Outpatient Medications:     Ascorbic Acid (VITAMIN C PO), Take 1 tablet by mouth Daily., Disp: , Rfl:     aspirin 81 MG EC tablet, Take 1 tablet by mouth Daily., Disp: , Rfl:     atorvastatin (LIPITOR) 80 MG tablet, TAKE ONE TABLET BY MOUTH AT BEDTIME (Patient taking differently: Take 1 tablet by mouth Daily.), Disp: 90 tablet, Rfl: 4    calcium citrate-vitamin d (CALCITRATE) 315-250 MG-UNIT tablet tablet, Take 1 tablet by mouth Daily., Disp: , Rfl:     carvedilol (COREG) 6.25 MG tablet, Take 1 tablet by mouth 2 (Two) Times a Day., Disp: , Rfl:     cetirizine (zyrTEC) 10 MG tablet, Take 1 tablet by mouth Daily., Disp: , Rfl:     cholecalciferol (VITAMIN D3) 25 MCG (1000 UT) tablet, Take 1 tablet by mouth Daily., Disp: , Rfl:     clopidogrel (PLAVIX) 75 MG tablet, TAKE ONE TABLET BY MOUTH EVERY DAY (Patient taking differently: Take 1 tablet by mouth Daily.), Disp: 90 tablet, Rfl: 3    cyclobenzaprine (FLEXERIL) 10 MG tablet, TAKE ONE TABLET BY MOUTH THREE TIMES A DAY AS NEEDED FOR MUSCLE SPASMS (Patient taking differently: Take 1 tablet by mouth 3 (Three) Times a Day As Needed.), Disp: 90 tablet, Rfl: 0    fluticasone (FLONASE) 50 MCG/ACT nasal spray, USE TWO SPRAYS INTO THE NOSTRIL(S) AS DIRECTED BY PROVIDER DAILY, Disp: 18 g, Rfl: 5    hydroCHLOROthiazide (MICROZIDE) 12.5 MG capsule, TAKE ONE CAPSULE BY MOUTH EVERY DAY (Patient taking differently: Take 1 capsule by mouth Every Morning.), Disp: 90 capsule, Rfl: 3    indapamide (LOZOL) 2.5 MG tablet, TAKE ONE TABLET BY MOUTH EVERY MORNING (Patient taking differently:  Take 1 tablet by mouth Daily.), Disp: 90 tablet, Rfl: 1    Krill Oil (Omega-3) 500 MG capsule, Take 1 tablet by mouth Daily., Disp: , Rfl:     losartan (COZAAR) 100 MG tablet, Take 1 tablet by mouth Daily., Disp: , Rfl:     losartan (COZAAR) 50 MG tablet, TAKE ONE TABLET BY MOUTH EVERY DAY (Patient taking differently: Take 1 tablet by mouth Daily.), Disp: 90 tablet, Rfl: 3    montelukast (Singulair) 10 MG tablet, Take 1 tablet by mouth Every Night., Disp: 90 tablet, Rfl: 3    multivitamin with minerals tablet tablet, Take 1 tablet by mouth Daily., Disp: , Rfl:     ranolazine (RANEXA) 1000 MG 12 hr tablet, Take 1 tablet by mouth Every 12 (Twelve) Hours., Disp: 180 tablet, Rfl: 3    terbinafine (LamISIL AT) 1 % cream, Apply 1 application  topically to the appropriate area as directed 2 (Two) Times a Day., Disp: 42 g, Rfl: 0    Turmeric Curcumin 500 MG capsule, Take 1 capsule by mouth Daily., Disp: , Rfl:     Allergies   Allergen Reactions    Diazepam Other (See Comments)     Made her violent    Codeine Nausea And Vomiting       Social History     Socioeconomic History    Marital status:    Tobacco Use    Smoking status: Never     Passive exposure: Never    Smokeless tobacco: Never   Vaping Use    Vaping Use: Never used   Substance and Sexual Activity    Alcohol use: Never    Drug use: Never    Sexual activity: Defer       Family History   Problem Relation Age of Onset    Diabetes Mother     Cancer Father     Heart disease Father     No Known Problems Maternal Grandmother     Diabetes Maternal Grandfather     No Known Problems Paternal Grandmother     Cancer Paternal Grandfather     Colon cancer Neg Hx     Colon polyps Neg Hx     Esophageal cancer Neg Hx     Liver disease Neg Hx     Stomach cancer Neg Hx     Rectal cancer Neg Hx     Liver cancer Neg Hx        Review of Systems   Constitutional:  Negative for fever and unexpected weight change.   Gastrointestinal:  Positive for abdominal pain.       Objective  "    /70 (BP Location: Left arm, Patient Position: Sitting, Cuff Size: Adult)   Pulse 54   Temp 97.8 °F (36.6 °C) (Infrared)   Ht 157.5 cm (62\")   Wt 100 kg (221 lb)   SpO2 98%   Breastfeeding No   BMI 40.42 kg/m²     Physical Exam  Vitals reviewed.   Constitutional:       Appearance: Normal appearance.   Cardiovascular:      Rate and Rhythm: Normal rate and regular rhythm.      Heart sounds: Normal heart sounds.   Pulmonary:      Breath sounds: Normal breath sounds.   Abdominal:      General: Abdomen is flat. There is no distension.      Palpations: Abdomen is soft.      Tenderness: There is no abdominal tenderness.   Neurological:      Mental Status: She is alert.         Lab Results - Last 18 Months   Lab Units 12/29/23  0844 12/18/23  0800 12/12/23  0959 11/14/23 0730 03/14/23  0756 01/26/23  0944 10/24/22  0720   GLUCOSE mg/dL 113* 116* 146* 116* 119* 180* 102*   BUN mg/dL 14 20 14 16 17 11 15   CREATININE mg/dL 0.91 0.93 0.83 0.98 0.71 0.72 0.81   SODIUM mmol/L 140 142 139 141 141 139 142   POTASSIUM mmol/L 3.7 4.0 3.7 3.5 3.5 3.1* 3.7   CHLORIDE mmol/L 102 106 102 104 103 100 104   CO2 mmol/L 25 23 24.0 24 24 28.0 24   TOTAL PROTEIN g/dL  --   --  8.1  --   --   --   --    ALBUMIN g/dL 3.7* 3.6* 3.4* 3.5* 3.7  --  3.8   ALT (SGPT) IU/L 85* 54* 57* 58* 43*  --  38*   AST (SGOT) IU/L 99* 73* 75* 71* 52*  --  40   ALK PHOS IU/L 58 57 57 55 46  --  47   BILIRUBIN mg/dL 0.8 0.6 0.7 0.7 0.5  --  0.6   GLOBULIN gm/dL  --   --  4.7  --   --   --   --        Lab Results - Last 18 Months   Lab Units 12/29/23  0844 11/14/23 0730 01/26/23  0944 10/24/22  0720   HEMOGLOBIN g/dL 13.2 12.9 13.4 13.0   HEMATOCRIT % 38.4 36.3 40.6 38.6   MCV fL 93 91 94.2 95   WBC x10E3/uL 4.5 4.6 6.45 5.8   RDW % 12.5 13.1 12.4 13.0   MPV fL  --   --  10.2  --    PLATELETS x10E3/uL 119* 113* 148 139*       Lab Results - Last 18 Months   Lab Units 11/14/23  0730 10/24/22  0720   T4 TOTAL ug/dL 9.5 10.5   TSH uIU/mL 1.620 1.330 "      Narrative & Impression   US LIVER- 12/26/2023     HISTORY: elevated liver enzymes; R74.8-Abnormal levels of other serum  enzymes; D69.6-Thrombocytopenia, unspecified     COMPARISON: None     FINDINGS: Sonographic imaging in the right upper quadrant performed.     Shadowing artifact obscures visualization of the pancreatic body.  Visible portions of the pancreas are unremarkable. Coarse echogenicity  liver parenchyma may be seen with hepatic steatosis. No focal liver mass  identified. Previous cholecystectomy. Common bile duct measurement of 8  mm within normal limits following gallbladder resection. No intrahepatic  biliary dilatation.     Right kidney is normal with no hydronephrosis. Right kidney measures  10.3 cm in length.     Suboptimal visualization of the proximal IVC and abdominal aorta related  to shadowing artifact.     IMPRESSION:  1. Slightly coarse echogenicity liver parenchyma may indicate mild  underlying hepatic steatosis. Previous cholecystectomy. No acute  sonographic pathology identified.        This report was signed and finalized on 12/26/2023 8:36 AM by Dr. Little Velazquez MD.         IMPRESSION/PLAN:    Assessment & Plan      Problem List Items Addressed This Visit       Elevated LFTs    Overview     12/26/2023 liver ultrasound: Slightly coarse echogenicity liver parenchyma possibly indicating mild hepatic steatosis.    12/29/2023: ALT 85, AST 99, ALKP normal, bilirubin normal platelets 119K  Fib-4 Score= 6.5  ..Fib-4 scoring system  Points <1.45:                      Cirrhosis less likely  Points ?1.45 and ?3.25:       Indeterminate  Points >3.25:                      Cirrhosis more likely     BNP normal 12/12/2023  Daily use of a statin  Fatty liver per US         Current Assessment & Plan     Further liver serology to rule out other sources of liver disease         Relevant Orders    SHOSHANA    Anti-Smooth Muscle Antibody Titer    Ferritin    Hepatitis Panel, Acute    Mitochondrial  Antibodies, M2    Ceruloplasmin    Vitamin D,25-Hydroxy    Epigastric pain - Primary    Overview     Upper abdomen pain for 1-2 months, improved after round of antibiotics.  Last colonoscopy > 10 years ago         Relevant Orders    Case Request (Completed)    Fatty liver    Overview     12/26/2023 liver ultrasound: Slightly coarse echogenicity liver parenchyma possibly indicating mild hepatic steatosis.         Current Assessment & Plan     ..The principles of management of steatohepatitis are weight loss through a structured diet and exercise as well as meticulous control of any component of metabolic syndrome, including blood glucose levels, cholesterol and blood pressure.   The patient should strive to lose 2-4 monthly until reaching 7-10% reduction in weight.      Coffee consumption has been shown to decrease the risk of HCC in patients with chronic liver disease.  In these patients, coffee consumption should be encouraged.    I have advised to avoid fructose containing food and drink.     Daily alcohol intake should strickly be below 30gm in men and 20 gm in women.    A physical activity program should in 150-200 min/week of moderate intensity in 3-5 sessions. Resistance training to promote musculoskeletal fitness and improve metabolic factors was reviewed.            Other Visit Diagnoses       Abnormal results of liver function studies        Relevant Orders    Hepatitis Panel, Acute    Liver disease, unspecified        Relevant Orders    Vitamin D,25-Hydroxy          Endo/Colon per Dr Ulloa  Liver serology to further assess elevated LFT's  3 month follow up            ..The risks, benefits, and alternatives of colonoscopy were reviewed with the patient today.  Risks including perforation of the colon possibly requiring surgery or colostomy.  Additional risks include risk of bleeding from biopsies or removal of colon tissue.  There is also the risk of a drug reaction or problems with anesthesia.  This will be  discussed with the further by the anesthesia team on the day of the procedure.  Lastly there is a possibility of missing a colon polyp or cancer.  The benefits include the diagnosis and management of disease of the colon and rectum.  Alternatives to colonoscopy include barium enema, laboratory testing, radiographic evaluation, or no intervention.  The patient verbalizes understanding and agrees.    In accordance with requirements under the Affordable Care Act, [facility] has provided pricing for all hospital services and items on each of its websites. However, a patient's actual cost may differ based on the services the patient receives to meet individual healthcare needs and based on the benefits provided under the patient’s insurance coverage.        Carlyn Whitney, APRN  02/05/24  14:33 CST    Part of this note may be an electronic transcription/translation of spoken language to printed text.

## 2024-02-06 LAB
25(OH)D3 SERPL-MCNC: 40.5 NG/ML (ref 30–100)
ANA SER QL: POSITIVE
CERULOPLASMIN SERPL-MCNC: 23 MG/DL (ref 19–39)
DSDNA AB SER-ACNC: 66 IU/ML (ref 0–9)
FERRITIN SERPL-MCNC: 152 NG/ML (ref 13–150)
Lab: ABNORMAL
MITOCHONDRIA M2 IGG SER-ACNC: <20 UNITS (ref 0–20)
SMA IGG SER-ACNC: 53 UNITS (ref 0–19)

## 2024-02-22 ENCOUNTER — TELEPHONE (OUTPATIENT)
Dept: FAMILY MEDICINE CLINIC | Facility: CLINIC | Age: 74
End: 2024-02-22
Payer: MEDICARE

## 2024-02-22 ENCOUNTER — TELEPHONE (OUTPATIENT)
Dept: GASTROENTEROLOGY | Facility: CLINIC | Age: 74
End: 2024-02-22
Payer: MEDICARE

## 2024-02-22 NOTE — TELEPHONE ENCOUNTER
Per KENDRA Hahn, she verbally ok'd for pt to hold Plavix for pt's endo/colon on 2/29, last dose on 2/23. Her MA is going to send a telephone encounter as well. Tried to call pt, but no answer. Left vm with details holding blood thinner and to follow all instructions given to her in the office. Advised pt to call the office back if she had any questions/concerns.

## 2024-02-22 NOTE — TELEPHONE ENCOUNTER
Spoke with Olimpia Tomlinson from DR. Velez office via phone conversation stating she had sent several requests regarding providers approval for holding Plavix dose today for Patient due to a procedure preformed by .tomorrow 2/23/24.Provider verbally approved as speaking conversation on phone. Request encounter for verification.

## 2024-02-27 ENCOUNTER — TELEPHONE (OUTPATIENT)
Dept: GASTROENTEROLOGY | Facility: CLINIC | Age: 74
End: 2024-02-27
Payer: MEDICARE

## 2024-02-27 NOTE — TELEPHONE ENCOUNTER
Pt called to cx her endo/colon on Thursday. She is sick. She did not want to r/s right now, but will place pt in recall to follow up. She will go back on her Plavix.

## 2024-03-04 RX ORDER — CARVEDILOL 6.25 MG/1
TABLET ORAL
Qty: 180 TABLET | Refills: 3 | OUTPATIENT
Start: 2024-03-04

## 2024-03-04 RX ORDER — CARVEDILOL 6.25 MG/1
6.25 TABLET ORAL 2 TIMES DAILY
Qty: 180 TABLET | Refills: 1 | Status: SHIPPED | OUTPATIENT
Start: 2024-03-04

## 2024-03-04 NOTE — TELEPHONE ENCOUNTER
Rx Refill Note  Requested Prescriptions     Pending Prescriptions Disp Refills    carvedilol (COREG) 6.25 MG tablet [Pharmacy Med Name: CARVEDILOL 6.25MG TABS] 180 tablet 3     Sig: TAKE ONE TABLET BY MOUTH TWICE A DAY      Last office visit with prescribing clinician: 1/22/2024         Daphne Heard MA  03/04/24, 11:27 CST

## 2024-03-12 ENCOUNTER — OFFICE VISIT (OUTPATIENT)
Dept: CARDIOLOGY | Facility: CLINIC | Age: 74
End: 2024-03-12
Payer: MEDICARE

## 2024-03-12 VITALS
WEIGHT: 227 LBS | BODY MASS INDEX: 41.77 KG/M2 | SYSTOLIC BLOOD PRESSURE: 121 MMHG | DIASTOLIC BLOOD PRESSURE: 71 MMHG | OXYGEN SATURATION: 99 % | HEART RATE: 53 BPM | HEIGHT: 62 IN

## 2024-03-12 DIAGNOSIS — I25.10 CORONARY ARTERY DISEASE INVOLVING NATIVE HEART, UNSPECIFIED VESSEL OR LESION TYPE, UNSPECIFIED WHETHER ANGINA PRESENT: Primary | ICD-10-CM

## 2024-03-12 DIAGNOSIS — E78.2 MIXED HYPERLIPIDEMIA: ICD-10-CM

## 2024-03-12 DIAGNOSIS — I10 PRIMARY HYPERTENSION: ICD-10-CM

## 2024-03-12 DIAGNOSIS — R91.8 PULMONARY NODULES: ICD-10-CM

## 2024-03-12 DIAGNOSIS — I35.8 AORTIC VALVE SCLEROSIS: ICD-10-CM

## 2024-03-12 PROCEDURE — 3074F SYST BP LT 130 MM HG: CPT | Performed by: INTERNAL MEDICINE

## 2024-03-12 PROCEDURE — 1159F MED LIST DOCD IN RCRD: CPT | Performed by: INTERNAL MEDICINE

## 2024-03-12 PROCEDURE — 3078F DIAST BP <80 MM HG: CPT | Performed by: INTERNAL MEDICINE

## 2024-03-12 PROCEDURE — 1160F RVW MEDS BY RX/DR IN RCRD: CPT | Performed by: INTERNAL MEDICINE

## 2024-03-12 PROCEDURE — 99214 OFFICE O/P EST MOD 30 MIN: CPT | Performed by: INTERNAL MEDICINE

## 2024-03-12 RX ORDER — CARVEDILOL 6.25 MG/1
6.25 TABLET ORAL 2 TIMES DAILY
Qty: 180 TABLET | Refills: 3 | Status: SHIPPED | OUTPATIENT
Start: 2024-03-12

## 2024-03-12 NOTE — PROGRESS NOTES
"Chief Complaint  Coronary Artery Disease (3 MO FU) and Results (RECENT ECHO 12/14/23 & CT ANGIOGRAM CHEST 01/02/24)    Subjective      Priscilla Santos presents to [facility]  History of Present Illness    Priscilla is doing well and has had no recent anginal type chest discomfort or increased exertional dyspnea.  Echocardiography revealed moderate calcification of the aortic valve with normal left ventricular systolic and diastolic function.  It was felt that there was dilation of the aortic root but this was not the case on CTA which was ordered subsequently.  The CTA however did reveal 2 lung nodules in the right upper lobe and right middle lobe.  Follow-up study in 6 to 12 months was recommended.    She has not had no further syncopal or presyncopal episodes and no PND or orthopnea.  She is currently a non-smoker.    Objective   Vital Signs:  /71 (BP Location: Left arm, Patient Position: Sitting, Cuff Size: Adult)   Pulse 53   Ht 157.5 cm (62.01\")   Wt 103 kg (227 lb)   SpO2 99%   BMI 41.51 kg/m²   Estimated body mass index is 41.51 kg/m² as calculated from the following:    Height as of this encounter: 157.5 cm (62.01\").    Weight as of this encounter: 103 kg (227 lb).            Physical Exam    A 73-year-old female in no apparent distress.  She is awake, alert and oriented.  HEENT: No scleral icterus.  Neck: No jugular venous distention noted  Lungs: Clear to auscultation.  Heart: Regular rhythm with grade 2/6 systolic ejection murmur heard at the right upper sternal border and along the left sternal border.  Extremities: Trace pretibial edema.  Pedal pulses intact.  Neurologic: No focal abnormalities noted.    Result Review :                 Assessment and Plan   Diagnoses and all orders for this visit:    1. Coronary artery disease involving native heart, unspecified vessel or lesion type, unspecified whether angina present (Primary)    2. Primary hypertension    3. Mixed " hyperlipidemia    4. Aortic valve sclerosis    5. Pulmonary nodules    Other orders  -     carvedilol (COREG) 6.25 MG tablet; Take 1 tablet by mouth 2 (Two) Times a Day.  Dispense: 180 tablet; Refill: 3    1.  Coronary artery disease.  She has diffuse coronary artery disease and has had exertional angina in the past but is not having it currently.  She had previously undergone coronary bypass grafting in 2008 with subsequent occlusion of 2 vein grafts and stenting to the circumflex and right coronary arteries.  Continue guideline directed therapy of high intensity statins, beta-blockers, DAPT.  Also continue antianginal therapy including ranolazine and beta-blockers.    2.  Hypertension.  Blood pressure is 121/71 at today's visit.  Continue current therapy including HCTZ 12.5 mg daily, losartan 50 mg daily, Lozol 2.5 mg daily, Coreg 6.25 mg twice daily.  Prescription for Coreg sent.    3.  Hyperlipidemia.  Continue atorvastatin 80 mg daily.  Continue periodic lipid panels and liver function studies.  Monitored by Reza GARDNER.    4.  Aortic valve sclerosis.  This explains her systolic heart murmur.  Continue echocardiographic surveillance for development of aortic stenosis on a yearly basis.  Continue aggressive cardiac risk factor modification.    5.  Pulmonary nodules.  She has nodules in the right upper and middle lobes and tells me that Reza Charles has already ordered a repeat CT scan in early June of this year.  The patient is a non-smoker and apparently only smoked for a brief period of time in the remote past.      There are no Patient Instructions on file for this visit.       Follow Up   Return in about 6 months (around 9/12/2024) for Next scheduled follow up.  Patient was given instructions and counseling regarding her condition or for health maintenance advice. Please see specific information pulled into the AVS if appropriate.

## 2024-03-18 DIAGNOSIS — I20.89 ANGINA OF EFFORT: ICD-10-CM

## 2024-03-18 RX ORDER — HYDROCHLOROTHIAZIDE 12.5 MG/1
CAPSULE, GELATIN COATED ORAL
Qty: 90 CAPSULE | Refills: 1 | Status: SHIPPED | OUTPATIENT
Start: 2024-03-18

## 2024-03-18 RX ORDER — INDAPAMIDE 2.5 MG/1
TABLET ORAL
Qty: 90 TABLET | Refills: 1 | Status: SHIPPED | OUTPATIENT
Start: 2024-03-18

## 2024-03-18 RX ORDER — RANOLAZINE 1000 MG/1
1 TABLET, EXTENDED RELEASE ORAL EVERY 12 HOURS
Qty: 180 TABLET | Refills: 1 | Status: SHIPPED | OUTPATIENT
Start: 2024-03-18

## 2024-03-18 RX ORDER — LOSARTAN POTASSIUM 50 MG/1
TABLET ORAL
Qty: 90 TABLET | Refills: 1 | Status: SHIPPED | OUTPATIENT
Start: 2024-03-18

## 2024-03-18 RX ORDER — CLOPIDOGREL BISULFATE 75 MG/1
TABLET ORAL
Qty: 90 TABLET | Refills: 1 | Status: SHIPPED | OUTPATIENT
Start: 2024-03-18

## 2024-03-18 NOTE — TELEPHONE ENCOUNTER
Rx Refill Note  Requested Prescriptions     Pending Prescriptions Disp Refills    hydroCHLOROthiazide (MICROZIDE) 12.5 MG capsule [Pharmacy Med Name: HYDROCHLOROTHIAZIDE 12.5MG CAPS] 90 capsule 3     Sig: TAKE ONE CAPSULE BY MOUTH EVERY DAY    ranolazine (RANEXA) 1000 MG 12 hr tablet [Pharmacy Med Name: RANOLAZINE ER 1000MG TB12] 180 tablet 3     Sig: TAKE ONE TABLET BY MOUTH EVERY 12 HOURS    clopidogrel (PLAVIX) 75 MG tablet [Pharmacy Med Name: CLOPIDOGREL BISULFATE 75MG TABS] 90 tablet 3     Sig: TAKE ONE TABLET BY MOUTH EVERY DAY    indapamide (LOZOL) 2.5 MG tablet [Pharmacy Med Name: INDAPAMIDE 2.5MG TABS] 90 tablet 1     Sig: TAKE ONE TABLET BY MOUTH EVERY MORNING    losartan (COZAAR) 50 MG tablet [Pharmacy Med Name: LOSARTAN POTASSIUM 50MG TABS] 90 tablet 3     Sig: TAKE ONE TABLET BY MOUTH EVERY DAY      Daija Ng MA  03/18/24, 10:12 CDT

## 2024-03-22 RX ORDER — FLUTICASONE PROPIONATE 50 MCG
SPRAY, SUSPENSION (ML) NASAL
Qty: 16 G | Refills: 5 | Status: SHIPPED | OUTPATIENT
Start: 2024-03-22

## 2024-03-22 NOTE — TELEPHONE ENCOUNTER
Rx Refill Note  Requested Prescriptions     Pending Prescriptions Disp Refills    fluticasone (FLONASE) 50 MCG/ACT nasal spray [Pharmacy Med Name: FLUTICASONE PROPIONATE 50 SUSP] 16 g 5     Sig: USE TWO SPRAYS INTO THE NOSTRILS AS DIRECTED BY PROVIDER DAILY      Last office visit with prescribing clinician: 1/22/2024     Next office visit with prescribing clinician: 11/20/2024   {    Daphne Heard MA  03/22/24, 09:08 CDT

## 2024-04-12 DIAGNOSIS — R79.89 ELEVATED LFTS: Primary | ICD-10-CM

## 2024-05-06 ENCOUNTER — TELEPHONE (OUTPATIENT)
Dept: GASTROENTEROLOGY | Facility: CLINIC | Age: 74
End: 2024-05-06
Payer: MEDICARE

## 2024-06-13 NOTE — TELEPHONE ENCOUNTER
Rx Refill Note  Requested Prescriptions     Pending Prescriptions Disp Refills    nitroglycerin (NITROSTAT) 0.4 MG SL tablet [Pharmacy Med Name: NITROGLYCERIN 0.4MG SUBL] 60 tablet 5     Sig: PLACE ONE TABLET UNDER THE TONGUE EVERY 5 MINUTES UP TO 3 DOSES AS NEEDED FOR CHEST PAIN      Last office visit with prescribing clinician: 1/22/2024   Last telemedicine visit with prescribing clinician: Visit date not found   Next office visit with prescribing clinician: 11/20/2024       Daphne Heard MA  06/13/24, 10:54 CDT

## 2024-06-14 DIAGNOSIS — I20.89 ANGINA OF EFFORT: ICD-10-CM

## 2024-06-14 RX ORDER — RANOLAZINE 1000 MG/1
1 TABLET, EXTENDED RELEASE ORAL EVERY 12 HOURS
Qty: 180 TABLET | Refills: 1 | Status: SHIPPED | OUTPATIENT
Start: 2024-06-14

## 2024-06-14 RX ORDER — NITROGLYCERIN 0.4 MG/1
TABLET SUBLINGUAL
Qty: 50 TABLET | Refills: 1 | Status: SHIPPED | OUTPATIENT
Start: 2024-06-14

## 2024-06-14 NOTE — TELEPHONE ENCOUNTER
Caller: Priscilla Santos    Relationship: Self    Best call back number: [phone]     Requested Prescriptions:   Requested Prescriptions     Pending Prescriptions Disp Refills    ranolazine (RANEXA) 1000 MG 12 hr tablet 180 tablet 1     Sig: Take 1 tablet by mouth Every 12 (Twelve) Hours.        Pharmacy where request should be sent: [facility] [address] [phone] [facility] [phone] [facility]     Last office visit with prescribing clinician: 1/22/2024   Last telemedicine visit with prescribing clinician: Visit date not found   Next office visit with prescribing clinician: 11/20/2024     Does the patient have less than a 3 day supply:  [x] Yes  [] No    Would you like a call back once the refill request has been completed: [x] Yes [] No    If the office needs to give you a call back, can they leave a voicemail: [x] Yes [] No    Pollo Avalos   06/14/24 11:41 CDT

## 2024-07-22 ENCOUNTER — TELEPHONE (OUTPATIENT)
Dept: CARDIOLOGY | Facility: CLINIC | Age: 74
End: 2024-07-22

## 2024-07-22 NOTE — TELEPHONE ENCOUNTER
Caller: Priscilla Santos    Relationship to patient: Self    Best call back number: [phone]    Chief complaint: CHEST PAIN AND OTHER SYMPTOMS   NOT LIFE THREATENING OR REAL BAD JUST DISCOMFORT MOSTLY COMES AND GOES    Type of visit: FOLLOW UP    Requested date: ASAP     Additional notes:PATIENT ADVISED TO GO TO THE ER OR CALL 911 IF NEEDED

## 2024-07-22 NOTE — TELEPHONE ENCOUNTER
Patient has non-exertional and exertional SOB. Chest pain that comes and goes. Taking all medications as prescribed. No syncope.She tells she was told to notify office if she ever had any SOB.

## 2024-07-24 ENCOUNTER — OFFICE VISIT (OUTPATIENT)
Dept: CARDIOLOGY | Facility: CLINIC | Age: 74
End: 2024-07-24
Payer: MEDICARE

## 2024-07-24 VITALS
OXYGEN SATURATION: 99 % | SYSTOLIC BLOOD PRESSURE: 124 MMHG | HEIGHT: 61 IN | HEART RATE: 51 BPM | BODY MASS INDEX: 42.29 KG/M2 | WEIGHT: 224 LBS | DIASTOLIC BLOOD PRESSURE: 58 MMHG

## 2024-07-24 DIAGNOSIS — R06.09 DOE (DYSPNEA ON EXERTION): Primary | ICD-10-CM

## 2024-07-24 DIAGNOSIS — I10 HYPERTENSION, WELL CONTROLLED: ICD-10-CM

## 2024-07-24 DIAGNOSIS — I25.810 CORONARY ARTERY DISEASE INVOLVING CORONARY BYPASS GRAFT OF NATIVE HEART WITHOUT ANGINA PECTORIS: ICD-10-CM

## 2024-07-24 DIAGNOSIS — E66.01 CLASS 3 SEVERE OBESITY DUE TO EXCESS CALORIES WITH SERIOUS COMORBIDITY AND BODY MASS INDEX (BMI) OF 40.0 TO 44.9 IN ADULT: ICD-10-CM

## 2024-07-24 DIAGNOSIS — E78.2 MIXED HYPERLIPIDEMIA: ICD-10-CM

## 2024-07-24 PROBLEM — I25.708 CORONARY ARTERY DISEASE OF BYPASS GRAFT OF NATIVE HEART WITH STABLE ANGINA PECTORIS: Status: ACTIVE | Noted: 2024-07-24

## 2024-07-24 PROCEDURE — 99214 OFFICE O/P EST MOD 30 MIN: CPT | Performed by: NURSE PRACTITIONER

## 2024-07-24 PROCEDURE — 1159F MED LIST DOCD IN RCRD: CPT | Performed by: NURSE PRACTITIONER

## 2024-07-24 PROCEDURE — 3074F SYST BP LT 130 MM HG: CPT | Performed by: NURSE PRACTITIONER

## 2024-07-24 PROCEDURE — 3078F DIAST BP <80 MM HG: CPT | Performed by: NURSE PRACTITIONER

## 2024-07-24 PROCEDURE — 93000 ELECTROCARDIOGRAM COMPLETE: CPT | Performed by: NURSE PRACTITIONER

## 2024-07-24 PROCEDURE — 1160F RVW MEDS BY RX/DR IN RCRD: CPT | Performed by: NURSE PRACTITIONER

## 2024-07-24 RX ORDER — UBIDECARENONE 75 MG
50 CAPSULE ORAL DAILY
COMMUNITY

## 2024-07-24 NOTE — PROGRESS NOTES
"    Subjective:     Encounter Date:07/24/2024      Patient ID: Priscilla Santos is a 73 y.o. female.    Chief Complaint:\"worsening DING\"  Shortness of Breath  This is a new problem. The current episode started in the past 7 days. The problem occurs daily. The problem has been unchanged. Pertinent negatives include no chest pain, leg swelling, orthopnea, PND, rash, sputum production, syncope or wheezing. Her past medical history is significant for CAD.   Coronary Artery Disease  Presents for follow-up visit. Pertinent negatives include no chest pain, dizziness, leg swelling, palpitations, shortness of breath or weight gain. The symptoms have been stable.   Hypertension  This is a chronic problem. The current episode started more than 1 year ago. The problem has been stable since onset. The problem is controlled. Pertinent negatives include no chest pain, malaise/fatigue, orthopnea, palpitations, PND or shortness of breath.     Patient presents today with complaints of worsening DING. She was referred to Dr. Milner in 6/2023 to re-establish care with a cardiologist. She has CAD with previous 3v CABG in 2008 in [place] with subsequent stenting to the RCA in 2015 per Dr. Reaves and to the LCX in 2019 per Dr. Dick. She also has small vessel disease with stable angina.     She presents today with complaints of worsening DING in the past few days. She has been painting inside her house the past few days and been more active than normal. She notes her dyspnea resolves within a minute with rest. She denies chest pain, orthopnea and PND with onset of worsening DING. She reports she has been drinking lemon juice every other day which has resulted in resolution of her left leg edema.     The following portions of the patient's history were reviewed and updated as appropriate: allergies, current medications, past family history, past medical history, past social history, past surgical history and problem list.    Allergies "   Allergen Reactions    Diazepam Other (See Comments)     Made her violent    Codeine Nausea And Vomiting       Current Outpatient Medications:     Ascorbic Acid (VITAMIN C PO), Take 1 tablet by mouth Daily., Disp: , Rfl:     aspirin 81 MG EC tablet, Take 1 tablet by mouth Daily., Disp: , Rfl:     atorvastatin (LIPITOR) 80 MG tablet, TAKE ONE TABLET BY MOUTH AT BEDTIME (Patient taking differently: Take 1 tablet by mouth Daily.), Disp: 90 tablet, Rfl: 4    calcium citrate-vitamin d (CALCITRATE) 315-250 MG-UNIT tablet tablet, Take 1 tablet by mouth Daily., Disp: , Rfl:     carvedilol (COREG) 6.25 MG tablet, Take 1 tablet by mouth 2 (Two) Times a Day., Disp: 180 tablet, Rfl: 3    cetirizine (zyrTEC) 10 MG tablet, Take 1 tablet by mouth Daily., Disp: , Rfl:     cholecalciferol (VITAMIN D3) 25 MCG (1000 UT) tablet, Take 1 tablet by mouth Daily., Disp: , Rfl:     clopidogrel (PLAVIX) 75 MG tablet, TAKE ONE TABLET BY MOUTH EVERY DAY, Disp: 90 tablet, Rfl: 1    cyclobenzaprine (FLEXERIL) 10 MG tablet, TAKE ONE TABLET BY MOUTH THREE TIMES A DAY AS NEEDED FOR MUSCLE SPASMS (Patient taking differently: Take 1 tablet by mouth 3 (Three) Times a Day As Needed.), Disp: 90 tablet, Rfl: 0    fluticasone (FLONASE) 50 MCG/ACT nasal spray, USE TWO SPRAYS INTO THE NOSTRILS AS DIRECTED BY PROVIDER DAILY, Disp: 16 g, Rfl: 5    hydroCHLOROthiazide (MICROZIDE) 12.5 MG capsule, TAKE ONE CAPSULE BY MOUTH EVERY DAY, Disp: 90 capsule, Rfl: 1    Krill Oil (Omega-3) 500 MG capsule, Take 1 tablet by mouth Daily., Disp: , Rfl:     losartan (COZAAR) 100 MG tablet, Take 1 tablet by mouth Daily., Disp: , Rfl:     losartan (COZAAR) 50 MG tablet, TAKE ONE TABLET BY MOUTH EVERY DAY, Disp: 90 tablet, Rfl: 1    multivitamin with minerals tablet tablet, Take 1 tablet by mouth Daily., Disp: , Rfl:     ranolazine (RANEXA) 1000 MG 12 hr tablet, Take 1 tablet by mouth Every 12 (Twelve) Hours., Disp: 180 tablet, Rfl: 1    Turmeric Curcumin 500 MG capsule, Take 1  capsule by mouth Daily., Disp: , Rfl:     vitamin B-12 (CYANOCOBALAMIN) 100 MCG tablet, Take 0.5 tablets by mouth Daily., Disp: , Rfl:     indapamide (LOZOL) 2.5 MG tablet, TAKE ONE TABLET BY MOUTH EVERY MORNING, Disp: 90 tablet, Rfl: 1    montelukast (Singulair) 10 MG tablet, Take 1 tablet by mouth Every Night. (Patient not taking: Reported on 7/24/2024), Disp: 90 tablet, Rfl: 3    nitroglycerin (NITROSTAT) 0.4 MG SL tablet, PLACE ONE TABLET UNDER THE TONGUE EVERY 5 MINUTES UP TO 3 DOSES AS NEEDED FOR CHEST PAIN (Patient not taking: Reported on 7/24/2024), Disp: 50 tablet, Rfl: 1    terbinafine (LamISIL AT) 1 % cream, Apply 1 application  topically to the appropriate area as directed 2 (Two) Times a Day. (Patient not taking: Reported on 7/24/2024), Disp: 42 g, Rfl: 0  Past Medical History:   Diagnosis Date    Arthritis     CHF (congestive heart failure)     Coronary artery disease     Fatty liver     History of transfusion     Hyperlipidemia     Hypertension     Sleep apnea     UTI (urinary tract infection)        Social History     Socioeconomic History    Marital status:    Tobacco Use    Smoking status: Never     Passive exposure: Never    Smokeless tobacco: Never   Vaping Use    Vaping status: Never Used   Substance and Sexual Activity    Alcohol use: Never    Drug use: Never    Sexual activity: Defer       Review of Systems   Constitutional: Negative for malaise/fatigue, weight gain and weight loss.   Cardiovascular:  Positive for dyspnea on exertion. Negative for chest pain, irregular heartbeat, leg swelling, near-syncope, orthopnea, palpitations, paroxysmal nocturnal dyspnea and syncope.   Respiratory:  Negative for cough, shortness of breath, sleep disturbances due to breathing, sputum production and wheezing.    Skin:  Negative for dry skin, flushing, itching and rash.   Gastrointestinal:  Negative for hematemesis and hematochezia.   Neurological:  Negative for dizziness, light-headedness, loss of  balance and weakness.   All other systems reviewed and are negative.         Objective:     Vitals reviewed.   Constitutional:       General: Not in acute distress.     Appearance: Healthy appearance. Well-developed. Not diaphoretic.   Eyes:      General: No scleral icterus.     Conjunctiva/sclera: Conjunctivae normal.      Pupils: Pupils are equal, round, and reactive to light.   HENT:      Head: Normocephalic.    Mouth/Throat:      Pharynx: No oropharyngeal exudate.   Neck:      Vascular: No JVR.   Pulmonary:      Effort: Pulmonary effort is normal. No respiratory distress.      Breath sounds: Normal breath sounds. No wheezing. No rhonchi. No rales.   Chest:      Chest wall: Not tender to palpatation.   Cardiovascular:      Bradycardia present. Regular rhythm.   Pulses:     Intact distal pulses.   Edema:     Peripheral edema absent.   Abdominal:      General: Bowel sounds are normal. There is no distension.      Palpations: Abdomen is soft.      Tenderness: There is no abdominal tenderness.   Musculoskeletal: Normal range of motion.      Cervical back: Normal range of motion and neck supple. Skin:     General: Skin is warm and dry.      Coloration: Skin is not pale.      Findings: No erythema or rash.   Neurological:      Mental Status: Alert, oriented to person, place, and time and oriented to person, place and time.      Deep Tendon Reflexes: Reflexes are normal and symmetric.   Psychiatric:         Behavior: Behavior normal.             ECG 12 Lead    Date/Time: 7/24/2024 9:29 AM  Performed by: Ana Sam APRN    Authorized by: Ana Sam APRN  Comparison: compared with previous ECG from 12/12/2023  Similar to previous ECG  Rhythm: sinus bradycardia  Rate: bradycardic  BPM: 51  Q waves: III    ST Segments: ST segments normal  QRS axis: left  Other findings: T wave abnormality    Clinical impression: abnormal EKG        /58 (BP Location: Left arm, Patient Position: Sitting, Cuff Size: Large  "Adult)   Pulse 51   Ht 154.9 cm (61\")   Wt 102 kg (224 lb)   SpO2 99%   BMI 42.32 kg/m²     Lab Review:   I have reviewed previous office notes, recent labs and recent cardiac testing.     Lab Results   Component Value Date    CHLPL 123 11/14/2023    TRIG 107 11/14/2023    HDL 55 11/14/2023    LDL 48 11/14/2023     Results for orders placed during the hospital encounter of 12/14/23    Adult Transthoracic Echo Complete W/ Cont if Necessary Per Protocol    Interpretation Summary    Left ventricular systolic function is normal. Left ventricular ejection fraction appears to be 61 - 65%.    Left ventricular diastolic function was normal.    The left atrial cavity is mildly dilated.    There is moderate calcification of the aortic valve.    Estimated right ventricular systolic pressure from tricuspid regurgitation is normal (<35 mmHg).    Mild dilation of the aortic root is present. Mild dilation of the proximal aorta is present.          Assessment:          Diagnosis Plan   1. DING (dyspnea on exertion)        2. Coronary artery disease involving coronary bypass graft of native heart without angina pectoris        3. Hypertension, well controlled        4. Mixed hyperlipidemia        5. Class 3 severe obesity due to excess calories with serious comorbidity and body mass index (BMI) of 40.0 to 44.9 in adult               Plan:       1 DING- no evidence of volume overload. Low suspicion for worsening ischemia as her symptoms started within the last few days when she started to paint. Likely due to paint fumes and working more than she typically does. No acute EKG changes noted today. At this time, do not recommend any further cardiac workup. Educated on when to be concerned regarding DING and when to contact our office.   2. CAD- stable. S/p 3v CABG with occluded SVG x2 and patent LIMA per cath in 2019 with subsequent stenting to RCA in 2015 and to LCX in 2019. Continue ASA, Plavix, BB, ARB and statin.   3. HTN- " controlled on current therapy. No changes recommended at this time.   4. HLD- controlled with LDL 48 in 11/2023. Continue statin   5. BMI- Class 3 Severe Obesity (BMI >=40). Obesity-related health conditions include the following: obstructive sleep apnea, coronary heart disease, and dyslipidemias. Obesity is unchanged. BMI is is above average; BMI management plan is completed. We discussed portion control and increasing exercise.        Keep f/u with Dr. Milner in Sept or sooner if symptoms worsen.     I spent 30 minutes caring for Priscilla on this date of service. This time includes time spent by me in the following activities:preparing for the visit, reviewing tests, obtaining and/or reviewing a separately obtained history, performing a medically appropriate examination and/or evaluation , counseling and educating the patient/family/caregiver, and documenting information in the medical record    I spent 2 minutes on the separately reported service of EKG interpretation. This time is not included in the time used to support the E/M service also reported today.

## 2024-08-20 DIAGNOSIS — I20.89 ANGINA OF EFFORT: Primary | ICD-10-CM

## 2024-08-20 DIAGNOSIS — I10 PRIMARY HYPERTENSION: ICD-10-CM

## 2024-08-20 RX ORDER — LOSARTAN POTASSIUM 100 MG/1
100 TABLET ORAL DAILY
Qty: 90 TABLET | Refills: 1 | Status: SHIPPED | OUTPATIENT
Start: 2024-08-20

## 2024-08-20 RX ORDER — HYDROCHLOROTHIAZIDE 12.5 MG/1
12.5 CAPSULE, GELATIN COATED ORAL DAILY
Qty: 90 CAPSULE | Refills: 1 | Status: SHIPPED | OUTPATIENT
Start: 2024-08-20

## 2024-08-20 RX ORDER — CLOPIDOGREL BISULFATE 75 MG/1
75 TABLET ORAL DAILY
Qty: 90 TABLET | Refills: 1 | Status: SHIPPED | OUTPATIENT
Start: 2024-08-20

## 2024-08-20 NOTE — TELEPHONE ENCOUNTER
Rx Refill Note  Requested Prescriptions     Pending Prescriptions Disp Refills    losartan (COZAAR) 100 MG tablet 30 tablet 3     Sig: Take 1 tablet by mouth Daily.    hydroCHLOROthiazide (MICROZIDE) 12.5 MG capsule 90 capsule 1     Sig: Take 1 capsule by mouth Daily.    clopidogrel (PLAVIX) 75 MG tablet 90 tablet 1     Sig: Take 1 tablet by mouth Daily.      Last office visit with prescribing clinician: 1/22/2024   Last telemedicine visit with prescribing clinician: Visit date not found   Next office visit with prescribing clinician: 11/20/2024                         Would you like a call back once the refill request has been completed: [] Yes [x] No    If the office needs to give you a call back, can they leave a voicemail: [] Yes [x] No    Kayli Tello MA  08/20/24, 16:16 CDT

## 2024-08-22 DIAGNOSIS — E78.2 MIXED HYPERLIPIDEMIA: Primary | ICD-10-CM

## 2024-08-22 RX ORDER — ATORVASTATIN CALCIUM 80 MG/1
TABLET, FILM COATED ORAL
Qty: 90 TABLET | Refills: 1 | Status: SHIPPED | OUTPATIENT
Start: 2024-08-22

## 2024-08-22 NOTE — TELEPHONE ENCOUNTER
Rx Refill Note  Requested Prescriptions     Pending Prescriptions Disp Refills    atorvastatin (LIPITOR) 80 MG tablet [Pharmacy Med Name: ATORVASTATIN CALCIUM 80MG TABS] 90 tablet 4     Sig: TAKE ONE TABLET BY MOUTH AT BEDTIME        Daija Ng MA  08/22/24, 09:31 CDT

## 2024-09-03 ENCOUNTER — OFFICE VISIT (OUTPATIENT)
Dept: FAMILY MEDICINE CLINIC | Facility: CLINIC | Age: 74
End: 2024-09-03
Payer: MEDICARE

## 2024-09-03 VITALS
OXYGEN SATURATION: 99 % | BODY MASS INDEX: 52.6 KG/M2 | DIASTOLIC BLOOD PRESSURE: 76 MMHG | HEART RATE: 71 BPM | WEIGHT: 278.6 LBS | SYSTOLIC BLOOD PRESSURE: 117 MMHG | TEMPERATURE: 97 F | HEIGHT: 61 IN

## 2024-09-03 DIAGNOSIS — J01.00 ACUTE NON-RECURRENT MAXILLARY SINUSITIS: Primary | ICD-10-CM

## 2024-09-03 DIAGNOSIS — R09.81 SINUS CONGESTION: ICD-10-CM

## 2024-09-03 PROCEDURE — 1126F AMNT PAIN NOTED NONE PRSNT: CPT | Performed by: NURSE PRACTITIONER

## 2024-09-03 PROCEDURE — 87428 SARSCOV & INF VIR A&B AG IA: CPT | Performed by: NURSE PRACTITIONER

## 2024-09-03 PROCEDURE — 3074F SYST BP LT 130 MM HG: CPT | Performed by: NURSE PRACTITIONER

## 2024-09-03 PROCEDURE — 1160F RVW MEDS BY RX/DR IN RCRD: CPT | Performed by: NURSE PRACTITIONER

## 2024-09-03 PROCEDURE — 1159F MED LIST DOCD IN RCRD: CPT | Performed by: NURSE PRACTITIONER

## 2024-09-03 PROCEDURE — 3078F DIAST BP <80 MM HG: CPT | Performed by: NURSE PRACTITIONER

## 2024-09-03 PROCEDURE — 99213 OFFICE O/P EST LOW 20 MIN: CPT | Performed by: NURSE PRACTITIONER

## 2024-09-03 RX ORDER — CEFDINIR 300 MG/1
300 CAPSULE ORAL 2 TIMES DAILY
Qty: 20 CAPSULE | Refills: 0 | Status: SHIPPED | OUTPATIENT
Start: 2024-09-03 | End: 2024-09-13

## 2024-09-03 NOTE — PROGRESS NOTES
"Chief Complaint   Patient presents with    Sinus Problem     Pt states that she cannot breath and is having congestion.         Subjective   Priscilla Santos is a 73 y.o. female who presents today for the following sinus congestion.   Patient states she is having nasal congestion. She denies fever, chills, body aches. She states she is having trouble breathing and is having to sleep in a recliner.           Allergies   Allergen Reactions    Diazepam Other (See Comments)     Made her violent    Codeine Nausea And Vomiting         OBJECTIVE:  Vitals:    09/03/24 0948   BP: 117/76   BP Location: Left arm   Patient Position: Sitting   Pulse: 71   Temp: 97 °F (36.1 °C)   TempSrc: Infrared   SpO2: 99%   Weight: 126 kg (278 lb 9.6 oz)   Height: 154.9 cm (60.98\")     Physical Exam  Vitals and nursing note reviewed.   Constitutional:       Appearance: Normal appearance.   HENT:      Head: Normocephalic and atraumatic.      Right Ear: Tympanic membrane normal.      Left Ear: Tympanic membrane normal.      Nose: Nose normal. Congestion present.      Right Sinus: Maxillary sinus tenderness present.      Left Sinus: Maxillary sinus tenderness present.   Eyes:      Pupils: Pupils are equal, round, and reactive to light.   Cardiovascular:      Rate and Rhythm: Normal rate and regular rhythm.   Pulmonary:      Breath sounds: Normal breath sounds. No wheezing or rhonchi.   Abdominal:      General: Bowel sounds are normal.   Musculoskeletal:         General: Normal range of motion.   Skin:     General: Skin is warm and dry.   Neurological:      Mental Status: She is alert and oriented to person, place, and time.   Psychiatric:         Mood and Affect: Mood normal.         Behavior: Behavior normal.         Thought Content: Thought content normal.         Judgment: Judgment normal.                    ASSESSMENT/ PLAN:    Diagnoses and all orders for this visit:    1. Acute non-recurrent maxillary sinusitis (Primary)    2. Sinus " congestion  -     POCT SARS-CoV-2 Antigen MCKINLEY + Flu    Other orders  -     cefdinir (OMNICEF) 300 MG capsule; Take 1 capsule by mouth 2 (Two) Times a Day for 10 days.  Dispense: 20 capsule; Refill: 0      Procedures       Follow-up: Return if symptoms worsen or fail to improve.      Silvia Fierro, APRN 9/3/2024 11:39 CDT  This note was electronically signed.

## 2024-09-11 RX ORDER — CARVEDILOL 6.25 MG/1
TABLET ORAL
Qty: 90 TABLET | Refills: 1 | Status: SHIPPED | OUTPATIENT
Start: 2024-09-11

## 2024-09-11 NOTE — TELEPHONE ENCOUNTER
Rx Refill Note  Requested Prescriptions     Pending Prescriptions Disp Refills    carvedilol (COREG) 6.25 MG tablet [Pharmacy Med Name: CARVEDILOL 6.25MG TABS] 180 tablet 1     Sig: TAKE ONE TABLET BY MOUTH TWICE A [id number]      Patient will be due for Medicare Wellness next month. Please schedule.   Daija Ng MA  09/11/24, 07:47 CDT

## 2024-09-16 ENCOUNTER — OFFICE VISIT (OUTPATIENT)
Dept: CARDIOLOGY | Facility: CLINIC | Age: 74
End: 2024-09-16
Payer: MEDICARE

## 2024-09-16 VITALS
OXYGEN SATURATION: 98 % | SYSTOLIC BLOOD PRESSURE: 115 MMHG | WEIGHT: 223 LBS | HEIGHT: 61 IN | DIASTOLIC BLOOD PRESSURE: 63 MMHG | BODY MASS INDEX: 42.1 KG/M2 | HEART RATE: 55 BPM

## 2024-09-16 DIAGNOSIS — E78.2 MIXED HYPERLIPIDEMIA: ICD-10-CM

## 2024-09-16 DIAGNOSIS — R06.09 DOE (DYSPNEA ON EXERTION): ICD-10-CM

## 2024-09-16 DIAGNOSIS — R09.89 BILATERAL CAROTID BRUITS: ICD-10-CM

## 2024-09-16 DIAGNOSIS — E66.01 CLASS 3 SEVERE OBESITY DUE TO EXCESS CALORIES WITH SERIOUS COMORBIDITY AND BODY MASS INDEX (BMI) OF 40.0 TO 44.9 IN ADULT: ICD-10-CM

## 2024-09-16 DIAGNOSIS — I20.89 ANGINA OF EFFORT: ICD-10-CM

## 2024-09-16 DIAGNOSIS — R91.8 MULTIPLE PULMONARY NODULES DETERMINED BY COMPUTED TOMOGRAPHY OF LUNG: Primary | ICD-10-CM

## 2024-09-16 DIAGNOSIS — I10 PRIMARY HYPERTENSION: ICD-10-CM

## 2024-09-16 DIAGNOSIS — I25.10 CORONARY ARTERY DISEASE INVOLVING NATIVE HEART, UNSPECIFIED VESSEL OR LESION TYPE, UNSPECIFIED WHETHER ANGINA PRESENT: ICD-10-CM

## 2024-09-16 DIAGNOSIS — I35.8 AORTIC VALVE SCLEROSIS: ICD-10-CM

## 2024-09-16 PROCEDURE — 3078F DIAST BP <80 MM HG: CPT | Performed by: INTERNAL MEDICINE

## 2024-09-16 PROCEDURE — 3074F SYST BP LT 130 MM HG: CPT | Performed by: INTERNAL MEDICINE

## 2024-09-16 PROCEDURE — 1160F RVW MEDS BY RX/DR IN RCRD: CPT | Performed by: INTERNAL MEDICINE

## 2024-09-16 PROCEDURE — 1159F MED LIST DOCD IN RCRD: CPT | Performed by: INTERNAL MEDICINE

## 2024-09-16 PROCEDURE — 99214 OFFICE O/P EST MOD 30 MIN: CPT | Performed by: INTERNAL MEDICINE

## 2024-09-20 ENCOUNTER — TELEPHONE (OUTPATIENT)
Dept: FAMILY MEDICINE CLINIC | Facility: CLINIC | Age: 74
End: 2024-09-20
Payer: MEDICARE

## 2024-09-23 RX ORDER — INDAPAMIDE 2.5 MG/1
TABLET ORAL
Qty: 30 TABLET | Refills: 0 | Status: SHIPPED | OUTPATIENT
Start: 2024-09-23

## 2024-09-23 RX ORDER — RANOLAZINE 1000 MG/1
1 TABLET, EXTENDED RELEASE ORAL EVERY 12 HOURS
Qty: 60 TABLET | Refills: 0 | Status: SHIPPED | OUTPATIENT
Start: 2024-09-23

## 2024-10-15 ENCOUNTER — HOSPITAL ENCOUNTER (OUTPATIENT)
Dept: CT IMAGING | Facility: HOSPITAL | Age: 74
Discharge: HOME | End: 2024-10-15
Payer: MEDICARE

## 2024-10-15 ENCOUNTER — HOSPITAL ENCOUNTER (OUTPATIENT)
Dept: ULTRASOUND IMAGING | Facility: HOSPITAL | Age: 74
Discharge: HOME | End: 2024-10-15
Payer: MEDICARE

## 2024-10-15 DIAGNOSIS — I25.10 CORONARY ARTERY DISEASE INVOLVING NATIVE HEART, UNSPECIFIED VESSEL OR LESION TYPE, UNSPECIFIED WHETHER ANGINA PRESENT: ICD-10-CM

## 2024-10-15 DIAGNOSIS — R09.89 BILATERAL CAROTID BRUITS: ICD-10-CM

## 2024-10-15 DIAGNOSIS — I35.8 AORTIC VALVE SCLEROSIS: ICD-10-CM

## 2024-10-15 DIAGNOSIS — E78.2 MIXED HYPERLIPIDEMIA: ICD-10-CM

## 2024-10-15 DIAGNOSIS — R06.09 DOE (DYSPNEA ON EXERTION): ICD-10-CM

## 2024-10-15 DIAGNOSIS — E66.01 CLASS 3 SEVERE OBESITY DUE TO EXCESS CALORIES WITH SERIOUS COMORBIDITY AND BODY MASS INDEX (BMI) OF 40.0 TO 44.9 IN ADULT: ICD-10-CM

## 2024-10-15 DIAGNOSIS — E66.813 CLASS 3 SEVERE OBESITY DUE TO EXCESS CALORIES WITH SERIOUS COMORBIDITY AND BODY MASS INDEX (BMI) OF 40.0 TO 44.9 IN ADULT: ICD-10-CM

## 2024-10-15 DIAGNOSIS — R91.8 MULTIPLE PULMONARY NODULES DETERMINED BY COMPUTED TOMOGRAPHY OF LUNG: ICD-10-CM

## 2024-10-15 DIAGNOSIS — I10 PRIMARY HYPERTENSION: ICD-10-CM

## 2024-10-15 PROCEDURE — 93880 EXTRACRANIAL BILAT STUDY: CPT | Performed by: SURGERY

## 2024-10-15 PROCEDURE — 25510000001 IOPAMIDOL 61 % SOLUTION: Performed by: INTERNAL MEDICINE

## 2024-10-15 PROCEDURE — 71270 CT THORAX DX C-/C+: CPT

## 2024-10-15 PROCEDURE — 93880 EXTRACRANIAL BILAT STUDY: CPT

## 2024-10-15 RX ORDER — IOPAMIDOL 612 MG/ML
100 INJECTION, SOLUTION INTRAVASCULAR
Status: COMPLETED | OUTPATIENT
Start: 2024-10-15 | End: 2024-10-15

## 2024-10-15 RX ADMIN — IOPAMIDOL 100 ML: 612 INJECTION, SOLUTION INTRAVENOUS at 16:09

## 2024-11-04 ENCOUNTER — TELEPHONE (OUTPATIENT)
Dept: FAMILY MEDICINE CLINIC | Facility: CLINIC | Age: 74
End: 2024-11-04
Payer: MEDICARE

## 2024-11-04 RX ORDER — LOSARTAN POTASSIUM 50 MG/1
50 TABLET ORAL DAILY
Qty: 90 TABLET | Refills: 1 | Status: SHIPPED | OUTPATIENT
Start: 2024-11-04

## 2024-11-04 NOTE — TELEPHONE ENCOUNTER
Pt came to say that she picked up her meds that Mei called in while you were out and realized that it was double the dose. She said that she threw them away cause it was too much. She is requesting a refill on the Losartan 50mg, she has enough for 2 more days. Thank you

## 2024-11-20 ENCOUNTER — OFFICE VISIT (OUTPATIENT)
Dept: FAMILY MEDICINE CLINIC | Facility: CLINIC | Age: 74
End: 2024-11-20
Payer: MEDICARE

## 2024-11-20 VITALS
HEIGHT: 61 IN | OXYGEN SATURATION: 97 % | TEMPERATURE: 98 F | WEIGHT: 226.8 LBS | HEART RATE: 85 BPM | RESPIRATION RATE: 18 BRPM | SYSTOLIC BLOOD PRESSURE: 124 MMHG | BODY MASS INDEX: 42.82 KG/M2 | DIASTOLIC BLOOD PRESSURE: 68 MMHG

## 2024-11-20 DIAGNOSIS — D69.6 THROMBOCYTOPENIA: ICD-10-CM

## 2024-11-20 DIAGNOSIS — R74.8 ELEVATED LIVER ENZYMES: ICD-10-CM

## 2024-11-20 DIAGNOSIS — Z00.00 ENCOUNTER FOR SUBSEQUENT ANNUAL WELLNESS VISIT IN MEDICARE PATIENT: Primary | ICD-10-CM

## 2024-11-20 DIAGNOSIS — M54.16 LUMBAR BACK PAIN WITH RADICULOPATHY AFFECTING RIGHT LOWER EXTREMITY: ICD-10-CM

## 2024-11-20 DIAGNOSIS — R74.01 ELEVATED TRANSAMINASE LEVEL: ICD-10-CM

## 2024-11-20 DIAGNOSIS — G47.33 OSA (OBSTRUCTIVE SLEEP APNEA): ICD-10-CM

## 2024-11-20 DIAGNOSIS — I10 PRIMARY HYPERTENSION: ICD-10-CM

## 2024-11-20 DIAGNOSIS — J30.2 SEASONAL ALLERGIES: ICD-10-CM

## 2024-11-20 DIAGNOSIS — E78.00 ELEVATED LDL CHOLESTEROL LEVEL: ICD-10-CM

## 2024-11-20 PROCEDURE — 1125F AMNT PAIN NOTED PAIN PRSNT: CPT | Performed by: NURSE PRACTITIONER

## 2024-11-20 PROCEDURE — 1160F RVW MEDS BY RX/DR IN RCRD: CPT | Performed by: NURSE PRACTITIONER

## 2024-11-20 PROCEDURE — G0439 PPPS, SUBSEQ VISIT: HCPCS | Performed by: NURSE PRACTITIONER

## 2024-11-20 PROCEDURE — 3078F DIAST BP <80 MM HG: CPT | Performed by: NURSE PRACTITIONER

## 2024-11-20 PROCEDURE — 1159F MED LIST DOCD IN RCRD: CPT | Performed by: NURSE PRACTITIONER

## 2024-11-20 PROCEDURE — 1170F FXNL STATUS ASSESSED: CPT | Performed by: NURSE PRACTITIONER

## 2024-11-20 PROCEDURE — 3044F HG A1C LEVEL LT 7.0%: CPT | Performed by: NURSE PRACTITIONER

## 2024-11-20 PROCEDURE — 3074F SYST BP LT 130 MM HG: CPT | Performed by: NURSE PRACTITIONER

## 2024-11-20 NOTE — PROGRESS NOTES
Subjective   The ABCs of the Annual Wellness Visit  Medicare Wellness Visit      Priscilla Santos is a 73 y.o. patient who presents for a Medicare Wellness Visit.    The following portions of the patient's history were reviewed and   updated as appropriate: allergies, current medications, past family history, past medical history, past social history, past surgical history, and problem list.    Compared to one year ago, the patient's physical   health is worse.  Compared to one year ago, the patient's mental   health is worse.    Recent Hospitalizations:  She was not admitted to the hospital during the last year.     Current Medical Providers:  Patient Care Team:  Reza Charles APRN as PCP - General (Family Medicine)  [facility], Max Kebede MD as Cardiologist (Cardiology)    Outpatient Medications Prior to Visit   Medication Sig Dispense Refill    Ascorbic Acid (VITAMIN C PO) Take 1 tablet by mouth Daily.      aspirin 81 MG EC tablet Take 1 tablet by mouth Daily.      atorvastatin (LIPITOR) 80 MG tablet TAKE ONE TABLET BY MOUTH AT BEDTIME 90 tablet 1    carvedilol (COREG) 6.25 MG tablet TAKE ONE TABLET BY MOUTH TWICE A RAN1FAW 90 tablet 1    cetirizine (zyrTEC) 10 MG tablet Take 1 tablet by mouth Daily.      cholecalciferol (VITAMIN D3) 25 MCG (1000 UT) tablet Take 1 tablet by mouth Daily.      clopidogrel (PLAVIX) 75 MG tablet Take 1 tablet by mouth Daily. 90 tablet 1    fluticasone (FLONASE) 50 MCG/ACT nasal spray USE TWO SPRAYS INTO THE NOSTRILS AS DIRECTED BY PROVIDER DAILY 16 g 5    hydroCHLOROthiazide (MICROZIDE) 12.5 MG capsule Take 1 capsule by mouth Daily. 90 capsule 1    indapamide (LOZOL) 2.5 MG tablet TAKE ONE TABLET BY MOUTH EVERY MORNING 30 tablet 0    Krill Oil (Omega-3) 500 MG capsule Take 1 tablet by mouth Daily.      losartan (Cozaar) 50 MG tablet Take 1 tablet by mouth Daily. 90 tablet 1    ranolazine (RANEXA) 1000 MG 12 hr tablet TAKE ONE TABLET BY MOUTH EVERY 12 HOURS 60 tablet 0     "Turmeric Curcumin 500 MG capsule Take 1 capsule by mouth Daily.      vitamin B-12 (CYANOCOBALAMIN) 100 MCG tablet Take 0.5 tablets by mouth Daily.       No facility-administered medications prior to visit.     No opioid medication identified on active medication list. I have reviewed chart for other potential  high risk medication/s and harmful drug interactions in the elderly.      Aspirin is on active medication list.  Takes daily .      Patient Active Problem List   Diagnosis    Umbilical hernia without obstruction and without gangrene    Post-menopausal    Multiple joint pain    History of coronary artery stent placement    Hypertension, well controlled    Vitamin D deficiency    Mixed hyperlipidemia    Class 2 severe obesity due to excess calories with serious comorbidity and body mass index (BMI) of 39.0 to 39.9 in adult    History of bilateral knee replacement    Elevated LFTs    Epigastric pain    Fatty liver    Coronary artery disease of bypass graft of native heart with stable angina pectoris     Advance Care Planning Advance Directive is not on file.  ACP discussion was declined by the patient. Patient does not have an advance directive, declines further assistance.      Class 3 Severe Obesity (BMI >=40). Obesity-related health conditions include the following: obstructive sleep apnea, hypertension, coronary heart disease, and dyslipidemias. Obesity is unchanged. BMI is is above average; BMI management plan is completed. We discussed low calorie, low carb based diet program, portion control, and increasing exercise.       Objective   Vitals:    11/20/24 0936   BP: 124/68   BP Location: Left arm   Patient Position: Sitting   Cuff Size: Large Adult   Pulse: 85   Resp: 18   Temp: 98 °F (36.7 °C)   TempSrc: Infrared   SpO2: 97%   Weight: 103 kg (226 lb 12.8 oz)   Height: 154.9 cm (60.98\")   PainSc:   8   PainLoc: Generalized       Estimated body mass index is 42.88 kg/m² as calculated from the following:    " "Height as of this encounter: 154.9 cm (60.98\").    Weight as of this encounter: 103 kg (226 lb 12.8 oz).  Class 3 Severe Obesity (BMI >=40). Obesity-related health conditions include the following: obstructive sleep apnea, hypertension, coronary heart disease, dyslipidemias, GERD, and peripheral vascular disease. Obesity is unchanged. BMI is is above average; BMI management plan is completed. We discussed low calorie, low carb based diet program, portion control, and increasing exercise.           Does the patient have evidence of cognitive impairment? No  Lab Results   Component Value Date    CHLPL 113 11/15/2024    TRIG 81 11/15/2024    HDL 53 11/15/2024    LDL 44 11/15/2024    VLDL 16 11/15/2024    HGBA1C 5.6 11/15/2024                                                                                                Health  Risk Assessment    Smoking Status:  Social History     Tobacco Use   Smoking Status Never    Passive exposure: Never   Smokeless Tobacco Never     Alcohol Consumption:  Social History     Substance and Sexual Activity   Alcohol Use Never       Fall Risk Screen  STEADI Fall Risk Assessment was completed, and patient is at LOW risk for falls.Assessment completed on:2024    Depression Screening   Little interest or pleasure in doing things? Not at all   Feeling down, depressed, or hopeless? Not at all   PHQ-2 Total Score 0      Health Habits and Functional and Cognitive Screenin/20/2024     9:41 AM   Functional & Cognitive Status   Do you have difficulty preparing food and eating? No   Do you have difficulty bathing yourself, getting dressed or grooming yourself? No   Do you have difficulty using the toilet? No   Do you have difficulty moving around from place to place? No   Do you have trouble with steps or getting out of a bed or a chair? No   Current Diet Well Balanced Diet   Dental Exam Up to date   Eye Exam Up to date   Exercise (times per week) 0 times per week   Current " Exercises Include No Regular Exercise   Do you need help using the phone?  No   Are you deaf or do you have serious difficulty hearing?  No   Do you need help to go to places out of walking distance? No   Do you need help shopping? No   Do you need help preparing meals?  No   Do you need help with housework?  No   Do you need help with laundry? No   Do you need help taking your medications? No   Do you need help managing money? No   Do you ever drive or ride in a car without wearing a seat belt? No   Have you felt unusual stress, anger or loneliness in the last month? No   Who do you live with? Spouse   If you need help, do you have trouble finding someone available to you? No   Have you been bothered in the last four weeks by sexual problems? No   Do you have difficulty concentrating, remembering or making decisions? No           Age-appropriate Screening Schedule:  Refer to the list below for future screening recommendations based on patient's age, sex and/or medical conditions. Orders for these recommended tests are listed in the plan section. The patient has been provided with a written plan.    Health Maintenance List  Health Maintenance   Topic Date Due    DXA SCAN  Never done    COLORECTAL CANCER SCREENING  Never done    MAMMOGRAM  Never done    TDAP/TD VACCINES (2 - Td or Tdap) 02/20/2022    Pneumococcal Vaccine 65+ (1 of 2 - PCV) 12/06/2024 (Originally 11/21/1956)    INFLUENZA VACCINE  03/31/2025 (Originally 8/1/2024)    COVID-19 Vaccine (1 - 2024-25 season) 11/11/2025 (Originally 9/1/2024)    ZOSTER VACCINE (1 of 2) 11/13/2025 (Originally 11/21/2001)    HEMOGLOBIN A1C  05/15/2025    LIPID PANEL  11/15/2025    ANNUAL WELLNESS VISIT  11/20/2025    BMI FOLLOWUP  11/20/2025    HEPATITIS C SCREENING  Completed    DIABETIC FOOT EXAM  Discontinued    DIABETIC EYE EXAM  Discontinued    URINE MICROALBUMIN  Discontinued                                                                                                    "                                             CMS Preventative Services Quick Reference  Risk Factors Identified During Encounter  Hearing Problem:  Does not want referral  Immunizations Discussed/Encouraged: Tdap, Influenza, Prevnar 20 (Pneumococcal 20-valent conjugate), Shingrix, COVID19, and RSV (Respiratory Syncytial Virus)  Polypharmacy: Medication List reviewed    The above risks/problems have been discussed with the patient.  Pertinent information has been shared with the patient in the After Visit Summary.  An After Visit Summary and PPPS were made available to the patient.    Follow Up:   Next Medicare Wellness visit to be scheduled in 1 year.         Additional E&M Note during same encounter follows:  Patient has additional, significant, and separately identifiable condition(s)/problem(s) that require work above and beyond the Medicare Wellness Visit     Chief Complaint  Medicare Wellness-subsequent    Subjective   HPI  Priscilla is also being seen today for an annual adult preventative physical exam.                 Objective   Vital Signs:  /68 (BP Location: Left arm, Patient Position: Sitting, Cuff Size: Large Adult)   Pulse 85   Temp 98 °F (36.7 °C) (Infrared)   Resp 18   Ht 154.9 cm (60.98\")   Wt 103 kg (226 lb 12.8 oz)   SpO2 97%   BMI 42.88 kg/m²   Physical Exam  Vitals and nursing note reviewed.   Constitutional:       Appearance: Normal appearance.   HENT:      Head: Normocephalic and atraumatic.      Right Ear: Tympanic membrane, ear canal and external ear normal.      Left Ear: Tympanic membrane, ear canal and external ear normal.      Nose: Nose normal.      Mouth/Throat:      Mouth: Mucous membranes are moist.      Pharynx: Oropharynx is clear.   Eyes:      Extraocular Movements: Extraocular movements intact.      Pupils: Pupils are equal, round, and reactive to light.   Cardiovascular:      Rate and Rhythm: Normal rate and regular rhythm.      Pulses: Normal pulses.      Heart sounds: " Normal heart sounds.   Pulmonary:      Effort: Pulmonary effort is normal.      Breath sounds: Normal breath sounds.   Abdominal:      General: Abdomen is flat. Bowel sounds are normal.      Palpations: Abdomen is soft.   Musculoskeletal:         General: Normal range of motion.      Cervical back: Normal range of motion and neck supple.      Comments: Low back pain with right radiculopathy   Skin:     General: Skin is warm and dry.      Capillary Refill: Capillary refill takes less than 2 seconds.   Neurological:      General: No focal deficit present.      Mental Status: She is alert and oriented to person, place, and time.   Psychiatric:         Mood and Affect: Mood normal.         Behavior: Behavior normal.         Thought Content: Thought content normal.         Judgment: Judgment normal.             CBC w/diff          12/29/2023    08:44 11/15/2024    07:30   CBC w/Diff   WBC 4.5  4.5    RBC 4.12  3.85    Hemoglobin 13.2  12.5    Hematocrit 38.4  36.1    MCV 93  94    MCH 32.0  32.5    MCHC 34.4  34.6    RDW 12.5  12.7    Platelets 119  100    Neutrophil Rel % 42  43    Lymphocyte Rel % 46  45    Monocyte Rel % 10  10    Eosinophil Rel % 2  2    Basophil Rel % 0  0      Lipid Panel          11/15/2024    07:30   Lipid Panel   Total Cholesterol 113    Triglycerides 81    HDL Cholesterol 53    VLDL Cholesterol 16    LDL Cholesterol  44    LDL/HDL Ratio 0.8      TSH          11/15/2024    07:30   TSH   TSH 1.530      Most Recent A1C          11/15/2024    07:30   HGBA1C Most Recent   Hemoglobin A1C 5.6    Blood work reviewed and discussed with patient            Assessment and Plan Additional age appropriate preventative wellness advice topics were discussed during today's preventative wellness exam(some topics already addressed during AWV portion of the note above):    Physical Activity: Advised cardiovascular activity 150 minutes per week as tolerated. (example brisk walk for 30 minutes, 5 days a week).      Nutrition: Discussed nutrition plan with patient. Information shared in after visit summary. Goal is for a well balanced diet to enhance overall health.     Motor Vehicle Safety Discussion:  Wearing Seatbelt While in Motor Vehicle recommendation. Adhering to posted speed limit recommendation.     Injury Prevention Discussion:  Information shared in after visit summary.    MEDICATION Instructions:     Encouraged patient to continue routine medicines as prescribed and maintain compliance. Patient instructed to report any adverse side effects or reactions to medicines promptly to the office. Patient instructed to make us aware of any OTC or herbal meds or supplement use.           DIET Recommendations:       Patient instructed and provided information on the following nutrition and diet recommendations: Calorie restriction for weight reduction and maintenance. Necessity for adequate daily intake of fluids/water. Also, diet information provided in AVS for specifics.           EXERCISE Instructions:         Discussed with patient the need for routine aerobic activity for cardiovascular fitness, 3 times a week for about 30 minutes. Daily exercise for increased fitness and weight reduction goals.           HEALTH MAINTENANCE:                 Counseling provided to patient/family about routine health maintenance and ANNUAL physicals/labs. Counseling on recommended Vaccinations appropriate for age needed.              Encounter for subsequent annual wellness visit in Medicare patient         Elevated LDL cholesterol level         Seasonal allergies         Elevated liver enzymes    Orders:     Liver; Future    EMILY (obstructive sleep apnea)    Orders:    Home Sleep Study; Future    Lumbar back pain with radiculopathy affecting right lower extremity    Orders:    Ambulatory Referral to Orthopedic Surgery    Elevated transaminase level         Thrombocytopenia         Primary hypertension  Hypertension is stable and  controlled  Continue current treatment regimen.  Blood pressure will be reassessed in 3 months.                 Follow Up   Return in about 6 months (around 5/20/2025) for Recheck with labs prior.  Patient was given instructions and counseling regarding her condition or for health maintenance advice. Please see specific information pulled into the AVS if appropriate.

## 2024-12-06 RX ORDER — INDAPAMIDE 2.5 MG/1
TABLET ORAL
Qty: 30 TABLET | Refills: 0 | Status: SHIPPED | OUTPATIENT
Start: 2024-12-06

## 2024-12-06 RX ORDER — CARVEDILOL 6.25 MG/1
6.25 TABLET ORAL 2 TIMES DAILY
Qty: 90 TABLET | Refills: 1 | Status: SHIPPED | OUTPATIENT
Start: 2024-12-06

## 2024-12-06 NOTE — TELEPHONE ENCOUNTER
Rx Refill Note  Requested Prescriptions     Pending Prescriptions Disp Refills    carvedilol (COREG) 6.25 MG tablet [Pharmacy Med Name: CARVEDILOL 6.25MG TABS] 90 tablet 1     Sig: TAKE ONE TABLET BY MOUTH TWICE A DAY          Daija Ng MA  12/06/24, 12:13 CST

## 2024-12-13 RX ORDER — INDAPAMIDE 2.5 MG/1
TABLET ORAL
Qty: 90 TABLET | Refills: 1 | Status: SHIPPED | OUTPATIENT
Start: 2024-12-13

## 2024-12-13 NOTE — TELEPHONE ENCOUNTER
Rx Refill Note  Requested Prescriptions     Pending Prescriptions Disp Refills    indapamide (LOZOL) 2.5 MG tablet [Pharmacy Med Name: INDAPAMIDE 2.5MG TABS] 30 tablet 0     Sig: TAKE ONE TABLET BY MOUTH EVERY MORNING        Daija Ng MA  12/13/24, 15:01 CST

## 2024-12-20 RX ORDER — LOSARTAN POTASSIUM 50 MG/1
50 TABLET ORAL DAILY
Qty: 90 TABLET | Refills: 1 | Status: SHIPPED | OUTPATIENT
Start: 2024-12-20

## 2025-01-06 ENCOUNTER — TELEPHONE (OUTPATIENT)
Dept: FAMILY MEDICINE CLINIC | Facility: CLINIC | Age: 75
End: 2025-01-06
Payer: MEDICARE

## 2025-01-06 NOTE — TELEPHONE ENCOUNTER
Called patient to let her know she is due for mammogram. She states she does not want a mammogram.

## 2025-01-08 ENCOUNTER — TELEPHONE (OUTPATIENT)
Age: 75
End: 2025-01-08
Payer: MEDICARE

## 2025-01-16 DIAGNOSIS — K76.9 CHRONIC LIVER DISEASE: Primary | ICD-10-CM

## 2025-01-21 ENCOUNTER — OFFICE VISIT (OUTPATIENT)
Dept: FAMILY MEDICINE CLINIC | Facility: CLINIC | Age: 75
End: 2025-01-21
Payer: MEDICARE

## 2025-01-21 VITALS
TEMPERATURE: 97.7 F | OXYGEN SATURATION: 99 % | HEART RATE: 64 BPM | HEIGHT: 61 IN | RESPIRATION RATE: 17 BRPM | SYSTOLIC BLOOD PRESSURE: 114 MMHG | DIASTOLIC BLOOD PRESSURE: 62 MMHG | BODY MASS INDEX: 42.29 KG/M2 | WEIGHT: 224 LBS

## 2025-01-21 DIAGNOSIS — R52 GENERALIZED BODY ACHES: ICD-10-CM

## 2025-01-21 DIAGNOSIS — J02.9 ACUTE PHARYNGITIS, UNSPECIFIED ETIOLOGY: Primary | ICD-10-CM

## 2025-01-21 DIAGNOSIS — J02.9 SORE THROAT: ICD-10-CM

## 2025-01-21 PROCEDURE — 99213 OFFICE O/P EST LOW 20 MIN: CPT | Performed by: NURSE PRACTITIONER

## 2025-01-21 PROCEDURE — 87428 SARSCOV & INF VIR A&B AG IA: CPT | Performed by: NURSE PRACTITIONER

## 2025-01-21 PROCEDURE — 1160F RVW MEDS BY RX/DR IN RCRD: CPT | Performed by: NURSE PRACTITIONER

## 2025-01-21 PROCEDURE — 3078F DIAST BP <80 MM HG: CPT | Performed by: NURSE PRACTITIONER

## 2025-01-21 PROCEDURE — 1125F AMNT PAIN NOTED PAIN PRSNT: CPT | Performed by: NURSE PRACTITIONER

## 2025-01-21 PROCEDURE — 3074F SYST BP LT 130 MM HG: CPT | Performed by: NURSE PRACTITIONER

## 2025-01-21 PROCEDURE — 1159F MED LIST DOCD IN RCRD: CPT | Performed by: NURSE PRACTITIONER

## 2025-01-21 RX ORDER — CEFDINIR 300 MG/1
300 CAPSULE ORAL 2 TIMES DAILY
Qty: 20 CAPSULE | Refills: 0 | Status: SHIPPED | OUTPATIENT
Start: 2025-01-21 | End: 2025-01-31

## 2025-01-21 RX ORDER — GABAPENTIN 100 MG/1
100 CAPSULE ORAL 3 TIMES DAILY
COMMUNITY
Start: 2024-11-27

## 2025-01-21 NOTE — PROGRESS NOTES
"Chief Complaint   Patient presents with    Sore Throat    Joint Pain        Subjective   Priscilla Santos is a 74 y.o. female who presents today for the following:    Sore Throat   This is a new problem. Episode onset: 2 days.   Pain is worse on right side(s). The maximum temperature recorded prior to her arrival was unmeasured. Associated symptoms include congestion. Pertinent negatives include no coughing.   Additional comments: Joint pain       Allergies   Allergen Reactions    Diazepam Other (See Comments)     Made her violent    Codeine Nausea And Vomiting         OBJECTIVE:  Vitals:    01/21/25 0957   BP: 114/62   BP Location: Left arm   Patient Position: Sitting   Cuff Size: Adult   Pulse: 64   Resp: 17   Temp: 97.7 °F (36.5 °C)   TempSrc: Oral   SpO2: 99%   Weight: 102 kg (224 lb)   Height: 154.9 cm (61\")     Physical Exam  Vitals and nursing note reviewed.   Constitutional:       Appearance: Normal appearance.   HENT:      Right Ear: Tympanic membrane normal.      Left Ear: Tympanic membrane normal.      Mouth/Throat:      Pharynx: Posterior oropharyngeal erythema present.   Cardiovascular:      Rate and Rhythm: Normal rate and regular rhythm.   Pulmonary:      Breath sounds: Normal breath sounds. No wheezing or rhonchi.   Abdominal:      General: Bowel sounds are normal.   Skin:     General: Skin is warm and dry.   Neurological:      Mental Status: She is alert and oriented to person, place, and time.   Psychiatric:         Mood and Affect: Mood normal.         Behavior: Behavior normal.         Thought Content: Thought content normal.         Judgment: Judgment normal.                    ASSESSMENT/ PLAN:  Assessment & Plan  Acute pharyngitis, unspecified etiology    Orders:    cefdinir (OMNICEF) 300 MG capsule; Take 1 capsule by mouth 2 (Two) Times a Day for 10 days.    Sore throat    Orders:    POCT SARS-CoV-2 + Flu Antigen MCKINLEY    Generalized body aches    Orders:    POCT SARS-CoV-2 + Flu Antigen MCKINLEY     "     Procedures     Management Plan:     An After Visit Summary was printed and given to the patient at discharge.    Follow-up: Return if symptoms worsen or fail to improve.         Silvia Fierro, APRN 1/21/2025 11:56 CST  This note was electronically signed.

## 2025-01-29 ENCOUNTER — LAB (OUTPATIENT)
Dept: LAB | Facility: HOSPITAL | Age: 75
End: 2025-01-29
Payer: MEDICARE

## 2025-01-29 ENCOUNTER — OFFICE VISIT (OUTPATIENT)
Dept: GASTROENTEROLOGY | Facility: CLINIC | Age: 75
End: 2025-01-29
Payer: MEDICARE

## 2025-01-29 VITALS
HEART RATE: 55 BPM | WEIGHT: 218 LBS | HEIGHT: 61 IN | OXYGEN SATURATION: 98 % | DIASTOLIC BLOOD PRESSURE: 68 MMHG | TEMPERATURE: 97.1 F | BODY MASS INDEX: 41.16 KG/M2 | SYSTOLIC BLOOD PRESSURE: 110 MMHG

## 2025-01-29 DIAGNOSIS — E55.9 VITAMIN D DEFICIENCY: ICD-10-CM

## 2025-01-29 DIAGNOSIS — K76.0 FATTY LIVER: ICD-10-CM

## 2025-01-29 DIAGNOSIS — K76.9 LIVER DISEASE, UNSPECIFIED: ICD-10-CM

## 2025-01-29 DIAGNOSIS — R79.89 ELEVATED LFTS: ICD-10-CM

## 2025-01-29 DIAGNOSIS — R11.0 NAUSEA: ICD-10-CM

## 2025-01-29 DIAGNOSIS — R79.89 ELEVATED LFTS: Primary | ICD-10-CM

## 2025-01-29 DIAGNOSIS — D69.6 THROMBOCYTOPENIA: ICD-10-CM

## 2025-01-29 LAB
25(OH)D3 SERPL-MCNC: 40.5 NG/ML (ref 30–100)
ALBUMIN SERPL-MCNC: 3.5 G/DL (ref 3.5–5.2)
ALBUMIN/GLOB SERPL: 0.7 G/DL
ALP SERPL-CCNC: 75 U/L (ref 39–117)
ALT SERPL W P-5'-P-CCNC: 123 U/L (ref 1–33)
ANION GAP SERPL CALCULATED.3IONS-SCNC: 12 MMOL/L (ref 5–15)
AST SERPL-CCNC: 153 U/L (ref 1–32)
BILIRUB SERPL-MCNC: 0.9 MG/DL (ref 0–1.2)
BUN SERPL-MCNC: 25 MG/DL (ref 8–23)
BUN/CREAT SERPL: 27.8 (ref 7–25)
CALCIUM SPEC-SCNC: 9.9 MG/DL (ref 8.6–10.5)
CHLORIDE SERPL-SCNC: 97 MMOL/L (ref 98–107)
CO2 SERPL-SCNC: 25 MMOL/L (ref 22–29)
CREAT SERPL-MCNC: 0.9 MG/DL (ref 0.57–1)
DEPRECATED RDW RBC AUTO: 44 FL (ref 37–54)
EGFRCR SERPLBLD CKD-EPI 2021: 67.6 ML/MIN/1.73
ERYTHROCYTE [DISTWIDTH] IN BLOOD BY AUTOMATED COUNT: 12.8 % (ref 12.3–15.4)
GLOBULIN UR ELPH-MCNC: 5.3 GM/DL
GLUCOSE SERPL-MCNC: 91 MG/DL (ref 65–99)
HCT VFR BLD AUTO: 37.1 % (ref 34–46.6)
HGB BLD-MCNC: 12.8 G/DL (ref 12–15.9)
INR PPP: 1.33 (ref 0.91–1.09)
MCH RBC QN AUTO: 32.3 PG (ref 26.6–33)
MCHC RBC AUTO-ENTMCNC: 34.5 G/DL (ref 31.5–35.7)
MCV RBC AUTO: 93.7 FL (ref 79–97)
PLATELET # BLD AUTO: 123 10*3/MM3 (ref 140–450)
PMV BLD AUTO: 10 FL (ref 6–12)
POTASSIUM SERPL-SCNC: 3.8 MMOL/L (ref 3.5–5.2)
PROT SERPL-MCNC: 8.8 G/DL (ref 6–8.5)
PROTHROMBIN TIME: 17.2 SECONDS (ref 11.8–14.8)
RBC # BLD AUTO: 3.96 10*6/MM3 (ref 3.77–5.28)
SODIUM SERPL-SCNC: 134 MMOL/L (ref 136–145)
WBC NRBC COR # BLD AUTO: 4.73 10*3/MM3 (ref 3.4–10.8)

## 2025-01-29 PROCEDURE — 3078F DIAST BP <80 MM HG: CPT | Performed by: NURSE PRACTITIONER

## 2025-01-29 PROCEDURE — 86258 DGP ANTIBODY EACH IG CLASS: CPT | Performed by: NURSE PRACTITIONER

## 2025-01-29 PROCEDURE — 1160F RVW MEDS BY RX/DR IN RCRD: CPT | Performed by: NURSE PRACTITIONER

## 2025-01-29 PROCEDURE — 85027 COMPLETE CBC AUTOMATED: CPT | Performed by: NURSE PRACTITIONER

## 2025-01-29 PROCEDURE — 82306 VITAMIN D 25 HYDROXY: CPT | Performed by: NURSE PRACTITIONER

## 2025-01-29 PROCEDURE — 3074F SYST BP LT 130 MM HG: CPT | Performed by: NURSE PRACTITIONER

## 2025-01-29 PROCEDURE — 36415 COLL VENOUS BLD VENIPUNCTURE: CPT | Performed by: NURSE PRACTITIONER

## 2025-01-29 PROCEDURE — 83516 IMMUNOASSAY NONANTIBODY: CPT

## 2025-01-29 PROCEDURE — 1159F MED LIST DOCD IN RCRD: CPT | Performed by: NURSE PRACTITIONER

## 2025-01-29 PROCEDURE — 99214 OFFICE O/P EST MOD 30 MIN: CPT | Performed by: NURSE PRACTITIONER

## 2025-01-29 PROCEDURE — 85610 PROTHROMBIN TIME: CPT | Performed by: NURSE PRACTITIONER

## 2025-01-29 PROCEDURE — 80053 COMPREHEN METABOLIC PANEL: CPT | Performed by: NURSE PRACTITIONER

## 2025-01-29 PROCEDURE — 86376 MICROSOMAL ANTIBODY EACH: CPT | Performed by: NURSE PRACTITIONER

## 2025-01-29 NOTE — PROGRESS NOTES
"Primary Physician: Reza Charles APRN    Chief Complaint   Patient presents with    Elevated Hepatic Enzymes     Pt presents today for evaluation for elevated liver enzymes-had US 1/15/2025 for PCP that showed changes of chronic liver disease; Pt states she \"googled\" a lot of things about her liver and has changed her diet-has last 9 pounds in the last 2 weeks-also states since changing her diet her legs are no longer \"red and splotchy\"        Subjective     Priscilla Santos is a 74 y.o. female.    HPI  Elevated LFT's  Patient with a history of elevated LFTs dating back to at least 2015.  April 2015: AST 74, ALT 78.  ALKP & Bili normal  4/15/2022: AST normal 40, ALT 36 (0-32).   normal ALKP & Bili  3/14/2023: AST 52 (0-40), ALT 43 (0-32).   ALKP & Bili normal  11/15/2024: AST 93, ALT 66.  Bili and Alkp normal    2/5/2024 liver serology: Ceruloplasmin normal, AMA normal, acute hepatitis panel normal, ferritin unremarkable.  SHOSHANA was positive as was the ASMA.      12/26/2023 liver ultrasound: Slightly coarse echogenicity liver parenchyma possibly indicating mild hepatic steatosis.     12/29/2023: ALT 85, AST 99, ALKP normal, bilirubin normal platelets 119K  Fib-4 Score= 6.5    11/15/2024 Fib-4 Score= 8.36  11/15/2024 AST 93, ALT 66, Bili/ALKP normal    ..Fib-4 scoring system  Points <1.45:                        Cirrhosis less likely  Points >=1.45 and <=3.25:       Indeterminate  Points >3.25:                        Cirrhosis more likely      BNP normal 12/12/2023  Does take daily Lipitor  No alcohol use, no smoking.      1/15/2025 ultrasound with mildly heterogeneous liver echogenicity with mildly nodular liver contour seen with chronic liver disease.  12/26/2023 liver ultrasound showing slightly coarse echogenicity liver parenchyma indicating mild hepatic steatosis.    Patient last seen February 2024 at which time she was having abdominal pain.  We did order a upper endoscopy and lower colonoscopy on her.  Patient " later called and canceled that is scheduled appointment and has not called back yet to get that reset up.  We did follow-up in May 2024 to see if she would like to get that reset up.    1/29/2025 Pt has recently (past 1 month) been focusing on a healthier diet and has lost 9 pounds.    Thrombocytopenia  11/15/2024 platelets 100,000        No family hx colon cancer  No abdominal pain. No diarrhea, constipation, no rectal bleeding.  Pt reports she does Not want to have a colonoscopy at this point. She will consider it once we have a plan for her liver and would like to discuss colonoscopy in the future later this year.    Past Medical History:   Diagnosis Date    Arthritis     CHF (congestive heart failure)     Coronary artery disease     Fatty liver     History of transfusion     Hyperlipidemia     Hypertension     Sleep apnea     UTI (urinary tract infection)        Past Surgical History:   Procedure Laterality Date    APPENDECTOMY      CARDIAC SURGERY  2008    Tripple bypass    CAROTID STENT      CARPAL TUNNEL RELEASE Bilateral     CHOLECYSTECTOMY      COLON SURGERY      CORONARY STENT PLACEMENT  2021    total of 4-5 heart stents in past    HERNIA REPAIR      JOINT REPLACEMENT      KNEE SURGERY Bilateral     TONSILLECTOMY AND ADENOIDECTOMY      TUBAL ABDOMINAL LIGATION  1977    UMBILICAL HERNIA REPAIR N/A 02/02/2023    Procedure: UMBILICAL HERNIA REPAIR with mesh;  Surgeon: Lara Conley MD;  Location: [facility];  Service: General;  Laterality: N/A;        Current Outpatient Medications:     Ascorbic Acid (VITAMIN C PO), Take 1 tablet by mouth Daily., Disp: , Rfl:     aspirin 81 MG EC tablet, Take 1 tablet by mouth Daily., Disp: , Rfl:     atorvastatin (LIPITOR) 80 MG tablet, TAKE ONE TABLET BY MOUTH AT BEDTIME (Patient taking differently: Take 1 tablet by mouth Every Night.), Disp: 90 tablet, Rfl: 1    carvedilol (COREG) 6.25 MG tablet, TAKE ONE TABLET BY MOUTH TWICE A DAY, Disp: 90 tablet, Rfl: 1    cefdinir  (OMNICEF) 300 MG capsule, Take 1 capsule by mouth 2 (Two) Times a Day for 10 days., Disp: 20 capsule, Rfl: 0    cetirizine (zyrTEC) 10 MG tablet, Take 1 tablet by mouth Daily., Disp: , Rfl:     cholecalciferol (VITAMIN D3) 25 MCG (1000 UT) tablet, Take 1 tablet by mouth Daily., Disp: , Rfl:     clopidogrel (PLAVIX) 75 MG tablet, Take 1 tablet by mouth Daily., Disp: 90 tablet, Rfl: 1    fluticasone (FLONASE) 50 MCG/ACT nasal spray, USE TWO SPRAYS INTO THE NOSTRILS AS DIRECTED BY PROVIDER DAILY (Patient taking differently: Administer 2 sprays into the nostril(s) as directed by provider Daily.), Disp: 16 g, Rfl: 5    gabapentin (NEURONTIN) 100 MG capsule, Take 1 capsule by mouth 3 (Three) Times a Day., Disp: , Rfl:     hydroCHLOROthiazide (MICROZIDE) 12.5 MG capsule, Take 1 capsule by mouth Daily., Disp: 90 capsule, Rfl: 1    indapamide (LOZOL) 2.5 MG tablet, TAKE ONE TABLET BY MOUTH EVERY MORNING (Patient taking differently: Take 1 tablet by mouth Daily.), Disp: 90 tablet, Rfl: 1    Krill Oil (Omega-3) 500 MG capsule, Take 1 tablet by mouth Daily., Disp: , Rfl:     losartan (Cozaar) 50 MG tablet, Take 1 tablet by mouth Daily., Disp: 90 tablet, Rfl: 1    ranolazine (RANEXA) 1000 MG 12 hr tablet, TAKE ONE TABLET BY MOUTH EVERY 12 HOURS, Disp: 60 tablet, Rfl: 0    Turmeric Curcumin 500 MG capsule, Take 1 capsule by mouth Daily., Disp: , Rfl:     vitamin B-12 (CYANOCOBALAMIN) 100 MCG tablet, Take 0.5 tablets by mouth Daily., Disp: , Rfl:     Allergies   Allergen Reactions    Diazepam Other (See Comments)     Made her violent    Codeine Nausea And Vomiting       Social History     Socioeconomic History    Marital status:    Tobacco Use    Smoking status: Never     Passive exposure: Never    Smokeless tobacco: Never   Vaping Use    Vaping status: Never Used   Substance and Sexual Activity    Alcohol use: Never    Drug use: Never    Sexual activity: Defer       Family History   Problem Relation Age of Onset     "Diabetes Mother     Cancer Father     Heart disease Father     No Known Problems Maternal Grandmother     Diabetes Maternal Grandfather     No Known Problems Paternal Grandmother     Cancer Paternal Grandfather     Colon cancer Neg Hx     Colon polyps Neg Hx     Esophageal cancer Neg Hx     Liver disease Neg Hx     Stomach cancer Neg Hx     Rectal cancer Neg Hx     Liver cancer Neg Hx        Review of Systems   Constitutional:  Negative for unexpected weight change.   HENT:  Negative for trouble swallowing.    Respiratory:  Negative for shortness of breath.    Cardiovascular:  Negative for chest pain.   Gastrointestinal:  Positive for nausea. Negative for abdominal pain, anal bleeding and vomiting.       Objective     /68 (BP Location: Left arm, Patient Position: Sitting, Cuff Size: Adult)   Pulse 55   Temp 97.1 °F (36.2 °C) (Infrared)   Ht 154.9 cm (61\")   Wt 98.9 kg (218 lb)   SpO2 98%   Breastfeeding No   BMI 41.19 kg/m²     Physical Exam  Vitals reviewed.   Constitutional:       Appearance: Normal appearance.   Cardiovascular:      Rate and Rhythm: Normal rate and regular rhythm.      Heart sounds: Normal heart sounds.   Pulmonary:      Effort: Pulmonary effort is normal.      Breath sounds: Normal breath sounds.   Neurological:      Mental Status: She is alert.         Lab Results - Last 18 Months   Lab Units 01/29/25  1118 11/15/24  0730 12/29/23  0844 12/18/23  0800 12/12/23  0959 11/14/23  0730   GLUCOSE mg/dL 91 114* 113* 116* 146* 116*   BUN mg/dL 25* 19 14 20 14 16   CREATININE mg/dL 0.90 0.93 0.91 0.93 0.83 0.98   SODIUM mmol/L 134* 138 140 142 139 141   POTASSIUM mmol/L 3.8 4.0 3.7 4.0 3.7 3.5   CHLORIDE mmol/L 97* 104 102 106 102 104   CO2 mmol/L 25.0 25 25 23 24.0 24   TOTAL PROTEIN g/dL 8.8*  --   --   --  8.1  --    ALBUMIN g/dL 3.5 3.5* 3.7* 3.6* 3.4* 3.5*   ALT (SGPT) U/L 123* 66* 85* 54* 57* 58*   AST (SGOT) U/L 153* 93* 99* 73* 75* 71*   ALK PHOS U/L 75 68 58 57 57 55   BILIRUBIN " mg/dL 0.9 0.8 0.8 0.6 0.7 0.7   GLOBULIN gm/dL 5.3  --   --   --  4.7  --        Lab Results - Last 18 Months   Lab Units 01/29/25  1118 11/15/24  0730 12/29/23  0844 11/14/23  0730   HEMOGLOBIN g/dL 12.8 12.5 13.2 12.9   HEMATOCRIT % 37.1 36.1 38.4 36.3   MCV fL 93.7 94 93 91   WBC 10*3/mm3 4.73 4.5 4.5 4.6   RDW % 12.8 12.7 12.5 13.1   MPV fL 10.0  --   --   --    PLATELETS 10*3/mm3 123* 100* 119* 113*   INR  1.33*  --   --   --        Lab Results - Last 18 Months   Lab Units 01/29/25  1118 11/15/24  0730 02/05/24  1417 02/05/24  1417 11/14/23  0730   FERRITIN ng/mL  --   --   --  152.00*  --    T4 TOTAL ug/dL  --  9.0  --   --  9.5   TSH uIU/mL  --  1.530  --   --  1.620   VIT D 25 HYDROXY ng/ml 40.5  --    < > 40.5  --     < > = values in this interval not displayed.        Lab Results - Last 18 Months   Lab Units 02/05/24  1417   HEP B C IGM  Non-Reactive   HEP B S AG  Non-Reactive   FERRITIN ng/mL 152.00*   CERULOPLASMIN mg/dL 23   MITOCHONDRIAL AB Units <20.0     Narrative & Impression   EXAM: US LIVER-      DATE: 1/15/2025 8:44 AM     HISTORY: elevated liver enzymes; R74.8-Abnormal levels of other serum  enzymes       COMPARISON: 12/26/2023     TECHNIQUE:  Real-time ultrasound performed with representative images  and report stored to PACS per institutional protocol.     FINDINGS:  PANCREAS. Partially visualized pancreas within normal limits.     AORTA. Partially visualized aorta is within normal limits.     IVC. Partially visualized IVC within normal limits.     LIVER. Mildly heterogeneous liver echogenicity with mildly nodular liver  contour. Patent portal vein with normal hepatopetal flow.     GALLBLADDER. Gallbladder not demonstrated, consistent with surgical  history.     COMMON BILE DUCT. Measures 8-9 mm, which can be reservoir effect after  cholecystectomy.     OTHER. RIGHT kidney measures 10.1 x 4.2 x 5.2 cm. Normal renal  echogenicity and contour on the submitted images. No hydronephrosis.            IMPRESSION:  1. Mildly heterogeneous liver echogenicity with mildly nodular liver  contour, which can be seen with chronic liver disease.     This report was signed and finalized on 1/15/2025 11:38 AM by Dr Mignon Chiang MD.         IMPRESSION/PLAN:    Assessment & Plan      Problem List Items Addressed This Visit       Vitamin D deficiency    Elevated LFTs - Primary    Overview     Suspect chronic liver disease to be associated with fatty liver and possibly AIH.  Positive SHOSHANA, positive ASMA so suspect autoimmune hepatitis at this time.  Other liver serology collected in February 2024 unremarkable including AMA, ceruloplasmin, ferritin, acute hepatitis panel  No history of HE or Ascites    12/26/2023 liver ultrasound: Slightly coarse echogenicity liver parenchyma possibly indicating mild hepatic steatosis.    12/29/2023: ALT 85, AST 99, ALKP normal, bilirubin normal platelets 119K  Fib-4 Score= 6.5  ..Fib-4 scoring system  Points <1.45:                      Cirrhosis less likely  Points >=1.45 and <=3.25:       Indeterminate  Points >3.25:                      Cirrhosis more likely     BNP normal 12/12/2023  Daily use of a statin  Fatty liver per US         Current Assessment & Plan     Will educate on fatty liver.  Will get further serology to help establish the diagnosis of autoimmune hepatitis.  Will discuss his case further with Dr. Ulloa.  Labs today to calculate MELD score.         Relevant Orders    Soluble Liver Ag (IgG Ab)    Gliadin Antibody, IgG (Completed)    CBC (No Diff) (Completed)    Comprehensive Metabolic Panel (Completed)    Protime-INR (Completed)    Anti-microsomal Antibody (Completed)    Fatty liver    Overview     12/26/2023 liver ultrasound: Slightly coarse echogenicity liver parenchyma possibly indicating mild hepatic steatosis.  1/15/2025 ultrasound with mildly heterogeneous liver echogenicity with mildly nodular liver contour seen with chronic liver disease.    12/29/2023: Fib-4 Score=  6.5  11/15/2024 Fib-4 Score= 8.36  11/15/2024 AST 93, ALT 66, Bili/ALKP normal    ..Fib-4 scoring system  Points <1.45:                        Cirrhosis less likely  Points >=1.45 and <=3.25:       Indeterminate  Points >3.25:           Current Assessment & Plan     ..The principles of management of steatohepatitis are weight loss through a structured diet and exercise as well as meticulous control of any component of metabolic syndrome, including blood glucose levels, cholesterol and blood pressure.   The patient should strive to lose 2-4 monthly until reaching 7-10% reduction in weight.      Coffee consumption has been shown to decrease the risk of HCC in patients with chronic liver disease.  In these patients, coffee consumption should be encouraged.    I have advised to avoid fructose containing food and drink.     Daily alcohol intake should strickly be below 30gm in men and 20 gm in women.    A physical activity program should in 150-200 min/week of moderate intensity in 3-5 sessions. Resistance training to promote musculoskeletal fitness and improve metabolic factors was reviewed.           Thrombocytopenia    Overview     11/15/2024 platelets 100,000         Nausea    Overview     Nausea 4-5 months, worse at night          Other Visit Diagnoses       Liver disease, unspecified        Relevant Orders    Soluble Liver Ag (IgG Ab)    Gliadin Antibody, IgG (Completed)    CBC (No Diff) (Completed)    Comprehensive Metabolic Panel (Completed)    Protime-INR (Completed)    Vitamin D 25 Hydroxy (Completed)    Anti-microsomal Antibody (Completed)          Endoscopy per Dr Ulloa to evaluate esophageal varices  2 week follow up after today's labs in the event we start AIH medication          ..The risks, benefits, and alternatives of endoscopy were reviewed with the patient today.  Risks including perforation, with or without dilation, possibly requiring surgery.  Additional risks include risk of bleeding.  There is  also the risk of a drug reaction or problems with anesthesia.  This will be discussed with the further by the anesthesia team on the day of the procedure. The benefits include the diagnosis and management of disease of the upper digestive tract.  Alternatives to endoscopy include upper GI series, laboratory testing, radiographic evaluation, or no intervention.  The patient verbalizes understanding and agrees.    In accordance with requirements under the Affordable Care Act, [facility] has provided pricing for all hospital services and items on each of its websites. However, a patient's actual cost may differ based on the services the patient receives to meet individual healthcare needs and based on the benefits provided under the patient’s insurance coverage.        Carlyn Whitney, APRN  01/31/25  10:40 CST    Part of this note may be an electronic transcription/translation of spoken language to printed text.

## 2025-01-29 NOTE — ASSESSMENT & PLAN NOTE
Will educate on fatty liver.  Will get further serology to help establish the diagnosis of autoimmune hepatitis.  Will discuss his case further with Dr. Ulloa.  Labs today to calculate MELD score.

## 2025-01-30 LAB
GLIADIN PEPTIDE IGG SER-ACNC: 5 UNITS (ref 0–19)
LKM-1 AB SER-ACNC: 1 UNITS (ref 0–20)

## 2025-02-03 ENCOUNTER — PREP FOR SURGERY (OUTPATIENT)
Dept: OTHER | Facility: HOSPITAL | Age: 75
End: 2025-02-03
Payer: MEDICARE

## 2025-02-03 DIAGNOSIS — R79.89 ELEVATED LFTS: ICD-10-CM

## 2025-02-03 DIAGNOSIS — K76.0 FATTY LIVER: Primary | ICD-10-CM

## 2025-02-03 DIAGNOSIS — R11.0 NAUSEA: ICD-10-CM

## 2025-02-03 LAB — SOLUBLE LIVER IGG SER IA-ACNC: 0.8 UNITS (ref 0–20)

## 2025-02-06 ENCOUNTER — HOSPITAL ENCOUNTER (OUTPATIENT)
Facility: HOSPITAL | Age: 75
Setting detail: OBSERVATION
Discharge: HOME | End: 2025-02-08
Attending: EMERGENCY MEDICINE | Admitting: INTERNAL MEDICINE
Payer: MEDICARE

## 2025-02-06 ENCOUNTER — APPOINTMENT (OUTPATIENT)
Dept: GENERAL RADIOLOGY | Facility: HOSPITAL | Age: 75
End: 2025-02-06
Payer: MEDICARE

## 2025-02-06 DIAGNOSIS — R74.01 TRANSAMINITIS: ICD-10-CM

## 2025-02-06 DIAGNOSIS — N18.9 CHRONIC KIDNEY DISEASE, UNSPECIFIED CKD STAGE: ICD-10-CM

## 2025-02-06 DIAGNOSIS — R07.9 CHEST PAIN, UNSPECIFIED TYPE: Primary | ICD-10-CM

## 2025-02-06 LAB
ALBUMIN SERPL-MCNC: 3 G/DL (ref 3.5–5.2)
ALBUMIN/GLOB SERPL: 0.7 G/DL
ALP SERPL-CCNC: 64 U/L (ref 39–117)
ALT SERPL W P-5'-P-CCNC: 89 U/L (ref 1–33)
AMYLASE SERPL-CCNC: 61 U/L (ref 28–100)
ANION GAP SERPL CALCULATED.3IONS-SCNC: 6 MMOL/L (ref 5–15)
AST SERPL-CCNC: 121 U/L (ref 1–32)
BASOPHILS # BLD AUTO: 0.01 10*3/MM3 (ref 0–0.2)
BASOPHILS NFR BLD AUTO: 0.2 % (ref 0–1.5)
BILIRUB SERPL-MCNC: 1.4 MG/DL (ref 0–1.2)
BUN SERPL-MCNC: 25 MG/DL (ref 8–23)
BUN/CREAT SERPL: 24.3 (ref 7–25)
CALCIUM SPEC-SCNC: 9.3 MG/DL (ref 8.6–10.5)
CHLORIDE SERPL-SCNC: 100 MMOL/L (ref 98–107)
CO2 SERPL-SCNC: 25 MMOL/L (ref 22–29)
CREAT SERPL-MCNC: 1.03 MG/DL (ref 0.57–1)
DEPRECATED RDW RBC AUTO: 44.7 FL (ref 37–54)
EGFRCR SERPLBLD CKD-EPI 2021: 57.5 ML/MIN/1.73
EOSINOPHIL # BLD AUTO: 0 10*3/MM3 (ref 0–0.4)
EOSINOPHIL NFR BLD AUTO: 0 % (ref 0.3–6.2)
ERYTHROCYTE [DISTWIDTH] IN BLOOD BY AUTOMATED COUNT: 13.1 % (ref 12.3–15.4)
FERRITIN SERPL-MCNC: 310.4 NG/ML (ref 13–150)
GEN 5 1HR TROPONIN T REFLEX: 40 NG/L
GLOBULIN UR ELPH-MCNC: 4.5 GM/DL
GLUCOSE SERPL-MCNC: 126 MG/DL (ref 65–99)
HCT VFR BLD AUTO: 31.5 % (ref 34–46.6)
HGB BLD-MCNC: 10.8 G/DL (ref 12–15.9)
HOLD SPECIMEN: NORMAL
HOLD SPECIMEN: NORMAL
HYPOCHROMIA BLD QL: NORMAL
IMM GRANULOCYTES # BLD AUTO: 0.01 10*3/MM3 (ref 0–0.05)
IMM GRANULOCYTES NFR BLD AUTO: 0.2 % (ref 0–0.5)
IRON 24H UR-MRATE: 102 MCG/DL (ref 37–145)
IRON SATN MFR SERPL: 38 % (ref 20–50)
LIPASE SERPL-CCNC: 58 U/L (ref 13–60)
LYMPHOCYTES # BLD AUTO: 1.27 10*3/MM3 (ref 0.7–3.1)
LYMPHOCYTES NFR BLD AUTO: 25 % (ref 19.6–45.3)
MCH RBC QN AUTO: 32.1 PG (ref 26.6–33)
MCHC RBC AUTO-ENTMCNC: 34.3 G/DL (ref 31.5–35.7)
MCV RBC AUTO: 93.8 FL (ref 79–97)
MONOCYTES # BLD AUTO: 0.46 10*3/MM3 (ref 0.1–0.9)
MONOCYTES NFR BLD AUTO: 9.1 % (ref 5–12)
NEUTROPHILS NFR BLD AUTO: 3.33 10*3/MM3 (ref 1.7–7)
NEUTROPHILS NFR BLD AUTO: 65.5 % (ref 42.7–76)
NRBC BLD AUTO-RTO: 0 /100 WBC (ref 0–0.2)
PLATELET # BLD AUTO: 90 10*3/MM3 (ref 140–450)
PMV BLD AUTO: 10.8 FL (ref 6–12)
POLYCHROMASIA BLD QL SMEAR: NORMAL
POTASSIUM SERPL-SCNC: 3.9 MMOL/L (ref 3.5–5.2)
PROT SERPL-MCNC: 7.5 G/DL (ref 6–8.5)
QT INTERVAL: 510 MS
QT INTERVAL: 524 MS
QTC INTERVAL: 464 MS
QTC INTERVAL: 477 MS
RBC # BLD AUTO: 3.36 10*6/MM3 (ref 3.77–5.28)
SMALL PLATELETS BLD QL SMEAR: NORMAL
SODIUM SERPL-SCNC: 131 MMOL/L (ref 136–145)
TIBC SERPL-MCNC: 265 MCG/DL (ref 298–536)
TRANSFERRIN SERPL-MCNC: 178 MG/DL (ref 200–360)
TROPONIN T % DELTA: 0
TROPONIN T NUMERIC DELTA: 0 NG/L
TROPONIN T SERPL HS-MCNC: 40 NG/L
WBC MORPH BLD: NORMAL
WBC NRBC COR # BLD AUTO: 5.08 10*3/MM3 (ref 3.4–10.8)
WHOLE BLOOD HOLD COAG: NORMAL
WHOLE BLOOD HOLD SPECIMEN: NORMAL

## 2025-02-06 PROCEDURE — 25810000003 SODIUM CHLORIDE 0.9 % SOLUTION: Performed by: EMERGENCY MEDICINE

## 2025-02-06 PROCEDURE — 36415 COLL VENOUS BLD VENIPUNCTURE: CPT

## 2025-02-06 PROCEDURE — 25010000002 ONDANSETRON PER 1 MG: Performed by: NURSE PRACTITIONER

## 2025-02-06 PROCEDURE — 80053 COMPREHEN METABOLIC PANEL: CPT | Performed by: EMERGENCY MEDICINE

## 2025-02-06 PROCEDURE — 25010000002 FENTANYL CITRATE (PF) 50 MCG/ML SOLUTION: Performed by: EMERGENCY MEDICINE

## 2025-02-06 PROCEDURE — G0378 HOSPITAL OBSERVATION PER HR: HCPCS

## 2025-02-06 PROCEDURE — 96372 THER/PROPH/DIAG INJ SC/IM: CPT

## 2025-02-06 PROCEDURE — 84466 ASSAY OF TRANSFERRIN: CPT | Performed by: NURSE PRACTITIONER

## 2025-02-06 PROCEDURE — 85025 COMPLETE CBC W/AUTO DIFF WBC: CPT | Performed by: EMERGENCY MEDICINE

## 2025-02-06 PROCEDURE — 82607 VITAMIN B-12: CPT | Performed by: NURSE PRACTITIONER

## 2025-02-06 PROCEDURE — 94660 CPAP INITIATION&MGMT: CPT

## 2025-02-06 PROCEDURE — 94799 UNLISTED PULMONARY SVC/PX: CPT

## 2025-02-06 PROCEDURE — 85007 BL SMEAR W/DIFF WBC COUNT: CPT | Performed by: EMERGENCY MEDICINE

## 2025-02-06 PROCEDURE — 83690 ASSAY OF LIPASE: CPT | Performed by: EMERGENCY MEDICINE

## 2025-02-06 PROCEDURE — 71045 X-RAY EXAM CHEST 1 VIEW: CPT

## 2025-02-06 PROCEDURE — 82150 ASSAY OF AMYLASE: CPT | Performed by: EMERGENCY MEDICINE

## 2025-02-06 PROCEDURE — 96374 THER/PROPH/DIAG INJ IV PUSH: CPT

## 2025-02-06 PROCEDURE — 96375 TX/PRO/DX INJ NEW DRUG ADDON: CPT

## 2025-02-06 PROCEDURE — 93005 ELECTROCARDIOGRAM TRACING: CPT

## 2025-02-06 PROCEDURE — 25810000003 SODIUM CHLORIDE 0.9 % SOLUTION: Performed by: NURSE PRACTITIONER

## 2025-02-06 PROCEDURE — 25010000002 ENOXAPARIN PER 10 MG: Performed by: NURSE PRACTITIONER

## 2025-02-06 PROCEDURE — 99285 EMERGENCY DEPT VISIT HI MDM: CPT

## 2025-02-06 PROCEDURE — 25010000002 ONDANSETRON PER 1 MG: Performed by: EMERGENCY MEDICINE

## 2025-02-06 PROCEDURE — 96376 TX/PRO/DX INJ SAME DRUG ADON: CPT

## 2025-02-06 PROCEDURE — 93005 ELECTROCARDIOGRAM TRACING: CPT | Performed by: EMERGENCY MEDICINE

## 2025-02-06 PROCEDURE — 82728 ASSAY OF FERRITIN: CPT | Performed by: NURSE PRACTITIONER

## 2025-02-06 PROCEDURE — 84484 ASSAY OF TROPONIN QUANT: CPT | Performed by: EMERGENCY MEDICINE

## 2025-02-06 PROCEDURE — 83540 ASSAY OF IRON: CPT | Performed by: NURSE PRACTITIONER

## 2025-02-06 PROCEDURE — 93010 ELECTROCARDIOGRAM REPORT: CPT | Performed by: HOSPITALIST

## 2025-02-06 RX ORDER — BISACODYL 10 MG
10 SUPPOSITORY, RECTAL RECTAL DAILY PRN
Status: DISCONTINUED | OUTPATIENT
Start: 2025-02-06 | End: 2025-02-08 | Stop reason: HOSPADM

## 2025-02-06 RX ORDER — ACETAMINOPHEN 160 MG/5ML
650 SOLUTION ORAL EVERY 4 HOURS PRN
Status: DISCONTINUED | OUTPATIENT
Start: 2025-02-06 | End: 2025-02-08 | Stop reason: HOSPADM

## 2025-02-06 RX ORDER — SODIUM CHLORIDE 0.9 % (FLUSH) 0.9 %
10 SYRINGE (ML) INJECTION EVERY 12 HOURS SCHEDULED
Status: DISCONTINUED | OUTPATIENT
Start: 2025-02-06 | End: 2025-02-06

## 2025-02-06 RX ORDER — INDAPAMIDE 2.5 MG/1
2.5 TABLET ORAL EVERY MORNING
COMMUNITY

## 2025-02-06 RX ORDER — RANOLAZINE 500 MG/1
1000 TABLET, EXTENDED RELEASE ORAL EVERY 12 HOURS
Status: DISCONTINUED | OUTPATIENT
Start: 2025-02-06 | End: 2025-02-08 | Stop reason: HOSPADM

## 2025-02-06 RX ORDER — POLYETHYLENE GLYCOL 3350 17 G/17G
17 POWDER, FOR SOLUTION ORAL DAILY PRN
Status: DISCONTINUED | OUTPATIENT
Start: 2025-02-06 | End: 2025-02-08 | Stop reason: HOSPADM

## 2025-02-06 RX ORDER — ONDANSETRON 2 MG/ML
4 INJECTION INTRAMUSCULAR; INTRAVENOUS EVERY 6 HOURS PRN
Status: DISCONTINUED | OUTPATIENT
Start: 2025-02-06 | End: 2025-02-08 | Stop reason: HOSPADM

## 2025-02-06 RX ORDER — ENOXAPARIN SODIUM 100 MG/ML
40 INJECTION SUBCUTANEOUS EVERY 12 HOURS SCHEDULED
Status: DISCONTINUED | OUTPATIENT
Start: 2025-02-06 | End: 2025-02-08 | Stop reason: HOSPADM

## 2025-02-06 RX ORDER — ASPIRIN 81 MG/1
324 TABLET, CHEWABLE ORAL ONCE
Status: DISCONTINUED | OUTPATIENT
Start: 2025-02-06 | End: 2025-02-06

## 2025-02-06 RX ORDER — SODIUM CHLORIDE 0.9 % (FLUSH) 0.9 %
10 SYRINGE (ML) INJECTION AS NEEDED
Status: DISCONTINUED | OUTPATIENT
Start: 2025-02-06 | End: 2025-02-08 | Stop reason: HOSPADM

## 2025-02-06 RX ORDER — ATORVASTATIN CALCIUM 80 MG/1
80 TABLET, FILM COATED ORAL NIGHTLY
COMMUNITY

## 2025-02-06 RX ORDER — CETIRIZINE HYDROCHLORIDE 10 MG/1
10 TABLET ORAL DAILY
Status: DISCONTINUED | OUTPATIENT
Start: 2025-02-06 | End: 2025-02-08 | Stop reason: HOSPADM

## 2025-02-06 RX ORDER — SODIUM CHLORIDE 0.9 % (FLUSH) 0.9 %
10 SYRINGE (ML) INJECTION AS NEEDED
Status: DISCONTINUED | OUTPATIENT
Start: 2025-02-06 | End: 2025-02-06

## 2025-02-06 RX ORDER — LIDOCAINE HYDROCHLORIDE 20 MG/ML
10 SOLUTION OROPHARYNGEAL ONCE
Status: DISCONTINUED | OUTPATIENT
Start: 2025-02-06 | End: 2025-02-08 | Stop reason: HOSPADM

## 2025-02-06 RX ORDER — FENTANYL CITRATE 50 UG/ML
25 INJECTION, SOLUTION INTRAMUSCULAR; INTRAVENOUS ONCE
Status: COMPLETED | OUTPATIENT
Start: 2025-02-06 | End: 2025-02-06

## 2025-02-06 RX ORDER — ASPIRIN 81 MG/1
81 TABLET ORAL DAILY
Status: DISCONTINUED | OUTPATIENT
Start: 2025-02-06 | End: 2025-02-08 | Stop reason: HOSPADM

## 2025-02-06 RX ORDER — DIPHENHYDRAMINE HCL 12.5MG/5ML
25 LIQUID (ML) ORAL ONCE
Status: DISCONTINUED | OUTPATIENT
Start: 2025-02-06 | End: 2025-02-08 | Stop reason: HOSPADM

## 2025-02-06 RX ORDER — SODIUM CHLORIDE 9 MG/ML
50 INJECTION, SOLUTION INTRAVENOUS CONTINUOUS
Status: DISCONTINUED | OUTPATIENT
Start: 2025-02-06 | End: 2025-02-07

## 2025-02-06 RX ORDER — NITROGLYCERIN 0.4 MG/1
0.4 TABLET SUBLINGUAL
Status: DISCONTINUED | OUTPATIENT
Start: 2025-02-06 | End: 2025-02-08 | Stop reason: HOSPADM

## 2025-02-06 RX ORDER — ATORVASTATIN CALCIUM 40 MG/1
80 TABLET, FILM COATED ORAL NIGHTLY
Status: DISCONTINUED | OUTPATIENT
Start: 2025-02-06 | End: 2025-02-08 | Stop reason: HOSPADM

## 2025-02-06 RX ORDER — AMOXICILLIN 250 MG
2 CAPSULE ORAL 2 TIMES DAILY PRN
Status: DISCONTINUED | OUTPATIENT
Start: 2025-02-06 | End: 2025-02-08 | Stop reason: HOSPADM

## 2025-02-06 RX ORDER — ALUMINA, MAGNESIA, AND SIMETHICONE 2400; 2400; 240 MG/30ML; MG/30ML; MG/30ML
15 SUSPENSION ORAL ONCE
Status: DISCONTINUED | OUTPATIENT
Start: 2025-02-06 | End: 2025-02-08 | Stop reason: HOSPADM

## 2025-02-06 RX ORDER — BISACODYL 5 MG/1
5 TABLET, DELAYED RELEASE ORAL DAILY PRN
Status: DISCONTINUED | OUTPATIENT
Start: 2025-02-06 | End: 2025-02-08 | Stop reason: HOSPADM

## 2025-02-06 RX ORDER — ACETAMINOPHEN 325 MG/1
650 TABLET ORAL EVERY 4 HOURS PRN
Status: DISCONTINUED | OUTPATIENT
Start: 2025-02-06 | End: 2025-02-08 | Stop reason: HOSPADM

## 2025-02-06 RX ORDER — CLOPIDOGREL BISULFATE 75 MG/1
75 TABLET ORAL DAILY
Status: DISCONTINUED | OUTPATIENT
Start: 2025-02-06 | End: 2025-02-08 | Stop reason: HOSPADM

## 2025-02-06 RX ORDER — FLUTICASONE PROPIONATE 50 MCG
2 SPRAY, SUSPENSION (ML) NASAL DAILY
Status: DISCONTINUED | OUTPATIENT
Start: 2025-02-06 | End: 2025-02-07

## 2025-02-06 RX ORDER — NITROGLYCERIN 0.4 MG/1
0.4 TABLET SUBLINGUAL
COMMUNITY

## 2025-02-06 RX ORDER — ACETAMINOPHEN 650 MG/1
650 SUPPOSITORY RECTAL EVERY 4 HOURS PRN
Status: DISCONTINUED | OUTPATIENT
Start: 2025-02-06 | End: 2025-02-08 | Stop reason: HOSPADM

## 2025-02-06 RX ORDER — ONDANSETRON 4 MG/1
4 TABLET, ORALLY DISINTEGRATING ORAL EVERY 6 HOURS PRN
Status: DISCONTINUED | OUTPATIENT
Start: 2025-02-06 | End: 2025-02-08 | Stop reason: HOSPADM

## 2025-02-06 RX ORDER — GABAPENTIN 100 MG/1
100 CAPSULE ORAL 3 TIMES DAILY
Status: DISCONTINUED | OUTPATIENT
Start: 2025-02-06 | End: 2025-02-08 | Stop reason: HOSPADM

## 2025-02-06 RX ORDER — ONDANSETRON 2 MG/ML
4 INJECTION INTRAMUSCULAR; INTRAVENOUS ONCE
Status: COMPLETED | OUTPATIENT
Start: 2025-02-06 | End: 2025-02-06

## 2025-02-06 RX ADMIN — ONDANSETRON 4 MG: 2 INJECTION INTRAMUSCULAR; INTRAVENOUS at 14:34

## 2025-02-06 RX ADMIN — FENTANYL CITRATE 25 MCG: 50 INJECTION, SOLUTION INTRAMUSCULAR; INTRAVENOUS at 14:34

## 2025-02-06 RX ADMIN — ENOXAPARIN SODIUM 40 MG: 100 INJECTION SUBCUTANEOUS at 15:55

## 2025-02-06 RX ADMIN — ATORVASTATIN CALCIUM 80 MG: 40 TABLET, FILM COATED ORAL at 21:07

## 2025-02-06 RX ADMIN — SODIUM CHLORIDE 50 ML/HR: 9 INJECTION, SOLUTION INTRAVENOUS at 17:30

## 2025-02-06 RX ADMIN — SODIUM CHLORIDE 50 ML/HR: 9 INJECTION, SOLUTION INTRAVENOUS at 22:38

## 2025-02-06 RX ADMIN — SODIUM CHLORIDE 500 ML: 9 INJECTION, SOLUTION INTRAVENOUS at 14:34

## 2025-02-06 RX ADMIN — ONDANSETRON 4 MG: 2 INJECTION INTRAMUSCULAR; INTRAVENOUS at 23:51

## 2025-02-06 RX ADMIN — ACETAMINOPHEN 650 MG: 325 TABLET ORAL at 23:50

## 2025-02-06 RX ADMIN — GABAPENTIN 100 MG: 100 CAPSULE ORAL at 21:07

## 2025-02-06 NOTE — H&P
"    [facility] Medicine Services  HISTORY AND PHYSICAL    Date of Admission: 2/6/2025  Primary Care Physician: Reza Charles APRN    Subjective   Primary Historian: Patient and ED provider    Chief Complaint: Persistent chest pain    History of Present Illness  Priscilla Santos is a 74-year-old female with a past medical history of heart failure most recent echocardiogram 12/14/2023 without acute findings, coronary artery disease with a history of CABG, fatty liver disease with chronic elevated enzymes, hypertension, hyperlipidemia, sleep apnea, arthritis.  Patient presents to [facility] ED via EMS for complaints of chest tightness and discomfort.  There was concern for STEMI however EKG upon arrival shared with on-call intervention cardiologist and deemed no STEMI identified.  Patient was given nitroglycerin in the ambulance which caused her to have hypotension.  Currently blood pressure is 126/59, heart rate 54, she has dropped into the high 40s.  Laboratory studies reveal troponin 40 x 2, sodium 131, BUN 25, creatinine 1.03 with a GFR 57.5, glucose 126, , ALT 89, total bilirubin 1.4, hemoglobin 10.8, platelets 90,000, chest x-ray without acute findings, EKG x 2 bradycardia 40s and 50s without acute findings.  Currently patient complaining of severe pain scoring 12 on a scale of 1-10 below her ribs wrapping around to her back.  She has severe tenderness on epigastric palpation.  She has not endoscheduled for 2/25/2025 with Dr. Ulloa, Psychiatric GI.  She does not have a history of reflux or GERD however due to symptomology we will treat with GI cocktail.  She has no diaphoresis, nausea/vomiting or shortness of breathing associated with this pain.  She does report weakness and feeling \"tired\" for the past several months.  Currently condition is stable.   Severo and grandson are at bedside.  She is admitted for further evaluation and " treatment.      Review of Systems   Otherwise complete ROS reviewed and negative except as mentioned in the HPI.    Past Medical History:   Past Medical History:   Diagnosis Date    Arthritis     CHF (congestive heart failure)     Coronary artery disease     Fatty liver     History of transfusion     Hyperlipidemia     Hypertension     Sleep apnea     UTI (urinary tract infection)      Past Surgical History:  Past Surgical History:   Procedure Laterality Date    APPENDECTOMY      CARDIAC SURGERY  2008    Tripple bypass    CAROTID STENT      CARPAL TUNNEL RELEASE Bilateral     CHOLECYSTECTOMY      COLON SURGERY      CORONARY STENT PLACEMENT  2021    total of 4-5 heart stents in past    HERNIA REPAIR      JOINT REPLACEMENT      KNEE SURGERY Bilateral     TONSILLECTOMY AND ADENOIDECTOMY      TUBAL ABDOMINAL LIGATION  1977    UMBILICAL HERNIA REPAIR N/A 02/02/2023    Procedure: UMBILICAL HERNIA REPAIR with mesh;  Surgeon: Lara Conley MD;  Location: [facility];  Service: General;  Laterality: N/A;     Social History:  reports that she has never smoked. She has never been exposed to tobacco smoke. She has never used smokeless tobacco. She reports that she does not drink alcohol and does not use drugs.    Family History: family history includes Cancer in her father and paternal grandfather; Diabetes in her maternal grandfather and mother; Heart disease in her father; No Known Problems in her maternal grandmother and paternal grandmother.       Allergies:  Allergies   Allergen Reactions    Diazepam Other (See Comments)     Made her violent    Codeine Nausea And Vomiting       Medications:  Prior to Admission medications    Medication Sig Start Date End Date Taking? Authorizing Provider   Ascorbic Acid (VITAMIN C PO) Take 1 tablet by mouth Daily.    ProviderSaul MD   aspirin 81 MG EC tablet Take 1 tablet by mouth Daily.    Saul Perez MD   atorvastatin (LIPITOR) 80 MG tablet TAKE ONE TABLET BY MOUTH  AT BEDTIME  Patient taking differently: Take 1 tablet by mouth Every Night. 8/22/24   Christin Quintero APRN   carvedilol (COREG) 6.25 MG tablet TAKE ONE TABLET BY MOUTH TWICE A DAY 12/6/24   Reza Charles APRN   cetirizine (zyrTEC) 10 MG tablet Take 1 tablet by mouth Daily.    Saul Perez MD   cholecalciferol (VITAMIN D3) 25 MCG (1000 UT) tablet Take 1 tablet by mouth Daily.    Saul Perez MD   clopidogrel (PLAVIX) 75 MG tablet Take 1 tablet by mouth Daily. 8/20/24   Mei Cole APRN   fluticasone (FLONASE) 50 MCG/ACT nasal spray USE TWO SPRAYS INTO THE NOSTRILS AS DIRECTED BY PROVIDER DAILY  Patient taking differently: Administer 2 sprays into the nostril(s) as directed by provider Daily. 3/22/24   Reza Charles APRN   gabapentin (NEURONTIN) 100 MG capsule Take 1 capsule by mouth 3 (Three) Times a Day. 11/27/24   Saul Perez MD   hydroCHLOROthiazide (MICROZIDE) 12.5 MG capsule Take 1 capsule by mouth Daily. 8/20/24   Mei Cole APRN   indapamide (LOZOL) 2.5 MG tablet TAKE ONE TABLET BY MOUTH EVERY MORNING  Patient taking differently: Take 1 tablet by mouth Daily. 12/13/24   Reza Charles APRN   Krill Oil (Omega-3) 500 MG capsule Take 1 tablet by mouth Daily.    Saul Perez MD   losartan (Cozaar) 50 MG tablet Take 1 tablet by mouth Daily. 12/20/24   Silvia Fierro APRN   ranolazine (RANEXA) 1000 MG 12 hr tablet TAKE ONE TABLET BY MOUTH EVERY 12 HOURS 9/23/24   Silvia Fierro APRN   Turmeric Curcumin 500 MG capsule Take 1 capsule by mouth Daily.    Saul Perez MD   vitamin B-12 (CYANOCOBALAMIN) 100 MCG tablet Take 0.5 tablets by mouth Daily.    Saul Perez MD     I have utilized all available immediate resources to obtain, update, or review the patient's current medications (including all prescriptions, over-the-counter products, herbals, cannabis/cannabidiol products, and vitamin/mineral/dietary (nutritional)  "supplements).    Objective     Vital Signs: /58   Pulse 58   Temp 97.4 °F (36.3 °C) (Oral)   Resp 16   Ht 154.9 cm (61\")   Wt 104 kg (230 lb 4.8 oz)   SpO2 98%   BMI 43.51 kg/m²   Physical Exam  Vitals reviewed.   Constitutional:       Appearance: Normal appearance. She is obese.   HENT:      Head: Normocephalic and atraumatic.      Mouth/Throat:      Mouth: Mucous membranes are moist.      Pharynx: Oropharynx is clear.   Eyes:      Extraocular Movements: Extraocular movements intact.      Conjunctiva/sclera: Conjunctivae normal.   Cardiovascular:      Rate and Rhythm: Regular rhythm. Bradycardia present.   Pulmonary:      Effort: Pulmonary effort is normal.      Breath sounds: Normal breath sounds.   Abdominal:      General: There is no distension.      Palpations: Abdomen is soft.      Tenderness: There is abdominal tenderness (Epigastric region). There is guarding (Epigastric region).   Musculoskeletal:      Cervical back: Normal range of motion and neck supple.      Right lower leg: No edema.      Left lower leg: No edema.      Comments: Generalized weakness   Skin:     General: Skin is warm and dry.   Neurological:      General: No focal deficit present.      Mental Status: She is alert and oriented to person, place, and time.   Psychiatric:         Mood and Affect: Mood normal.         Behavior: Behavior normal.        Results Reviewed:  Lab Results (last 24 hours)       Procedure Component Value Units Date/Time    High Sensitivity Troponin T 1Hr [[id number]]  (Abnormal) Collected: 02/06/25 1443    Specimen: Blood Updated: 02/06/25 1511     HS Troponin T 40 ng/L      Troponin T Numeric Delta 0 ng/L      Troponin T % Delta 0    Narrative:      High Sensitive Troponin T Reference Range:  <14.0 ng/L- Negative Female for AMI  <22.0 ng/L- Negative Male for AMI  >=14 - Abnormal Female indicating possible myocardial injury.  >=22 - Abnormal Male indicating possible myocardial injury.   Clinicians would " have to utilize clinical acumen, EKG, Troponin, and serial changes to determine if it is an Acute Myocardial Infarction or myocardial injury due to an underlying chronic condition.         Comprehensive Metabolic Panel [[id number]]  (Abnormal) Collected: 02/06/25 1342    Specimen: Blood from Arm, Right Updated: 02/06/25 1419     Glucose 126 mg/dL      BUN 25 mg/dL      Creatinine 1.03 mg/dL      Sodium 131 mmol/L      Potassium 3.9 mmol/L      Chloride 100 mmol/L      CO2 25.0 mmol/L      Calcium 9.3 mg/dL      Total Protein 7.5 g/dL      Albumin 3.0 g/dL      ALT (SGPT) 89 U/L      AST (SGOT) 121 U/L      Alkaline Phosphatase 64 U/L      Total Bilirubin 1.4 mg/dL      Globulin 4.5 gm/dL      A/G Ratio 0.7 g/dL      BUN/Creatinine Ratio 24.3     Anion Gap 6.0 mmol/L      eGFR 57.5 mL/min/1.73     Narrative:      GFR Categories in Chronic Kidney Disease (CKD)      GFR Category          GFR (mL/min/1.73)    Interpretation  G1                     90 or greater         Normal or high (1)  G2                      60-89                Mild decrease (1)  G3a                   45-59                Mild to moderate decrease  G3b                   30-44                Moderate to severe decrease  G4                    15-29                Severe decrease  G5                    14 or less           Kidney failure          (1)In the absence of evidence of kidney disease, neither GFR category G1 or G2 fulfill the criteria for CKD.    eGFR calculation 2021 CKD-EPI creatinine equation, which does not include race as a factor    Amylase [[id number]]  (Normal) Collected: 02/06/25 1342    Specimen: Blood from Arm, Right Updated: 02/06/25 1416     Amylase 61 U/L     High Sensitivity Troponin T [[id number]]  (Abnormal) Collected: 02/06/25 1342    Specimen: Blood from Arm, Right Updated: 02/06/25 1416     HS Troponin T 40 ng/L     Narrative:      High Sensitive Troponin T Reference Range:  <14.0 ng/L- Negative Female for AMI  <22.0 ng/L-  Negative Male for AMI  >=14 - Abnormal Female indicating possible myocardial injury.  >=22 - Abnormal Male indicating possible myocardial injury.   Clinicians would have to utilize clinical acumen, EKG, Troponin, and serial changes to determine if it is an Acute Myocardial Infarction or myocardial injury due to an underlying chronic condition.         Lipase [[id number]]  (Normal) Collected: 02/06/25 1342    Specimen: Blood from Arm, Right Updated: 02/06/25 1414     Lipase 58 U/L     CBC & Differential [[id number]]  (Abnormal) Collected: 02/06/25 1254    Specimen: Blood Updated: 02/06/25 1341    Narrative:      The following orders were created for panel order CBC & Differential.  Procedure                               Abnormality         Status                     ---------                               -----------         ------                     CBC Auto Differential[[id number]]        Abnormal            Edited Result - FINAL      Scan Slide[[id number]]                                       Final result                 Please view results for these tests on the individual orders.    Scan Slide [[id number]] Collected: 02/06/25 1254    Specimen: Blood Updated: 02/06/25 1341     Hypochromia Slight/1+     Polychromasia Slight/1+     WBC Morphology Normal     Platelet Estimate Decreased    CBC Auto Differential [[id number]]  (Abnormal) Collected: 02/06/25 1254    Specimen: Blood Updated: 02/06/25 1320     WBC 5.08 10*3/mm3      RBC 3.36 10*6/mm3      Hemoglobin 10.8 g/dL      Hematocrit 31.5 %      MCV 93.8 fL      MCH 32.1 pg      MCHC 34.3 g/dL      RDW 13.1 %      RDW-SD 44.7 fl      MPV 10.8 fL      Platelets 90 10*3/mm3      Neutrophil % 65.5 %      Lymphocyte % 25.0 %      Monocyte % 9.1 %      Eosinophil % 0.0 %      Basophil % 0.2 %      Immature Grans % 0.2 %      Neutrophils, Absolute 3.33 10*3/mm3      Lymphocytes, Absolute 1.27 10*3/mm3      Monocytes, Absolute 0.46 10*3/mm3      Eosinophils, Absolute 0.00  10*3/mm3      Basophils, Absolute 0.01 10*3/mm3      Immature Grans, Absolute 0.01 10*3/mm3      nRBC 0.0 /100 WBC     [place] Draw [[id number]] Collected: 02/06/25 1254    Specimen: Blood Updated: 02/06/25 1315    Narrative:      The following orders were created for panel order [place] Draw.  Procedure                               Abnormality         Status                     ---------                               -----------         ------                     Green Top (Gel)[[id number]]                                  Final result               Lavender Top[[id number]]                                     Final result               Red Top[[id number]]                                          Final result               Light Blue Top[[id number]]                                   Final result                 Please view results for these tests on the individual orders.    Green Top (Gel) [[id number]] Collected: 02/06/25 1254    Specimen: Blood Updated: 02/06/25 1315     Extra Tube Hold for add-ons.     Comment: Auto resulted.       Red Top [[id number]] Collected: 02/06/25 1254    Specimen: Blood Updated: 02/06/25 1315     Extra Tube Hold for add-ons.     Comment: Auto resulted.       Lavender Top [[id number]] Collected: 02/06/25 1254    Specimen: Blood Updated: 02/06/25 1315     Extra Tube hold for add-on     Comment: Auto resulted       Light Blue Top [[id number]] Collected: 02/06/25 1254    Specimen: Blood Updated: 02/06/25 1315     Extra Tube Hold for add-ons.     Comment: Auto resulted             Imaging Results (Last 24 Hours)       Procedure Component Value Units Date/Time    XR Chest 1 View [[id number]] Collected: 02/06/25 1259     Updated: 02/06/25 1303    Narrative:      EXAMINATION: XR CHEST 1 VW- 2/6/2025 12:59 PM     HISTORY: Chest Pain Protocol.     REPORT: A frontal view of the chest was obtained.     COMPARISON: Chest x-rays 4/29/2015.     The lungs are free of focal infiltrates, there is evidence of  mild  bibasilar atelectasis and central vascular crowding. No pneumothorax or  pleural effusion. Previous median sternotomy is noted, heart size  appears normal. The osseous structures and upper abdomen are  unremarkable.       Impression:      No acute cardiopulmonary abnormality.     This report was signed and finalized on 2/6/2025 1:00 PM by Dr. Bernabe Caro MD.             I have personally reviewed and interpreted the radiology studies and ECG obtained at time of admission.     Assessment / Plan   Assessment:   Active Hospital Problems    Diagnosis     **Chest pain     Coronary artery disease of bypass graft of native heart with stable angina pectoris     Fatty liver     Hypertension, well controlled    Suspect GI etiology    Treatment Plan  The patient will be admitted to Dr. Palma's service here at [facility].     1.  Chest pain/coronary artery disease, discussed with cardiology on-call, n.p.o. after midnight for Lexiscan in a.m., will consult if needed, routine telemetry orders    2.  Fatty liver disease with subsequent chronic transaminitis, anemia and thrombocytopenia, labs in a.m., check anemia studies    3.  Hypertension, well-controlled, will hold antihypertensives for now, reevaluate for need in a.m.    4.  Suspected GI etiology of pain, modified GI cocktail Mylanta/viscous lidocaine/Benadryl elixir, n.p.o. after midnight for CT of abdomen pelvis in a.m. with p.o. contrast    Medical Decision Making  Number and Complexity of problems: 5  2 problems, acute, high complexity, unchanged  3 problems, chronic, moderate complexity, stable  Differential Diagnosis: Esophagitis, gastritis    Conditions and Status        Condition is unchanged.     MDM Data  External documents reviewed: Epic records  Cardiac tracing (EKG, telemetry) interpretation: Reviewed  Radiology interpretation: Reviewed  Labs reviewed: Yes  Any tests that were considered but not ordered: No     Decision rules/scores  evaluated (example [id number], [place], etc): No     Discussed with: Patient, family at bedside and Dr. Palma     Care Planning  Shared decision making: Patient, family at bedside and Dr. Palma  Code status and discussions: Full    Disposition  Social Determinants of Health that impact treatment or disposition: None  Estimated length of stay is 1-2 days.     I confirmed that the patient's advanced care plan is present, code status is documented, and a surrogate decision maker is listed in the patient's medical record.     The patient's surrogate decision maker is  Severo.     The patient was seen and examined by me on 2/6/2025 at 3:34 PM.    Electronically signed by KENDRA Bull, 02/06/25, 16:42 CST.

## 2025-02-06 NOTE — ED NOTES
Nursing report ED to floor  Priscilla Santos  74 y.o.  female    HPI:   Chief Complaint   Patient presents with    Chest Pain       Admitting doctor:   Suresh Palma MD    Consulting provider(s):  Consults       Date and Time Order Name Status Description    2/6/2025  3:32 PM Cardiology (on-call MD unless specified)               Admitting diagnosis:   The primary encounter diagnosis was Chest pain, unspecified type. Diagnoses of Chronic kidney disease, unspecified CKD stage and Transaminitis were also pertinent to this visit.    Code status:   Current Code Status       Date Active Code Status Order ID Comments User Context       2/6/2025 1542 CPR (Attempt to Resuscitate) [id number]  Jory Palma, APRN ED        Question Answer    Code Status (Patient has no pulse and is not breathing) CPR (Attempt to Resuscitate)    Medical Interventions (Patient has pulse or is breathing) Full Support    Level Of Support Discussed With Patient                    Allergies:   Diazepam and Codeine    Intake and Output  No intake or output data in the 24 hours ending 02/06/25 1559    Weight:       02/06/25  1247   Weight: 104 kg (230 lb 4.8 oz)       Most recent vitals:   Vitals:    02/06/25 1500 02/06/25 1515 02/06/25 1533 02/06/25 1548   BP: 115/57 113/61 126/58 126/60   BP Location:       Patient Position:       Pulse: 51 53 54 55   Resp:       Temp:       TempSrc:       SpO2: 99% 98% 100% 100%   Weight:       Height:         Oxygen Therapy: .    Active LDAs/IV Access:   Lines, Drains & Airways       Active LDAs       Name Placement date Placement time Site Days    Peripheral IV 02/06/25 1246 Right;Posterior Hand 02/06/25  1246  Hand  less than 1    Peripheral IV 02/06/25 1247 Left;Posterior Hand 02/06/25  1247  Hand  less than 1                    Labs (abnormal labs have a star):   Labs Reviewed   COMPREHENSIVE METABOLIC PANEL - Abnormal; Notable for the following components:       Result Value    Glucose 126 (*)     BUN  25 (*)     Creatinine 1.03 (*)     Sodium 131 (*)     Albumin 3.0 (*)     ALT (SGPT) 89 (*)     AST (SGOT) 121 (*)     Total Bilirubin 1.4 (*)     eGFR 57.5 (*)     All other components within normal limits    Narrative:     GFR Categories in Chronic Kidney Disease (CKD)      GFR Category          GFR (mL/min/1.73)    Interpretation  G1                     90 or greater         Normal or high (1)  G2                      60-89                Mild decrease (1)  G3a                   45-59                Mild to moderate decrease  G3b                   30-44                Moderate to severe decrease  G4                    15-29                Severe decrease  G5                    14 or less           Kidney failure          (1)In the absence of evidence of kidney disease, neither GFR category G1 or G2 fulfill the criteria for CKD.    eGFR calculation 2021 CKD-EPI creatinine equation, which does not include race as a factor   TROPONIN - Abnormal; Notable for the following components:    HS Troponin T 40 (*)     All other components within normal limits    Narrative:     High Sensitive Troponin T Reference Range:  <14.0 ng/L- Negative Female for AMI  <22.0 ng/L- Negative Male for AMI  >=14 - Abnormal Female indicating possible myocardial injury.  >=22 - Abnormal Male indicating possible myocardial injury.   Clinicians would have to utilize clinical acumen, EKG, Troponin, and serial changes to determine if it is an Acute Myocardial Infarction or myocardial injury due to an underlying chronic condition.        CBC WITH AUTO DIFFERENTIAL - Abnormal; Notable for the following components:    RBC 3.36 (*)     Hemoglobin 10.8 (*)     Hematocrit 31.5 (*)     Platelets 90 (*)     Eosinophil % 0.0 (*)     All other components within normal limits   HIGH SENSITIVITIY TROPONIN T 1HR - Abnormal; Notable for the following components:    HS Troponin T 40 (*)     All other components within normal limits    Narrative:     High  Sensitive Troponin T Reference Range:  <14.0 ng/L- Negative Female for AMI  <22.0 ng/L- Negative Male for AMI  >=14 - Abnormal Female indicating possible myocardial injury.  >=22 - Abnormal Male indicating possible myocardial injury.   Clinicians would have to utilize clinical acumen, EKG, Troponin, and serial changes to determine if it is an Acute Myocardial Infarction or myocardial injury due to an underlying chronic condition.        AMYLASE - Normal   LIPASE - Normal   RAINBOW DRAW    Narrative:     The following orders were created for panel order [place] Draw.  Procedure                               Abnormality         Status                     ---------                               -----------         ------                     Green Top (Gel)[[id number]]                                  Final result               Lavender Top[[id number]]                                     Final result               Red Top[[id number]]                                          Final result               Light Blue Top[[id number]]                                   Final result                 Please view results for these tests on the individual orders.   SCAN SLIDE   CBC AND DIFFERENTIAL    Narrative:     The following orders were created for panel order CBC & Differential.  Procedure                               Abnormality         Status                     ---------                               -----------         ------                     CBC Auto Differential[[id number]]        Abnormal            Edited Result - FINAL      Scan Slide[[id number]]                                       Final result                 Please view results for these tests on the individual orders.   GREEN TOP   LAVENDER TOP   RED TOP   LIGHT BLUE TOP       Meds given in ED:   Medications   sodium chloride 0.9 % flush 10 mL (has no administration in time range)   aspirin chewable tablet 324 mg (324 mg Oral Not Given 2/6/25 1250)   sodium chloride  0.9 % flush 10 mL (has no administration in time range)   sodium chloride 0.9 % flush 10 mL (has no administration in time range)   Enoxaparin Sodium (LOVENOX) syringe 40 mg (40 mg Subcutaneous Given 2/6/25 1555)   nitroglycerin (NITROSTAT) SL tablet 0.4 mg (has no administration in time range)   acetaminophen (TYLENOL) tablet 650 mg (has no administration in time range)     Or   acetaminophen (TYLENOL) 160 MG/5ML oral solution 650 mg (has no administration in time range)     Or   acetaminophen (TYLENOL) suppository 650 mg (has no administration in time range)   sennosides-docusate (PERICOLACE) 8.6-50 MG per tablet 2 tablet (has no administration in time range)     And   polyethylene glycol (MIRALAX) packet 17 g (has no administration in time range)     And   bisacodyl (DULCOLAX) EC tablet 5 mg (has no administration in time range)     And   bisacodyl (DULCOLAX) suppository 10 mg (has no administration in time range)   ondansetron ODT (ZOFRAN-ODT) disintegrating tablet 4 mg (has no administration in time range)     Or   ondansetron (ZOFRAN) injection 4 mg (has no administration in time range)   fentaNYL citrate (PF) (SUBLIMAZE) injection 25 mcg (25 mcg Intravenous Given 2/6/25 1434)   ondansetron (ZOFRAN) injection 4 mg (4 mg Intravenous Given 2/6/25 1434)   sodium chloride 0.9 % bolus 500 mL (500 mL Intravenous New Bag 2/6/25 1434)           NIH Stroke Scale:       Isolation/Infection(s):  No active isolations   No active infections     COVID Testing  Collected .  Resulted .    Nursing report ED to floor:  Mental status: a&ox4  Ambulatory status: up with assist.  Precautions: none.    ED nurse phone extentsion- [phone]

## 2025-02-06 NOTE — ED PROVIDER NOTES
Subjective   History of Present Illness  Patient is a 74-year-old who was brought to the ED by EMS for chest tightness and discomfort.  They give her sublingual nitroglycerin with dropped her blood pressure came to the ED still having some discomfort does not appear to be in distress.    Chest Pain  Pain location:  Substernal area  Pain quality: aching and burning    Pain radiates to:  Does not radiate  Pain severity:  Moderate  Onset quality:  Gradual  Timing:  Constant  Progression:  Worsening  Chronicity:  New  Context: not breathing, not drug use, not eating, not lifting, not movement, not raising an arm, not at rest and not stress    Relieved by:  Nothing  Worsened by:  Nothing  Ineffective treatments:  None tried  Associated symptoms: no abdominal pain, no AICD problem, no anxiety, no back pain, no fever, no headache, no heartburn, no lower extremity edema, no orthopnea, no palpitations, no syncope, no vomiting and no weakness    Risk factors: coronary artery disease, high cholesterol, hypertension and male sex        Review of Systems   Constitutional: Negative.  Negative for fever.   HENT: Negative.     Eyes: Negative.    Respiratory: Negative.     Cardiovascular:  Positive for chest pain. Negative for palpitations, orthopnea and syncope.   Gastrointestinal: Negative.  Negative for abdominal pain, heartburn and vomiting.   Musculoskeletal: Negative.  Negative for back pain and neck pain.   Skin: Negative.    Neurological: Negative.  Negative for weakness and headaches.   All other systems reviewed and are negative.      Past Medical History:   Diagnosis Date    Arthritis     CHF (congestive heart failure)     Coronary artery disease     Fatty liver     History of transfusion     Hyperlipidemia     Hypertension     Sleep apnea     UTI (urinary tract infection)        Allergies   Allergen Reactions    Diazepam Other (See Comments)     Made her violent    Codeine Nausea And Vomiting       Past Surgical History:    Procedure Laterality Date    APPENDECTOMY      CARDIAC SURGERY  2008    Tripple bypass    CAROTID STENT      CARPAL TUNNEL RELEASE Bilateral     CHOLECYSTECTOMY      COLON SURGERY      CORONARY STENT PLACEMENT  2021    total of 4-5 heart stents in past    HERNIA REPAIR      JOINT REPLACEMENT      KNEE SURGERY Bilateral     TONSILLECTOMY AND ADENOIDECTOMY      TUBAL ABDOMINAL LIGATION  1977    UMBILICAL HERNIA REPAIR N/A 02/02/2023    Procedure: UMBILICAL HERNIA REPAIR with mesh;  Surgeon: Lara Colney MD;  Location: [facility] OR;  Service: General;  Laterality: N/A;       Family History   Problem Relation Age of Onset    Diabetes Mother     Cancer Father     Heart disease Father     No Known Problems Maternal Grandmother     Diabetes Maternal Grandfather     No Known Problems Paternal Grandmother     Cancer Paternal Grandfather     Colon cancer Neg Hx     Colon polyps Neg Hx     Esophageal cancer Neg Hx     Liver disease Neg Hx     Stomach cancer Neg Hx     Rectal cancer Neg Hx     Liver cancer Neg Hx        Social History     Socioeconomic History    Marital status:    Tobacco Use    Smoking status: Never     Passive exposure: Never    Smokeless tobacco: Never   Vaping Use    Vaping status: Never Used   Substance and Sexual Activity    Alcohol use: Never    Drug use: Never    Sexual activity: Defer           Objective   Physical Exam  Vitals and nursing note reviewed. Exam conducted with a chaperone present.   Constitutional:       General: She is not in acute distress.     Appearance: Normal appearance. She is well-developed. She is not toxic-appearing or diaphoretic.   HENT:      Head: Normocephalic and atraumatic.      Right Ear: External ear normal.      Nose: Nose normal.      Mouth/Throat:      Mouth: Mucous membranes are moist.   Eyes:      Conjunctiva/sclera: Conjunctivae normal.      Pupils: Pupils are equal, round, and reactive to light.   Cardiovascular:      Rate and Rhythm: Normal rate and  regular rhythm.      Chest Wall: PMI is not displaced.      Pulses: Normal pulses. No decreased pulses.      Heart sounds: Normal heart sounds. No murmur heard.     No systolic murmur is present.      No diastolic murmur is present.      No S3 sounds.   Pulmonary:      Effort: Pulmonary effort is normal. No tachypnea, accessory muscle usage or respiratory distress.      Breath sounds: Normal breath sounds. No stridor. No decreased breath sounds, wheezing, rhonchi or rales.   Chest:      Chest wall: No tenderness or crepitus.   Abdominal:      General: Bowel sounds are normal. There is no distension.      Palpations: Abdomen is soft.      Tenderness: There is no abdominal tenderness.   Musculoskeletal:         General: No swelling or tenderness. Normal range of motion.      Cervical back: Normal range of motion and neck supple. No rigidity.      Right lower leg: No edema.      Left lower leg: No edema.      Comments: Lower extremity exam bilaterally is unremarkable.  There is no right or left calf tenderness .  There is no palpable venous cord.  No obvious difference in the size of the legs.  No pitting edema.  The dorsalis pedis and posterior tibial femoral and popliteal pulses are palpable and +2 bilaterally.  Homans sign is negative   Skin:     General: Skin is warm.      Capillary Refill: Capillary refill takes less than 2 seconds.      Coloration: Skin is not jaundiced.      Findings: No erythema or rash.   Neurological:      General: No focal deficit present.      Mental Status: She is alert and oriented to person, place, and time. Mental status is at baseline.      Cranial Nerves: No cranial nerve deficit.      Motor: No weakness.      Coordination: Coordination normal.      Deep Tendon Reflexes: Reflexes are normal and symmetric. Reflexes normal.   Psychiatric:         Mood and Affect: Mood normal.         Procedures           ED Course  ED Course as of 02/06/25 1535   u Feb 06, 2025   1310 Nonspecific  changes unchanged discussed with Jonathan barnes  [TS]   1513 nsr [TS]   1523 Patient is 74-year-old who came into the ED with chest pain her cardiac markers tropes are flat.  And she says he still having pain.  Her EKG is nonspecific discussed with Dr. Barnes patient will be admitted to medicine service for further evaluation assessment.  White cell count is normal lipase and amylase are within normal limits    Patient was given nitroglycerin by the EMS dropped her blood pressure and that is why she was given fluid bolus over here in the ED.  But there was no change in her pain [TS]   1523 Her abdominal exam is unremarkable.  She will chronic elevation of liver enzymes. [TS]   1527 I had discussed this case Dr. Barnes and he had reviewed the EKGs. [TS]   1527 dr Reyes is being called for consultation [TS]      ED Course User Index  [TS] Elmo De La Cruz MD                  HEART Score: 6   Shared Decision Making  I discussed the findings with the patient/patient representative who is in agreement with the treatment plan and the final disposition.  Risks and benefits of discharge and/or observation/admission were discussed: Yes                                      Medical Decision Making  Differential Diagnosis:  I considered chest wall pain, muscle strain, costochondritis, pleurisy, rib fracture, herpes zoster, cardiovascular etiology, myocardial infarction, intermediate coronary syndrome, unstable angina, angina, aortic dissection, pericarditis, pulmonary etiology, pulmonary embolism, pneumonia, pneumothorax, lung cancer, gastroesophageal reflux disease, esophagitis, esophageal spasm and gastrointestinal etiology as a possible cause of chest pain in this patient. This is a partial list of diagnoses considered.        Problems Addressed:  Chest pain, unspecified type: complicated acute illness or injury  Chronic kidney disease, unspecified CKD stage: chronic illness or injury  Transaminitis: chronic illness or  injury    Amount and/or Complexity of Data Reviewed  Labs: ordered.     Details: Labs reviewed  Radiology: ordered.     Details: X-ray reviewed  ECG/medicine tests: ordered.     Details: Nonspecific T wave change  Discussion of management or test interpretation with external provider(s): Discussed this case Dr. Robbins and with the hospitalist also writing a consult with Dr. Eric Garber  OTC drugs.  Prescription drug management.  Decision regarding hospitalization.  Risk Details: Well score 0  Patient came in with some chest discomfort.  Her liver enzymes are elevated but chronic elevated she has got hepatic steatosis there is no abdominal pain or tenderness on palpation she does not have a gallbladder.  There is no leukocytosis lipase amylase are within normal limits.  Keeping consideration her prior cardiac history will be admitted to the medicine service for further evaluation and rule out protocol.        Final diagnoses:   Chest pain, unspecified type   Chronic kidney disease, unspecified CKD stage   Transaminitis       ED Disposition  ED Disposition       ED Disposition   Decision to Admit    Condition   --    Comment   Level of Care: Telemetry [5]   Diagnosis: Chest pain [663690]   Admitting Physician: WILL MARTINEZ [[id number]]   Attending Physician: WILL MARTINEZ [[id number]]                 No follow-up provider specified.       Medication List      No changes were made to your prescriptions during this visit.            Elmo De La Cruz MD  02/06/25 1309       Elmo De La Cruz MD  02/06/25 1533       Elmo De La Cruz MD  02/06/25 1535

## 2025-02-06 NOTE — Clinical Note
Level of Care: Telemetry [5]   Diagnosis: Chest pain [851769]   Admitting Physician: WILL MARTINEZ [[id number]]   Attending Physician: WILL MARTINEZ [[id number]]   Certification: I Certify That Inpatient Hospital Services Are Medically Necessary For Greater Than 2 Midnights   Oneil Domingo is a 72 y.o. female who presents for the following: Suture / Staple Removal (Right Knee, Medial - 13 days s/p Mohs Surgery of SCC by Dr. Mandel.) and Squamous Cell Carcinoma (Left Medial Heel - biopsy proven SCC. Patient asks about bandaging options due to the anatomical location. ).     Review of Systems:  No other skin or systemic complaints other than what is documented elsewhere in the note.    The following portions of the chart were reviewed this encounter and updated as appropriate:          Skin Cancer History  Biopsy Date Type Location Status   3/29/24 SCC in Situ Left Medial Thigh Treatment Complete  6/13/24   3/29/24 SCC Right Knee - Medial Treatment Complete  6/13/24   3/29/24 SCC in Situ Left Thigh - Lateral Treatment Complete  6/13/24   3/29/24 SCC in Situ Left Medial Heel Treatment Complete  6/13/24       Specialty Problems          Dermatology Problems    Actinic keratosis    Hemangioma of skin and subcutaneous tissue    Neoplasm of uncertain behavior of skin    Other melanin hyperpigmentation    Other seborrheic keratosis    Personal history of other malignant neoplasm of skin    Squamous cell carcinoma of skin of lower limb, including hip    Back skin lesion    Benign skin cyst    Hives    Multiple dysplastic nevi    Pain due to onychomycosis of nail    Skin lesion of face    Skin lesion of lower extremity    Squamous cell skin cancer        Objective   Well appearing patient in no apparent distress; mood and affect are within normal limits.    A focused skin examination was performed. All findings within normal limits unless otherwise noted below.    Assessment/Plan   1. Visit for suture removal  Right Knee - Medial                    Suture Removal - Right Knee - Medial    Performed by: Dk Mandel MD PhD  Authorized by: Dk Mandel MD PhD  Consent: Verbal consent obtained.  Consent given by: patient        2. 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 Suggestive of lines of Blaschko. There was good response with previous EDnC by [facility]. Patient agreed with a plan of aggressive medical management before consideration of Mohs.    Is a 9.1 x 3.0 cm hypertrophic plaque          Destr of lesion  Complexity: simple    Destruction method: electrodesiccation and curettage    Informed consent: discussed and consent obtained    Timeout:  patient name, date of birth, surgical site, and procedure verified  Procedure prep:  Patient was prepped and draped  Anesthesia: the lesion was anesthetized in a standard fashion    Anesthetic:  1% lidocaine w/ epinephrine 1-100,000 buffered w/ 8.4% NaHCO3  Curettage performed in three different directions: Yes    Electrodesiccation performed over the curetted area: Yes    Lesion length (cm):  9.1  Lesion width (cm):  3  Margin per side (cm):  0.3  Final wound size (cm):  9.7  Final wound size (cm) comment:  2.0cm x 8.5cm, 1.8cm x 3.0cm, and 6.0cm x 8.0cm  Hemostasis achieved with:  pressure and electrodesiccation  Outcome: patient tolerated procedure well with no complications    Post-procedure details: sterile dressing applied and wound care instructions given    Dressing type: pressure dressing, Telfa pad and petrolatum      Biopsy proven 3/29/2024 by Dr. Aimee Ochoa and referred for Mohs Surgery (scheduled 6/26/2024). Previously treated with ED&C by Dr. Rodney Soler 9/15/2022, also treated previously with LN2 and at-home paring.     Discussed ideal treatment with ED&C today versus Mohs Surgery.       Dk Mandel MD, PhD

## 2025-02-07 ENCOUNTER — APPOINTMENT (OUTPATIENT)
Dept: CARDIOLOGY | Facility: HOSPITAL | Age: 75
End: 2025-02-07
Payer: MEDICARE

## 2025-02-07 ENCOUNTER — APPOINTMENT (OUTPATIENT)
Dept: CT IMAGING | Facility: HOSPITAL | Age: 75
End: 2025-02-07
Payer: MEDICARE

## 2025-02-07 ENCOUNTER — APPOINTMENT (OUTPATIENT)
Dept: ULTRASOUND IMAGING | Facility: HOSPITAL | Age: 75
End: 2025-02-07
Payer: MEDICARE

## 2025-02-07 LAB
ALBUMIN SERPL-MCNC: 2.7 G/DL (ref 3.5–5.2)
ALBUMIN/GLOB SERPL: 0.6 G/DL
ALP SERPL-CCNC: 65 U/L (ref 39–117)
ALT SERPL W P-5'-P-CCNC: 85 U/L (ref 1–33)
ANION GAP SERPL CALCULATED.3IONS-SCNC: 7 MMOL/L (ref 5–15)
AST SERPL-CCNC: 114 U/L (ref 1–32)
BH CV REST NUCLEAR ISOTOPE DOSE: 10.1 MCI
BH CV STRESS BP STAGE 1: NORMAL
BH CV STRESS COMMENTS STAGE 1: NORMAL
BH CV STRESS COMMENTS STAGE 2: NORMAL
BH CV STRESS DOSE REGADENOSON STAGE 1: 0.4
BH CV STRESS DURATION MIN STAGE 1: 0
BH CV STRESS DURATION SEC STAGE 1: 10
BH CV STRESS DURATION SEC STAGE 2: 0
BH CV STRESS HR STAGE 1: 59
BH CV STRESS NUCLEAR ISOTOPE DOSE: 35.9 MCI
BH CV STRESS PROTOCOL 1: NORMAL
BH CV STRESS RECOVERY BP: NORMAL MMHG
BH CV STRESS RECOVERY HR: 57 BPM
BH CV STRESS STAGE 1: 1
BILIRUB SERPL-MCNC: 1.1 MG/DL (ref 0–1.2)
BUN SERPL-MCNC: 27 MG/DL (ref 8–23)
BUN/CREAT SERPL: 20.9 (ref 7–25)
CALCIUM SPEC-SCNC: 8.8 MG/DL (ref 8.6–10.5)
CHLORIDE SERPL-SCNC: 100 MMOL/L (ref 98–107)
CHOLEST SERPL-MCNC: 68 MG/DL (ref 0–200)
CO2 SERPL-SCNC: 22 MMOL/L (ref 22–29)
CREAT SERPL-MCNC: 1.29 MG/DL (ref 0.57–1)
DEPRECATED RDW RBC AUTO: 44.1 FL (ref 37–54)
EGFRCR SERPLBLD CKD-EPI 2021: 43.9 ML/MIN/1.73
ERYTHROCYTE [DISTWIDTH] IN BLOOD BY AUTOMATED COUNT: 12.9 % (ref 12.3–15.4)
GLOBULIN UR ELPH-MCNC: 4.4 GM/DL
GLUCOSE SERPL-MCNC: 125 MG/DL (ref 65–99)
HBA1C MFR BLD: 4.9 % (ref 4.8–5.6)
HCT VFR BLD AUTO: 30.2 % (ref 34–46.6)
HDLC SERPL-MCNC: 38 MG/DL (ref 40–60)
HGB BLD-MCNC: 10.5 G/DL (ref 12–15.9)
LDLC SERPL CALC-MCNC: 19 MG/DL (ref 0–100)
LDLC/HDLC SERPL: 0.6 {RATIO}
MAXIMAL PREDICTED HEART RATE: 147 BPM
MCH RBC QN AUTO: 32.5 PG (ref 26.6–33)
MCHC RBC AUTO-ENTMCNC: 34.8 G/DL (ref 31.5–35.7)
MCV RBC AUTO: 93.5 FL (ref 79–97)
OSMOLALITY UR: 212 MOSM/KG (ref 50–1400)
PERCENT MAX PREDICTED HR: 40.14 %
PLATELET # BLD AUTO: 87 10*3/MM3 (ref 140–450)
PMV BLD AUTO: 10.4 FL (ref 6–12)
POTASSIUM SERPL-SCNC: 3.9 MMOL/L (ref 3.5–5.2)
PROT SERPL-MCNC: 7.1 G/DL (ref 6–8.5)
RBC # BLD AUTO: 3.23 10*6/MM3 (ref 3.77–5.28)
SODIUM SERPL-SCNC: 129 MMOL/L (ref 136–145)
SODIUM UR-SCNC: 42 MMOL/L
SPECT HRT GATED+EF W RNC IV: 65 %
STRESS BASELINE BP: NORMAL MMHG
STRESS BASELINE HR: 53 BPM
STRESS PERCENT HR: 47 %
STRESS POST EXERCISE DUR SEC: 10 SEC
STRESS POST PEAK BP: NORMAL MMHG
STRESS POST PEAK HR: 59 BPM
STRESS TARGET HR: 125 BPM
TRIGL SERPL-MCNC: 36 MG/DL (ref 0–150)
TROPONIN T SERPL HS-MCNC: 46 NG/L
TSH SERPL DL<=0.05 MIU/L-ACNC: 0.68 UIU/ML (ref 0.27–4.2)
UUN 24H UR-MCNC: 226 MG/DL
VIT B12 BLD-MCNC: 1288 PG/ML (ref 211–946)
VLDLC SERPL-MCNC: 11 MG/DL (ref 5–40)
WBC NRBC COR # BLD AUTO: 6.69 10*3/MM3 (ref 3.4–10.8)

## 2025-02-07 PROCEDURE — G0378 HOSPITAL OBSERVATION PER HR: HCPCS

## 2025-02-07 PROCEDURE — 76775 US EXAM ABDO BACK WALL LIM: CPT

## 2025-02-07 PROCEDURE — 78452 HT MUSCLE IMAGE SPECT MULT: CPT | Performed by: INTERNAL MEDICINE

## 2025-02-07 PROCEDURE — 84484 ASSAY OF TROPONIN QUANT: CPT | Performed by: NURSE PRACTITIONER

## 2025-02-07 PROCEDURE — 78452 HT MUSCLE IMAGE SPECT MULT: CPT

## 2025-02-07 PROCEDURE — 25010000002 REGADENOSON 0.4 MG/5ML SOLUTION: Performed by: INTERNAL MEDICINE

## 2025-02-07 PROCEDURE — 84540 ASSAY OF URINE/UREA-N: CPT | Performed by: NURSE PRACTITIONER

## 2025-02-07 PROCEDURE — 93018 CV STRESS TEST I&R ONLY: CPT | Performed by: INTERNAL MEDICINE

## 2025-02-07 PROCEDURE — 85027 COMPLETE CBC AUTOMATED: CPT | Performed by: NURSE PRACTITIONER

## 2025-02-07 PROCEDURE — 94799 UNLISTED PULMONARY SVC/PX: CPT

## 2025-02-07 PROCEDURE — 83036 HEMOGLOBIN GLYCOSYLATED A1C: CPT | Performed by: NURSE PRACTITIONER

## 2025-02-07 PROCEDURE — 94660 CPAP INITIATION&MGMT: CPT

## 2025-02-07 PROCEDURE — 80053 COMPREHEN METABOLIC PANEL: CPT | Performed by: NURSE PRACTITIONER

## 2025-02-07 PROCEDURE — 34310000005 TECHNETIUM TETROFOSMIN KIT: Performed by: INTERNAL MEDICINE

## 2025-02-07 PROCEDURE — 80061 LIPID PANEL: CPT | Performed by: NURSE PRACTITIONER

## 2025-02-07 PROCEDURE — 36415 COLL VENOUS BLD VENIPUNCTURE: CPT | Performed by: NURSE PRACTITIONER

## 2025-02-07 PROCEDURE — 96372 THER/PROPH/DIAG INJ SC/IM: CPT

## 2025-02-07 PROCEDURE — 84443 ASSAY THYROID STIM HORMONE: CPT | Performed by: NURSE PRACTITIONER

## 2025-02-07 PROCEDURE — 25010000002 ENOXAPARIN PER 10 MG: Performed by: NURSE PRACTITIONER

## 2025-02-07 PROCEDURE — 93017 CV STRESS TEST TRACING ONLY: CPT

## 2025-02-07 PROCEDURE — 25510000001 IOPAMIDOL 61 % SOLUTION: Performed by: INTERNAL MEDICINE

## 2025-02-07 PROCEDURE — 84300 ASSAY OF URINE SODIUM: CPT | Performed by: NURSE PRACTITIONER

## 2025-02-07 PROCEDURE — 83935 ASSAY OF URINE OSMOLALITY: CPT | Performed by: NURSE PRACTITIONER

## 2025-02-07 PROCEDURE — 93016 CV STRESS TEST SUPVJ ONLY: CPT | Performed by: INTERNAL MEDICINE

## 2025-02-07 PROCEDURE — 74177 CT ABD & PELVIS W/CONTRAST: CPT

## 2025-02-07 PROCEDURE — A9502 TC99M TETROFOSMIN: HCPCS | Performed by: INTERNAL MEDICINE

## 2025-02-07 RX ORDER — IOPAMIDOL 612 MG/ML
50 INJECTION, SOLUTION INTRAVASCULAR
Status: COMPLETED | OUTPATIENT
Start: 2025-02-07 | End: 2025-02-07

## 2025-02-07 RX ORDER — REGADENOSON 0.08 MG/ML
0.4 INJECTION, SOLUTION INTRAVENOUS ONCE
Status: COMPLETED | OUTPATIENT
Start: 2025-02-07 | End: 2025-02-07

## 2025-02-07 RX ORDER — FLUTICASONE PROPIONATE 50 MCG
2 SPRAY, SUSPENSION (ML) NASAL NIGHTLY
Status: DISCONTINUED | OUTPATIENT
Start: 2025-02-07 | End: 2025-02-08 | Stop reason: HOSPADM

## 2025-02-07 RX ORDER — SODIUM CHLORIDE 9 MG/ML
50 INJECTION, SOLUTION INTRAVENOUS CONTINUOUS
Status: DISPENSED | OUTPATIENT
Start: 2025-02-06 | End: 2025-02-07

## 2025-02-07 RX ORDER — IOPAMIDOL 612 MG/ML
100 INJECTION, SOLUTION INTRAVASCULAR
Status: COMPLETED | OUTPATIENT
Start: 2025-02-07 | End: 2025-02-07

## 2025-02-07 RX ADMIN — ENOXAPARIN SODIUM 40 MG: 100 INJECTION SUBCUTANEOUS at 05:01

## 2025-02-07 RX ADMIN — REGADENOSON 0.4 MG: 0.08 INJECTION, SOLUTION INTRAVENOUS at 09:11

## 2025-02-07 RX ADMIN — RANOLAZINE 1000 MG: 500 TABLET, FILM COATED, EXTENDED RELEASE ORAL at 17:37

## 2025-02-07 RX ADMIN — TETROFOSMIN 1 DOSE: 1.38 INJECTION, POWDER, LYOPHILIZED, FOR SOLUTION INTRAVENOUS at 07:59

## 2025-02-07 RX ADMIN — RANOLAZINE 1000 MG: 500 TABLET, FILM COATED, EXTENDED RELEASE ORAL at 05:02

## 2025-02-07 RX ADMIN — ATORVASTATIN CALCIUM 80 MG: 40 TABLET, FILM COATED ORAL at 20:42

## 2025-02-07 RX ADMIN — CETIRIZINE HYDROCHLORIDE 10 MG: 10 TABLET, FILM COATED ORAL at 13:07

## 2025-02-07 RX ADMIN — GABAPENTIN 100 MG: 100 CAPSULE ORAL at 13:08

## 2025-02-07 RX ADMIN — CLOPIDOGREL BISULFATE 75 MG: 75 TABLET ORAL at 13:07

## 2025-02-07 RX ADMIN — GABAPENTIN 100 MG: 100 CAPSULE ORAL at 20:42

## 2025-02-07 RX ADMIN — GABAPENTIN 100 MG: 100 CAPSULE ORAL at 17:37

## 2025-02-07 RX ADMIN — ENOXAPARIN SODIUM 40 MG: 100 INJECTION SUBCUTANEOUS at 17:37

## 2025-02-07 RX ADMIN — ASPIRIN 81 MG: 81 TABLET, COATED ORAL at 13:08

## 2025-02-07 RX ADMIN — IOPAMIDOL 50 ML: 612 INJECTION, SOLUTION INTRAVENOUS at 05:16

## 2025-02-07 RX ADMIN — IOPAMIDOL 100 ML: 612 INJECTION, SOLUTION INTRAVENOUS at 05:16

## 2025-02-07 RX ADMIN — FLUTICASONE PROPIONATE 2 SPRAY: 50 SPRAY, METERED NASAL at 20:43

## 2025-02-07 RX ADMIN — TETROFOSMIN 1 DOSE: 1.38 INJECTION, POWDER, LYOPHILIZED, FOR SOLUTION INTRAVENOUS at 09:23

## 2025-02-07 NOTE — PROGRESS NOTES
Patient Name: Priscilla Santos  Date of Admission: 2/6/2025  Today's Date: 02/07/25  Length of Stay: 1  Primary Care Physician: Reza Charles APRN    Subjective   Chief Complaint: Chest pain/upper abdominal pain  HPI   Today: Patient up and walking about in room.  She has been n.p.o. since midnight and states she has no epigastric or chest pain.  She reports GI cocktail resolved her chest pain last evening.  Laboratory studies reviewed, troponin slightly elevated at 46 as compared to 40 yesterday, sodium has dropped to 129, creatinine has increased to 1.29, I did discuss with Dr. Palma who feels results are secondary to component of her liver disease.  At time of assessment stress echo study results pending.  We will plan for discharge in a.m. if it is within normal limits      Documented weights    02/06/25 1247 02/06/25 1748 02/07/25 0910   Weight: 104 kg (230 lb 4.8 oz) 105 kg (231 lb 1.6 oz) 105 kg (231 lb)          Intake/Output Summary (Last 24 hours) at 2/7/2025 1735  Last data filed at 2/7/2025 1500  Gross per 24 hour   Intake 360 ml   Output 900 ml   Net -540 ml       Results for orders placed during the hospital encounter of 12/14/23    Adult Transthoracic Echo Complete W/ Cont if Necessary Per Protocol    Interpretation Summary    Left ventricular systolic function is normal. Left ventricular ejection fraction appears to be 61 - 65%.    Left ventricular diastolic function was normal.    The left atrial cavity is mildly dilated.    There is moderate calcification of the aortic valve.    Estimated right ventricular systolic pressure from tricuspid regurgitation is normal (<35 mmHg).    Mild dilation of the aortic root is present. Mild dilation of the proximal aorta is present.       Review of Systems   All pertinent negatives and positives are as above. All other systems have been reviewed and are negative unless otherwise stated.     Objective    Temp:  [97.6 °F (36.4 °C)-98.8 °F (37.1 °C)] 97.6 °F  (36.4 °C)  Heart Rate:  [48-76] 48  Resp:  [16-18] 18  BP: ()/(44-72) 107/55  Physical Exam  Vitals reviewed.   Constitutional:       Appearance: Normal appearance. She is obese.   HENT:      Head: Normocephalic and atraumatic.      Mouth/Throat:      Mouth: Mucous membranes are moist.      Pharynx: Oropharynx is clear.   Eyes:      Extraocular Movements: Extraocular movements intact.      Conjunctiva/sclera: Conjunctivae normal.   Cardiovascular:      Rate and Rhythm: Normal rate and regular rhythm.      Heart sounds: Murmur heard.   Pulmonary:      Effort: Pulmonary effort is normal.      Breath sounds: Normal breath sounds.   Abdominal:      General: There is no distension.      Palpations: Abdomen is soft.      Tenderness: There is no abdominal tenderness.   Musculoskeletal:      Cervical back: Normal range of motion and neck supple.      Right lower leg: No edema.      Left lower leg: No edema.   Skin:     General: Skin is warm and dry.   Neurological:      General: No focal deficit present.      Mental Status: She is alert and oriented to person, place, and time.   Psychiatric:         Mood and Affect: Mood normal.         Behavior: Behavior normal.       Results Review:  I have reviewed the labs, radiology results, and diagnostic studies.    Laboratory Data:   Results from last 7 days   Lab Units 02/07/25  0543 02/06/25  1254   WBC 10*3/mm3 6.69 5.08   HEMOGLOBIN g/dL 10.5* 10.8*   HEMATOCRIT % 30.2* 31.5*   PLATELETS 10*3/mm3 87* 90*        Results from last 7 days   Lab Units 02/07/25  0544 02/06/25  1342   SODIUM mmol/L 129* 131*   POTASSIUM mmol/L 3.9 3.9   CHLORIDE mmol/L 100 100   CO2 mmol/L 22.0 25.0   BUN mg/dL 27* 25*   CREATININE mg/dL 1.29* 1.03*   CALCIUM mg/dL 8.8 9.3   BILIRUBIN mg/dL 1.1 1.4*   ALK PHOS U/L 65 64   ALT (SGPT) U/L 85* 89*   AST (SGOT) U/L 114* 121*   GLUCOSE mg/dL 125* 126*       Culture Data:     Microbiology Results (last 10 days)       ** No results found for the last  240 hours. **             Radiology Data:   Imaging Results (Last 7 Days)       Procedure Component Value Units Date/Time    US Renal Bilateral [[id number]] Collected: 02/07/25 1120     Updated: 02/07/25 1240    Narrative:      US RENAL BILATERAL-     HISTORY: worsening hyponatremia and kidney function; R07.9-Chest pain,  unspecified; N18.9-Chronic kidney disease, unspecified; R74.01-Elevation  of levels of liver transaminase levels     COMPARISON: CT abdomen pelvis 2/7/2025     FINDINGS: Sonographic imaging of the kidneys and bladder performed.     The proximal IVC is patent. Visible abdominal aorta normal in caliber.        Kidneys appear normal in echotexture. The right kidney measures 11.0 x  4.9 x 5.5 cm. Left kidney measures 11.3 x 5.2 x 4.7 cm. No solid or  cystic renal mass. No obstructing intrarenal stones. No hydronephrosis.  No abnormal perinephric fluid collection.     Bladder is distended but otherwise unremarkable.       Impression:      1. Normal sonographic appearance to the kidneys. Distended  normal-appearing bladder.        This report was signed and finalized on 2/7/2025 11:22 AM by Dr. Little Velazquez MD.       CT Abdomen Pelvis With Contrast [[id number]] Collected: 02/07/25 0616     Updated: 02/07/25 0630    Narrative:      EXAMINATION: CT ABDOMEN PELVIS W CONTRAST-      2/7/2025 3:53 AM     HISTORY: Epigastric pain, severe; R07.9-Chest pain, unspecified;  N18.9-Chronic kidney disease, unspecified; R74.01-Elevation of levels of  liver transaminase levels     In order to have a CT radiation dose as low as reasonably achievable  Automated Exposure Control was utilized for adjustment of the mA and/or  KV according to patient size.     Total DLP = 1062.44 mGy.cm     The CT scan of the abdomen and pelvis is performed after intravenous and  oral contrast enhancement.     Images are acquired in axial plane and subsequent reconstruction in  coronal and sagittal planes.     There is no previous similar  study for comparison.     Lung bases included in the study show atelectatic changes and scarring.  No discrete nodule, infiltrate or consolidation.     Limited visualized cardiomediastinal structures show severe atheromatous  changes of the coronary arteries. Moderate cardiomegaly.     Liver is normal. Spleen is normal.     The gallbladder is surgically absent. There is no significant dilatation  of the common bile duct.     The pancreas is normal.     The adrenal glands bilaterally are normal.     Moderate lobulation of renal contour bilaterally is seen. No discrete  mass. No radiopaque calculi. No hydronephrosis. The urinary bladder is  poorly distended. No intrinsic abnormality.     Relatively small uterus is seen which is anteverted. No adnexal masses.     The stomach is normal. There is small diverticulum from the mid  descending duodenum near the insertion of the common bile duct. The  remaining duodenum is normal. Small bowel is normal dilated. There is a  structure extending from the tip of the cecum which appears to represent  appendix?. However there is a history of appendectomy. This may be  clinically correlated. No finding to suggest an inflammatory process  about this structure. There is a filling defect in the proximal  ascending colon which probably represent retained stool. Possibility of  a nonobstructing mass not excluded. No finding to suggest obstruction.  Moderate gas and stool is seen throughout the colon. No finding to  suggest obstruction. There is diverticulosis of the colon. No evidence  of a diverticulitis.     Atheromatous changes of the abdominal aorta and iliac arteries. No  aneurysmal dilatation.     There are a few borderline prominent retroperitoneal para-aortic and  aortocaval lymph nodes are seen. The largest lymph node adjacent and  anterior to the distal inferior vena cava measures 11 mm in short axis.     Images reviewed in bone window show chronic degenerative changes of  the  lumbar spine. There is a compression fracture of vertebra T12 with 50%  loss of height. There is mild retropulsion. There is a degenerative  spondylolisthesis at L4-5.       Impression:      1. No acute findings to account for patient's symptoms.  2. An apparent filling defect in the ascending colon without obstruction  may suggest retained stool. Possibility of a nonobstructing mass may not  be excluded.  3. Diverticulosis of the colon. No evidence of diverticulitis.  4. Compression fracture with 50% loss of height of vertebra T12 with  moderate retropulsion. This was noted in the previous CT angiography  chest in January 2024 and represent a chronic process.  5. Other nonacute findings as above.                                      This report was signed and finalized on 2/7/2025 6:27 AM by Dr. Irasema Bernal MD.       XR Chest 1 View [[id number]] Collected: 02/06/25 1259     Updated: 02/06/25 1303    Narrative:      EXAMINATION: XR CHEST 1 VW- 2/6/2025 12:59 PM     HISTORY: Chest Pain Protocol.     REPORT: A frontal view of the chest was obtained.     COMPARISON: Chest x-rays 4/29/2015.     The lungs are free of focal infiltrates, there is evidence of mild  bibasilar atelectasis and central vascular crowding. No pneumothorax or  pleural effusion. Previous median sternotomy is noted, heart size  appears normal. The osseous structures and upper abdomen are  unremarkable.       Impression:      No acute cardiopulmonary abnormality.     This report was signed and finalized on 2/6/2025 1:00 PM by Dr. Bernabe Caro MD.                I have reviewed the patient's current medications.     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  Chest pain/coronary artery disease, resolved today, await Aidee scan results, if positive will consult cardiology, routine telemetry orders     2.  Fatty liver disease with subsequent chronic transaminitis, anemia and thrombocytopenia, labs in a.m., check anemia studies-anemia of chronic disease     3.  Hypertension, well-controlled, will hold antihypertensives for now, reevaluate for need in a.m.     4.  Suspected GI etiology of pain, modified GI cocktail Mylanta/viscous lidocaine/Benadryl elixir, n.p.o. after midnight for CT of abdomen pelvis in a.m. with p.o. contrast-constipation versus possible mass in the ascending:, patient will need to keep appointment next week with GI for planned upper endoscopy, I did advise patient to have GI provider review laboratory studies and imaging from this admission     Medical Decision Making  Number and Complexity of problems: 5  2 problems, acute, high complexity, improving  3 problems, chronic, moderate complexity, stable  Differential Diagnosis: Esophagitis, gastritis     Conditions and Status        Condition is unchanged.     MDM Data  External documents reviewed: Epic records  Cardiac tracing (EKG, telemetry) interpretation: Reviewed  Radiology interpretation: Reviewed  Labs reviewed: Yes  Any tests that were considered but not ordered: No     Decision rules/scores evaluated (example [id number], Wells, etc): No     Discussed with: Patient, family at bedside and Dr. Palma     Care Planning  Shared decision making: Patient, family at bedside and Dr. Palma  Code status and discussions: Full   Surrogate Decision Maker   Severo Walker  Social Determinants of Health that impact treatment or disposition: None  I expect the patient to be discharged to home in tomorrow.     Electronically signed by YVETTE Bull, 02/07/25, 17:35 CST.

## 2025-02-07 NOTE — PLAN OF CARE
Problem: Adult Inpatient Plan of Care  Goal: Plan of Care Review  Outcome: Progressing  Goal: Patient-Specific Goal (Individualized)  Outcome: Progressing  Goal: Absence of Hospital-Acquired Illness or Injury  Outcome: Progressing  Intervention: Identify and Manage Fall Risk  Recent Flowsheet Documentation  Taken 2/7/2025 1300 by Ebony Boyle RN  Safety Promotion/Fall Prevention: safety round/check completed  Taken 2/7/2025 1116 by Ebony Boyle RN  Safety Promotion/Fall Prevention: patient off unit  Taken 2/7/2025 0910 by Ebony Boyle RN  Safety Promotion/Fall Prevention: patient off unit  Taken 2/7/2025 0815 by Ebony Boyle RN  Safety Promotion/Fall Prevention:   clutter free environment maintained   safety round/check completed  Intervention: Prevent Skin Injury  Recent Flowsheet Documentation  Taken 2/7/2025 0910 by Ebony Boyle RN  Body Position: position changed independently  Intervention: Prevent and Manage VTE (Venous Thromboembolism) Risk  Recent Flowsheet Documentation  Taken 2/7/2025 0815 by Ebony Boyle RN  VTE Prevention/Management: (see MAR) other (see comments)  Goal: Optimal Comfort and Wellbeing  Outcome: Progressing  Intervention: Provide Person-Centered Care  Recent Flowsheet Documentation  Taken 2/7/2025 0815 by Ebony Boyle RN  Trust Relationship/Rapport:   care explained   choices provided   reassurance provided   questions encouraged  Goal: Readiness for Transition of Care  Outcome: Progressing     Problem: Fall Injury Risk  Goal: Absence of Fall and Fall-Related Injury  Outcome: Progressing  Intervention: Promote Injury-Free Environment  Recent Flowsheet Documentation  Taken 2/7/2025 1300 by Ebony Boyle RN  Safety Promotion/Fall Prevention: safety round/check completed  Taken 2/7/2025 1116 by Ebony Boyle RN  Safety Promotion/Fall Prevention: patient off unit  Taken 2/7/2025 0910 by Ebony Boyle RN  Safety Promotion/Fall Prevention: patient off  unit  Taken 2/7/2025 0815 by Ebony Boyle, RN  Safety Promotion/Fall Prevention:   clutter free environment maintained   safety round/check completed   Goal Outcome Evaluation:

## 2025-02-07 NOTE — PLAN OF CARE
Goal Outcome Evaluation:         PT eval attempted, screening performed. Pt reports she has been up in room, sitting EOB and has been ambulating to bathroom without difficulty. PT to sign off at this time. Please reconsult with change in status or need regarding this pt care. Anticipate d/c home when medically stable.

## 2025-02-07 NOTE — CASE MANAGEMENT/SOCIAL WORK
Discharge Planning Assessment   Tish     Patient Name: Priscilla Santos  MRN: [id number]  Today's Date: 2/7/2025    Admit Date: 2/6/2025        Discharge Needs Assessment       Row Name 02/07/25 0846       Living Environment    People in Home spouse    Current Living Arrangements home    Potentially Unsafe Housing Conditions none    In the past 12 months has the electric, gas, oil, or water company threatened to shut off services in your home? No    Primary Care Provided by self    Provides Primary Care For no one    Family Caregiver if Needed spouse    Quality of Family Relationships helpful;involved;supportive    Able to Return to Prior Arrangements yes       Resource/Environmental Concerns    Resource/Environmental Concerns none    Transportation Concerns none       Transportation Needs    In the past 12 months, has lack of transportation kept you from medical appointments or from getting medications? no    In the past 12 months, has lack of transportation kept you from meetings, work, or from getting things needed for daily living? No       Food Insecurity    Within the past 12 months, you worried that your food would run out before you got the money to buy more. Never true    Within the past 12 months, the food you bought just didn't last and you didn't have money to get more. Never true       Transition Planning    Patient/Family Anticipates Transition to home    Patient/Family Anticipated Services at Transition none    Transportation Anticipated car, drives self;family or friend will provide       Discharge Needs Assessment    Readmission Within the Last 30 Days no previous admission in last 30 days    Equipment Currently Used at Home cpap;scales    Concerns to be Addressed no discharge needs identified    Do you want help finding or keeping work or a job? I do not need or want help    Do you want help with school or training? For example, starting or completing job training or getting a high school  diploma, GED or equivalent No    Anticipated Changes Related to Illness none    Equipment Needed After Discharge none    Provided Post Acute Provider List? N/A    Provided Post Acute Provider Quality & Resource List? N/A    Discharge Coordination/Progress Lives at home with her . Drives self but  can provide transportation if need be.. Has PCP and Rx coverage. No D/C needs identified at this time.                   Discharge Plan    No documentation.                 Continued Care and Services - Admitted Since 2/6/2025    No active coordination exists for this encounter.          Demographic Summary    No documentation.                  Functional Status    No documentation.                  Psychosocial    No documentation.                  Abuse/Neglect    No documentation.                  Legal    No documentation.                  Substance Abuse    No documentation.                  Patient Forms    No documentation.                     Jose Lerner RN

## 2025-02-07 NOTE — PLAN OF CARE
Goal Outcome Evaluation:  Plan of Care Reviewed With: patient        Progress: no change  Pt rested well overnight. Pt had stomach pain; see MAR. Pt was taken for CT of abdomen. Pt ran sinus 63-85; per tele. Safety maintained during shift. Call light within reach.

## 2025-02-08 ENCOUNTER — READMISSION MANAGEMENT (OUTPATIENT)
Dept: CALL CENTER | Facility: HOSPITAL | Age: 75
End: 2025-02-08
Payer: MEDICARE

## 2025-02-08 VITALS
TEMPERATURE: 98.1 F | BODY MASS INDEX: 43.61 KG/M2 | HEIGHT: 61 IN | HEART RATE: 70 BPM | OXYGEN SATURATION: 95 % | DIASTOLIC BLOOD PRESSURE: 56 MMHG | SYSTOLIC BLOOD PRESSURE: 109 MMHG | RESPIRATION RATE: 16 BRPM | WEIGHT: 231 LBS

## 2025-02-08 LAB
ALBUMIN SERPL-MCNC: 2.7 G/DL (ref 3.5–5.2)
ALBUMIN/GLOB SERPL: 0.6 G/DL
ALP SERPL-CCNC: 64 U/L (ref 39–117)
ALT SERPL W P-5'-P-CCNC: 74 U/L (ref 1–33)
ANION GAP SERPL CALCULATED.3IONS-SCNC: 7 MMOL/L (ref 5–15)
AST SERPL-CCNC: 95 U/L (ref 1–32)
BILIRUB SERPL-MCNC: 1 MG/DL (ref 0–1.2)
BUN SERPL-MCNC: 22 MG/DL (ref 8–23)
BUN/CREAT SERPL: 20.8 (ref 7–25)
CALCIUM SPEC-SCNC: 9 MG/DL (ref 8.6–10.5)
CHLORIDE SERPL-SCNC: 105 MMOL/L (ref 98–107)
CO2 SERPL-SCNC: 24 MMOL/L (ref 22–29)
CREAT SERPL-MCNC: 1.06 MG/DL (ref 0.57–1)
DEPRECATED RDW RBC AUTO: 46.1 FL (ref 37–54)
EGFRCR SERPLBLD CKD-EPI 2021: 55.6 ML/MIN/1.73
ERYTHROCYTE [DISTWIDTH] IN BLOOD BY AUTOMATED COUNT: 13.2 % (ref 12.3–15.4)
GLOBULIN UR ELPH-MCNC: 4.4 GM/DL
GLUCOSE SERPL-MCNC: 110 MG/DL (ref 65–99)
HCT VFR BLD AUTO: 32.1 % (ref 34–46.6)
HGB BLD-MCNC: 10.9 G/DL (ref 12–15.9)
MCH RBC QN AUTO: 32.1 PG (ref 26.6–33)
MCHC RBC AUTO-ENTMCNC: 34 G/DL (ref 31.5–35.7)
MCV RBC AUTO: 94.4 FL (ref 79–97)
PLATELET # BLD AUTO: 88 10*3/MM3 (ref 140–450)
PMV BLD AUTO: 10.5 FL (ref 6–12)
POTASSIUM SERPL-SCNC: 3.9 MMOL/L (ref 3.5–5.2)
PROT SERPL-MCNC: 7.1 G/DL (ref 6–8.5)
RBC # BLD AUTO: 3.4 10*6/MM3 (ref 3.77–5.28)
SODIUM SERPL-SCNC: 136 MMOL/L (ref 136–145)
WBC NRBC COR # BLD AUTO: 4.8 10*3/MM3 (ref 3.4–10.8)

## 2025-02-08 PROCEDURE — 85027 COMPLETE CBC AUTOMATED: CPT | Performed by: NURSE PRACTITIONER

## 2025-02-08 PROCEDURE — 94799 UNLISTED PULMONARY SVC/PX: CPT

## 2025-02-08 PROCEDURE — 94660 CPAP INITIATION&MGMT: CPT

## 2025-02-08 PROCEDURE — G0378 HOSPITAL OBSERVATION PER HR: HCPCS

## 2025-02-08 PROCEDURE — 25010000002 ENOXAPARIN PER 10 MG: Performed by: NURSE PRACTITIONER

## 2025-02-08 PROCEDURE — 80053 COMPREHEN METABOLIC PANEL: CPT | Performed by: NURSE PRACTITIONER

## 2025-02-08 RX ORDER — ALUMINA, MAGNESIA, AND SIMETHICONE 2400; 2400; 240 MG/30ML; MG/30ML; MG/30ML
15 SUSPENSION ORAL EVERY 6 HOURS PRN
Start: 2025-02-08

## 2025-02-08 RX ADMIN — GABAPENTIN 100 MG: 100 CAPSULE ORAL at 08:57

## 2025-02-08 RX ADMIN — ASPIRIN 81 MG: 81 TABLET, COATED ORAL at 08:57

## 2025-02-08 RX ADMIN — ENOXAPARIN SODIUM 40 MG: 100 INJECTION SUBCUTANEOUS at 05:55

## 2025-02-08 RX ADMIN — CETIRIZINE HYDROCHLORIDE 10 MG: 10 TABLET, FILM COATED ORAL at 08:57

## 2025-02-08 RX ADMIN — RANOLAZINE 1000 MG: 500 TABLET, FILM COATED, EXTENDED RELEASE ORAL at 05:56

## 2025-02-08 RX ADMIN — CLOPIDOGREL BISULFATE 75 MG: 75 TABLET ORAL at 08:58

## 2025-02-08 NOTE — CASE MANAGEMENT/SOCIAL WORK
Case Management Discharge Note           Provided Post Acute Provider List?: N/A  Provided Post Acute Provider Quality & Resource List?: N/A    Selected Continued Care - Admitted Since 2/6/2025       Destination    No services have been selected for the patient.                Durable Medical Equipment    No services have been selected for the patient.                Dialysis/Infusion    No services have been selected for the patient.                Home Medical Care    No services have been selected for the patient.                Therapy    No services have been selected for the patient.                Community Resources    No services have been selected for the patient.                Community & DME    No services have been selected for the patient.                         Final Discharge Disposition Code: 01 - home or self-care

## 2025-02-08 NOTE — PROGRESS NOTES
Pt states she does not like our machine and will be fine without using it. I will pull machine from room and change to PRN in case she changes her mind.

## 2025-02-08 NOTE — PLAN OF CARE
Problem: Adult Inpatient Plan of Care  Goal: Plan of Care Review  Outcome: Progressing  Goal: Patient-Specific Goal (Individualized)  Outcome: Progressing  Goal: Absence of Hospital-Acquired Illness or Injury  Outcome: Progressing  Intervention: Identify and Manage Fall Risk  Recent Flowsheet Documentation  Taken 2/8/2025 0200 by Stewart Solo RN  Safety Promotion/Fall Prevention:   assistive device/personal items within reach   fall prevention program maintained   safety round/check completed  Taken 2/8/2025 0000 by Stewart Solo RN  Safety Promotion/Fall Prevention:   assistive device/personal items within reach   fall prevention program maintained   safety round/check completed  Taken 2/7/2025 2230 by Stewart Solo RN  Safety Promotion/Fall Prevention:   assistive device/personal items within reach   fall prevention program maintained   safety round/check completed  Taken 2/7/2025 2130 by Stewart Solo RN  Safety Promotion/Fall Prevention:   assistive device/personal items within reach   fall prevention program maintained   safety round/check completed  Taken 2/7/2025 2042 by Stewart Solo RN  Safety Promotion/Fall Prevention:   assistive device/personal items within reach   fall prevention program maintained   safety round/check completed  Taken 2/7/2025 1930 by Stewart Solo RN  Safety Promotion/Fall Prevention:   assistive device/personal items within reach   fall prevention program maintained   safety round/check completed  Intervention: Prevent Skin Injury  Recent Flowsheet Documentation  Taken 2/8/2025 0200 by Stewart Solo RN  Body Position: position changed independently  Taken 2/8/2025 0000 by Stewart Solo RN  Body Position: position changed independently  Taken 2/7/2025 2230 by Stewart Solo RN  Body Position: position changed independently  Taken 2/7/2025 2130 by Stewart Solo RN  Body Position: position changed independently  Taken 2/7/2025 2042 by Stewart Solo RN  Body Position:  position changed independently  Taken 2/7/2025 1930 by Stewart Solo RN  Body Position: position changed independently  Intervention: Prevent and Manage VTE (Venous Thromboembolism) Risk  Recent Flowsheet Documentation  Taken 2/7/2025 2042 by Stewart Solo RN  VTE Prevention/Management: (lovenox) other (see comments)  Intervention: Prevent Infection  Recent Flowsheet Documentation  Taken 2/8/2025 0200 by Stewart Solo RN  Infection Prevention:   hand hygiene promoted   rest/sleep promoted   single patient room provided  Taken 2/8/2025 0000 by Stewart Solo RN  Infection Prevention:   hand hygiene promoted   rest/sleep promoted   single patient room provided  Taken 2/7/2025 2230 by Stewart Solo RN  Infection Prevention:   hand hygiene promoted   rest/sleep promoted   single patient room provided  Taken 2/7/2025 2130 by Stewart Solo RN  Infection Prevention:   hand hygiene promoted   rest/sleep promoted   single patient room provided  Taken 2/7/2025 2042 by Stewart Solo RN  Infection Prevention:   hand hygiene promoted   rest/sleep promoted   single patient room provided  Taken 2/7/2025 1930 by Stewart Solo RN  Infection Prevention:   hand hygiene promoted   rest/sleep promoted   single patient room provided  Goal: Optimal Comfort and Wellbeing  Outcome: Progressing  Intervention: Provide Person-Centered Care  Recent Flowsheet Documentation  Taken 2/7/2025 2042 by Stewart Solo RN  Trust Relationship/Rapport: care explained  Goal: Readiness for Transition of Care  Outcome: Progressing     Problem: Noninvasive Ventilation Acute  Goal: Effective Unassisted Ventilation and Oxygenation  Outcome: Progressing     Problem: Fall Injury Risk  Goal: Absence of Fall and Fall-Related Injury  Outcome: Progressing  Intervention: Identify and Manage Contributors  Recent Flowsheet Documentation  Taken 2/7/2025 2042 by Stewart Solo RN  Medication Review/Management: medications reviewed  Intervention: Promote Injury-Free  Environment  Recent Flowsheet Documentation  Taken 2/8/2025 0200 by Stewart Solo, RN  Safety Promotion/Fall Prevention:   assistive device/personal items within reach   fall prevention program maintained   safety round/check completed  Taken 2/8/2025 0000 by Stewart Solo RN  Safety Promotion/Fall Prevention:   assistive device/personal items within reach   fall prevention program maintained   safety round/check completed  Taken 2/7/2025 2230 by Stewart Solo, JUHI  Safety Promotion/Fall Prevention:   assistive device/personal items within reach   fall prevention program maintained   safety round/check completed  Taken 2/7/2025 2130 by Stewart Solo, JUHI  Safety Promotion/Fall Prevention:   assistive device/personal items within reach   fall prevention program maintained   safety round/check completed  Taken 2/7/2025 2042 by Stewart Solo, JUHI  Safety Promotion/Fall Prevention:   assistive device/personal items within reach   fall prevention program maintained   safety round/check completed  Taken 2/7/2025 1930 by Stewart Solo, RN  Safety Promotion/Fall Prevention:   assistive device/personal items within reach   fall prevention program maintained   safety round/check completed     Goal Outcome Evaluation:  EMR reviewed and POC continued. Pt denies pain or needs. States ready to go home. Sodium level better this AM. Tele SR/SB & on room air. Able to ambulate to bathroom without difficulty. Monitoring.

## 2025-02-08 NOTE — DISCHARGE INSTRUCTIONS
Advised to take Mylanta per recommendation on bottle for recurrent upper abdominal pain.  Keep scheduled appointment with [facility], please review discharge summary/CT abdomen pelvis obtained during admission  Follow-up with PCP in 1 week, routine hospital follow-up  Return to the emergency department for chest pain with associated nausea/vomiting or shortness of breathing or diaphoresis, fever

## 2025-02-08 NOTE — OUTREACH NOTE
Prep Survey      Flowsheet Row Responses   [facility] facility patient discharged from? [place]   Is LACE score < 7 ? Yes   Eligibility [facility]   Date of Admission 02/06/25   Date of Discharge 02/08/25   Discharge Disposition Home or Self Care   Discharge diagnosis Chest pain   Does the patient have one of the following disease processes/diagnoses(primary or secondary)? Other   Does the patient have Home health ordered? No   Is there a DME ordered? No   Prep survey completed? Yes            PITER A - Registered Nurse

## 2025-02-08 NOTE — DISCHARGE SUMMARY
[facility] Medicine Services  DISCHARGE SUMMARY       Date of Admission: 2/6/2025  Date of Discharge:  2/8/2025  Primary Care Physician: Reza Charles APRN    Presenting Problem/History of Present Illness:  Chest pain    Final Discharge Diagnoses:  Active Hospital Problems    Diagnosis     **Chest pain     Coronary artery disease of bypass graft of native heart with stable angina pectoris     Fatty liver     Hypertension, well controlled        Consults: None    Procedures Performed:   2/7/2025 stress test with myocardial perfusion   interpretation Summary     Myocardial perfusion imaging indicates a normal myocardial perfusion study with no evidence of ischemia. Impressions are consistent with a low risk study.    Left ventricular ejection fraction is normal (Calculated EF = 65%).    Breast attenuation artifact is present.       Pertinent Test Results:   Results for orders placed during the hospital encounter of 12/14/23    Adult Transthoracic Echo Complete W/ Cont if Necessary Per Protocol    Interpretation Summary    Left ventricular systolic function is normal. Left ventricular ejection fraction appears to be 61 - 65%.    Left ventricular diastolic function was normal.    The left atrial cavity is mildly dilated.    There is moderate calcification of the aortic valve.    Estimated right ventricular systolic pressure from tricuspid regurgitation is normal (<35 mmHg).    Mild dilation of the aortic root is present. Mild dilation of the proximal aorta is present.      Imaging Results (All)       Procedure Component Value Units Date/Time    US Renal Bilateral [[id number]] Collected: 02/07/25 1120     Updated: 02/07/25 1240    Narrative:      US RENAL BILATERAL-     HISTORY: worsening hyponatremia and kidney function; R07.9-Chest pain,  unspecified; N18.9-Chronic kidney disease, unspecified; R74.01-Elevation  of levels of liver transaminase levels     COMPARISON: CT abdomen  pelvis 2/7/2025     FINDINGS: Sonographic imaging of the kidneys and bladder performed.     The proximal IVC is patent. Visible abdominal aorta normal in caliber.        Kidneys appear normal in echotexture. The right kidney measures 11.0 x  4.9 x 5.5 cm. Left kidney measures 11.3 x 5.2 x 4.7 cm. No solid or  cystic renal mass. No obstructing intrarenal stones. No hydronephrosis.  No abnormal perinephric fluid collection.     Bladder is distended but otherwise unremarkable.       Impression:      1. Normal sonographic appearance to the kidneys. Distended  normal-appearing bladder.        This report was signed and finalized on 2/7/2025 11:22 AM by Dr. Little Velazquez MD.       CT Abdomen Pelvis With Contrast [[id number]] Collected: 02/07/25 0616     Updated: 02/07/25 0630    Narrative:      EXAMINATION: CT ABDOMEN PELVIS W CONTRAST-      2/7/2025 3:53 AM     HISTORY: Epigastric pain, severe; R07.9-Chest pain, unspecified;  N18.9-Chronic kidney disease, unspecified; R74.01-Elevation of levels of  liver transaminase levels     In order to have a CT radiation dose as low as reasonably achievable  Automated Exposure Control was utilized for adjustment of the mA and/or  KV according to patient size.     Total DLP = 1062.44 mGy.cm     The CT scan of the abdomen and pelvis is performed after intravenous and  oral contrast enhancement.     Images are acquired in axial plane and subsequent reconstruction in  coronal and sagittal planes.     There is no previous similar study for comparison.     Lung bases included in the study show atelectatic changes and scarring.  No discrete nodule, infiltrate or consolidation.     Limited visualized cardiomediastinal structures show severe atheromatous  changes of the coronary arteries. Moderate cardiomegaly.     Liver is normal. Spleen is normal.     The gallbladder is surgically absent. There is no significant dilatation  of the common bile duct.     The pancreas is normal.     The  adrenal glands bilaterally are normal.     Moderate lobulation of renal contour bilaterally is seen. No discrete  mass. No radiopaque calculi. No hydronephrosis. The urinary bladder is  poorly distended. No intrinsic abnormality.     Relatively small uterus is seen which is anteverted. No adnexal masses.     The stomach is normal. There is small diverticulum from the mid  descending duodenum near the insertion of the common bile duct. The  remaining duodenum is normal. Small bowel is normal dilated. There is a  structure extending from the tip of the cecum which appears to represent  appendix?. However there is a history of appendectomy. This may be  clinically correlated. No finding to suggest an inflammatory process  about this structure. There is a filling defect in the proximal  ascending colon which probably represent retained stool. Possibility of  a nonobstructing mass not excluded. No finding to suggest obstruction.  Moderate gas and stool is seen throughout the colon. No finding to  suggest obstruction. There is diverticulosis of the colon. No evidence  of a diverticulitis.     Atheromatous changes of the abdominal aorta and iliac arteries. No  aneurysmal dilatation.     There are a few borderline prominent retroperitoneal para-aortic and  aortocaval lymph nodes are seen. The largest lymph node adjacent and  anterior to the distal inferior vena cava measures 11 mm in short axis.     Images reviewed in bone window show chronic degenerative changes of the  lumbar spine. There is a compression fracture of vertebra T12 with 50%  loss of height. There is mild retropulsion. There is a degenerative  spondylolisthesis at L4-5.       Impression:      1. No acute findings to account for patient's symptoms.  2. An apparent filling defect in the ascending colon without obstruction  may suggest retained stool. Possibility of a nonobstructing mass may not  be excluded.  3. Diverticulosis of the colon. No evidence of  diverticulitis.  4. Compression fracture with 50% loss of height of vertebra T12 with  moderate retropulsion. This was noted in the previous CT angiography  chest in January 2024 and represent a chronic process.  5. Other nonacute findings as above.         This report was signed and finalized on 2/7/2025 6:27 AM by Dr. Irasema Bernal MD.       XR Chest 1 View [[id number]] Collected: 02/06/25 1259     Updated: 02/06/25 1303    Narrative:      EXAMINATION: XR CHEST 1 VW- 2/6/2025 12:59 PM     HISTORY: Chest Pain Protocol.     REPORT: A frontal view of the chest was obtained.     COMPARISON: Chest x-rays 4/29/2015.     The lungs are free of focal infiltrates, there is evidence of mild  bibasilar atelectasis and central vascular crowding. No pneumothorax or  pleural effusion. Previous median sternotomy is noted, heart size  appears normal. The osseous structures and upper abdomen are  unremarkable.       Impression:      No acute cardiopulmonary abnormality.     This report was signed and finalized on 2/6/2025 1:00 PM by Dr. Bernabe Caro MD.             LAB RESULTS:      Lab 02/08/25  0254 02/07/25  0543 02/06/25  1254   WBC 4.80 6.69 5.08   HEMOGLOBIN 10.9* 10.5* 10.8*   HEMATOCRIT 32.1* 30.2* 31.5*   PLATELETS 88* 87* 90*   NEUTROS ABS  --   --  3.33   IMMATURE GRANS (ABS)  --   --  0.01   LYMPHS ABS  --   --  1.27   MONOS ABS  --   --  0.46   EOS ABS  --   --  0.00   MCV 94.4 93.5 93.8         Lab 02/08/25  0254 02/07/25  0544 02/07/25  0543 02/06/25  1342   SODIUM 136 129*  --  131*   POTASSIUM 3.9 3.9  --  3.9   CHLORIDE 105 100  --  100   CO2 24.0 22.0  --  25.0   ANION GAP 7.0 7.0  --  6.0   BUN 22 27*  --  25*   CREATININE 1.06* 1.29*  --  1.03*   EGFR 55.6* 43.9*  --  57.5*   GLUCOSE 110* 125*  --  126*   CALCIUM 9.0 8.8  --  9.3   HEMOGLOBIN A1C  --   --  4.90  --    TSH  --  0.678  --   --          Lab 02/08/25  0254 02/07/25  0544 02/06/25  1342   TOTAL PROTEIN 7.1 7.1 7.5   ALBUMIN 2.7* 2.7* 3.0*    GLOBULIN 4.4 4.4 4.5   ALT (SGPT) 74* 85* 89*   AST (SGOT) 95* 114* 121*   BILIRUBIN 1.0 1.1 1.4*   ALK PHOS 64 65 64   AMYLASE  --   --  61   LIPASE  --   --  58         Lab 02/07/25  0544 02/06/25  1443 02/06/25  1342   HSTROP T 46* 40* 40*         Lab 02/07/25  0544   CHOLESTEROL 68   LDL CHOL 19   HDL CHOL 38*   TRIGLYCERIDES 36         Lab 02/06/25  1443 02/06/25  1254   IRON 102  --    IRON SATURATION (TSAT) 38  --    TIBC 265*  --    TRANSFERRIN 178*  --    FERRITIN 310.40*  --    VITAMIN B 12  --  1,288*         Brief Urine Lab Results       None          Microbiology Results (last 10 days)       ** No results found for the last 240 hours. **          Microbiology Results (last 10 days)       ** No results found for the last 240 hours. **             Documented weights    02/06/25 1247 02/06/25 1748 02/07/25 0910   Weight: 104 kg (230 lb 4.8 oz) 105 kg (231 lb 1.6 oz) 105 kg (231 lb)        Hospital Course: Priscilla Santos is a 74-year-old female with a past medical history of heart failure most recent echocardiogram 12/14/2023 without acute findings, coronary artery disease with a history of CABG, fatty liver disease with chronic elevated enzymes, hypertension, hyperlipidemia, sleep apnea, arthritis.  Patient [facility] ED via EMS for complaints of chest tightness and discomfort.  There was concern for STEMI however EKG upon arrival shared with on-call intervention cardiologist and deemed no STEMI identified.  Patient was given nitroglycerin in the ambulance which caused her to have hypotension.  Currently blood pressure is 126/59, heart rate 54, she has dropped into the high 40s.  Laboratory studies reveal troponin 40 x 2, sodium 131, BUN 25, creatinine 1.03 with a GFR 57.5, glucose 126, , ALT 89, total bilirubin 1.4, hemoglobin 10.8, platelets 90,000, chest x-ray without acute findings, EKG x 2 bradycardia 40s and 50s without acute findings.  Currently patient complaining of  "severe pain scoring 12 on a scale of 1-10 below her ribs wrapping around to her back.  She has severe tenderness on epigastric palpation.  She has not endoscheduled for 2/25/2025 with Dr. Ulloa, [facility].  She does not have a history of reflux or GERD however due to symptomology we will treat with GI cocktail.  She has no diaphoresis, nausea/vomiting or shortness of breathing associated with this pain.  She does report weakness and feeling \"tired\" for the past several months.  Currently condition is stable.   Severo and grandson are at bedside.  She is admitted for further evaluation and treatment.     Today: patient is resting in bed.  She is ready to go home.  She has no pain.  Reviewed labs, normalizing creatinine today 1.03, at her baseline, reviewed renal ultrasound results-normal, again advised patient to have GI to review CT of the abdomen pelvis results at next week's appointment.  Reviewed stress test-no acute findings.  Patient is to keep her routine appointments with cardiology.    Patient has reached the maximum benefit of hospitalization and is stable for discharge  Patient has been evaluated today 02/08/25 and is stable for discharge.     Physical Exam on Discharge:  /51 (BP Location: Right arm, Patient Position: Lying)   Pulse 58   Temp 98.2 °F (36.8 °C) (Oral)   Resp 16   Ht 154.9 cm (61\")   Wt 105 kg (231 lb)   SpO2 98%   BMI 43.65 kg/m²   Physical Exam  Vitals reviewed.   Constitutional:       Appearance: Normal appearance. She is obese.   HENT:      Head: Normocephalic and atraumatic.      Mouth/Throat:      Mouth: Mucous membranes are moist.      Pharynx: Oropharynx is clear.   Eyes:      Extraocular Movements: Extraocular movements intact.      Conjunctiva/sclera: Conjunctivae normal.   Cardiovascular:      Rate and Rhythm: Normal rate and regular rhythm.      Heart sounds: Murmur heard.   Pulmonary:      Effort: Pulmonary effort is normal.      Breath sounds: Normal " breath sounds.   Abdominal:      General: There is no distension.      Palpations: Abdomen is soft.      Tenderness: There is no abdominal tenderness.   Musculoskeletal:      Cervical back: Normal range of motion and neck supple.      Right lower leg: No edema.      Left lower leg: No edema.   Skin:     General: Skin is warm and dry.   Neurological:      General: No focal deficit present.      Mental Status: She is alert and oriented to person, place, and time.   Psychiatric:         Mood and Affect: Mood normal.         Behavior: Behavior normal.     Condition on Discharge: Stable    Discharge Disposition:  Home or Self Care    Discharge Medications:     Discharge Medications        New Medications        Instructions Start Date   aluminum-magnesium hydroxide-simethicone 400-400-40 MG/5ML suspension  Commonly known as: MAALOX MAX   15 mL, Oral, Every 6 Hours PRN             Continue These Medications        Instructions Start Date   aspirin 81 MG EC tablet   81 mg, Oral, Daily      atorvastatin 80 MG tablet  Commonly known as: LIPITOR   80 mg, Oral, Nightly      carvedilol 6.25 MG tablet  Commonly known as: COREG   6.25 mg, Oral, 2 Times Daily      cetirizine 10 MG tablet  Commonly known as: zyrTEC   1 tablet, Oral, Daily      cholecalciferol 25 MCG (1000 UT) tablet  Commonly known as: VITAMIN D3   1,000 Units, Oral, Daily      clopidogrel 75 MG tablet  Commonly known as: PLAVIX   75 mg, Oral, Daily      fluticasone 50 MCG/ACT nasal spray  Commonly known as: FLONASE   USE TWO SPRAYS INTO THE NOSTRILS AS DIRECTED BY PROVIDER DAILY      gabapentin 100 MG capsule  Commonly known as: NEURONTIN   100 mg, 3 Times Daily      hydroCHLOROthiazide 12.5 MG capsule  Commonly known as: MICROZIDE   12.5 mg, Oral, Daily      indapamide 2.5 MG tablet  Commonly known as: LOZOL   2.5 mg, Every Morning      losartan 50 MG tablet  Commonly known as: Cozaar   50 mg, Oral, Daily      Multivitamin Women 50+ tablet tablet  Generic drug:  multivitamin with minerals   1 tablet, Daily      nitroglycerin 0.4 MG SL tablet  Commonly known as: NITROSTAT   0.4 mg, Sublingual, Every 5 Minutes PRN, Take no more than 3 doses in 15 minutes.      Omega-3 500 MG capsule   1 tablet, Daily      ranolazine 1000 MG 12 hr tablet  Commonly known as: RANEXA   1,000 mg, Oral, Every 12 Hours      Turmeric Curcumin 500 MG capsule   1 capsule, Daily      vitamin B-12 100 MCG tablet  Commonly known as: CYANOCOBALAMIN   100 mcg, Daily      VITAMIN C PO   1 tablet, Oral, Daily               Discharge Diet:   Diet Instructions       Diet: Cardiac Diets, Gastrointestinal Diets; Healthy Heart (2-3 Na+); Regular (IDDSI 7); Thin (IDDSI 0); Fiber-Restricted, Low Irritant      Discharge Diet:  Cardiac Diets  Gastrointestinal Diets       Cardiac Diet: Healthy Heart (2-3 Na+)    Texture: Regular (IDDSI 7)    Fluid Consistency: Thin (IDDSI 0)    Gastrointestinal Diet:  Fiber-Restricted  Low Irritant               Activity at Discharge:   Activity Instructions       Activity as Tolerated              Discharge Instructions:   1.  Advised to take Mylanta per recommendation on bottle for recurrent upper abdominal pain  2.  Keep scheduled appointment with [facility], please review discharge summary/CT abdomen pelvis obtained during admission  3.  Follow-up with PCP in 1 week, routine hospital follow-up  4.  Return to the emergency department for chest pain with associated nausea/vomiting or shortness of breathing or diaphoresis, fever    Follow-up Appointments:   Future Appointments   Date Time Provider Department Center   2/12/2025 12:30 PM Carlyn Whitney APRN [facility] GE PAD PAD   3/17/2025 11:00 AM Max Milner MD [facility] CD  PAD   5/20/2025  9:45 AM Reza Charles APRN [facility] PAD       Test Results Pending at Discharge: None    Electronically signed by KENDRA Bull-BC, 02/08/25, 06:22 CST.    Time: 25 minutes minutes.

## 2025-02-10 ENCOUNTER — TRANSITIONAL CARE MANAGEMENT TELEPHONE ENCOUNTER (OUTPATIENT)
Dept: CALL CENTER | Facility: HOSPITAL | Age: 75
End: 2025-02-10
Payer: MEDICARE

## 2025-02-10 NOTE — OUTREACH NOTE
Call Center TCM Note      Flowsheet Row Responses   [facility] patient discharged from? [place]   Does the patient have one of the following disease processes/diagnoses(primary or secondary)? Other   TCM attempt successful? Yes   Call start time 1444   Call end time 1448   Discharge diagnosis Chest pain   Meds reviewed with patient/caregiver? Yes   Is the patient having any side effects they believe may be caused by any medication additions or changes? No   Does the patient have all medications ordered at discharge? Yes   Is the patient taking all medications as directed (includes completed medication regime)? Yes   Comments Appt made for 2/11 @ 2:00 with KENDRA Hahn.  Pt has appt with GI on 2/12 @ 12:45   Does the patient have an appointment with their PCP within 7-14 days of discharge? No   Nursing Interventions Assisted patient with making appointment per protocol   Psychosocial issues? No   Did the patient receive a copy of their discharge instructions? Yes   Nursing interventions Reviewed instructions with patient   What is the patient's perception of their health status since discharge? Improving   Is the patient/caregiver able to teach back signs and symptoms related to disease process for when to call PCP? Yes   Is the patient/caregiver able to teach back signs and symptoms related to disease process for when to call 911? Yes   Is the patient/caregiver able to teach back the hierarchy of who to call/visit for symptoms/problems? PCP, Specialist, Home health nurse, Urgent Care, ED, 911 Yes   Additional teach back comments States she is doing well.   TCM call completed? Yes   Wrap up additional comments Appt made with PCP with no other needs or questions.   Call end time 1448            Amy Chamberlain LPN    2/10/2025, 14:49 CST

## 2025-02-10 NOTE — OUTREACH NOTE
Call Center TCM Note      Flowsheet Row Responses   [facility] patient discharged from? [place]   Does the patient have one of the following disease processes/diagnoses(primary or secondary)? Other   TCM attempt successful? No   Unsuccessful attempts Attempt 1            Amy Chamberlain LPN    2/10/2025, 09:30 CST

## 2025-02-11 ENCOUNTER — OFFICE VISIT (OUTPATIENT)
Dept: FAMILY MEDICINE CLINIC | Facility: CLINIC | Age: 75
End: 2025-02-11
Payer: MEDICARE

## 2025-02-11 VITALS
BODY MASS INDEX: 41.72 KG/M2 | RESPIRATION RATE: 19 BRPM | HEART RATE: 65 BPM | WEIGHT: 221 LBS | DIASTOLIC BLOOD PRESSURE: 59 MMHG | TEMPERATURE: 98.2 F | HEIGHT: 61 IN | OXYGEN SATURATION: 99 % | SYSTOLIC BLOOD PRESSURE: 91 MMHG

## 2025-02-11 DIAGNOSIS — K75.4 AUTOIMMUNE HEPATITIS: ICD-10-CM

## 2025-02-11 DIAGNOSIS — R07.89 ATYPICAL CHEST PAIN: Primary | ICD-10-CM

## 2025-02-11 DIAGNOSIS — R09.81 SINUS CONGESTION: ICD-10-CM

## 2025-02-11 RX ORDER — TRIAMCINOLONE ACETONIDE 40 MG/ML
80 INJECTION, SUSPENSION INTRA-ARTICULAR; INTRAMUSCULAR ONCE
Status: COMPLETED | OUTPATIENT
Start: 2025-02-11 | End: 2025-02-11

## 2025-02-11 RX ADMIN — TRIAMCINOLONE ACETONIDE 80 MG: 40 INJECTION, SUSPENSION INTRA-ARTICULAR; INTRAMUSCULAR at 15:48

## 2025-02-11 NOTE — PROGRESS NOTES
Transitional Care Follow Up Visit  Subjective     Priscilla Santos is a 74 y.o. female who presents for a transitional care management visit.    Within 48 business hours after discharge our office contacted her via telephone to coordinate her care and needs.      I reviewed and discussed the details of that call along with the discharge summary, hospital problems, inpatient lab results, inpatient diagnostic studies, and consultation reports with Priscilla.     Current outpatient and discharge medications have been reconciled for the patient.  Reviewed by: KENDRA Epperson          2/8/2025    12:33 PM   Date of TCM Phone Call   [facility]   Date of Admission 2/6/2025   Date of Discharge 2/8/2025   Discharge Disposition Home or Self Care     Risk for Readmission (LACE) Score: 6 (2/8/2025  5:00 AM)      History of Present Illness   Course During Hospital Stay:  See below  Hospital Course: Priscilla Santos is a 74-year-old female with a past medical history of heart failure most recent echocardiogram 12/14/2023 without acute findings, coronary artery disease with a history of CABG, fatty liver disease with chronic elevated enzymes, hypertension, hyperlipidemia, sleep apnea, arthritis.  Patient presents to [facility] ED via EMS for complaints of chest tightness and discomfort.  There was concern for STEMI however EKG upon arrival shared with on-call intervention cardiologist and deemed no STEMI identified.  Patient was given nitroglycerin in the ambulance which caused her to have hypotension.  Currently blood pressure is 126/59, heart rate 54, she has dropped into the high 40s.  Laboratory studies reveal troponin 40 x 2, sodium 131, BUN 25, creatinine 1.03 with a GFR 57.5, glucose 126, , ALT 89, total bilirubin 1.4, hemoglobin 10.8, platelets 90,000, chest x-ray without acute findings, EKG x 2 bradycardia 40s and 50s without acute findings.  Currently patient complaining of severe pain scoring 12  "on a scale of 1-10 below her ribs wrapping around to her back.  She has severe tenderness on epigastric palpation.  She has not endoscheduled for 2/25/2025 with Dr. Ulloa, [facility].  She does not have a history of reflux or GERD however due to symptomology we will treat with GI cocktail.  She has no diaphoresis, nausea/vomiting or shortness of breathing associated with this pain.  She does report weakness and feeling \"tired\" for the past several months.  Currently condition is stable.   Severo and grandson are at bedside.  She is admitted for further evaluation and treatment.     Today: patient is resting in bed.  She is ready to go home.  She has no pain.  Reviewed labs, normalizing creatinine today 1.03, at her baseline, reviewed renal ultrasound results-normal, again advised patient to have GI to review CT of the abdomen pelvis results at next week's appointment.  Reviewed stress test-no acute findings.  Patient is to keep her routine appointments with cardiology.     Patient has reached the maximum benefit of hospitalization and is stable for discharge  Patient has been evaluated today 02/08/25 and is stable for discharge.     She was given a prescription for mag citrate.  She was on lovenox in the hospital.   She is scheduled for endoscopy at the end of the month. The hospitalist was worried that her newly diagnosed autoimmune hepatitis was the cause of her chest discomfort.   She does admit to having nasal congestion x 1 week.   The following portions of the patient's history were reviewed and updated as appropriate: allergies, current medications, past family history, past medical history, past social history, past surgical history, and problem list.    Review of Systems   Constitutional:  Positive for activity change and fatigue.   Eyes: Negative.    Respiratory: Negative.     Cardiovascular: Negative.    Gastrointestinal:  Positive for abdominal pain.   Endocrine: Negative.  Positive for cold " "intolerance.   Genitourinary: Negative.    Musculoskeletal: Negative.  Positive for myalgias.   Allergic/Immunologic: Negative.    Neurological:  Positive for weakness.   Hematological: Negative.    Psychiatric/Behavioral: Negative.         Objective   BP 91/59 (BP Location: Left arm, Patient Position: Sitting, Cuff Size: Large Adult)   Pulse 65   Temp 98.2 °F (36.8 °C) (Infrared)   Resp 19   Ht 154.9 cm (61\")   Wt 100 kg (221 lb)   SpO2 99%   BMI 41.76 kg/m²   Physical Exam  Vitals and nursing note reviewed.   Constitutional:       Appearance: Normal appearance. She is obese.   HENT:      Head: Normocephalic and atraumatic.      Right Ear: Tympanic membrane is bulging.      Left Ear: Tympanic membrane is bulging.      Nose: Congestion and rhinorrhea present.      Mouth/Throat:      Pharynx: Pharyngeal swelling, posterior oropharyngeal erythema and postnasal drip present.   Cardiovascular:      Rate and Rhythm: Normal rate and regular rhythm.      Pulses: Normal pulses.      Heart sounds: Normal heart sounds.   Pulmonary:      Effort: Pulmonary effort is normal.      Breath sounds: Normal breath sounds.   Abdominal:      General: Abdomen is flat. Bowel sounds are normal.      Palpations: Abdomen is soft.   Skin:     General: Skin is warm and dry.   Neurological:      General: No focal deficit present.      Mental Status: She is alert and oriented to person, place, and time.   Psychiatric:         Mood and Affect: Mood normal.         Behavior: Behavior normal.         Thought Content: Thought content normal.         Judgment: Judgment normal.         Assessment & Plan   Diagnoses and all orders for this visit:    1. Atypical chest pain (Primary)    2. Autoimmune hepatitis    3. Sinus congestion  -     triamcinolone acetonide (KENALOG-40) injection 80 mg      Continue plans for endoscopy. Limit tylenol and sodium intake.              "

## 2025-02-12 ENCOUNTER — OFFICE VISIT (OUTPATIENT)
Dept: GASTROENTEROLOGY | Facility: CLINIC | Age: 75
End: 2025-02-12
Payer: MEDICARE

## 2025-02-12 VITALS
WEIGHT: 217 LBS | HEART RATE: 52 BPM | SYSTOLIC BLOOD PRESSURE: 104 MMHG | HEIGHT: 61 IN | BODY MASS INDEX: 40.97 KG/M2 | OXYGEN SATURATION: 99 % | DIASTOLIC BLOOD PRESSURE: 64 MMHG | TEMPERATURE: 97.5 F

## 2025-02-12 DIAGNOSIS — R79.89 ELEVATED LFTS: Primary | ICD-10-CM

## 2025-02-12 DIAGNOSIS — Z79.01 ANTICOAGULATED: ICD-10-CM

## 2025-02-12 DIAGNOSIS — R93.3 ABNORMAL FINDING ON GI TRACT IMAGING: ICD-10-CM

## 2025-02-12 DIAGNOSIS — D69.6 THROMBOCYTOPENIA: ICD-10-CM

## 2025-02-12 NOTE — PROGRESS NOTES
Primary Physician: Reza Charles APRN    Chief Complaint   Patient presents with    Follow-up     Pt presents today for elevated LFT and fatty liver follow up-had labs 1/29/2025; Pt did end up in [facility] last week with chest pain-was told her sodium was low and her kidney function was off-was started on Maalox at discharge; Pt states she is feeling better; Pt is scheduled for endo 2/27/2025       Subjective     Priscilla Santos is a 74 y.o. female.    HPI  Elevated LFT's  Patient with a history of elevated LFTs dating back to at least 2015.  April 2015: AST 74, ALT 78.  ALKP & Bili normal  4/15/2022: AST normal 40, ALT 36 (0-32).   normal ALKP & Bili  3/14/2023: AST 52 (0-40), ALT 43 (0-32).   ALKP & Bili normal  11/15/2024: AST 93, ALT 66.  Bili and Alkp normal     2/5/2024 liver serology: Ceruloplasmin normal, AMA normal, acute hepatitis panel normal, ferritin unremarkable.  SHOSHANA was positive as was the ASMA.   1/29/25 Normal Vitamin D     12/26/2023 liver ultrasound: Slightly coarse echogenicity liver parenchyma possibly indicating mild hepatic steatosis.     12/29/2023: ALT 85, AST 99, ALKP normal, bilirubin normal platelets 119K  Fib-4 Score= 6.5     11/15/2024 Fib-4 Score= 8.36  11/15/2024 AST 93, ALT 66, Bili/ALKP normal      2/12/2025 Pt back for office visit follow up. Her Transaminases have decreased with her recent weight loss. This certainly could represent liver improvement form steatosis. However there is still concern for ruling out AIH.  After our last office visit further blood work was collected to try to determine if AIH was present.  1/29/2025 Soluble Liver Antigen negative.  LKM type 1 antibodies/Anti-microsomal antibodies normal  IgG normal  2/8/2025 Labs: AST 95, ALT 74, Bili 1.0 ALKP 64  2/8/2025 Fib-4 Score= 9.16     ..Fib-4 scoring system  Points <1.45:                        Cirrhosis less likely  Points >=1.45 and <=3.25:       Indeterminate  Points >3.25:                        Cirrhosis  more likely      BNP normal 12/12/2023  Does take daily Lipitor  No alcohol use, no smoking.      2/7/2025 CT Scan of Abdomen & Pelvis:liver is normal, apparent filling defect in the ascending colon without obstruction.  1/15/2025 ultrasound with mildly heterogeneous liver echogenicity with mildly nodular liver contour seen with chronic liver disease.  12/26/2023 liver ultrasound showing slightly coarse echogenicity liver parenchyma indicating mild hepatic steatosis.     Patient last seen February 2024 at which time she was having abdominal pain.  We did order a upper endoscopy and lower colonoscopy on her.  Patient later called and canceled that is scheduled appointment and has not called back yet to get that reset up.  We did follow-up in May 2024 to see if she would like to get that reset up.     1/29/2025 Pt had recently (past 1 month) been focusing on a healthier diet and has lost 9 pounds. Weight 218 pounds    2/12/2025 OV: Weight down to 217 pounds     Thrombocytopenia  11/15/2024 platelets 100,000   2/8/2025 platelets 88,000        Abnormal GI Imaging of Colon  2/7/2025 CT Scan of Abdomen & Pelvis:liver is normal, apparent filling defect in the ascending colon without obstruction.    No family hx colon cancer  No abdominal pain. No diarrhea, constipation, no rectal bleeding.         Past Medical History:   Diagnosis Date    Arthritis     CHF (congestive heart failure)     Coronary artery disease     Fatty liver     History of transfusion     Hyperlipidemia     Hypertension     Sleep apnea     UTI (urinary tract infection)        Past Surgical History:   Procedure Laterality Date    APPENDECTOMY      CARDIAC SURGERY  2008    Tripple bypass    CAROTID STENT      CARPAL TUNNEL RELEASE Bilateral     CHOLECYSTECTOMY      COLON SURGERY      CORONARY STENT PLACEMENT  2021    total of 4-5 heart stents in past    HERNIA REPAIR      JOINT REPLACEMENT      KNEE SURGERY Bilateral     TONSILLECTOMY AND ADENOIDECTOMY       TUBAL ABDOMINAL LIGATION  1977    UMBILICAL HERNIA REPAIR N/A 02/02/2023    Procedure: UMBILICAL HERNIA REPAIR with mesh;  Surgeon: Lara Conley MD;  Location: [facility] OR;  Service: General;  Laterality: N/A;        Current Outpatient Medications:     aluminum-magnesium hydroxide-simethicone (MAALOX MAX) 400-400-40 MG/5ML suspension, Take 15 mL by mouth Every 6 (Six) Hours As Needed for Indigestion or Heartburn., Disp: , Rfl:     Ascorbic Acid (VITAMIN C PO), Take 1 tablet by mouth Daily., Disp: , Rfl:     aspirin 81 MG EC tablet, Take 1 tablet by mouth Daily., Disp: , Rfl:     atorvastatin (LIPITOR) 80 MG tablet, Take 1 tablet by mouth Every Night., Disp: , Rfl:     carvedilol (COREG) 6.25 MG tablet, TAKE ONE TABLET BY MOUTH TWICE A DAY, Disp: 90 tablet, Rfl: 1    cetirizine (zyrTEC) 10 MG tablet, Take 1 tablet by mouth Daily., Disp: , Rfl:     cholecalciferol (VITAMIN D3) 25 MCG (1000 UT) tablet, Take 1 tablet by mouth Daily., Disp: , Rfl:     clopidogrel (PLAVIX) 75 MG tablet, Take 1 tablet by mouth Daily., Disp: 90 tablet, Rfl: 1    fluticasone (FLONASE) 50 MCG/ACT nasal spray, USE TWO SPRAYS INTO THE NOSTRILS AS DIRECTED BY PROVIDER DAILY (Patient taking differently: Administer 2 sprays into the nostril(s) as directed by provider As Needed for Allergies.), Disp: 16 g, Rfl: 5    gabapentin (NEURONTIN) 100 MG capsule, Take 1 capsule by mouth 3 (Three) Times a Day. (Patient taking differently: Take 1 capsule by mouth Daily.), Disp: , Rfl:     hydroCHLOROthiazide (MICROZIDE) 12.5 MG capsule, Take 1 capsule by mouth Daily., Disp: 90 capsule, Rfl: 1    indapamide (LOZOL) 2.5 MG tablet, Take 1 tablet by mouth Every Morning., Disp: , Rfl:     Krill Oil (Omega-3) 500 MG capsule, Take 1 tablet by mouth Daily., Disp: , Rfl:     losartan (Cozaar) 50 MG tablet, Take 1 tablet by mouth Daily., Disp: 90 tablet, Rfl: 1    multivitamin with minerals (Multivitamin Women 50+) tablet tablet, Take 1 tablet by mouth Daily., Disp:  ", Rfl:     nitroglycerin (NITROSTAT) 0.4 MG SL tablet, Place 1 tablet under the tongue Every 5 (Five) Minutes As Needed for Chest Pain. Take no more than 3 doses in 15 minutes., Disp: , Rfl:     ranolazine (RANEXA) 1000 MG 12 hr tablet, TAKE ONE TABLET BY MOUTH EVERY 12 HOURS, Disp: 60 tablet, Rfl: 0    Turmeric Curcumin 500 MG capsule, Take 1 capsule by mouth Daily., Disp: , Rfl:     vitamin B-12 (CYANOCOBALAMIN) 100 MCG tablet, Take 1 tablet by mouth Daily., Disp: , Rfl:   No current facility-administered medications for this visit.    Allergies   Allergen Reactions    Diazepam Other (See Comments)     Made her violent    Codeine Nausea And Vomiting       Social History     Socioeconomic History    Marital status:    Tobacco Use    Smoking status: Never     Passive exposure: Never    Smokeless tobacco: Never   Vaping Use    Vaping status: Never Used   Substance and Sexual Activity    Alcohol use: Never    Drug use: Never    Sexual activity: Defer       Family History   Problem Relation Age of Onset    Diabetes Mother     Cancer Father     Heart disease Father     No Known Problems Maternal Grandmother     Diabetes Maternal Grandfather     No Known Problems Paternal Grandmother     Cancer Paternal Grandfather     Colon cancer Neg Hx     Colon polyps Neg Hx     Esophageal cancer Neg Hx     Liver disease Neg Hx     Stomach cancer Neg Hx     Rectal cancer Neg Hx     Liver cancer Neg Hx        Review of Systems   Constitutional:  Negative for unexpected weight change.   Cardiovascular:  Positive for leg swelling.       Objective     /64 (BP Location: Left arm, Patient Position: Sitting, Cuff Size: Adult)   Pulse 52   Temp 97.5 °F (36.4 °C) (Infrared)   Ht 154.9 cm (61\")   Wt 98.4 kg (217 lb)   SpO2 99%   Breastfeeding No   BMI 41.00 kg/m²     Physical Exam  Vitals reviewed.   Constitutional:       Appearance: Normal appearance.   Cardiovascular:      Rate and Rhythm: Normal rate and regular rhythm. "      Heart sounds: Normal heart sounds.   Pulmonary:      Effort: Pulmonary effort is normal.      Breath sounds: Normal breath sounds.   Musculoskeletal:      Right lower leg: Edema present.      Left lower leg: Edema present.      Comments: Mild edema to bilateral lower extremities   Neurological:      Mental Status: She is alert.         Lab Results - Last 18 Months   Lab Units 02/08/25  0254 02/07/25  0544 02/06/25  1342 01/29/25  1118 11/15/24  0730 12/29/23  0844   GLUCOSE mg/dL 110* 125* 126* 91 114* 113*   BUN mg/dL 22 27* 25* 25* 19 14   CREATININE mg/dL 1.06* 1.29* 1.03* 0.90 0.93 0.91   SODIUM mmol/L 136 129* 131* 134* 138 140   POTASSIUM mmol/L 3.9 3.9 3.9 3.8 4.0 3.7   CHLORIDE mmol/L 105 100 100 97* 104 102   CO2 mmol/L 24.0 22.0 25.0 25.0 25 25   TOTAL PROTEIN g/dL 7.1 7.1 7.5 8.8* 7.5 7.9   ALBUMIN g/dL 2.7* 2.7* 3.0* 3.5 3.5* 3.7*   ALT (SGPT) U/L 74* 85* 89* 123* 66* 85*   AST (SGOT) U/L 95* 114* 121* 153* 93* 99*   ALK PHOS U/L 64 65 64 75 68 58   BILIRUBIN mg/dL 1.0 1.1 1.4* 0.9 0.8 0.8   GLOBULIN gm/dL 4.4 4.4 4.5 5.3  --   --    GLOBULINREF g/dL  --   --   --   --  4.0 4.2       Lab Results - Last 18 Months   Lab Units 02/08/25  0254 02/07/25  0543 02/06/25  1254 01/29/25  1118 11/15/24  0730 12/29/23  0844   HEMOGLOBIN g/dL 10.9* 10.5* 10.8* 12.8 12.5 13.2   HEMATOCRIT % 32.1* 30.2* 31.5* 37.1 36.1 38.4   MCV fL 94.4 93.5 93.8 93.7 94 93   WBC 10*3/mm3 4.80 6.69 5.08 4.73 4.5 4.5   RDW % 13.2 12.9 13.1 12.8 12.7 12.5   MPV fL 10.5 10.4 10.8 10.0  --   --    PLATELETS 10*3/mm3 88* 87* 90* 123* 100* 119*   INR   --   --   --  1.33*  --   --        Lab Results - Last 18 Months   Lab Units 02/07/25  0544 02/06/25  1443 01/29/25  1118 11/15/24  0730 02/05/24  1417 02/05/24  1417 11/14/23  0730   IRON mcg/dL  --  102  --   --   --   --   --    TIBC mcg/dL  --  265*  --   --   --   --   --    IRON SATURATION (TSAT) %  --  38  --   --   --   --   --    FERRITIN ng/mL  --  310.40*  --   --   --   152.00*  --    T4 TOTAL ug/dL  --   --   --  9.0  --   --  9.5   TSH uIU/mL 0.678  --   --  1.530  --   --  1.620   VIT D 25 HYDROXY ng/ml  --   --  40.5  --    < > 40.5  --     < > = values in this interval not displayed.        Lab Results - Last 18 Months   Lab Units 02/06/25  1443 02/05/24  1417   HEP B C IGM   --  Non-Reactive   HEP B S AG   --  Non-Reactive   FERRITIN ng/mL 310.40* 152.00*   CERULOPLASMIN mg/dL  --  23   MITOCHONDRIAL AB Units  --  <20.0     EXAMINATION: CT ABDOMEN PELVIS W CONTRAST-      2/7/2025 3:53 AM     HISTORY: Epigastric pain, severe; R07.9-Chest pain, unspecified;  N18.9-Chronic kidney disease, unspecified; R74.01-Elevation of levels of  liver transaminase levels     In order to have a CT radiation dose as low as reasonably achievable  Automated Exposure Control was utilized for adjustment of the mA and/or  KV according to patient size.     Total DLP = 1062.44 mGy.cm     The CT scan of the abdomen and pelvis is performed after intravenous and  oral contrast enhancement.     Images are acquired in axial plane and subsequent reconstruction in  coronal and sagittal planes.     There is no previous similar study for comparison.     Lung bases included in the study show atelectatic changes and scarring.  No discrete nodule, infiltrate or consolidation.     Limited visualized cardiomediastinal structures show severe atheromatous  changes of the coronary arteries. Moderate cardiomegaly.     Liver is normal. Spleen is normal.     The gallbladder is surgically absent. There is no significant dilatation  of the common bile duct.     The pancreas is normal.     The adrenal glands bilaterally are normal.     Moderate lobulation of renal contour bilaterally is seen. No discrete  mass. No radiopaque calculi. No hydronephrosis. The urinary bladder is  poorly distended. No intrinsic abnormality.     Relatively small uterus is seen which is anteverted. No adnexal masses.     The stomach is normal.  There is small diverticulum from the mid  descending duodenum near the insertion of the common bile duct. The  remaining duodenum is normal. Small bowel is normal dilated. There is a  structure extending from the tip of the cecum which appears to represent  appendix?. However there is a history of appendectomy. This may be  clinically correlated. No finding to suggest an inflammatory process  about this structure. There is a filling defect in the proximal  ascending colon which probably represent retained stool. Possibility of  a nonobstructing mass not excluded. No finding to suggest obstruction.  Moderate gas and stool is seen throughout the colon. No finding to  suggest obstruction. There is diverticulosis of the colon. No evidence  of a diverticulitis.     Atheromatous changes of the abdominal aorta and iliac arteries. No  aneurysmal dilatation.     There are a few borderline prominent retroperitoneal para-aortic and  aortocaval lymph nodes are seen. The largest lymph node adjacent and  anterior to the distal inferior vena cava measures 11 mm in short axis.     Images reviewed in bone window show chronic degenerative changes of the  lumbar spine. There is a compression fracture of vertebra T12 with 50%  loss of height. There is mild retropulsion. There is a degenerative  spondylolisthesis at L4-5.     IMPRESSION:  1. No acute findings to account for patient's symptoms.  2. An apparent filling defect in the ascending colon without obstruction  may suggest retained stool. Possibility of a nonobstructing mass may not  be excluded.  3. Diverticulosis of the colon. No evidence of diverticulitis.  4. Compression fracture with 50% loss of height of vertebra T12 with  moderate retropulsion. This was noted in the previous CT angiography  chest in January 2024 and represent a chronic process.  5. Other nonacute findings as above.     This report was signed and finalized on 2/7/2025 6:27 AM by Dr. Irasema Bernal,  MD.      IMPRESSION/PLAN:    Assessment & Plan      Problem List Items Addressed This Visit       Elevated LFTs - Primary    Overview     Suspect chronic liver disease to be associated with fatty liver and possibly AIH.  Jan 2025 sono: with chronic liver disease, nodular liver.  LFT's have trended downward with weight loss.  Positive SHOSHANA, positive ASMA so suspect autoimmune hepatitis lead to further labs. 1/2025 Neg Soluble Liver Antigen and Anti-microsomal antibodies.  Other liver serology collected in February 2024 unremarkable including AMA, ceruloplasmin, ferritin, acute hepatitis panel  No history of HE or Ascites    2/7/2025 CT Scan Abdomen: liver is normal.  12/26/2023 liver ultrasound: Slightly coarse echogenicity liver parenchyma possibly indicating mild hepatic steatosis.      2/8/2025 Fib-4 Score= 9.16  12/29/2023: ALT 85, AST 99, ALKP normal, bilirubin normal platelets 119K, Fib-4 Score= 6.5  ..Fib-4 scoring system  Points <1.45:                      Cirrhosis less likely  Points >=1.45 and <=3.25:       Indeterminate  Points >3.25:                      Cirrhosis more likely     BNP normal 12/12/2023  Daily use of a statin  Fatty liver per US         Current Assessment & Plan     [facility] to help determine if any AIH component.  Further education of importance of weight loss and healthy lifestyle. Recommend exercise 3-5 times per week. Obtain normal weight,  diet.  Recheck in 3 months at which time we will re-weigh.  Plan Endoscopy to rule out any varices         Relevant Orders    Ambulatory Referral to Hepatology (Completed)    Thrombocytopenia    Overview     11/15/2024 platelets 100,000         Relevant Orders    Ambulatory Referral to Hepatology (Completed)    Abnormal finding on GI tract imaging    Overview     2/7/2025 CT Scan of Abdomen & Pelvis:liver is normal, apparent filling defect in the ascending colon without obstruction.         Current  Assessment & Plan     Colonoscopy per Dr Ulloa with Miralax Prep. Will need Plavix held 5 days prior to procedures and will ask PCP for approval to do so.         Relevant Orders    Case Request (Completed)    Follow Anesthesia Guidelines / Protocol    Anticoagulated    Overview     Daily Plavix therapy          Plan colonoscopy along with endoscopy  Will need Plavix held 5 days prior.  Refer to Hepatology  3 month follow up          ..The risks, benefits, and alternatives of colonoscopy were reviewed with the patient today.  Risks including perforation of the colon possibly requiring surgery or colostomy.  Additional risks include risk of bleeding from biopsies or removal of colon tissue.  There is also the risk of a drug reaction or problems with anesthesia.  This will be discussed with the further by the anesthesia team on the day of the procedure.  Lastly there is a possibility of missing a colon polyp or cancer.  The benefits include the diagnosis and management of disease of the colon and rectum.  Alternatives to colonoscopy include barium enema, laboratory testing, radiographic evaluation, or no intervention.  The patient verbalizes understanding and agrees.    In accordance with requirements under the Affordable Care Act, [facility] has provided pricing for all hospital services and items on each of its websites. However, a patient's actual cost may differ based on the services the patient receives to meet individual healthcare needs and based on the benefits provided under the patient’s insurance coverage.        Carlyn Whitney, APRN  02/12/25  13:58 CST    Part of this note may be an electronic transcription/translation of spoken language to printed text.

## 2025-02-12 NOTE — ASSESSMENT & PLAN NOTE
Colonoscopy per Dr Ulloa with Miralax Prep. Will need Plavix held 5 days prior to procedures and will ask PCP for approval to do so.

## 2025-02-12 NOTE — H&P (VIEW-ONLY)
Primary Physician: Reza Charles APRN    Chief Complaint   Patient presents with    Follow-up     Pt presents today for elevated LFT and fatty liver follow up-had labs 1/29/2025; Pt did end up in [facility] last week with chest pain-was told her sodium was low and her kidney function was off-was started on Maalox at discharge; Pt states she is feeling better; Pt is scheduled for endo 2/27/2025       Subjective     Priscilla Santos is a 74 y.o. female.    HPI  Elevated LFT's  Patient with a history of elevated LFTs dating back to at least 2015.  April 2015: AST 74, ALT 78.  ALKP & Bili normal  4/15/2022: AST normal 40, ALT 36 (0-32).   normal ALKP & Bili  3/14/2023: AST 52 (0-40), ALT 43 (0-32).   ALKP & Bili normal  11/15/2024: AST 93, ALT 66.  Bili and Alkp normal     2/5/2024 liver serology: Ceruloplasmin normal, AMA normal, acute hepatitis panel normal, ferritin unremarkable.  SHOSHANA was positive as was the ASMA.   1/29/25 Normal Vitamin D     12/26/2023 liver ultrasound: Slightly coarse echogenicity liver parenchyma possibly indicating mild hepatic steatosis.     12/29/2023: ALT 85, AST 99, ALKP normal, bilirubin normal platelets 119K  Fib-4 Score= 6.5     11/15/2024 Fib-4 Score= 8.36  11/15/2024 AST 93, ALT 66, Bili/ALKP normal      2/12/2025 Pt back for office visit follow up. Her Transaminases have decreased with her recent weight loss. This certainly could represent liver improvement form steatosis. However there is still concern for ruling out AIH.  After our last office visit further blood work was collected to try to determine if AIH was present.  1/29/2025 Soluble Liver Antigen negative.  LKM type 1 antibodies/Anti-microsomal antibodies normal  IgG normal  2/8/2025 Labs: AST 95, ALT 74, Bili 1.0 ALKP 64  2/8/2025 Fib-4 Score= 9.16     ..Fib-4 scoring system  Points <1.45:                        Cirrhosis less likely  Points >=1.45 and <=3.25:       Indeterminate  Points >3.25:                        Cirrhosis  more likely      BNP normal 12/12/2023  Does take daily Lipitor  No alcohol use, no smoking.      2/7/2025 CT Scan of Abdomen & Pelvis:liver is normal, apparent filling defect in the ascending colon without obstruction.  1/15/2025 ultrasound with mildly heterogeneous liver echogenicity with mildly nodular liver contour seen with chronic liver disease.  12/26/2023 liver ultrasound showing slightly coarse echogenicity liver parenchyma indicating mild hepatic steatosis.     Patient last seen February 2024 at which time she was having abdominal pain.  We did order a upper endoscopy and lower colonoscopy on her.  Patient later called and canceled that is scheduled appointment and has not called back yet to get that reset up.  We did follow-up in May 2024 to see if she would like to get that reset up.     1/29/2025 Pt had recently (past 1 month) been focusing on a healthier diet and has lost 9 pounds. Weight 218 pounds    2/12/2025 OV: Weight down to 217 pounds     Thrombocytopenia  11/15/2024 platelets 100,000   2/8/2025 platelets 88,000        Abnormal GI Imaging of Colon  2/7/2025 CT Scan of Abdomen & Pelvis:liver is normal, apparent filling defect in the ascending colon without obstruction.    No family hx colon cancer  No abdominal pain. No diarrhea, constipation, no rectal bleeding.         Past Medical History:   Diagnosis Date    Arthritis     CHF (congestive heart failure)     Coronary artery disease     Fatty liver     History of transfusion     Hyperlipidemia     Hypertension     Sleep apnea     UTI (urinary tract infection)        Past Surgical History:   Procedure Laterality Date    APPENDECTOMY      CARDIAC SURGERY  2008    Tripple bypass    CAROTID STENT      CARPAL TUNNEL RELEASE Bilateral     CHOLECYSTECTOMY      COLON SURGERY      CORONARY STENT PLACEMENT  2021    total of 4-5 heart stents in past    HERNIA REPAIR      JOINT REPLACEMENT      KNEE SURGERY Bilateral     TONSILLECTOMY AND ADENOIDECTOMY       TUBAL ABDOMINAL LIGATION  1977    UMBILICAL HERNIA REPAIR N/A 02/02/2023    Procedure: UMBILICAL HERNIA REPAIR with mesh;  Surgeon: Lara Conley MD;  Location: [facility] OR;  Service: General;  Laterality: N/A;        Current Outpatient Medications:     aluminum-magnesium hydroxide-simethicone (MAALOX MAX) 400-400-40 MG/5ML suspension, Take 15 mL by mouth Every 6 (Six) Hours As Needed for Indigestion or Heartburn., Disp: , Rfl:     Ascorbic Acid (VITAMIN C PO), Take 1 tablet by mouth Daily., Disp: , Rfl:     aspirin 81 MG EC tablet, Take 1 tablet by mouth Daily., Disp: , Rfl:     atorvastatin (LIPITOR) 80 MG tablet, Take 1 tablet by mouth Every Night., Disp: , Rfl:     carvedilol (COREG) 6.25 MG tablet, TAKE ONE TABLET BY MOUTH TWICE A DAY, Disp: 90 tablet, Rfl: 1    cetirizine (zyrTEC) 10 MG tablet, Take 1 tablet by mouth Daily., Disp: , Rfl:     cholecalciferol (VITAMIN D3) 25 MCG (1000 UT) tablet, Take 1 tablet by mouth Daily., Disp: , Rfl:     clopidogrel (PLAVIX) 75 MG tablet, Take 1 tablet by mouth Daily., Disp: 90 tablet, Rfl: 1    fluticasone (FLONASE) 50 MCG/ACT nasal spray, USE TWO SPRAYS INTO THE NOSTRILS AS DIRECTED BY PROVIDER DAILY (Patient taking differently: Administer 2 sprays into the nostril(s) as directed by provider As Needed for Allergies.), Disp: 16 g, Rfl: 5    gabapentin (NEURONTIN) 100 MG capsule, Take 1 capsule by mouth 3 (Three) Times a Day. (Patient taking differently: Take 1 capsule by mouth Daily.), Disp: , Rfl:     hydroCHLOROthiazide (MICROZIDE) 12.5 MG capsule, Take 1 capsule by mouth Daily., Disp: 90 capsule, Rfl: 1    indapamide (LOZOL) 2.5 MG tablet, Take 1 tablet by mouth Every Morning., Disp: , Rfl:     Krill Oil (Omega-3) 500 MG capsule, Take 1 tablet by mouth Daily., Disp: , Rfl:     losartan (Cozaar) 50 MG tablet, Take 1 tablet by mouth Daily., Disp: 90 tablet, Rfl: 1    multivitamin with minerals (Multivitamin Women 50+) tablet tablet, Take 1 tablet by mouth Daily., Disp:  ", Rfl:     nitroglycerin (NITROSTAT) 0.4 MG SL tablet, Place 1 tablet under the tongue Every 5 (Five) Minutes As Needed for Chest Pain. Take no more than 3 doses in 15 minutes., Disp: , Rfl:     ranolazine (RANEXA) 1000 MG 12 hr tablet, TAKE ONE TABLET BY MOUTH EVERY 12 HOURS, Disp: 60 tablet, Rfl: 0    Turmeric Curcumin 500 MG capsule, Take 1 capsule by mouth Daily., Disp: , Rfl:     vitamin B-12 (CYANOCOBALAMIN) 100 MCG tablet, Take 1 tablet by mouth Daily., Disp: , Rfl:   No current facility-administered medications for this visit.    Allergies   Allergen Reactions    Diazepam Other (See Comments)     Made her violent    Codeine Nausea And Vomiting       Social History     Socioeconomic History    Marital status:    Tobacco Use    Smoking status: Never     Passive exposure: Never    Smokeless tobacco: Never   Vaping Use    Vaping status: Never Used   Substance and Sexual Activity    Alcohol use: Never    Drug use: Never    Sexual activity: Defer       Family History   Problem Relation Age of Onset    Diabetes Mother     Cancer Father     Heart disease Father     No Known Problems Maternal Grandmother     Diabetes Maternal Grandfather     No Known Problems Paternal Grandmother     Cancer Paternal Grandfather     Colon cancer Neg Hx     Colon polyps Neg Hx     Esophageal cancer Neg Hx     Liver disease Neg Hx     Stomach cancer Neg Hx     Rectal cancer Neg Hx     Liver cancer Neg Hx        Review of Systems   Constitutional:  Negative for unexpected weight change.   Cardiovascular:  Positive for leg swelling.       Objective     /64 (BP Location: Left arm, Patient Position: Sitting, Cuff Size: Adult)   Pulse 52   Temp 97.5 °F (36.4 °C) (Infrared)   Ht 154.9 cm (61\")   Wt 98.4 kg (217 lb)   SpO2 99%   Breastfeeding No   BMI 41.00 kg/m²     Physical Exam  Vitals reviewed.   Constitutional:       Appearance: Normal appearance.   Cardiovascular:      Rate and Rhythm: Normal rate and regular rhythm. "      Heart sounds: Normal heart sounds.   Pulmonary:      Effort: Pulmonary effort is normal.      Breath sounds: Normal breath sounds.   Musculoskeletal:      Right lower leg: Edema present.      Left lower leg: Edema present.      Comments: Mild edema to bilateral lower extremities   Neurological:      Mental Status: She is alert.         Lab Results - Last 18 Months   Lab Units 02/08/25  0254 02/07/25  0544 02/06/25  1342 01/29/25  1118 11/15/24  0730 12/29/23  0844   GLUCOSE mg/dL 110* 125* 126* 91 114* 113*   BUN mg/dL 22 27* 25* 25* 19 14   CREATININE mg/dL 1.06* 1.29* 1.03* 0.90 0.93 0.91   SODIUM mmol/L 136 129* 131* 134* 138 140   POTASSIUM mmol/L 3.9 3.9 3.9 3.8 4.0 3.7   CHLORIDE mmol/L 105 100 100 97* 104 102   CO2 mmol/L 24.0 22.0 25.0 25.0 25 25   TOTAL PROTEIN g/dL 7.1 7.1 7.5 8.8* 7.5 7.9   ALBUMIN g/dL 2.7* 2.7* 3.0* 3.5 3.5* 3.7*   ALT (SGPT) U/L 74* 85* 89* 123* 66* 85*   AST (SGOT) U/L 95* 114* 121* 153* 93* 99*   ALK PHOS U/L 64 65 64 75 68 58   BILIRUBIN mg/dL 1.0 1.1 1.4* 0.9 0.8 0.8   GLOBULIN gm/dL 4.4 4.4 4.5 5.3  --   --    GLOBULINREF g/dL  --   --   --   --  4.0 4.2       Lab Results - Last 18 Months   Lab Units 02/08/25  0254 02/07/25  0543 02/06/25  1254 01/29/25  1118 11/15/24  0730 12/29/23  0844   HEMOGLOBIN g/dL 10.9* 10.5* 10.8* 12.8 12.5 13.2   HEMATOCRIT % 32.1* 30.2* 31.5* 37.1 36.1 38.4   MCV fL 94.4 93.5 93.8 93.7 94 93   WBC 10*3/mm3 4.80 6.69 5.08 4.73 4.5 4.5   RDW % 13.2 12.9 13.1 12.8 12.7 12.5   MPV fL 10.5 10.4 10.8 10.0  --   --    PLATELETS 10*3/mm3 88* 87* 90* 123* 100* 119*   INR   --   --   --  1.33*  --   --        Lab Results - Last 18 Months   Lab Units 02/07/25  0544 02/06/25  1443 01/29/25  1118 11/15/24  0730 02/05/24  1417 02/05/24  1417 11/14/23  0730   IRON mcg/dL  --  102  --   --   --   --   --    TIBC mcg/dL  --  265*  --   --   --   --   --    IRON SATURATION (TSAT) %  --  38  --   --   --   --   --    FERRITIN ng/mL  --  310.40*  --   --   --   152.00*  --    T4 TOTAL ug/dL  --   --   --  9.0  --   --  9.5   TSH uIU/mL 0.678  --   --  1.530  --   --  1.620   VIT D 25 HYDROXY ng/ml  --   --  40.5  --    < > 40.5  --     < > = values in this interval not displayed.        Lab Results - Last 18 Months   Lab Units 02/06/25  1443 02/05/24  1417   HEP B C IGM   --  Non-Reactive   HEP B S AG   --  Non-Reactive   FERRITIN ng/mL 310.40* 152.00*   CERULOPLASMIN mg/dL  --  23   MITOCHONDRIAL AB Units  --  <20.0     EXAMINATION: CT ABDOMEN PELVIS W CONTRAST-      2/7/2025 3:53 AM     HISTORY: Epigastric pain, severe; R07.9-Chest pain, unspecified;  N18.9-Chronic kidney disease, unspecified; R74.01-Elevation of levels of  liver transaminase levels     In order to have a CT radiation dose as low as reasonably achievable  Automated Exposure Control was utilized for adjustment of the mA and/or  KV according to patient size.     Total DLP = 1062.44 mGy.cm     The CT scan of the abdomen and pelvis is performed after intravenous and  oral contrast enhancement.     Images are acquired in axial plane and subsequent reconstruction in  coronal and sagittal planes.     There is no previous similar study for comparison.     Lung bases included in the study show atelectatic changes and scarring.  No discrete nodule, infiltrate or consolidation.     Limited visualized cardiomediastinal structures show severe atheromatous  changes of the coronary arteries. Moderate cardiomegaly.     Liver is normal. Spleen is normal.     The gallbladder is surgically absent. There is no significant dilatation  of the common bile duct.     The pancreas is normal.     The adrenal glands bilaterally are normal.     Moderate lobulation of renal contour bilaterally is seen. No discrete  mass. No radiopaque calculi. No hydronephrosis. The urinary bladder is  poorly distended. No intrinsic abnormality.     Relatively small uterus is seen which is anteverted. No adnexal masses.     The stomach is normal.  There is small diverticulum from the mid  descending duodenum near the insertion of the common bile duct. The  remaining duodenum is normal. Small bowel is normal dilated. There is a  structure extending from the tip of the cecum which appears to represent  appendix?. However there is a history of appendectomy. This may be  clinically correlated. No finding to suggest an inflammatory process  about this structure. There is a filling defect in the proximal  ascending colon which probably represent retained stool. Possibility of  a nonobstructing mass not excluded. No finding to suggest obstruction.  Moderate gas and stool is seen throughout the colon. No finding to  suggest obstruction. There is diverticulosis of the colon. No evidence  of a diverticulitis.     Atheromatous changes of the abdominal aorta and iliac arteries. No  aneurysmal dilatation.     There are a few borderline prominent retroperitoneal para-aortic and  aortocaval lymph nodes are seen. The largest lymph node adjacent and  anterior to the distal inferior vena cava measures 11 mm in short axis.     Images reviewed in bone window show chronic degenerative changes of the  lumbar spine. There is a compression fracture of vertebra T12 with 50%  loss of height. There is mild retropulsion. There is a degenerative  spondylolisthesis at L4-5.     IMPRESSION:  1. No acute findings to account for patient's symptoms.  2. An apparent filling defect in the ascending colon without obstruction  may suggest retained stool. Possibility of a nonobstructing mass may not  be excluded.  3. Diverticulosis of the colon. No evidence of diverticulitis.  4. Compression fracture with 50% loss of height of vertebra T12 with  moderate retropulsion. This was noted in the previous CT angiography  chest in January 2024 and represent a chronic process.  5. Other nonacute findings as above.     This report was signed and finalized on 2/7/2025 6:27 AM by Dr. Irasema Bernal,  MD.      IMPRESSION/PLAN:    Assessment & Plan      Problem List Items Addressed This Visit       Elevated LFTs - Primary    Overview     Suspect chronic liver disease to be associated with fatty liver and possibly AIH.  Jan 2025 sono: with chronic liver disease, nodular liver.  LFT's have trended downward with weight loss.  Positive SHOSHANA, positive ASMA so suspect autoimmune hepatitis lead to further labs. 1/2025 Neg Soluble Liver Antigen and Anti-microsomal antibodies.  Other liver serology collected in February 2024 unremarkable including AMA, ceruloplasmin, ferritin, acute hepatitis panel  No history of HE or Ascites    2/7/2025 CT Scan Abdomen: liver is normal.  12/26/2023 liver ultrasound: Slightly coarse echogenicity liver parenchyma possibly indicating mild hepatic steatosis.      2/8/2025 Fib-4 Score= 9.16  12/29/2023: ALT 85, AST 99, ALKP normal, bilirubin normal platelets 119K, Fib-4 Score= 6.5  ..Fib-4 scoring system  Points <1.45:                      Cirrhosis less likely  Points >=1.45 and <=3.25:       Indeterminate  Points >3.25:                      Cirrhosis more likely     BNP normal 12/12/2023  Daily use of a statin  Fatty liver per US         Current Assessment & Plan     [facility] to help determine if any AIH component.  Further education of importance of weight loss and healthy lifestyle. Recommend exercise 3-5 times per week. Obtain normal weight,  diet.  Recheck in 3 months at which time we will re-weigh.  Plan Endoscopy to rule out any varices         Relevant Orders    Ambulatory Referral to Hepatology (Completed)    Thrombocytopenia    Overview     11/15/2024 platelets 100,000         Relevant Orders    Ambulatory Referral to Hepatology (Completed)    Abnormal finding on GI tract imaging    Overview     2/7/2025 CT Scan of Abdomen & Pelvis:liver is normal, apparent filling defect in the ascending colon without obstruction.         Current  Assessment & Plan     Colonoscopy per Dr Ulloa with Miralax Prep. Will need Plavix held 5 days prior to procedures and will ask PCP for approval to do so.         Relevant Orders    Case Request (Completed)    Follow Anesthesia Guidelines / Protocol    Anticoagulated    Overview     Daily Plavix therapy          Plan colonoscopy along with endoscopy  Will need Plavix held 5 days prior.  Refer to Hepatology  3 month follow up          ..The risks, benefits, and alternatives of colonoscopy were reviewed with the patient today.  Risks including perforation of the colon possibly requiring surgery or colostomy.  Additional risks include risk of bleeding from biopsies or removal of colon tissue.  There is also the risk of a drug reaction or problems with anesthesia.  This will be discussed with the further by the anesthesia team on the day of the procedure.  Lastly there is a possibility of missing a colon polyp or cancer.  The benefits include the diagnosis and management of disease of the colon and rectum.  Alternatives to colonoscopy include barium enema, laboratory testing, radiographic evaluation, or no intervention.  The patient verbalizes understanding and agrees.    In accordance with requirements under the Affordable Care Act, [facility] has provided pricing for all hospital services and items on each of its websites. However, a patient's actual cost may differ based on the services the patient receives to meet individual healthcare needs and based on the benefits provided under the patient’s insurance coverage.        Carlyn Whitney, APRN  02/12/25  13:58 CST    Part of this note may be an electronic transcription/translation of spoken language to printed text.

## 2025-02-13 ENCOUNTER — TELEPHONE (OUTPATIENT)
Dept: GASTROENTEROLOGY | Facility: CLINIC | Age: 75
End: 2025-02-13
Payer: MEDICARE

## 2025-02-13 NOTE — TELEPHONE ENCOUNTER
Spoke with pt re: Plavix clearance and she vu. She will take asa 81mg while holding. Advised pt to call back with any other questions.

## 2025-02-17 ENCOUNTER — OFFICE VISIT (OUTPATIENT)
Dept: FAMILY MEDICINE CLINIC | Facility: CLINIC | Age: 75
End: 2025-02-17
Payer: MEDICARE

## 2025-02-17 VITALS
OXYGEN SATURATION: 94 % | TEMPERATURE: 96.4 F | SYSTOLIC BLOOD PRESSURE: 126 MMHG | HEIGHT: 61 IN | BODY MASS INDEX: 41.16 KG/M2 | HEART RATE: 58 BPM | WEIGHT: 218 LBS | DIASTOLIC BLOOD PRESSURE: 77 MMHG

## 2025-02-17 DIAGNOSIS — H66.001 NON-RECURRENT ACUTE SUPPURATIVE OTITIS MEDIA OF RIGHT EAR WITHOUT SPONTANEOUS RUPTURE OF TYMPANIC MEMBRANE: Primary | ICD-10-CM

## 2025-02-17 PROCEDURE — 1125F AMNT PAIN NOTED PAIN PRSNT: CPT | Performed by: NURSE PRACTITIONER

## 2025-02-17 PROCEDURE — 3078F DIAST BP <80 MM HG: CPT | Performed by: NURSE PRACTITIONER

## 2025-02-17 PROCEDURE — 1159F MED LIST DOCD IN RCRD: CPT | Performed by: NURSE PRACTITIONER

## 2025-02-17 PROCEDURE — 3044F HG A1C LEVEL LT 7.0%: CPT | Performed by: NURSE PRACTITIONER

## 2025-02-17 PROCEDURE — 99213 OFFICE O/P EST LOW 20 MIN: CPT | Performed by: NURSE PRACTITIONER

## 2025-02-17 PROCEDURE — 96372 THER/PROPH/DIAG INJ SC/IM: CPT | Performed by: NURSE PRACTITIONER

## 2025-02-17 PROCEDURE — 3074F SYST BP LT 130 MM HG: CPT | Performed by: NURSE PRACTITIONER

## 2025-02-17 PROCEDURE — 1160F RVW MEDS BY RX/DR IN RCRD: CPT | Performed by: NURSE PRACTITIONER

## 2025-02-17 RX ORDER — CEFTRIAXONE 1 G/1
1 INJECTION, POWDER, FOR SOLUTION INTRAMUSCULAR; INTRAVENOUS EVERY 24 HOURS
Status: COMPLETED | OUTPATIENT
Start: 2025-02-17 | End: 2025-02-17

## 2025-02-17 RX ADMIN — CEFTRIAXONE 1 G: 1 INJECTION, POWDER, FOR SOLUTION INTRAMUSCULAR; INTRAVENOUS at 10:47

## 2025-02-17 NOTE — PROGRESS NOTES
"Chief Complaint   Patient presents with    Earache     Started a few days ago, mostly in the right side.     Headache        Subjective   Priscilla Santos is a 74 y.o. female who presents today for the following: right ear pain with headache.     Earache   There is pain in the right ear. This is a new problem. The current episode started in the past 7 days. The problem occurs daily. The problem has been worse. The pain is moderate.      Patient presents with right ear pain with headache x 3 days.     Allergies   Allergen Reactions    Diazepam Other (See Comments)     Made her violent    Codeine Nausea And Vomiting         OBJECTIVE:  Vitals:    02/17/25 0952   BP: 126/77   BP Location: Left arm   Patient Position: Sitting   Cuff Size: Adult   Pulse: 58   Temp: 96.4 °F (35.8 °C)   TempSrc: Temporal   SpO2: 94%   Weight: 98.9 kg (218 lb)   Height: 154.9 cm (60.98\")     Physical Exam  Vitals reviewed.   HENT:      Right Ear: A middle ear effusion is present. Tympanic membrane is erythematous and bulging.                    ASSESSMENT/ PLAN:    Diagnoses and all orders for this visit:    1. Non-recurrent acute suppurative otitis media of right ear without spontaneous rupture of tympanic membrane (Primary)  -     amoxicillin-clavulanate (AUGMENTIN) 875-125 MG per tablet; Take 1 tablet by mouth 2 (Two) Times a Day for 10 days.  Dispense: 20 tablet; Refill: 0  -     cefTRIAXone (ROCEPHIN) injection 1 g      Procedures     Management Plan:   Complete all antibiotics.  An After Visit Summary was printed and given to the patient at discharge.    Follow-up: Return if symptoms worsen or fail to improve.         Reza Charles, APRN 2/17/2025 10:30 CST  This note was electronically signed.          "

## 2025-02-27 ENCOUNTER — ANESTHESIA EVENT (OUTPATIENT)
Dept: GASTROENTEROLOGY | Facility: HOSPITAL | Age: 75
End: 2025-02-27
Payer: MEDICARE

## 2025-02-27 ENCOUNTER — HOSPITAL ENCOUNTER (OUTPATIENT)
Facility: HOSPITAL | Age: 75
Setting detail: HOSPITAL OUTPATIENT SURGERY
Discharge: HOME | End: 2025-02-27
Attending: INTERNAL MEDICINE | Admitting: INTERNAL MEDICINE
Payer: MEDICARE

## 2025-02-27 ENCOUNTER — ANESTHESIA (OUTPATIENT)
Dept: GASTROENTEROLOGY | Facility: HOSPITAL | Age: 75
End: 2025-02-27
Payer: MEDICARE

## 2025-02-27 VITALS
TEMPERATURE: 96.8 F | SYSTOLIC BLOOD PRESSURE: 127 MMHG | WEIGHT: 212 LBS | BODY MASS INDEX: 40.02 KG/M2 | OXYGEN SATURATION: 100 % | DIASTOLIC BLOOD PRESSURE: 81 MMHG | HEIGHT: 61 IN | HEART RATE: 62 BPM | RESPIRATION RATE: 20 BRPM

## 2025-02-27 DIAGNOSIS — R93.3 ABNORMAL FINDING ON GI TRACT IMAGING: ICD-10-CM

## 2025-02-27 PROCEDURE — 45385 COLONOSCOPY W/LESION REMOVAL: CPT | Performed by: INTERNAL MEDICINE

## 2025-02-27 PROCEDURE — 25010000002 PROPOFOL 10 MG/ML EMULSION: Performed by: NURSE ANESTHETIST, CERTIFIED REGISTERED

## 2025-02-27 PROCEDURE — 88305 TISSUE EXAM BY PATHOLOGIST: CPT | Performed by: INTERNAL MEDICINE

## 2025-02-27 PROCEDURE — 43239 EGD BIOPSY SINGLE/MULTIPLE: CPT | Performed by: INTERNAL MEDICINE

## 2025-02-27 PROCEDURE — 25010000002 LIDOCAINE PF 2% 2 % SOLUTION: Performed by: NURSE ANESTHETIST, CERTIFIED REGISTERED

## 2025-02-27 PROCEDURE — 25810000003 SODIUM CHLORIDE 0.9 % SOLUTION: Performed by: ANESTHESIOLOGY

## 2025-02-27 PROCEDURE — 88342 IMHCHEM/IMCYTCHM 1ST ANTB: CPT | Performed by: INTERNAL MEDICINE

## 2025-02-27 DEVICE — DEV CLIP ENDO RESOLUTION360 CONTRL ROT 235CM: Type: IMPLANTABLE DEVICE | Site: RECTUM | Status: FUNCTIONAL

## 2025-02-27 RX ORDER — SODIUM CHLORIDE 0.9 % (FLUSH) 0.9 %
10 SYRINGE (ML) INJECTION AS NEEDED
Status: DISCONTINUED | OUTPATIENT
Start: 2025-02-27 | End: 2025-02-27 | Stop reason: HOSPADM

## 2025-02-27 RX ORDER — PROPOFOL 10 MG/ML
VIAL (ML) INTRAVENOUS AS NEEDED
Status: DISCONTINUED | OUTPATIENT
Start: 2025-02-27 | End: 2025-02-27 | Stop reason: SURG

## 2025-02-27 RX ORDER — LIDOCAINE HYDROCHLORIDE 20 MG/ML
INJECTION, SOLUTION EPIDURAL; INFILTRATION; INTRACAUDAL; PERINEURAL AS NEEDED
Status: DISCONTINUED | OUTPATIENT
Start: 2025-02-27 | End: 2025-02-27 | Stop reason: SURG

## 2025-02-27 RX ORDER — LIDOCAINE HYDROCHLORIDE 10 MG/ML
0.5 INJECTION, SOLUTION EPIDURAL; INFILTRATION; INTRACAUDAL; PERINEURAL ONCE AS NEEDED
Status: DISCONTINUED | OUTPATIENT
Start: 2025-02-27 | End: 2025-02-27 | Stop reason: HOSPADM

## 2025-02-27 RX ORDER — SODIUM CHLORIDE 9 MG/ML
500 INJECTION, SOLUTION INTRAVENOUS CONTINUOUS PRN
Status: DISCONTINUED | OUTPATIENT
Start: 2025-02-27 | End: 2025-02-27 | Stop reason: HOSPADM

## 2025-02-27 RX ADMIN — PROPOFOL 80 MG: 10 INJECTION, EMULSION INTRAVENOUS at 12:26

## 2025-02-27 RX ADMIN — PROPOFOL 80 MG: 10 INJECTION, EMULSION INTRAVENOUS at 12:38

## 2025-02-27 RX ADMIN — PROPOFOL 80 MG: 10 INJECTION, EMULSION INTRAVENOUS at 12:09

## 2025-02-27 RX ADMIN — SODIUM CHLORIDE 500 ML: 9 INJECTION, SOLUTION INTRAVENOUS at 10:49

## 2025-02-27 RX ADMIN — LIDOCAINE HYDROCHLORIDE 100 MG: 20 INJECTION, SOLUTION EPIDURAL; INFILTRATION; INTRACAUDAL; PERINEURAL at 12:09

## 2025-02-27 RX ADMIN — PROPOFOL 80 MG: 10 INJECTION, EMULSION INTRAVENOUS at 12:32

## 2025-02-27 RX ADMIN — PROPOFOL 80 MG: 10 INJECTION, EMULSION INTRAVENOUS at 12:44

## 2025-02-27 RX ADMIN — PROPOFOL 80 MG: 10 INJECTION, EMULSION INTRAVENOUS at 12:12

## 2025-02-27 RX ADMIN — PROPOFOL 80 MG: 10 INJECTION, EMULSION INTRAVENOUS at 12:19

## 2025-02-27 NOTE — ANESTHESIA PREPROCEDURE EVALUATION
Anesthesia Evaluation     Patient summary reviewed   no history of anesthetic complications:   NPO Solid Status: > 8 hours             Airway   Mallampati: II  Dental      Pulmonary    (+) ,sleep apnea on CPAP  Cardiovascular     (+) hypertension, CAD, CABG, cardiac stents more than 12 months ago , angina (chronic stable), hyperlipidemia    ROS comment: Low rsik stress 2/2025      Neuro/Psych- negative ROS  GI/Hepatic/Renal/Endo    (+) morbid obesity, renal disease- CRI    Musculoskeletal     Abdominal    Substance History      OB/GYN          Other                    Anesthesia Plan    ASA 3     MAC       Anesthetic plan, risks, benefits, and alternatives have been provided, discussed and informed consent has been obtained with: patient.    CODE STATUS:

## 2025-02-27 NOTE — ANESTHESIA POSTPROCEDURE EVALUATION
Patient: Priscilla Santos    Procedure Summary       Date: 02/27/25 Room / Location:  [facility] /  [facility]    Anesthesia Start: 1206 Anesthesia Stop: 1257    Procedures:       ESOPHAGOGASTRODUODENOSCOPY WITH ANESTHESIA      COLONOSCOPY WITH ANESTHESIA Diagnosis:       Abnormal finding on GI tract imaging      (Abnormal finding on GI tract imaging [R93.3])    Surgeons: Raiza Ulloa MD Provider: Conner Aguillon CRNA    Anesthesia Type: MAC ASA Status: 3            Anesthesia Type: MAC    Vitals  Vitals Value Taken Time   BP     Temp     Pulse 60 02/27/25 1258   Resp     SpO2 99 % 02/27/25 1258   Vitals shown include unfiled device data.        Post Anesthesia Care and Evaluation    Patient location during evaluation: PHASE II  Patient participation: complete - patient participated  Level of consciousness: awake and alert  Pain management: adequate    Airway patency: patent  Anesthetic complications: No anesthetic complications  PONV Status: none  Cardiovascular status: acceptable and stable  Respiratory status: acceptable and unassisted  Hydration status: acceptable

## 2025-03-02 LAB
CYTO UR: NORMAL
LAB AP CASE REPORT: NORMAL
Lab: NORMAL
PATH REPORT.FINAL DX SPEC: NORMAL
PATH REPORT.GROSS SPEC: NORMAL

## 2025-03-03 PROBLEM — Z86.0101 PERSONAL HISTORY OF ADENOMATOUS AND SERRATED COLON POLYPS: Status: ACTIVE | Noted: 2025-03-03

## 2025-03-06 ENCOUNTER — TELEPHONE (OUTPATIENT)
Dept: FAMILY MEDICINE CLINIC | Facility: CLINIC | Age: 75
End: 2025-03-06
Payer: MEDICARE

## 2025-03-06 DIAGNOSIS — R25.2 LEG CRAMPS: ICD-10-CM

## 2025-03-06 RX ORDER — CYCLOBENZAPRINE HCL 10 MG
10 TABLET ORAL
Qty: 30 TABLET | Refills: 5 | Status: SHIPPED | OUTPATIENT
Start: 2025-03-06

## 2025-03-06 NOTE — TELEPHONE ENCOUNTER
Caller: Priscilla Santos    Relationship: Self    Best call back number:     [phone]        Requested Prescriptions:     PRN MUSCLE RELAXER (PT DIDN'T REMEMBER THE NAME)       Pharmacy where request should be sent: [facility] [address] [phone] [facility] [phone] FX     Last office visit with prescribing clinician: 2/17/2025   Last telemedicine visit with prescribing clinician: Visit date not found   Next office visit with prescribing clinician: 3/6/2025     Additional details provided by patient: COMPLETLEY OUT    Does the patient have less than a 3 day supply:  [x] Yes  [] No    Would you like a call back once the refill request has been completed: [x] Yes [] No    If the office needs to give you a call back, can they leave a voicemail: [x] Yes [] No    Pollo Huff Rep   03/06/25 09:57 CST

## 2025-03-06 NOTE — TELEPHONE ENCOUNTER
The original prescription was discontinued on 11/17/2023 by Reza Charles APRN for the following reason: *Therapy completed. Renewing this prescription may not be appropriate.

## 2025-03-13 DIAGNOSIS — I20.89 ANGINA OF EFFORT: ICD-10-CM

## 2025-03-13 DIAGNOSIS — I10 PRIMARY HYPERTENSION: ICD-10-CM

## 2025-03-13 RX ORDER — CLOPIDOGREL BISULFATE 75 MG/1
75 TABLET ORAL DAILY
Qty: 90 TABLET | Refills: 1 | Status: SHIPPED | OUTPATIENT
Start: 2025-03-13

## 2025-03-13 RX ORDER — CARVEDILOL 6.25 MG/1
6.25 TABLET ORAL 2 TIMES DAILY
Qty: 90 TABLET | Refills: 1 | Status: SHIPPED | OUTPATIENT
Start: 2025-03-13

## 2025-03-13 RX ORDER — ATORVASTATIN CALCIUM 80 MG/1
80 TABLET, FILM COATED ORAL NIGHTLY
Qty: 90 TABLET | Refills: 1 | Status: SHIPPED | OUTPATIENT
Start: 2025-03-13

## 2025-03-13 RX ORDER — HYDROCHLOROTHIAZIDE 12.5 MG/1
12.5 CAPSULE ORAL DAILY
Qty: 90 CAPSULE | Refills: 1 | Status: SHIPPED | OUTPATIENT
Start: 2025-03-13

## 2025-03-19 ENCOUNTER — OFFICE VISIT (OUTPATIENT)
Dept: CARDIOLOGY | Facility: CLINIC | Age: 75
End: 2025-03-19
Payer: MEDICARE

## 2025-03-19 VITALS
HEART RATE: 54 BPM | SYSTOLIC BLOOD PRESSURE: 104 MMHG | DIASTOLIC BLOOD PRESSURE: 68 MMHG | BODY MASS INDEX: 40.4 KG/M2 | HEIGHT: 61 IN | WEIGHT: 214 LBS | OXYGEN SATURATION: 99 %

## 2025-03-19 DIAGNOSIS — I10 PRIMARY HYPERTENSION: ICD-10-CM

## 2025-03-19 DIAGNOSIS — E78.2 MIXED HYPERLIPIDEMIA: ICD-10-CM

## 2025-03-19 DIAGNOSIS — I35.8 AORTIC VALVE SCLEROSIS: Primary | ICD-10-CM

## 2025-03-19 DIAGNOSIS — E66.813 CLASS 3 SEVERE OBESITY DUE TO EXCESS CALORIES WITH SERIOUS COMORBIDITY AND BODY MASS INDEX (BMI) OF 40.0 TO 44.9 IN ADULT: ICD-10-CM

## 2025-03-19 DIAGNOSIS — I25.10 CORONARY ARTERY DISEASE INVOLVING NATIVE HEART, UNSPECIFIED VESSEL OR LESION TYPE, UNSPECIFIED WHETHER ANGINA PRESENT: ICD-10-CM

## 2025-03-19 DIAGNOSIS — E66.01 CLASS 3 SEVERE OBESITY DUE TO EXCESS CALORIES WITH SERIOUS COMORBIDITY AND BODY MASS INDEX (BMI) OF 40.0 TO 44.9 IN ADULT: ICD-10-CM

## 2025-03-19 PROCEDURE — 3074F SYST BP LT 130 MM HG: CPT | Performed by: INTERNAL MEDICINE

## 2025-03-19 PROCEDURE — 99214 OFFICE O/P EST MOD 30 MIN: CPT | Performed by: INTERNAL MEDICINE

## 2025-03-19 PROCEDURE — 3078F DIAST BP <80 MM HG: CPT | Performed by: INTERNAL MEDICINE

## 2025-03-19 PROCEDURE — 1159F MED LIST DOCD IN RCRD: CPT | Performed by: INTERNAL MEDICINE

## 2025-03-19 PROCEDURE — 1160F RVW MEDS BY RX/DR IN RCRD: CPT | Performed by: INTERNAL MEDICINE

## 2025-03-19 RX ORDER — GABAPENTIN 100 MG/1
100 CAPSULE ORAL NIGHTLY
COMMUNITY
Start: 2025-02-25

## 2025-03-19 NOTE — PROGRESS NOTES
"Chief Complaint  Coronary Artery Disease (6 mo fu-Recent Ekg 02/06/25 and Lexiscan 02/07/25.) and Aortic valve sclerosis    Subjective      Priscilla Santos presents to [facility]  History of Present Illness  Priscilla feels well.  She developed autoimmune hepatitis and is scheduled to be seen at [place].  She has lost 17 pounds recently by reducing her intake of carbohydrates.  Her chronic dyspnea has improved with weight loss.  She is not having any anginal type chest discomfort.    She was hospitalized last month at [facility] with epigastric discomfort which was initially felt cardiac in nature.  A nuclear stress test however revealed low probability for myocardial ischemia.  She had epigastric tenderness and elevated liver function studies.  She was allowed to go home without patient evaluation to ensue.    Objective   Vital Signs:  /68 (BP Location: Left arm, Patient Position: Sitting, Cuff Size: Adult)   Pulse 54   Ht 154.9 cm (60.98\")   Wt 97.1 kg (214 lb)   SpO2 99%   BMI 40.46 kg/m²   Estimated body mass index is 40.46 kg/m² as calculated from the following:    Height as of this encounter: 154.9 cm (60.98\").    Weight as of this encounter: 97.1 kg (214 lb).            Physical Exam  A 74-year-old female in no apparent distress.  She is awake, alert and oriented x 3.  HEENT: No scleral icterus.  Normocephalic.  Neck: No carotid bruits or jugular venous distention.  Lungs: Clear to auscultation.  Normal respiratory effort.  Heart: Regular rhythm with grade 2/6 systolic ejection murmur at the left sternal border.  No diastolic murmur or gallop sound noted.  Extremities: 1+ pretibial edema with chronic venous stasis changes.  No cyanosis.  Neurologic: No focal abnormalities noted.    Result Review :                   Assessment and Plan   Diagnoses and all orders for this visit:    1. Aortic valve sclerosis (Primary)    2. Coronary artery disease involving native " heart, unspecified vessel or lesion type, unspecified whether angina present    3. Primary hypertension    4. Mixed hyperlipidemia    5. Class 3 severe obesity due to excess calories with serious comorbidity and body mass index (BMI) of 40.0 to 44.9 in adult    1.  Aortic valve sclerosis.  Latest echocardiogram was 12/12/2023.  No clinical evidence of worsening aortic stenosis at this time.  Consider repeating an echocardiogram after the next visit.  This will be in about 6 months.    2.  Coronary artery disease.  She is angina free.  The symptoms that prompted her recent hospital stay were noncardiac in nature and possibly due to her autoimmune hepatitis.  At any rate, an ischemic study in the form of nuclear stress testing was negative or low risk for myocardial ischemia approximately a month ago.  Continue current GDMT.    3.  Hypertension.  Current blood pressure is 104/68.  Continue current antihypertensive regimen consisting of HCTZ 12.5 mg daily carvedilol 6.25 mg twice daily and losartan 50 mg daily.    4.  Hyperlipidemia.  She is on a atorvastatin 80 mg daily which was not held after her hospital admission with elevated liver function studies and was not held by GI.  Upon reviewing her lab work, her transaminases have been similarly elevated for over a year, since at least 12/18/2023.  Apparently, it has been felt for these reasons that discontinuing atorvastatin is not indicated.    5.  Obesity.  She has lost 17 pounds and is continuing dietary restrictions as noted above.  She should also increase her aerobic exercise.    All questions were answered to the best of my ability.  A return visit is scheduled in 6 months.  She is encouraged to contact me for any further questions or concerns.    As always, I appreciate the opportunity to assist in the care of your patients.      There are no Patient Instructions on file for this visit.       Follow Up   Return in about 6 months (around 9/19/2025) for Next  scheduled follow up.  Patient was given instructions and counseling regarding her condition or for health maintenance advice. Please see specific information pulled into the AVS if appropriate.

## 2025-03-20 DIAGNOSIS — I20.89 ANGINA OF EFFORT: ICD-10-CM

## 2025-03-25 DIAGNOSIS — I20.89 ANGINA OF EFFORT: ICD-10-CM

## 2025-03-25 RX ORDER — RANOLAZINE 1000 MG/1
1 TABLET, EXTENDED RELEASE ORAL EVERY 12 HOURS
Qty: 60 TABLET | Refills: 0 | Status: SHIPPED | OUTPATIENT
Start: 2025-03-25

## 2025-03-27 ENCOUNTER — CLINICAL SUPPORT (OUTPATIENT)
Dept: FAMILY MEDICINE CLINIC | Facility: CLINIC | Age: 75
End: 2025-03-27
Payer: MEDICARE

## 2025-03-27 ENCOUNTER — TELEPHONE (OUTPATIENT)
Dept: FAMILY MEDICINE CLINIC | Facility: CLINIC | Age: 75
End: 2025-03-27
Payer: MEDICARE

## 2025-03-27 DIAGNOSIS — E11.65 TYPE 2 DIABETES MELLITUS WITH HYPERGLYCEMIA, UNSPECIFIED WHETHER LONG TERM INSULIN USE: Primary | ICD-10-CM

## 2025-03-27 RX ORDER — RANOLAZINE 1000 MG/1
1 TABLET, EXTENDED RELEASE ORAL EVERY 12 HOURS
Qty: 60 TABLET | Refills: 0 | Status: SHIPPED | OUTPATIENT
Start: 2025-03-27

## 2025-03-27 NOTE — TELEPHONE ENCOUNTER
Patients  spoke to Alana this morning stating that they wanted her to be seen at [facility] instead of [facility]? They said that [facility] doesn't accept her insurance. The only referral I see is from January and has been closed but it wasn't going to [facility]. Do we need to put in a new referral for [facility]? If so the fax number they provided is [phone].

## 2025-03-28 DIAGNOSIS — R10.13 EPIGASTRIC PAIN: ICD-10-CM

## 2025-03-28 DIAGNOSIS — K74.00 FIBROSIS OF LIVER: Primary | ICD-10-CM

## 2025-03-28 LAB
ALBUMIN/CREAT UR: 8 MG/G CREAT (ref 0–29)
CREAT UR-MCNC: 112 MG/DL
MICROALBUMIN UR-MCNC: 8.4 UG/ML

## 2025-04-16 ENCOUNTER — OFFICE VISIT (OUTPATIENT)
Dept: FAMILY MEDICINE CLINIC | Facility: CLINIC | Age: 75
End: 2025-04-16
Payer: MEDICARE

## 2025-04-16 VITALS
SYSTOLIC BLOOD PRESSURE: 128 MMHG | HEART RATE: 64 BPM | DIASTOLIC BLOOD PRESSURE: 74 MMHG | HEIGHT: 61 IN | BODY MASS INDEX: 40.17 KG/M2 | RESPIRATION RATE: 17 BRPM | TEMPERATURE: 97.9 F | OXYGEN SATURATION: 99 % | WEIGHT: 212.8 LBS

## 2025-04-16 DIAGNOSIS — J30.2 SEASONAL ALLERGIES: ICD-10-CM

## 2025-04-16 DIAGNOSIS — I10 HYPERTENSION, WELL CONTROLLED: ICD-10-CM

## 2025-04-16 DIAGNOSIS — R60.9 SWELLING: Primary | ICD-10-CM

## 2025-04-16 DIAGNOSIS — I50.9 CONGESTIVE HEART FAILURE, UNSPECIFIED HF CHRONICITY, UNSPECIFIED HEART FAILURE TYPE: ICD-10-CM

## 2025-04-16 RX ORDER — FUROSEMIDE 20 MG/1
20 TABLET ORAL DAILY
Qty: 3 TABLET | Refills: 0 | Status: SHIPPED | OUTPATIENT
Start: 2025-04-16 | End: 2025-04-19

## 2025-04-16 NOTE — PROGRESS NOTES
"Chief Complaint   Patient presents with    Nasal Congestion    Ear Fullness    Edema        Subjective   Priscilla Santos is a 74 y.o. female who presents today for the following:    HPI   Swelling in bilateral lower extremities- Monday morning bilateral feet started hurting. Feet and ankles started swelling yesterday. Patient states it is very painful. She has not increased salt in her diet. She does have a history of CHF.     Ear pain- denies fever. She has had a stuffy nose but no drainage.     Allergies   Allergen Reactions    Diazepam Other (See Comments)     Made her violent    Codeine Nausea And Vomiting         OBJECTIVE:  Vitals:    04/16/25 0821   BP: 128/74   BP Location: Left arm   Patient Position: Sitting   Cuff Size: Adult   Pulse: 64   Resp: 17   Temp: 97.9 °F (36.6 °C)   TempSrc: Oral   SpO2: 99%   Weight: 96.5 kg (212 lb 12.8 oz)   Height: 154.9 cm (61\")     Physical Exam  Vitals and nursing note reviewed.   Constitutional:       Appearance: Normal appearance.   HENT:      Right Ear: Tympanic membrane normal.      Left Ear: Tympanic membrane normal.   Eyes:      Pupils: Pupils are equal, round, and reactive to light.   Cardiovascular:      Rate and Rhythm: Normal rate and regular rhythm.   Pulmonary:      Breath sounds: Normal breath sounds. No wheezing or rhonchi.   Abdominal:      General: Bowel sounds are normal.   Musculoskeletal:      Right ankle: Swelling present.      Left ankle: Swelling present.      Right foot: Swelling present. Normal pulse.      Left foot: Swelling present. Normal pulse.   Skin:     General: Skin is warm and dry.   Neurological:      Mental Status: She is alert and oriented to person, place, and time.   Psychiatric:         Mood and Affect: Mood normal.         Behavior: Behavior normal.                    ASSESSMENT/ PLAN:  Assessment & Plan  Swelling  Monitor swelling. If symptoms become worse, go to ER.   Orders:    Comprehensive metabolic panel    CBC & Differential    " BNP    furosemide (Lasix) 20 MG tablet; Take 1 tablet by mouth Daily for 3 days.    Hypertension, well controlled    Orders:    Comprehensive metabolic panel    CBC & Differential    BNP    Congestive heart failure, unspecified HF chronicity, unspecified heart failure type      Orders:    BNP    furosemide (Lasix) 20 MG tablet; Take 1 tablet by mouth Daily for 3 days.    Seasonal allergies  Monitor symptoms. If no improvement RTC.             Procedures     Management Plan:     An After Visit Summary was printed and given to the patient at discharge.    Follow-up: Return for RTC if symptoms do not improve. Patient will be notified of labs. .         Silvia Fierro, APRN 4/18/2025 19:48 CDT  This note was electronically signed.

## 2025-04-18 LAB
ALBUMIN SERPL-MCNC: 3.2 G/DL (ref 3.8–4.8)
ALP SERPL-CCNC: 65 IU/L (ref 44–121)
ALT SERPL-CCNC: 73 IU/L (ref 0–32)
AST SERPL-CCNC: 84 IU/L (ref 0–40)
BASOPHILS # BLD AUTO: 0 X10E3/UL (ref 0–0.2)
BASOPHILS NFR BLD AUTO: 1 %
BILIRUB SERPL-MCNC: 0.9 MG/DL (ref 0–1.2)
BUN SERPL-MCNC: 18 MG/DL (ref 8–27)
BUN/CREAT SERPL: 20 (ref 12–28)
CALCIUM SERPL-MCNC: 9.7 MG/DL (ref 8.7–10.3)
CHLORIDE SERPL-SCNC: 101 MMOL/L (ref 96–106)
CO2 SERPL-SCNC: 22 MMOL/L (ref 20–29)
CREAT SERPL-MCNC: 0.89 MG/DL (ref 0.57–1)
EGFRCR SERPLBLD CKD-EPI 2021: 68 ML/MIN/1.73
EOSINOPHIL # BLD AUTO: 0 X10E3/UL (ref 0–0.4)
EOSINOPHIL NFR BLD AUTO: 1 %
ERYTHROCYTE [DISTWIDTH] IN BLOOD BY AUTOMATED COUNT: 12.4 % (ref 11.7–15.4)
GLOBULIN SER CALC-MCNC: 4.1 G/DL (ref 1.5–4.5)
GLUCOSE SERPL-MCNC: 116 MG/DL (ref 70–99)
HCT VFR BLD AUTO: 34.8 % (ref 34–46.6)
HGB BLD-MCNC: 11.8 G/DL (ref 11.1–15.9)
IMM GRANULOCYTES # BLD AUTO: 0 X10E3/UL (ref 0–0.1)
IMM GRANULOCYTES NFR BLD AUTO: 1 %
LYMPHOCYTES # BLD AUTO: 1.7 X10E3/UL (ref 0.7–3.1)
LYMPHOCYTES NFR BLD AUTO: 30 %
MCH RBC QN AUTO: 33 PG (ref 26.6–33)
MCHC RBC AUTO-ENTMCNC: 33.9 G/DL (ref 31.5–35.7)
MCV RBC AUTO: 97 FL (ref 79–97)
MONOCYTES # BLD AUTO: 0.5 X10E3/UL (ref 0.1–0.9)
MONOCYTES NFR BLD AUTO: 8 %
NEUTROPHILS # BLD AUTO: 3.5 X10E3/UL (ref 1.4–7)
NEUTROPHILS NFR BLD AUTO: 59 %
PLATELET # BLD AUTO: 109 X10E3/UL (ref 150–450)
POTASSIUM SERPL-SCNC: 3.7 MMOL/L (ref 3.5–5.2)
PROT SERPL-MCNC: 7.3 G/DL (ref 6–8.5)
RBC # BLD AUTO: 3.58 X10E6/UL (ref 3.77–5.28)
SODIUM SERPL-SCNC: 135 MMOL/L (ref 134–144)
WBC # BLD AUTO: 5.7 X10E3/UL (ref 3.4–10.8)

## 2025-05-05 ENCOUNTER — TELEPHONE (OUTPATIENT)
Dept: CARDIOLOGY | Facility: CLINIC | Age: 75
End: 2025-05-05
Payer: MEDICARE

## 2025-05-05 RX ORDER — NITROGLYCERIN 0.4 MG/1
TABLET SUBLINGUAL
Qty: 50 TABLET | Refills: 1 | Status: SHIPPED | OUTPATIENT
Start: 2025-05-05

## 2025-05-05 NOTE — TELEPHONE ENCOUNTER
OKAY FOR HUB TO RELAY:    Priscilla left a voicemail requesting an appointment with our office asap to discuss new cardiac symptoms/concerns and worsening bilateral edema. I've attempted to reach patient without luck and her vm isn't set up on her phone.    Patient's follow up has been moved up to this Wednesday 05/07/25 at 11:00 am with KENDRA Navarro for Dr.C Milner. Patient will enter [facility] and take the elevator to the 3rd floor (we will be the only office on the 3rd floor).

## 2025-05-06 NOTE — TELEPHONE ENCOUNTER
I've still been unable to reach patient and neither her or Severo have a voicemail set up. Patient still currently scheduled to see KENDRA Navarro 05/07/25.

## 2025-05-07 ENCOUNTER — OFFICE VISIT (OUTPATIENT)
Dept: CARDIOLOGY | Facility: CLINIC | Age: 75
End: 2025-05-07
Payer: MEDICARE

## 2025-05-07 ENCOUNTER — HOSPITAL ENCOUNTER (OUTPATIENT)
Dept: GENERAL RADIOLOGY | Facility: HOSPITAL | Age: 75
Discharge: HOME | End: 2025-05-07
Payer: MEDICARE

## 2025-05-07 ENCOUNTER — RESULTS FOLLOW-UP (OUTPATIENT)
Dept: CARDIOLOGY | Facility: CLINIC | Age: 75
End: 2025-05-07

## 2025-05-07 ENCOUNTER — LAB (OUTPATIENT)
Dept: LAB | Facility: HOSPITAL | Age: 75
End: 2025-05-07
Payer: MEDICARE

## 2025-05-07 VITALS
SYSTOLIC BLOOD PRESSURE: 126 MMHG | DIASTOLIC BLOOD PRESSURE: 60 MMHG | OXYGEN SATURATION: 98 % | HEIGHT: 62 IN | WEIGHT: 208 LBS | HEART RATE: 55 BPM | BODY MASS INDEX: 38.28 KG/M2

## 2025-05-07 DIAGNOSIS — R06.09 DYSPNEA ON EXERTION: Primary | ICD-10-CM

## 2025-05-07 DIAGNOSIS — R06.09 DYSPNEA ON EXERTION: ICD-10-CM

## 2025-05-07 DIAGNOSIS — R60.9 SWELLING: ICD-10-CM

## 2025-05-07 DIAGNOSIS — I10 PRIMARY HYPERTENSION: ICD-10-CM

## 2025-05-07 DIAGNOSIS — E66.812 CLASS 2 SEVERE OBESITY DUE TO EXCESS CALORIES WITH SERIOUS COMORBIDITY AND BODY MASS INDEX (BMI) OF 38.0 TO 38.9 IN ADULT: ICD-10-CM

## 2025-05-07 DIAGNOSIS — E78.2 MIXED HYPERLIPIDEMIA: ICD-10-CM

## 2025-05-07 DIAGNOSIS — I25.10 CORONARY ARTERY DISEASE INVOLVING NATIVE HEART, UNSPECIFIED VESSEL OR LESION TYPE, UNSPECIFIED WHETHER ANGINA PRESENT: ICD-10-CM

## 2025-05-07 DIAGNOSIS — E66.01 CLASS 2 SEVERE OBESITY DUE TO EXCESS CALORIES WITH SERIOUS COMORBIDITY AND BODY MASS INDEX (BMI) OF 38.0 TO 38.9 IN ADULT: ICD-10-CM

## 2025-05-07 LAB — NT-PROBNP SERPL-MCNC: 609.1 PG/ML (ref 0–900)

## 2025-05-07 PROCEDURE — 83880 ASSAY OF NATRIURETIC PEPTIDE: CPT

## 2025-05-07 PROCEDURE — 36415 COLL VENOUS BLD VENIPUNCTURE: CPT

## 2025-05-07 PROCEDURE — 71046 X-RAY EXAM CHEST 2 VIEWS: CPT

## 2025-05-07 NOTE — PROGRESS NOTES
"    Subjective:     Encounter Date:05/06/2025      Patient ID: Priscilla Santos is a 74 y.o. female.    Chief Complaint:\"swelling and worsening DING\"  Shortness of Breath  This is a recurrent problem. The current episode started 1 to 4 weeks ago. The problem occurs daily. The problem has been worsening. Pertinent negatives include no chest pain, leg swelling, orthopnea, PND, rash, sputum production, syncope or wheezing. Her past medical history is significant for CAD.   Coronary Artery Disease  Presents for follow-up visit. Symptoms include shortness of breath. Pertinent negatives include no chest pain, dizziness, leg swelling, palpitations or weight gain. The symptoms have been stable.   Hypertension  This is a chronic problem. The current episode started more than 1 year ago. The problem has been stable since onset. The problem is controlled. Associated symptoms include peripheral edema and shortness of breath. Pertinent negatives include no chest pain, malaise/fatigue, orthopnea, palpitations or PND.   Leg Swelling  Symptoms are new.   Onset was 1 to 4 weeks.   Symptoms occur daily.   Symptoms have been unchanged since onset.   Pertinent negative symptoms include no chest pain, no cough, no rash and no weakness.     Patient presents today with complaints of worsening DING and bilateral worsening edema. She was referred to Dr. Milner in 6/2023 to re-establish care with a cardiologist. She has CAD with previous 3v CABG in 2008 in [place] with subsequent stenting to the RCA in 2015 per Dr. Reaves and to the LCX in 2019 per Dr. Dick. She also has small vessel disease with stable angina.     She presents today with complaints of worsening DING and swelling in BLE that started in the last few weeks. She monitors her weight at home and denies weight gain. She does not wear compression stockings. She denies chest pain, palpitations, orthopnea and PND. She reports she continues to drink lemon juice every other day which " is no longer working for her edema.       The following portions of the patient's history were reviewed and updated as appropriate: allergies, current medications, past family history, past medical history, past social history, past surgical history and problem list.    Allergies   Allergen Reactions    Diazepam Other (See Comments)     Made her violent    Codeine Nausea And Vomiting       Current Outpatient Medications:     aluminum-magnesium hydroxide-simethicone (MAALOX MAX) 400-400-40 MG/5ML suspension, Take 15 mL by mouth Every 6 (Six) Hours As Needed for Indigestion or Heartburn., Disp: , Rfl:     Ascorbic Acid (VITAMIN C PO), Take 1 tablet by mouth Daily., Disp: , Rfl:     aspirin 81 MG EC tablet, Take 1 tablet by mouth Daily., Disp: , Rfl:     atorvastatin (LIPITOR) 80 MG tablet, TAKE ONE TABLET BY MOUTH EVERY EVENING AT BEDTIME, Disp: 90 tablet, Rfl: 1    carvedilol (COREG) 6.25 MG tablet, TAKE ONE TABLET BY MOUTH TWICE A DAY, Disp: 90 tablet, Rfl: 1    cetirizine (zyrTEC) 10 MG tablet, Take 1 tablet by mouth Daily., Disp: , Rfl:     cholecalciferol (VITAMIN D3) 25 MCG (1000 UT) tablet, Take 1 tablet by mouth Daily., Disp: , Rfl:     clopidogrel (PLAVIX) 75 MG tablet, TAKE ONE TABLET BY MOUTH EVERY DAY, Disp: 90 tablet, Rfl: 1    cyclobenzaprine (FLEXERIL) 10 MG tablet, TAKE ONE TABLET BY MOUTH EVERY DAY AT BEDTIME, Disp: 30 tablet, Rfl: 5    gabapentin (NEURONTIN) 100 MG capsule, Take 1 capsule by mouth Every Night., Disp: , Rfl:     hydroCHLOROthiazide (MICROZIDE) 12.5 MG capsule, TAKE ONE CAPSULE BY MOUTH EVERY DAY, Disp: 90 capsule, Rfl: 1    indapamide (LOZOL) 2.5 MG tablet, Take 1 tablet by mouth Every Morning., Disp: , Rfl:     Krill Oil (Omega-3) 500 MG capsule, Take 1 tablet by mouth Daily., Disp: , Rfl:     losartan (Cozaar) 50 MG tablet, Take 1 tablet by mouth Daily., Disp: 90 tablet, Rfl: 1    multivitamin with minerals (Multivitamin Women 50+) tablet tablet, Take 1 tablet by mouth Daily., Disp:  , Rfl:     nitroglycerin (NITROSTAT) 0.4 MG SL tablet, PLACE ONE TABLET UNDER THE TONGUE EVERY 5 MINUTES AS NEEDED FOR CHEST PAIN FOR UP TO 3 DOSES, IF NO RELIEF AFTER FIRST DOSE, CALL 911, Disp: 50 tablet, Rfl: 1    ranolazine (RANEXA) 1000 MG 12 hr tablet, Take 1 tablet by mouth Every 12 (Twelve) Hours., Disp: 60 tablet, Rfl: 0    Turmeric Curcumin 500 MG capsule, Take 1 capsule by mouth Daily., Disp: , Rfl:     vitamin B-12 (CYANOCOBALAMIN) 100 MCG tablet, Take 1 tablet by mouth Daily., Disp: , Rfl:     furosemide (Lasix) 20 MG tablet, Take 1 tablet by mouth Daily for 3 days., Disp: 3 tablet, Rfl: 0    ranolazine (RANEXA) 1000 MG 12 hr tablet, TAKE ONE TABLET BY MOUTH EVERY 12 HOURS (Patient not taking: Reported on 5/7/2025), Disp: 60 tablet, Rfl: 0  Past Medical History:   Diagnosis Date    Arthritis     CHF (congestive heart failure)     Coronary artery disease     Diverticulosis     Fatty liver     History of adenomatous polyp of colon     History of transfusion     Hyperlipidemia     Hypertension     Sleep apnea     UTI (urinary tract infection)        Social History     Socioeconomic History    Marital status:    Tobacco Use    Smoking status: Never     Passive exposure: Never    Smokeless tobacco: Never   Vaping Use    Vaping status: Never Used   Substance and Sexual Activity    Alcohol use: Never    Drug use: Never    Sexual activity: Defer       Review of Systems   Constitutional: Negative for malaise/fatigue, weight gain and weight loss.   Cardiovascular:  Positive for dyspnea on exertion. Negative for chest pain, irregular heartbeat, leg swelling, near-syncope, orthopnea, palpitations, paroxysmal nocturnal dyspnea and syncope.   Respiratory:  Positive for shortness of breath. Negative for cough, sleep disturbances due to breathing, sputum production and wheezing.    Skin:  Negative for dry skin, flushing, itching and rash.   Gastrointestinal:  Negative for hematemesis and hematochezia.  "  Neurological:  Negative for dizziness, light-headedness, loss of balance and weakness.   All other systems reviewed and are negative.         Objective:     Vitals reviewed.   Constitutional:       General: Not in acute distress.     Appearance: Healthy appearance. Well-developed. Not diaphoretic.   Eyes:      General: No scleral icterus.     Conjunctiva/sclera: Conjunctivae normal.      Pupils: Pupils are equal, round, and reactive to light.   HENT:      Head: Normocephalic.    Mouth/Throat:      Pharynx: No oropharyngeal exudate.   Neck:      Vascular: No JVR.   Pulmonary:      Effort: Pulmonary effort is normal. No respiratory distress.      Breath sounds: Normal breath sounds. No wheezing. No rhonchi. No rales.   Chest:      Chest wall: Not tender to palpatation.   Cardiovascular:      Bradycardia present. Regular rhythm.   Pulses:     Intact distal pulses.   Edema:     Peripheral edema present.     Pretibial: 3+ pitting edema of the left pretibial area and 2+ pitting edema of the right pretibial area.     Ankle: 3+ pitting edema of the left ankle and 2+ pitting edema of the right ankle.     Feet: bilateral 1+ pitting edema of the feet.  Abdominal:      General: Bowel sounds are normal. There is no distension.      Palpations: Abdomen is soft.      Tenderness: There is no abdominal tenderness.   Musculoskeletal: Normal range of motion.      Cervical back: Normal range of motion and neck supple. Skin:     General: Skin is warm and dry.      Coloration: Skin is not pale.      Findings: No erythema or rash.   Neurological:      Mental Status: Alert, oriented to person, place, and time and oriented to person, place and time.      Deep Tendon Reflexes: Reflexes are normal and symmetric.   Psychiatric:         Behavior: Behavior normal.           Procedures  /60 (BP Location: Right arm, Patient Position: Sitting, Cuff Size: Adult)   Pulse 55   Ht 156.2 cm (61.5\")   Wt 94.3 kg (208 lb)   SpO2 98%   BMI " 38.66 kg/m²     Lab Review:   I have reviewed previous office notes, recent labs and recent cardiac testing.     Lab Results   Component Value Date    CHOL 68 02/07/2025    CHLPL 113 11/15/2024    TRIG 36 02/07/2025    HDL 38 (L) 02/07/2025    LDL 19 02/07/2025     Results for orders placed during the hospital encounter of 12/14/23    Adult Transthoracic Echo Complete W/ Cont if Necessary Per Protocol    Interpretation Summary    Left ventricular systolic function is normal. Left ventricular ejection fraction appears to be 61 - 65%.    Left ventricular diastolic function was normal.    The left atrial cavity is mildly dilated.    There is moderate calcification of the aortic valve.    Estimated right ventricular systolic pressure from tricuspid regurgitation is normal (<35 mmHg).    Mild dilation of the aortic root is present. Mild dilation of the proximal aorta is present.          Assessment:          Diagnosis Plan   1. Dyspnea on exertion  Adult Transthoracic Echo Complete w/ Color, Spectral and Contrast if necessary per protocol    BNP    XR chest pa and lateral      2. Swelling        3. Coronary artery disease involving native heart, unspecified vessel or lesion type, unspecified whether angina present        4. Primary hypertension        5. Mixed hyperlipidemia        6. Class 2 severe obesity due to excess calories with serious comorbidity and body mass index (BMI) of 38.0 to 38.9 in adult                 Plan:       1 DING- no evidence of volume overload on exam today. CXR in Feb revealed mild evidence pf bibasilar atelectasis and central vascular crowding. Received 3 days of lasix 20mg for 3 days on 4/16. Will obtain BNP, CXR and stat 2D echo.   2. Edema- appears to have some degree of venous insuffiencey. Low suspicion for heart failure but will repeat BNP and 2D echo.    CAD- stable. S/p 3v CABG with occluded SVG x2 and patent LIMA per cath in 2019 with subsequent stenting to RCA in 2015 and to LCX in  2019. Continue ASA 81mg, Plavix 75mg daily, caroeg 6.25mg BID, losartan 50mg daily, lipitor 80mg daily.   3. HTN- controlled on current therapy. No changes recommended at this time.   4. HLD- controlled with LDL 19 in 2/2025. Continue statin   5. BMI- Class 3 Severe Obesity (BMI >=40). Obesity-related health conditions include the following: obstructive sleep apnea, coronary heart disease, and dyslipidemias. Obesity is unchanged. BMI is is above average; BMI management plan is completed. We discussed portion control and increasing exercise.        Follow up in 4 weeks or sooner if symptoms worsen.     I spent 30 minutes caring for Priscilla on this date of service. This time includes time spent by me in the following activities:preparing for the visit, reviewing tests, obtaining and/or reviewing a separately obtained history, performing a medically appropriate examination and/or evaluation , counseling and educating the patient/family/caregiver, and documenting information in the medical record

## 2025-05-08 ENCOUNTER — OFFICE VISIT (OUTPATIENT)
Dept: FAMILY MEDICINE CLINIC | Facility: CLINIC | Age: 75
End: 2025-05-08
Payer: MEDICARE

## 2025-05-08 VITALS
OXYGEN SATURATION: 98 % | HEIGHT: 62 IN | DIASTOLIC BLOOD PRESSURE: 57 MMHG | BODY MASS INDEX: 38.09 KG/M2 | TEMPERATURE: 98.4 F | SYSTOLIC BLOOD PRESSURE: 93 MMHG | RESPIRATION RATE: 18 BRPM | WEIGHT: 207 LBS | HEART RATE: 52 BPM

## 2025-05-08 DIAGNOSIS — H61.21 IMPACTED CERUMEN OF RIGHT EAR: ICD-10-CM

## 2025-05-08 DIAGNOSIS — N30.00 ACUTE CYSTITIS WITHOUT HEMATURIA: Primary | ICD-10-CM

## 2025-05-08 DIAGNOSIS — I10 HYPERTENSION, WELL CONTROLLED: ICD-10-CM

## 2025-05-08 DIAGNOSIS — R30.0 BURNING WITH URINATION: ICD-10-CM

## 2025-05-08 LAB
BILIRUB BLD-MCNC: NEGATIVE MG/DL
CLARITY, POC: ABNORMAL
COLOR UR: YELLOW
GLUCOSE UR STRIP-MCNC: NEGATIVE MG/DL
KETONES UR QL: NEGATIVE
LEUKOCYTE EST, POC: ABNORMAL
NITRITE UR-MCNC: POSITIVE MG/ML
PH UR: 7 [PH] (ref 5–8)
PROT UR STRIP-MCNC: NEGATIVE MG/DL
RBC # UR STRIP: NEGATIVE /UL
SP GR UR: 1.02 (ref 1–1.03)
UROBILINOGEN UR QL: NORMAL

## 2025-05-08 PROCEDURE — 3074F SYST BP LT 130 MM HG: CPT | Performed by: NURSE PRACTITIONER

## 2025-05-08 PROCEDURE — 1126F AMNT PAIN NOTED NONE PRSNT: CPT | Performed by: NURSE PRACTITIONER

## 2025-05-08 PROCEDURE — 3078F DIAST BP <80 MM HG: CPT | Performed by: NURSE PRACTITIONER

## 2025-05-08 PROCEDURE — 1159F MED LIST DOCD IN RCRD: CPT | Performed by: NURSE PRACTITIONER

## 2025-05-08 PROCEDURE — 3044F HG A1C LEVEL LT 7.0%: CPT | Performed by: NURSE PRACTITIONER

## 2025-05-08 PROCEDURE — 99213 OFFICE O/P EST LOW 20 MIN: CPT | Performed by: NURSE PRACTITIONER

## 2025-05-08 PROCEDURE — 81003 URINALYSIS AUTO W/O SCOPE: CPT | Performed by: NURSE PRACTITIONER

## 2025-05-08 PROCEDURE — 1160F RVW MEDS BY RX/DR IN RCRD: CPT | Performed by: NURSE PRACTITIONER

## 2025-05-08 RX ORDER — BLOOD PRESSURE TEST KIT
KIT MISCELLANEOUS
Qty: 1 EACH | Refills: 0 | Status: SHIPPED | OUTPATIENT
Start: 2025-05-08

## 2025-05-08 RX ORDER — NITROFURANTOIN 25; 75 MG/1; MG/1
100 CAPSULE ORAL 2 TIMES DAILY
Qty: 20 CAPSULE | Refills: 0 | Status: SHIPPED | OUTPATIENT
Start: 2025-05-08

## 2025-05-08 NOTE — PROGRESS NOTES
"Chief Complaint   Patient presents with    Earache     Pt states she is having right ear pain that radiates down the right side of her neck, and she has had some dizziness.       Difficulty Urinating     Pt state she is having urinary pain and her urine is very dark/orange.         Subjective   Priscilla Santos is a 74 y.o. female who presents today for the following     Earache  Affected ear:  Right  Chronicity:  New  Onset:  In the past 7 days  Associated symptoms: dizziness and neck pain    Difficulty Urinating  Chronicity:  New  Onset:  In the past 7 days  Pain quality:  Burning  Associated symptoms: frequency and urgency           Allergies   Allergen Reactions    Diazepam Other (See Comments)     Made her violent    Codeine Nausea And Vomiting         OBJECTIVE:  Vitals:    05/08/25 0919   BP: 93/57   BP Location: Left arm   Patient Position: Sitting   Cuff Size: Large Adult   Pulse: 52   Resp: 18   Temp: 98.4 °F (36.9 °C)   TempSrc: Temporal   SpO2: 98%   Weight: 93.9 kg (207 lb)   Height: 156.2 cm (61.5\")     Physical Exam  Vitals and nursing note reviewed.   Constitutional:       Appearance: Normal appearance.   HENT:      Head: Normocephalic and atraumatic.      Right Ear: There is impacted cerumen.      Left Ear: Tympanic membrane normal.      Nose: Nose normal.   Cardiovascular:      Rate and Rhythm: Normal rate and regular rhythm.   Pulmonary:      Breath sounds: Normal breath sounds. No wheezing or rhonchi.   Abdominal:      General: Bowel sounds are normal.      Tenderness: There is no right CVA tenderness or left CVA tenderness.   Skin:     General: Skin is warm and dry.   Neurological:      Mental Status: She is alert.   Psychiatric:         Mood and Affect: Mood normal.         Behavior: Behavior normal.                    ASSESSMENT/ PLAN:    Diagnoses and all orders for this visit:    1. Acute cystitis without hematuria (Primary)  -     Urine Culture - Urine, Urine, Clean Catch  -     " nitrofurantoin, macrocrystal-monohydrate, (Macrobid) 100 MG capsule; Take 1 capsule by mouth 2 (Two) Times a Day.  Dispense: 20 capsule; Refill: 0    2. Impacted cerumen of right ear  -     carbamide peroxide (Debrox) 6.5 % otic solution; Administer 5 drops to the right ear 2 (Two) Times a Day.  Dispense: 15 mL; Refill: 0    3. Burning with urination  -     POC Urinalysis Dipstick, Multipro  -     Urine Culture - Urine, Urine, Clean Catch    4. Hypertension, well controlled  -     Blood Pressure kit; Check blood pressure daily.  Dispense: 1 each; Refill: 0    Advised patient to monitor blood pressure to see what it is at home. Advised patient to carefully change positions.   Procedures       Follow-up: Return if symptoms worsen or fail to improve.      Silvia Fierro, APRN 5/8/2025 11:48 CDT  This note was electronically signed.           Dyspnea on exertion

## 2025-05-12 ENCOUNTER — OFFICE VISIT (OUTPATIENT)
Dept: GASTROENTEROLOGY | Facility: CLINIC | Age: 75
End: 2025-05-12
Payer: MEDICARE

## 2025-05-12 VITALS
TEMPERATURE: 97.5 F | HEIGHT: 61 IN | OXYGEN SATURATION: 98 % | DIASTOLIC BLOOD PRESSURE: 66 MMHG | WEIGHT: 206.2 LBS | SYSTOLIC BLOOD PRESSURE: 130 MMHG | HEART RATE: 56 BPM | BODY MASS INDEX: 38.93 KG/M2

## 2025-05-12 DIAGNOSIS — K76.0 FATTY LIVER: Primary | ICD-10-CM

## 2025-05-12 DIAGNOSIS — R11.0 NAUSEA: ICD-10-CM

## 2025-05-12 DIAGNOSIS — Z86.0101 PERSONAL HISTORY OF ADENOMATOUS AND SERRATED COLON POLYPS: ICD-10-CM

## 2025-05-12 DIAGNOSIS — R79.89 ELEVATED LFTS: ICD-10-CM

## 2025-05-12 DIAGNOSIS — D69.6 THROMBOCYTOPENIA: ICD-10-CM

## 2025-05-12 LAB
BACTERIA UR CULT: ABNORMAL
BACTERIA UR CULT: ABNORMAL
OTHER ANTIBIOTIC SUSC ISLT: ABNORMAL

## 2025-05-12 PROCEDURE — 3078F DIAST BP <80 MM HG: CPT | Performed by: NURSE PRACTITIONER

## 2025-05-12 PROCEDURE — 1160F RVW MEDS BY RX/DR IN RCRD: CPT | Performed by: NURSE PRACTITIONER

## 2025-05-12 PROCEDURE — 1159F MED LIST DOCD IN RCRD: CPT | Performed by: NURSE PRACTITIONER

## 2025-05-12 PROCEDURE — 3075F SYST BP GE 130 - 139MM HG: CPT | Performed by: NURSE PRACTITIONER

## 2025-05-12 PROCEDURE — 99214 OFFICE O/P EST MOD 30 MIN: CPT | Performed by: NURSE PRACTITIONER

## 2025-05-12 RX ORDER — SPIRONOLACTONE 25 MG/1
25 TABLET ORAL DAILY
Qty: 30 TABLET | Refills: 11 | Status: SHIPPED | OUTPATIENT
Start: 2025-05-12

## 2025-05-12 RX ORDER — PANTOPRAZOLE SODIUM 40 MG/1
40 TABLET, DELAYED RELEASE ORAL DAILY
Qty: 30 TABLET | Refills: 11 | Status: SHIPPED | OUTPATIENT
Start: 2025-05-12

## 2025-05-12 NOTE — ASSESSMENT & PLAN NOTE
Awaiting Hepatology referral to help rule out AIH component to fatty liver disease.  2 month follow up

## 2025-05-12 NOTE — PROGRESS NOTES
Primary Physician: Reza Charles APRN    Chief Complaint   Patient presents with    GI Problem     3 mo follow up visit       Subjective     Priscilla Santos is a 74 y.o. female.    HPI  Elevated LFT's  Patient with a history of elevated LFTs dating back to at least 2015.  Does take daily Lipitor  No alcohol use, no smoking.      4/16/2025 :  AST 84, ALT 73, Bili and ALKP normal  2/8/2025 Labs: AST 95, ALT 74, Bili 1.0 ALKP 64  11/15/2024: AST 93, ALT 66.  Bili and Alkp normal  12/29/2023: ALT 85, AST 99, ALKP normal, bilirubin normal platelets 119K  3/14/2023: AST 52 (0-40), ALT 43 (0-32).   ALKP & Bili normal  4/15/2022: AST normal 40, ALT 36 (0-32).   normal ALKP & Bili  April 2015: AST 74, ALT 78.  ALKP & Bili normal    12/26/2023 liver ultrasound: Slightly coarse echogenicity liver parenchyma possibly indicating mild hepatic steatosis.  BNP normal 12/12/2023 2/5/2024 Work Up liver serology: Ceruloplasmin normal, AMA normal, acute hepatitis panel normal, ferritin unremarkable.  SHOSHANA was positive as was the ASMA.    2/12/2025 Pt seen in office visit for follow up. Her Transaminases had decreased with her recent weight loss. That certainly could represent liver improvement from steatosis. However there is still concern for ruling out AIH.  Further blood work was collected to try to determine if AIH was present.  1/29/2025 Soluble Liver Antigen negative.  LKM type 1 antibodies/Anti-microsomal antibodies normal  IgG normal    1/29/25 Normal Vitamin D     2/12/2025 Pt back for office visit follow up. Her Transaminases have decreased with her recent weight loss. This certainly could represent liver improvement form steatosis. However there is still concern for ruling out AIH.  After our last office visit further blood work was collected to try to determine if AIH was present.      1/29/2025 MELD Score 12    12/29/2023 Fib-4 Score= 6.5  11/15/2024 Fib-4 Score= 8.36  2/8/2025 Fib-4 Score= 9.16  4/16/2025 Fib-4  Score= 6.58     ..Fib-4 scoring system  Points <1.45:                        Cirrhosis less likely  Points >=1.45 and <=3.25:       Indeterminate  Points >3.25:                        Cirrhosis more likely            2/7/2025 CT Scan of Abdomen & Pelvis: liver is normal appearing  1/15/2025 ultrasound with mildly heterogeneous liver echogenicity with mildly nodular liver contour seen with chronic liver disease.  12/26/2023 liver ultrasound showing slightly coarse echogenicity liver parenchyma indicating mild hepatic steatosis.     Patient Seen February 2024 at which time she was having abdominal pain.  We did order a upper endoscopy and lower colonoscopy on her.  Patient later called and canceled that is scheduled appointment and has not called back yet to get that reset up.  We did follow-up in May 2024 to see if she would like to get that reset up.     1/29/2025 Pt had recently (past 1 month) been focusing on a healthier diet and has lost 9 pounds. Weight 218 pounds     2/12/2025 OV: Weight down to 217 pounds  5/12/2025 Weight 206 pounds      2/27/2025 Endoscopy with No varices, few diminutive erosion with stigmata of recent bleeding in gastric antrum.      5/12/2025: Pt reports nausea on a daily basis.  2/27/25 endo did show some erosions in gastric antrum with no intestinal metaplasia and NO Hpylori.  She denies any diarrhea or constipation.  Left lower extremity was swollen on exam. Right lower extremity without edema. She states she has an appt this Friday to be evaluated on her left lower extremity swelling.    I asked Priscilla about whether or not she has got a Hepatology appt yet. Because she may need liver biopsy to confirm AIH. She states [facility] denied her insurance and she does Not want to go to [place]. Instead she reports finding a Hepatology Specialist in [facility] through the help of her PCP, Reza GARDNER. She cannot recall the name of the doctor but reports she is seeing him  next month.  I am going to reach out to her PCP Reza Charles to see if they can give me the name of the specialist so I can forward our work up thus far.     Thrombocytopenia  11/15/2024 platelets 100,000   2/8/2025 platelets 88,000  4/16/2025 Platelets 109K        Hx Adenomatous Colon Polyps  2/7/2025 CT Scan of Abdomen & Pelvis:liver is normal, apparent filling defect in the ascending colon without obstruction.    2/27/2025 Colonoscopy: multiple medium mouthed diverticula, the ascending colon appeared normal with NO mass, 7mm adenomatous polyp in the TC and a 4mm adenomatous polyp resected from rectum.     No family hx colon cancer  No abdominal pain. No diarrhea, constipation, no rectal bleeding.      Past Medical History:   Diagnosis Date    Arthritis     CHF (congestive heart failure)     Coronary artery disease     Diverticulosis     Fatty liver     History of adenomatous polyp of colon     History of transfusion     Hyperlipidemia     Hypertension     Sleep apnea     UTI (urinary tract infection)        Past Surgical History:   Procedure Laterality Date    APPENDECTOMY      CARDIAC SURGERY  2008    Tripple bypass    CAROTID STENT      CARPAL TUNNEL RELEASE Bilateral     CHOLECYSTECTOMY      COLON SURGERY      COLONOSCOPY N/A 02/27/2025    Diverticulosis in the left colon; One 7mm tubular adenomatous polyp in the transverse colon; One 4mm tubular adenomatous polyp in the rectum-Clip (MR conditional) was placed; Repeat 5 years    CORONARY STENT PLACEMENT  2021    total of 4-5 heart stents in past    ENDOSCOPY N/A 02/27/2025    Erosive gastropathy with stigmata of recent bleeding-biopsied; Normal examined duodenum; Repeat 3 years    HERNIA REPAIR      JOINT REPLACEMENT      KNEE SURGERY Bilateral     TONSILLECTOMY AND ADENOIDECTOMY      TUBAL ABDOMINAL LIGATION  1977    UMBILICAL HERNIA REPAIR N/A 02/02/2023    Procedure: UMBILICAL HERNIA REPAIR with mesh;  Surgeon: Lara Conley MD;  Location: [facility] OR;   Service: General;  Laterality: N/A;        Current Outpatient Medications:     aluminum-magnesium hydroxide-simethicone (MAALOX MAX) 400-400-40 MG/5ML suspension, Take 15 mL by mouth Every 6 (Six) Hours As Needed for Indigestion or Heartburn., Disp: , Rfl:     Ascorbic Acid (VITAMIN C PO), Take 1 tablet by mouth Daily., Disp: , Rfl:     aspirin 81 MG EC tablet, Take 1 tablet by mouth Daily., Disp: , Rfl:     atorvastatin (LIPITOR) 80 MG tablet, TAKE ONE TABLET BY MOUTH EVERY EVENING AT BEDTIME, Disp: 90 tablet, Rfl: 1    Blood Pressure kit, Check blood pressure daily., Disp: 1 each, Rfl: 0    carbamide peroxide (Debrox) 6.5 % otic solution, Administer 5 drops to the right ear 2 (Two) Times a Day., Disp: 15 mL, Rfl: 0    carvedilol (COREG) 6.25 MG tablet, TAKE ONE TABLET BY MOUTH TWICE A DAY, Disp: 90 tablet, Rfl: 1    cetirizine (zyrTEC) 10 MG tablet, Take 1 tablet by mouth Daily., Disp: , Rfl:     cholecalciferol (VITAMIN D3) 25 MCG (1000 UT) tablet, Take 1 tablet by mouth Daily., Disp: , Rfl:     clopidogrel (PLAVIX) 75 MG tablet, TAKE ONE TABLET BY MOUTH EVERY DAY, Disp: 90 tablet, Rfl: 1    cyclobenzaprine (FLEXERIL) 10 MG tablet, TAKE ONE TABLET BY MOUTH EVERY DAY AT BEDTIME, Disp: 30 tablet, Rfl: 5    gabapentin (NEURONTIN) 100 MG capsule, Take 1 capsule by mouth Every Night., Disp: , Rfl:     indapamide (LOZOL) 2.5 MG tablet, Take 1 tablet by mouth Every Morning., Disp: , Rfl:     losartan (Cozaar) 50 MG tablet, Take 1 tablet by mouth Daily., Disp: 90 tablet, Rfl: 1    multivitamin with minerals (Multivitamin Women 50+) tablet tablet, Take 1 tablet by mouth Daily., Disp: , Rfl:     nitrofurantoin, macrocrystal-monohydrate, (Macrobid) 100 MG capsule, Take 1 capsule by mouth 2 (Two) Times a Day., Disp: 20 capsule, Rfl: 0    nitroglycerin (NITROSTAT) 0.4 MG SL tablet, PLACE ONE TABLET UNDER THE TONGUE EVERY 5 MINUTES AS NEEDED FOR CHEST PAIN FOR UP TO 3 DOSES, IF NO RELIEF AFTER FIRST DOSE, CALL 911, Disp: 50  "tablet, Rfl: 1    ranolazine (RANEXA) 1000 MG 12 hr tablet, Take 1 tablet by mouth Every 12 (Twelve) Hours., Disp: 60 tablet, Rfl: 0    ranolazine (RANEXA) 1000 MG 12 hr tablet, TAKE ONE TABLET BY MOUTH EVERY 12 HOURS, Disp: 60 tablet, Rfl: 0    spironolactone (ALDACTONE) 25 MG tablet, Take 1 tablet by mouth Daily., Disp: 30 tablet, Rfl: 11    Turmeric Curcumin 500 MG capsule, Take 1 capsule by mouth Daily., Disp: , Rfl:     vitamin B-12 (CYANOCOBALAMIN) 100 MCG tablet, Take 1 tablet by mouth Daily., Disp: , Rfl:     Krill Oil (Omega-3) 500 MG capsule, Take 1 tablet by mouth Daily. (Patient not taking: Reported on 5/12/2025), Disp: , Rfl:     pantoprazole (PROTONIX) 40 MG EC tablet, Take 1 tablet by mouth Daily., Disp: 30 tablet, Rfl: 11    Allergies   Allergen Reactions    Diazepam Other (See Comments)     Made her violent    Codeine Nausea And Vomiting       Social History     Socioeconomic History    Marital status:    Tobacco Use    Smoking status: Never     Passive exposure: Never    Smokeless tobacco: Never   Vaping Use    Vaping status: Never Used   Substance and Sexual Activity    Alcohol use: Never    Drug use: Never    Sexual activity: Defer       Family History   Problem Relation Age of Onset    Diabetes Mother     Cancer Father     Heart disease Father     No Known Problems Maternal Grandmother     Diabetes Maternal Grandfather     No Known Problems Paternal Grandmother     Cancer Paternal Grandfather     Colon cancer Neg Hx     Colon polyps Neg Hx     Esophageal cancer Neg Hx     Liver disease Neg Hx     Stomach cancer Neg Hx     Rectal cancer Neg Hx     Liver cancer Neg Hx        Review of Systems   Constitutional:  Negative for unexpected weight change.   Gastrointestinal:  Positive for nausea.       Objective     /66   Pulse 56   Temp 97.5 °F (36.4 °C)   Ht 156.2 cm (61.5\")   Wt 93.5 kg (206 lb 3.2 oz)   SpO2 98%   Breastfeeding No   BMI 38.34 kg/m²     Physical Exam  Vitals " reviewed.   Constitutional:       Appearance: Normal appearance.   Abdominal:      General: Abdomen is flat. Bowel sounds are normal.      Palpations: Abdomen is soft.   Musculoskeletal:      Right lower leg: No edema.      Left lower leg: Edema present.   Neurological:      Mental Status: She is alert.         Lab Results - Last 18 Months   Lab Units 04/16/25  0756 02/08/25  0254 02/07/25  0544 02/06/25  1342 01/29/25  1118 11/15/24  0730   GLUCOSE mg/dL 116* 110* 125* 126* 91 114*   BUN mg/dL 18 22 27* 25* 25* 19   CREATININE mg/dL 0.89 1.06* 1.29* 1.03* 0.90 0.93   SODIUM mmol/L 135 136 129* 131* 134* 138   POTASSIUM mmol/L 3.7 3.9 3.9 3.9 3.8 4.0   CHLORIDE mmol/L 101 105 100 100 97* 104   CO2 mmol/L 22 24.0 22.0 25.0 25.0 25   TOTAL PROTEIN g/dL 7.3 7.1 7.1 7.5 8.8* 7.5   ALBUMIN g/dL 3.2* 2.7* 2.7* 3.0* 3.5 3.5*   ALT (SGPT) IU/L 73* 74* 85* 89* 123* 66*   AST (SGOT) IU/L 84* 95* 114* 121* 153* 93*   ALK PHOS IU/L 65 64 65 64 75 68   BILIRUBIN mg/dL 0.9 1.0 1.1 1.4* 0.9 0.8   GLOBULIN gm/dL  --  4.4 4.4 4.5 5.3  --    GLOBULINREF g/dL 4.1  --   --   --   --  4.0       Lab Results - Last 18 Months   Lab Units 04/16/25  0756 02/08/25  0254 02/07/25  0543 02/06/25  1254 01/29/25  1118 11/15/24  0730   HEMOGLOBIN g/dL 11.8 10.9* 10.5* 10.8* 12.8 12.5   HEMATOCRIT % 34.8 32.1* 30.2* 31.5* 37.1 36.1   MCV fL 97 94.4 93.5 93.8 93.7 94   WBC x10E3/uL 5.7 4.80 6.69 5.08 4.73 4.5   RDW % 12.4 13.2 12.9 13.1 12.8 12.7   MPV fL  --  10.5 10.4 10.8 10.0  --    PLATELETS x10E3/uL 109* 88* 87* 90* 123* 100*   INR   --   --   --   --  1.33*  --        Lab Results - Last 18 Months   Lab Units 02/07/25  0544 02/06/25  1443 01/29/25  1118 11/15/24  0730 02/05/24  1417 02/05/24  1417 11/14/23  0730   IRON mcg/dL  --  102  --   --   --   --   --    TIBC mcg/dL  --  265*  --   --   --   --   --    IRON SATURATION (TSAT) %  --  38  --   --   --   --   --    FERRITIN ng/mL  --  310.40*  --   --   --  152.00*  --    T4 TOTAL ug/dL  --    --   --  9.0  --   --  9.5   TSH uIU/mL 0.678  --   --  1.530  --   --  1.620   VIT D 25 HYDROXY ng/ml  --   --  40.5  --    < > 40.5  --     < > = values in this interval not displayed.        Lab Results - Last 18 Months   Lab Units 02/06/25  1443 02/05/24  1417   HEP B C IGM   --  Non-Reactive   HEP B S AG   --  Non-Reactive   FERRITIN ng/mL 310.40* 152.00*   CERULOPLASMIN mg/dL  --  23   MITOCHONDRIAL AB Units  --  <20.0           IMPRESSION/PLAN:    Assessment & Plan      Problem List Items Addressed This Visit       Elevated LFTs    Overview   Suspect chronic liver disease to be associated with fatty liver and possibly AIH.  Jan 2025 sono: with chronic liver disease, nodular liver.  LFT's have trended downward with weight loss.  Positive SHOSHANA, positive ASMA so suspect autoimmune hepatitis lead to further labs. 1/2025 Neg Soluble Liver Antigen and Anti-microsomal antibodies.  Other liver serology collected in February 2024 unremarkable including AMA, ceruloplasmin, ferritin, acute hepatitis panel  No history of HE or Ascites  No varices on endoscopy 3/2025.    2/7/2025 CT Scan Abdomen: liver is normal.  12/26/2023 liver ultrasound: Slightly coarse echogenicity liver parenchyma possibly indicating mild hepatic steatosis.    2/8/2025 Fib-4 Score= 9.16  12/29/2023: ALT 85, AST 99, ALKP normal, bilirubin normal platelets 119K, Fib-4 Score= 6.5    Fib-4 scoring system  Points <1.45:                      Cirrhosis less likely  Points >=1.45 and <=3.25:       Indeterminate  Points >3.25:                      Cirrhosis more likely     BNP normal 12/12/2023  Daily use of a statin  Fatty liver per US         Current Assessment & Plan   Awaiting Hepatology referral to help rule out AIH component to fatty liver disease.  2 month follow up         Fatty liver - Primary    Overview   12/26/2023 liver ultrasound: Slightly coarse echogenicity liver parenchyma possibly indicating mild hepatic steatosis.  1/15/2025 ultrasound  with mildly heterogeneous liver echogenicity with mildly nodular liver contour seen with chronic liver disease.    12/29/2023: Fib-4 Score= 6.5  11/15/2024 Fib-4 Score= 8.36  4/16/2025 Fib-4 Score= 6.58  11/15/2024 AST 93, ALT 66, Bili/ALKP normal  4/16/2025 :  AST 84, ALT 73, Bili and ALKP normal    ..Fib-4 scoring system  Points <1.45:                        Cirrhosis less likely  Points >=1.45 and <=3.25:       Indeterminate  Points >3.25:           Thrombocytopenia    Overview   11/15/2024 platelets 100,000 2/8/2025 platelets 88,000  4/16/2025 Platelets 109K             Nausea    Overview   Nausea for several months.  2/27/2025 Endoscopy with No varices, few diminutive erosion with stigmata of recent bleeding in gastric antrum.         Current Assessment & Plan   Begin Pantoprazole 40mg once daily and follow up in 2 months         Relevant Medications    pantoprazole (PROTONIX) 40 MG EC tablet    Personal history of adenomatous and serrated colon polyps    Overview   Adenomas removed 2/2025.  Repeat 2/2030.          Begin Pantoprazole daily  2 month follow up  Endo recall 2/2028            Carlyn Whitney, APRN  05/12/25  14:36 CDT    Part of this note may be an electronic transcription/translation of spoken language to printed text.

## 2025-05-13 ENCOUNTER — HOSPITAL ENCOUNTER (EMERGENCY)
Facility: HOSPITAL | Age: 75
Discharge: HOME | End: 2025-05-13
Attending: FAMILY MEDICINE | Admitting: FAMILY MEDICINE
Payer: MEDICARE

## 2025-05-13 ENCOUNTER — APPOINTMENT (OUTPATIENT)
Dept: GENERAL RADIOLOGY | Facility: HOSPITAL | Age: 75
End: 2025-05-13
Payer: MEDICARE

## 2025-05-13 ENCOUNTER — APPOINTMENT (OUTPATIENT)
Dept: CT IMAGING | Facility: HOSPITAL | Age: 75
End: 2025-05-13
Payer: MEDICARE

## 2025-05-13 ENCOUNTER — HOSPITAL ENCOUNTER (OUTPATIENT)
Dept: CARDIOLOGY | Facility: HOSPITAL | Age: 75
Discharge: HOME | End: 2025-05-13
Admitting: NURSE PRACTITIONER
Payer: MEDICARE

## 2025-05-13 VITALS
DIASTOLIC BLOOD PRESSURE: 65 MMHG | WEIGHT: 206 LBS | RESPIRATION RATE: 18 BRPM | HEIGHT: 62 IN | HEART RATE: 57 BPM | OXYGEN SATURATION: 97 % | SYSTOLIC BLOOD PRESSURE: 123 MMHG | BODY MASS INDEX: 37.91 KG/M2 | TEMPERATURE: 98 F

## 2025-05-13 VITALS
SYSTOLIC BLOOD PRESSURE: 130 MMHG | BODY MASS INDEX: 38.92 KG/M2 | WEIGHT: 206.13 LBS | HEIGHT: 61 IN | DIASTOLIC BLOOD PRESSURE: 66 MMHG
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
## 2025-05-13 DIAGNOSIS — R06.09 DYSPNEA ON EXERTION: ICD-10-CM

## 2025-05-13 DIAGNOSIS — R74.8 ELEVATED LIVER ENZYMES: ICD-10-CM

## 2025-05-13 DIAGNOSIS — R55 SYNCOPE, UNSPECIFIED SYNCOPE TYPE: Primary | ICD-10-CM

## 2025-05-13 DIAGNOSIS — S22.31XA CLOSED FRACTURE OF ONE RIB OF RIGHT SIDE, INITIAL ENCOUNTER: ICD-10-CM

## 2025-05-13 LAB
ALBUMIN SERPL-MCNC: 3.1 G/DL (ref 3.5–5.2)
ALBUMIN/GLOB SERPL: 0.7 G/DL
ALP SERPL-CCNC: 57 U/L (ref 39–117)
ALT SERPL W P-5'-P-CCNC: 107 U/L (ref 1–33)
ANION GAP SERPL CALCULATED.3IONS-SCNC: 9 MMOL/L (ref 5–15)
APTT PPP: 34.4 SECONDS (ref 24.5–36)
AST SERPL-CCNC: 122 U/L (ref 1–32)
BASOPHILS # BLD AUTO: 0.04 10*3/MM3 (ref 0–0.2)
BASOPHILS NFR BLD AUTO: 0.7 % (ref 0–1.5)
BILIRUB SERPL-MCNC: 0.8 MG/DL (ref 0–1.2)
BUN SERPL-MCNC: 21 MG/DL (ref 8–23)
BUN/CREAT SERPL: 26.6 (ref 7–25)
CALCIUM SPEC-SCNC: 9.1 MG/DL (ref 8.6–10.5)
CHLORIDE SERPL-SCNC: 103 MMOL/L (ref 98–107)
CK SERPL-CCNC: 419 U/L (ref 20–180)
CO2 SERPL-SCNC: 24 MMOL/L (ref 22–29)
CREAT SERPL-MCNC: 0.79 MG/DL (ref 0.57–1)
D-LACTATE SERPL-SCNC: 1.8 MMOL/L (ref 0.5–2)
DEPRECATED RDW RBC AUTO: 46.5 FL (ref 37–54)
EGFRCR SERPLBLD CKD-EPI 2021: 79.1 ML/MIN/1.73
EOSINOPHIL # BLD AUTO: 0.07 10*3/MM3 (ref 0–0.4)
EOSINOPHIL NFR BLD AUTO: 1.3 % (ref 0.3–6.2)
ERYTHROCYTE [DISTWIDTH] IN BLOOD BY AUTOMATED COUNT: 13.2 % (ref 12.3–15.4)
GEN 5 1HR TROPONIN T REFLEX: 64 NG/L
GLOBULIN UR ELPH-MCNC: 4.5 GM/DL
GLUCOSE SERPL-MCNC: 139 MG/DL (ref 65–99)
HCT VFR BLD AUTO: 35.7 % (ref 34–46.6)
HGB BLD-MCNC: 12.1 G/DL (ref 12–15.9)
IMM GRANULOCYTES # BLD AUTO: 0.07 10*3/MM3 (ref 0–0.05)
IMM GRANULOCYTES NFR BLD AUTO: 1.3 % (ref 0–0.5)
INR PPP: 1.34 (ref 0.91–1.09)
LYMPHOCYTES # BLD AUTO: 1.31 10*3/MM3 (ref 0.7–3.1)
LYMPHOCYTES NFR BLD AUTO: 23.9 % (ref 19.6–45.3)
MAGNESIUM SERPL-MCNC: 1.7 MG/DL (ref 1.6–2.4)
MCH RBC QN AUTO: 32.5 PG (ref 26.6–33)
MCHC RBC AUTO-ENTMCNC: 33.9 G/DL (ref 31.5–35.7)
MCV RBC AUTO: 96 FL (ref 79–97)
MONOCYTES # BLD AUTO: 0.41 10*3/MM3 (ref 0.1–0.9)
MONOCYTES NFR BLD AUTO: 7.5 % (ref 5–12)
NEUTROPHILS NFR BLD AUTO: 3.59 10*3/MM3 (ref 1.7–7)
NEUTROPHILS NFR BLD AUTO: 65.3 % (ref 42.7–76)
PLATELET # BLD AUTO: 108 10*3/MM3 (ref 140–450)
PMV BLD AUTO: 9.6 FL (ref 6–12)
POTASSIUM SERPL-SCNC: 3.8 MMOL/L (ref 3.5–5.2)
PROT SERPL-MCNC: 7.6 G/DL (ref 6–8.5)
PROTHROMBIN TIME: 17.4 SECONDS (ref 11.8–14.8)
RBC # BLD AUTO: 3.72 10*6/MM3 (ref 3.77–5.28)
SODIUM SERPL-SCNC: 136 MMOL/L (ref 136–145)
TROPONIN T % DELTA: 3
TROPONIN T NUMERIC DELTA: 2 NG/L
TROPONIN T SERPL HS-MCNC: 62 NG/L
WBC NRBC COR # BLD AUTO: 5.49 10*3/MM3 (ref 3.4–10.8)

## 2025-05-13 PROCEDURE — 83605 ASSAY OF LACTIC ACID: CPT | Performed by: FAMILY MEDICINE

## 2025-05-13 PROCEDURE — 99284 EMERGENCY DEPT VISIT MOD MDM: CPT | Performed by: FAMILY MEDICINE

## 2025-05-13 PROCEDURE — 84484 ASSAY OF TROPONIN QUANT: CPT | Performed by: FAMILY MEDICINE

## 2025-05-13 PROCEDURE — 71101 X-RAY EXAM UNILAT RIBS/CHEST: CPT

## 2025-05-13 PROCEDURE — 82550 ASSAY OF CK (CPK): CPT | Performed by: FAMILY MEDICINE

## 2025-05-13 PROCEDURE — 93005 ELECTROCARDIOGRAM TRACING: CPT | Performed by: FAMILY MEDICINE

## 2025-05-13 PROCEDURE — 85610 PROTHROMBIN TIME: CPT | Performed by: FAMILY MEDICINE

## 2025-05-13 PROCEDURE — 83735 ASSAY OF MAGNESIUM: CPT | Performed by: FAMILY MEDICINE

## 2025-05-13 PROCEDURE — 25810000003 SODIUM CHLORIDE 0.9 % SOLUTION: Performed by: FAMILY MEDICINE

## 2025-05-13 PROCEDURE — 85025 COMPLETE CBC W/AUTO DIFF WBC: CPT | Performed by: FAMILY MEDICINE

## 2025-05-13 PROCEDURE — 80053 COMPREHEN METABOLIC PANEL: CPT | Performed by: FAMILY MEDICINE

## 2025-05-13 PROCEDURE — 70450 CT HEAD/BRAIN W/O DYE: CPT

## 2025-05-13 PROCEDURE — 93306 TTE W/DOPPLER COMPLETE: CPT

## 2025-05-13 PROCEDURE — 85730 THROMBOPLASTIN TIME PARTIAL: CPT | Performed by: FAMILY MEDICINE

## 2025-05-13 PROCEDURE — 36415 COLL VENOUS BLD VENIPUNCTURE: CPT

## 2025-05-13 RX ADMIN — SODIUM CHLORIDE 1000 ML: 9 INJECTION, SOLUTION INTRAVENOUS at 15:11

## 2025-05-13 NOTE — DISCHARGE INSTRUCTIONS
"Due to your fall being caused by \"syncope\".  I recommend that you call and speak with your primary care provider to get a referral to neurology for an EEG to make sure that your syncopal episode was not related to a seizure.  I also would recommend a Holter monitor to monitor your heartbeat and the speed of your heartbeat.  An arrhythmia could have been the other cause.  You do have a slow heartbeat which is called (bradycardia).  If further if your heartbeat gets too low and this was was causing your syncope a pacemaker may be required.  "

## 2025-05-13 NOTE — ED PROVIDER NOTES
HPI:     Patient 74-year-old white female presents emergency status post fall.  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  She cannot recall falling but woke up on the ground with complaint of him right sided rib pain.  No head neck or spine pain no shoulder pain no hip or bilateral lower extremity pain her ribs pain on the right side is 6 out of 10 worse primarily when she takes a deep breath or turns.  Patient denied any chest pain or palpitations prior to the incident.    REVIEW OF SYSTEMS  CONSTITUTIONAL:  No complaints of fever, chills,or weakness  EYES:  No complaints of discharge   ENT: No complaints of sore throat or ear pain  CARDIOVASCULAR: Positive for syncope RESPIRATORY:  No complaints of cough or shortness of breath  GI:  No complaints of abdominal pain, nausea, vomiting, or diarrhea  MUSCULOSKELETAL: Positive for fall with right-sided rib injury   SKIN:  No complaints of rash  NEUROLOGIC: Positive for syncope ENDOCRINE:  No complaints of polyuria or polydipsia  LYMPHATIC:  No complaints of swollen glands  GENITOURINARY: No complaints of urinary frequency or hematuria        PAST MEDICAL HISTORY  Past Medical History:   Diagnosis Date    Arthritis     CHF (congestive heart failure)     Coronary artery disease     Diverticulosis     Fatty liver     History of adenomatous polyp of colon     History of transfusion     Hyperlipidemia     Hypertension     Sleep apnea     UTI (urinary tract infection)        FAMILY HISTORY  Family History   Problem Relation Age of Onset    Diabetes Mother     Cancer Father     Heart disease Father     No Known Problems Maternal Grandmother     Diabetes Maternal Grandfather     No Known Problems Paternal Grandmother     Cancer Paternal Grandfather     Colon cancer Neg Hx     Colon  polyps Neg Hx     Esophageal cancer Neg Hx     Liver disease Neg Hx     Stomach cancer Neg Hx     Rectal cancer Neg Hx     Liver cancer Neg Hx        SOCIAL HISTORY  Social History     Socioeconomic History    Marital status:    Tobacco Use    Smoking status: Never     Passive exposure: Never    Smokeless tobacco: Never   Vaping Use    Vaping status: Never Used   Substance and Sexual Activity    Alcohol use: Never    Drug use: Never    Sexual activity: Defer       IMMUNIZATION HISTORY  Deferred to primary care physician.    SURGICAL HISTORY  Past Surgical History:   Procedure Laterality Date    APPENDECTOMY      CARDIAC SURGERY  2008    Tripple bypass    CAROTID STENT      CARPAL TUNNEL RELEASE Bilateral     CHOLECYSTECTOMY      COLON SURGERY      COLONOSCOPY N/A 02/27/2025    Diverticulosis in the left colon; One 7mm tubular adenomatous polyp in the transverse colon; One 4mm tubular adenomatous polyp in the rectum-Clip (MR conditional) was placed; Repeat 5 years    CORONARY STENT PLACEMENT  2021    total of 4-5 heart stents in past    ENDOSCOPY N/A 02/27/2025    Erosive gastropathy with stigmata of recent bleeding-biopsied; Normal examined duodenum; Repeat 3 years    HERNIA REPAIR      JOINT REPLACEMENT      KNEE SURGERY Bilateral     TONSILLECTOMY AND ADENOIDECTOMY      TUBAL ABDOMINAL LIGATION  1977    UMBILICAL HERNIA REPAIR N/A 02/02/2023    Procedure: UMBILICAL HERNIA REPAIR with mesh;  Surgeon: Lara Conley MD;  Location: [facility] OR;  Service: General;  Laterality: N/A;       CURRENT MEDICATIONS  No current facility-administered medications for this encounter.    Current Outpatient Medications:     aluminum-magnesium hydroxide-simethicone (MAALOX MAX) 400-400-40 MG/5ML suspension, Take 15 mL by mouth Every 6 (Six) Hours As Needed for Indigestion or Heartburn., Disp: , Rfl:     Ascorbic Acid (VITAMIN C PO), Take 1 tablet by mouth Daily., Disp: , Rfl:     aspirin 81 MG EC tablet, Take 1 tablet by  mouth Daily., Disp: , Rfl:     atorvastatin (LIPITOR) 80 MG tablet, TAKE ONE TABLET BY MOUTH EVERY EVENING AT BEDTIME, Disp: 90 tablet, Rfl: 1    Blood Pressure kit, Check blood pressure daily., Disp: 1 each, Rfl: 0    carbamide peroxide (Debrox) 6.5 % otic solution, Administer 5 drops to the right ear 2 (Two) Times a Day., Disp: 15 mL, Rfl: 0    carvedilol (COREG) 6.25 MG tablet, TAKE ONE TABLET BY MOUTH TWICE A DAY, Disp: 90 tablet, Rfl: 1    cetirizine (zyrTEC) 10 MG tablet, Take 1 tablet by mouth Daily., Disp: , Rfl:     cholecalciferol (VITAMIN D3) 25 MCG (1000 UT) tablet, Take 1 tablet by mouth Daily., Disp: , Rfl:     clopidogrel (PLAVIX) 75 MG tablet, TAKE ONE TABLET BY MOUTH EVERY DAY, Disp: 90 tablet, Rfl: 1    cyclobenzaprine (FLEXERIL) 10 MG tablet, TAKE ONE TABLET BY MOUTH EVERY DAY AT BEDTIME, Disp: 30 tablet, Rfl: 5    gabapentin (NEURONTIN) 100 MG capsule, Take 1 capsule by mouth Every Night., Disp: , Rfl:     indapamide (LOZOL) 2.5 MG tablet, Take 1 tablet by mouth Every Morning., Disp: , Rfl:     Krill Oil (Omega-3) 500 MG capsule, Take 1 tablet by mouth Daily. (Patient not taking: Reported on 5/12/2025), Disp: , Rfl:     losartan (Cozaar) 50 MG tablet, Take 1 tablet by mouth Daily., Disp: 90 tablet, Rfl: 1    multivitamin with minerals (Multivitamin Women 50+) tablet tablet, Take 1 tablet by mouth Daily., Disp: , Rfl:     nitrofurantoin, macrocrystal-monohydrate, (Macrobid) 100 MG capsule, Take 1 capsule by mouth 2 (Two) Times a Day., Disp: 20 capsule, Rfl: 0    nitroglycerin (NITROSTAT) 0.4 MG SL tablet, PLACE ONE TABLET UNDER THE TONGUE EVERY 5 MINUTES AS NEEDED FOR CHEST PAIN FOR UP TO 3 DOSES, IF NO RELIEF AFTER FIRST DOSE, CALL 911, Disp: 50 tablet, Rfl: 1    pantoprazole (PROTONIX) 40 MG EC tablet, Take 1 tablet by mouth Daily., Disp: 30 tablet, Rfl: 11    ranolazine (RANEXA) 1000 MG 12 hr tablet, Take 1 tablet by mouth Every 12 (Twelve) Hours., Disp: 60 tablet, Rfl: 0    ranolazine (RANEXA)  "1000 MG 12 hr tablet, TAKE ONE TABLET BY MOUTH EVERY 12 HOURS, Disp: 60 tablet, Rfl: 0    spironolactone (ALDACTONE) 25 MG tablet, Take 1 tablet by mouth Daily., Disp: 30 tablet, Rfl: 11    Turmeric Curcumin 500 MG capsule, Take 1 capsule by mouth Daily., Disp: , Rfl:     vitamin B-12 (CYANOCOBALAMIN) 100 MCG tablet, Take 1 tablet by mouth Daily., Disp: , Rfl:     ALLERGIES  Allergies   Allergen Reactions    Diazepam Other (See Comments)     Made her violent    Codeine Nausea And Vomiting           Cardiac exam    VITAL SIGNS:  /81   Pulse 58   Temp 98.1 °F (36.7 °C) (Oral)   Resp 17   Ht 157.5 cm (62\")   Wt 93.4 kg (206 lb)   SpO2 96%   BMI 37.68 kg/m²     Constitutional: Patient is alert and in no distress.  Patient with moderate right-sided rib discomfort.    ENT: There is a normal pharynx with no acute erythema or exudate and oral mucosa is moist.  Nose is clear with no drainage.  Tympanic membranes intact and nonerythemic    Respiratory: Patient is clear to auscultation bilaterally with no wheezing or rhonchi.  Chest wall is tender palpation of the right lateral ribs.  There are no external lesions on the chest.  There is no crepitance    Cardiovascular: S1-S2 regular rate and rhythm no murmurs rubs or gallop.    Abdomen: Soft, nontender, and  bowel sounds are normal in all 4 quadrants.  There is no rebound or guarding noted.  There is no abdominal distention or hepatosplenomegaly.    Genitourinary: Patient is voiding appropriately.    Integument: No acute lesions noted and color appears to be normal.    Geetha Coma Scale: Total score 15    Neurological: Patient is alert and oriented x4 and no acute findings noted.  Speech is fluent and cognition is normal.  No evidence of acute CVA.  Cranial nerves II through XII intact.  Patient with normal motor function as well as reflexes and sensation      RADIOLOGY/PROCEDURES      CT Head Without Contrast   Final Result   Impression:         No acute " intracranial abnormality.       This report was signed and finalized on 5/13/2025 3:06 PM by Yon Styles.          XR Ribs Right With PA Chest   Final Result   1. Lungs are clear and well expanded.   2. Mildly displaced right anterior ninth rib fracture.   3. T12 compression fracture is pre-existing, chronic and stable.       This report was signed and finalized on 5/13/2025 3:20 PM by Dr Shad Abarca.                 FUTURE APPOINTMENTS     Future Appointments   Date Time Provider Department Center   5/28/2025  9:45 AM Reza Charles, KENDRA [facility]   6/11/2025 11:00 AM Ana Sam, KENDRA [facility]   7/22/2025 10:45 AM Carlyn Whitney, APRADRIANO [facility]          EKG (reviewed and interpreted by me): Normal sinus bradycardia rhythm. No acute ischemia. Heart rate 54 with no acute ST segment elevation or depression noted      HEART SCORE    Patient history  1          Slightly suspicious (0 points)  2   ECG       Nonspecific repolarization disturbance (1 point)    3   Patient age     Equal or higher than 65 (2 points)    4   Risk factors (Hypercholesterolemia, Hypertension, diabetes, smoking, obesity)     More than 3 risk factors or atherosclerosis history (2 points)    5   Troponin       1 to 3 times higher than normal (1 point)      6     TOTAL RISK NUMBER: 6    The three risk categories are described below:  Heart score MACE risk Recommendation  0 - 3 Low (1.7%) Discharge can be an option.  4 - 6 Intermediate (20.3%) Clinical observation and further investigations.  7 - 10 High (72.2%) Immediate invasive treatment        COURSE & MEDICAL DECISION MAKING       Patient's partial differential diagnosis can include:    Vasovagal syncope, seizure, electrolyte abnormality, arrhythmia, traumatic pneumothorax, costochondritis pleurisy, pleural effusion, pulmonary edema, panic attack, esophageal spasm, GERD, gastritis, chest spasms, and others    CBC, CMP, troponin, magnesium, CK, PT, PTT,  lactic acid.  Chest x-ray, CT of the head and chest x-ray with ribs.    X-ray of the chest and ribs show 1 mildly displaced right ninth rib fracture.  No pneumothorax.  CT head shows no acute intracranial process.    Troponin stable.  I offered admission to the patient 2 to the patient having a syncopal episode for possible evaluation EEG on admission patient states she feels comfortable and wants to go home.  Advised her if she is going to go home to speak with her PCP tomorrow to get outpatient referral to neurology for EEG as well as further evaluation by Holter monitor.    Patient voices understanding and will be discharged at her request.      Patient's level of risk: Moderate        CRITICAL CARE    CRITICAL CARE: No    CRITICAL CARE TIME: None      Recent Results (from the past 24 hours)   Adult Transthoracic Echo Complete w/ Color, Spectral and Contrast if necessary per protocol    Collection Time: 05/13/25  7:53 AM   Result Value Ref Range    LVIDd 4.6 cm    LVIDs 2.6 cm    IVSd 0.80 cm    LVPWd 1.05 cm    FS 44.0 %    IVS/LVPW 0.77 cm    ESV(cubed) 16.8 ml    LV Sys Vol (BSA corrected) 10.2 cm2    EDV(cubed) 95.9 ml    LV Aguila Vol (BSA corrected) 31.0 cm2    LV mass(C)d 141.6 grams    LVOT area 3.7 cm2    LVOT diam 2.17 cm    EDV(MOD-sp4) 60.0 ml    ESV(MOD-sp4) 19.8 ml    SV(MOD-sp4) 40.2 ml    SVi(MOD-SP4) 20.8 ml/m2    SVi (LVOT) 47.8 ml/m2    EF(MOD-sp4) 67.0 %    MV E max justin 98.5 cm/sec    MV A max justin 101.0 cm/sec    MV dec time 0.26 sec    MV E/A 0.98     Med Peak E' Justin 6.1 cm/sec    Lat Peak E' Justin 11.2 cm/sec    TR max justin 192.5 cm/sec    Avg E/e' ratio 11.39     SV(LVOT) 92.5 ml    LA dimension (2D)  4.2 cm    LV V1 max 107.7 cm/sec    LV V1 max PG 4.6 mmHg    LV V1 mean PG 2.46 mmHg    LV V1 VTI 24.9 cm    Ao pk justin 195.2 cm/sec    Ao max PG 15.2 mmHg    Ao mean PG 7.4 mmHg    Ao V2 VTI 43.5 cm    BRANDAN(I,D) 2.13 cm2    Dimensionless Index 0.57 (DI)    MV dec slope 386.3 cm/sec2    TR max PG  14.8 mmHg    RVSP(TR) 17.8 mmHg    RAP systole 3.0 mmHg    Ao root diam 3.3 cm    LA ESV Index (BP) 33.3 ml/m2    LA ESV (BP) 65.0 ml   Comprehensive Metabolic Panel    Collection Time: 05/13/25  3:19 PM    Specimen: Blood   Result Value Ref Range    Glucose 139 (H) 65 - 99 mg/dL    BUN 21 8 - 23 mg/dL    Creatinine 0.79 0.57 - 1.00 mg/dL    Sodium 136 136 - 145 mmol/L    Potassium 3.8 3.5 - 5.2 mmol/L    Chloride 103 98 - 107 mmol/L    CO2 24.0 22.0 - 29.0 mmol/L    Calcium 9.1 8.6 - 10.5 mg/dL    Total Protein 7.6 6.0 - 8.5 g/dL    Albumin 3.1 (L) 3.5 - 5.2 g/dL    ALT (SGPT) 107 (H) 1 - 33 U/L    AST (SGOT) 122 (H) 1 - 32 U/L    Alkaline Phosphatase 57 39 - 117 U/L    Total Bilirubin 0.8 0.0 - 1.2 mg/dL    Globulin 4.5 gm/dL    A/G Ratio 0.7 g/dL    BUN/Creatinine Ratio 26.6 (H) 7.0 - 25.0    Anion Gap 9.0 5.0 - 15.0 mmol/L    eGFR 79.1 >60.0 mL/min/1.73   Protime-INR    Collection Time: 05/13/25  3:19 PM    Specimen: Blood   Result Value Ref Range    Protime 17.4 (H) 11.8 - 14.8 Seconds    INR 1.34 (H) 0.91 - 1.09   aPTT    Collection Time: 05/13/25  3:19 PM    Specimen: Blood   Result Value Ref Range    PTT 34.4 24.5 - 36.0 seconds   High Sensitivity Troponin T    Collection Time: 05/13/25  3:19 PM    Specimen: Blood   Result Value Ref Range    HS Troponin T 62 (C) <14 ng/L   Lactic Acid, Plasma    Collection Time: 05/13/25  3:19 PM    Specimen: Blood   Result Value Ref Range    Lactate 1.8 0.5 - 2.0 mmol/L   Magnesium    Collection Time: 05/13/25  3:19 PM    Specimen: Blood   Result Value Ref Range    Magnesium 1.7 1.6 - 2.4 mg/dL   CBC Auto Differential    Collection Time: 05/13/25  3:19 PM    Specimen: Blood   Result Value Ref Range    WBC 5.49 3.40 - 10.80 10*3/mm3    RBC 3.72 (L) 3.77 - 5.28 10*6/mm3    Hemoglobin 12.1 12.0 - 15.9 g/dL    Hematocrit 35.7 34.0 - 46.6 %    MCV 96.0 79.0 - 97.0 fL    MCH 32.5 26.6 - 33.0 pg    MCHC 33.9 31.5 - 35.7 g/dL    RDW 13.2 12.3 - 15.4 %    RDW-SD 46.5 37.0 - 54.0 fl     MPV 9.6 6.0 - 12.0 fL    Platelets 108 (L) 140 - 450 10*3/mm3    Neutrophil % 65.3 42.7 - 76.0 %    Lymphocyte % 23.9 19.6 - 45.3 %    Monocyte % 7.5 5.0 - 12.0 %    Eosinophil % 1.3 0.3 - 6.2 %    Basophil % 0.7 0.0 - 1.5 %    Immature Grans % 1.3 (H) 0.0 - 0.5 %    Neutrophils, Absolute 3.59 1.70 - 7.00 10*3/mm3    Lymphocytes, Absolute 1.31 0.70 - 3.10 10*3/mm3    Monocytes, Absolute 0.41 0.10 - 0.90 10*3/mm3    Eosinophils, Absolute 0.07 0.00 - 0.40 10*3/mm3    Basophils, Absolute 0.04 0.00 - 0.20 10*3/mm3    Immature Grans, Absolute 0.07 (H) 0.00 - 0.05 10*3/mm3   CK    Collection Time: 05/13/25  3:19 PM    Specimen: Blood   Result Value Ref Range    Creatine Kinase 419 (H) 20 - 180 U/L   ECG 12 Lead Syncope    Collection Time: 05/13/25  4:28 PM   Result Value Ref Range    QT Interval 494 ms    QTC Interval 468 ms   High Sensitivity Troponin T 1Hr    Collection Time: 05/13/25  4:49 PM    Specimen: Blood   Result Value Ref Range    HS Troponin T 64 (C) <14 ng/L    Troponin T Numeric Delta 2 ng/L    Troponin T % Delta 3 Abnormal if >/= 20%            The patient's last clinical visit to PCP was reviewed by me:       Old charts were reviewed per [employer] EMR.  Pertinent details are summarized above.  All laboratory, radiologic, and EKG studies that were performed in the Emergency Department were a necessary part of the evaluation needed to exclude unstable or  emergent medical conditions.     Patient was hemodynamically and neurologically stable in the ED.   Pertinent studies were reviewed as above.     The patient received:  Medications   sodium chloride 0.9 % bolus 1,000 mL (1,000 mL Intravenous New Bag 5/13/25 4301)            ED Disposition       ED Disposition   Discharge    Condition   Stable    Comment   --                 Dragon disclaimer:  Part of this note may be an electronic transcription/translation of spoken language to printed text using the Dragon Dictation System.    I have reviewed the patient’s  prescription history via a prescription monitoring program.  This information is consistent with my knowledge of the patient’s controlled substance use history.    Patient evaluated during Coronavirus Pandemic. Isolation practices followed according to [facility] policy.     FINAL IMPRESSION   Diagnosis Plan   1. Syncope, unspecified syncope type        2. Closed fracture of one rib of right side, initial encounter      Ninth anterior rib      3. Elevated liver enzymes        4. Fall on same level, initial encounter              MD Tiburcio Oliveira Jr, Thomas Mark Jr., MD  05/13/25 0221

## 2025-05-14 ENCOUNTER — TELEPHONE (OUTPATIENT)
Dept: FAMILY MEDICINE CLINIC | Facility: CLINIC | Age: 75
End: 2025-05-14
Payer: MEDICARE

## 2025-05-14 ENCOUNTER — TELEPHONE (OUTPATIENT)
Dept: CARDIOLOGY | Facility: CLINIC | Age: 75
End: 2025-05-14
Payer: MEDICARE

## 2025-05-14 DIAGNOSIS — R55 SYNCOPE, UNSPECIFIED SYNCOPE TYPE: Primary | ICD-10-CM

## 2025-05-14 NOTE — TELEPHONE ENCOUNTER
[place] called to say [place] and went to the ER, we do have records in the chart on this. She wanted to make the office aware of the situation and she would like a referral to neurology, would like to be in [place].

## 2025-05-14 NOTE — TELEPHONE ENCOUNTER
Caller: Priscilla Santos    Relationship to patient: Self    Best call back number: [phone] (home)     New or established patient?  [] New  [x] Established    Date of discharge: 05.13.25    Facility discharged from: [facility]    Diagnosis/Symptoms:   1 Syncope, unspecified syncope type          2. Closed fracture of one rib of right side, initial encounter        Ninth anterior rib       3. Elevated liver enzymes          4. Fall on same level, initial encounter         Additional Details: PATIENT WAS ADVISED TO ALSO GET A HOLTER.

## 2025-05-14 NOTE — TELEPHONE ENCOUNTER
Patient ED dc summary: further evaluation by Holter Monitor. Patient has f/u back here on 6/11/2025. Do you want to order so results might be back by next appointment?

## 2025-05-15 LAB
AORTIC DIMENSIONLESS INDEX: 0.57 (DI)
AV MEAN PRESS GRAD SYS DOP V1V2: 7.4 MMHG
AV VMAX SYS DOP: 195.2 CM/SEC
BH CV ECHO MEAS - AO MAX PG: 15.2 MMHG
BH CV ECHO MEAS - AO ROOT DIAM: 3.3 CM
BH CV ECHO MEAS - AO V2 VTI: 43.5 CM
BH CV ECHO MEAS - AVA(I,D): 2.13 CM2
BH CV ECHO MEAS - EDV(CUBED): 95.9 ML
BH CV ECHO MEAS - EDV(MOD-SP4): 60 ML
BH CV ECHO MEAS - EF(MOD-SP4): 67 %
BH CV ECHO MEAS - ESV(CUBED): 16.8 ML
BH CV ECHO MEAS - ESV(MOD-SP4): 19.8 ML
BH CV ECHO MEAS - FS: 44 %
BH CV ECHO MEAS - IVS/LVPW: 0.77 CM
BH CV ECHO MEAS - IVSD: 0.8 CM
BH CV ECHO MEAS - LA DIMENSION: 4.2 CM
BH CV ECHO MEAS - LAT PEAK E' VEL: 11.2 CM/SEC
BH CV ECHO MEAS - LV DIASTOLIC VOL/BSA (35-75): 31 CM2
BH CV ECHO MEAS - LV MASS(C)D: 141.6 GRAMS
BH CV ECHO MEAS - LV MAX PG: 4.6 MMHG
BH CV ECHO MEAS - LV MEAN PG: 2.46 MMHG
BH CV ECHO MEAS - LV SYSTOLIC VOL/BSA (12-30): 10.2 CM2
BH CV ECHO MEAS - LV V1 MAX: 107.7 CM/SEC
BH CV ECHO MEAS - LV V1 VTI: 24.9 CM
BH CV ECHO MEAS - LVIDD: 4.6 CM
BH CV ECHO MEAS - LVIDS: 2.6 CM
BH CV ECHO MEAS - LVOT AREA: 3.7 CM2
BH CV ECHO MEAS - LVOT DIAM: 2.17 CM
BH CV ECHO MEAS - LVPWD: 1.05 CM
BH CV ECHO MEAS - MED PEAK E' VEL: 6.1 CM/SEC
BH CV ECHO MEAS - MV A MAX VEL: 101 CM/SEC
BH CV ECHO MEAS - MV DEC SLOPE: 386.3 CM/SEC2
BH CV ECHO MEAS - MV DEC TIME: 0.26 SEC
BH CV ECHO MEAS - MV E MAX VEL: 98.5 CM/SEC
BH CV ECHO MEAS - MV E/A: 0.98
BH CV ECHO MEAS - RAP SYSTOLE: 3 MMHG
BH CV ECHO MEAS - RVSP: 17.8 MMHG
BH CV ECHO MEAS - SV(LVOT): 92.5 ML
BH CV ECHO MEAS - SV(MOD-SP4): 40.2 ML
BH CV ECHO MEAS - SVI(LVOT): 47.8 ML/M2
BH CV ECHO MEAS - SVI(MOD-SP4): 20.8 ML/M2
BH CV ECHO MEAS - TR MAX PG: 14.8 MMHG
BH CV ECHO MEAS - TR MAX VEL: 192.5 CM/SEC
BH CV ECHO MEASUREMENTS AVERAGE E/E' RATIO: 11.39
LEFT ATRIUM VOLUME INDEX: 33.3 ML/M2
LEFT ATRIUM VOLUME: 65 ML
QT INTERVAL: 494 MS
QTC INTERVAL: 468 MS

## 2025-05-16 NOTE — TELEPHONE ENCOUNTER
Sooner f/u appt scheduled.   I personally evaluated the patient. I reviewed the Resident’s or Physician Assistant’s note (as assigned above), and agree with the findings and plan except as documented in my note.14-year-old here for evaluation by was playing basketball went up for a shot and then came down and felt like he pulled something thought he heard something pop or snap but was unsure since then feels unsteady on feet physical exam is not remarkable for any bony malformation or tenderness pain seems to be more muscular than anything else we will go ahead and give NSAIDs and reassess can follow-up with PCP as OPD

## 2025-05-21 ENCOUNTER — TELEPHONE (OUTPATIENT)
Dept: FAMILY MEDICINE CLINIC | Facility: CLINIC | Age: 75
End: 2025-05-21
Payer: MEDICARE

## 2025-05-21 NOTE — TELEPHONE ENCOUNTER
Gabrielle called and wanted me to re-fax a cpap order that was faxed on 3/20/25. It said the signature was cut off. I don't see it in her chart and the hardcopy is already past the 30 days so it's no longer in the file cabinet. If we find it she wants us to fax it to [phone]. If they call back and still need it they may just need to refax for a new signature.

## 2025-05-22 ENCOUNTER — OFFICE VISIT (OUTPATIENT)
Dept: FAMILY MEDICINE CLINIC | Facility: CLINIC | Age: 75
End: 2025-05-22
Payer: MEDICARE

## 2025-05-22 ENCOUNTER — OFFICE VISIT (OUTPATIENT)
Dept: CARDIOLOGY | Facility: CLINIC | Age: 75
End: 2025-05-22
Payer: MEDICARE

## 2025-05-22 VITALS
HEIGHT: 61 IN | SYSTOLIC BLOOD PRESSURE: 94 MMHG | BODY MASS INDEX: 37.95 KG/M2 | WEIGHT: 201 LBS | HEART RATE: 64 BPM | DIASTOLIC BLOOD PRESSURE: 48 MMHG | OXYGEN SATURATION: 98 %

## 2025-05-22 VITALS
WEIGHT: 201.4 LBS | OXYGEN SATURATION: 99 % | SYSTOLIC BLOOD PRESSURE: 100 MMHG | DIASTOLIC BLOOD PRESSURE: 65 MMHG | TEMPERATURE: 97.9 F | HEIGHT: 61 IN | BODY MASS INDEX: 38.02 KG/M2 | RESPIRATION RATE: 18 BRPM | HEART RATE: 57 BPM

## 2025-05-22 DIAGNOSIS — R60.9 SWELLING: ICD-10-CM

## 2025-05-22 DIAGNOSIS — R06.09 DOE (DYSPNEA ON EXERTION): Primary | ICD-10-CM

## 2025-05-22 DIAGNOSIS — E66.812 CLASS 2 SEVERE OBESITY DUE TO EXCESS CALORIES WITH SERIOUS COMORBIDITY AND BODY MASS INDEX (BMI) OF 37.0 TO 37.9 IN ADULT: ICD-10-CM

## 2025-05-22 DIAGNOSIS — I25.10 CORONARY ARTERY DISEASE INVOLVING NATIVE HEART, UNSPECIFIED VESSEL OR LESION TYPE, UNSPECIFIED WHETHER ANGINA PRESENT: ICD-10-CM

## 2025-05-22 DIAGNOSIS — R26.81 GAIT INSTABILITY: ICD-10-CM

## 2025-05-22 DIAGNOSIS — E66.01 CLASS 2 SEVERE OBESITY DUE TO EXCESS CALORIES WITH SERIOUS COMORBIDITY AND BODY MASS INDEX (BMI) OF 37.0 TO 37.9 IN ADULT: ICD-10-CM
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
## 2025-05-22 DIAGNOSIS — I10 PRIMARY HYPERTENSION: ICD-10-CM

## 2025-05-22 DIAGNOSIS — E78.2 MIXED HYPERLIPIDEMIA: ICD-10-CM

## 2025-05-22 DIAGNOSIS — R55 SYNCOPE, UNSPECIFIED SYNCOPE TYPE: ICD-10-CM

## 2025-05-22 DIAGNOSIS — R55 SYNCOPE AND COLLAPSE: ICD-10-CM

## 2025-05-22 DIAGNOSIS — H66.92 ACUTE LEFT OTITIS MEDIA: Primary | ICD-10-CM

## 2025-05-22 RX ORDER — FLUTICASONE PROPIONATE 50 MCG
SPRAY, SUSPENSION (ML) NASAL
Qty: 16 G | Refills: 5 | Status: SHIPPED | OUTPATIENT
Start: 2025-05-22

## 2025-05-22 RX ORDER — HYDROCHLOROTHIAZIDE 25 MG/1
25 TABLET ORAL DAILY
COMMUNITY
End: 2025-05-22

## 2025-05-22 RX ORDER — CARVEDILOL 3.12 MG/1
3.12 TABLET ORAL 2 TIMES DAILY
Qty: 60 TABLET | Refills: 11 | Status: SHIPPED | OUTPATIENT
Start: 2025-05-22

## 2025-05-22 RX ORDER — CEFDINIR 300 MG/1
300 CAPSULE ORAL 2 TIMES DAILY
Qty: 20 CAPSULE | Refills: 0 | Status: SHIPPED | OUTPATIENT
Start: 2025-05-22

## 2025-05-22 NOTE — PROGRESS NOTES
"Chief Complaint   Patient presents with    Earache     Pt states her left ear has been hurting since yesterday.         Subjective   Priscilla Santos is a 74 y.o. female who presents today for the following     Earache  Affected ear:  Left  Chronicity:  New  Onset:  Yesterday  Fever:  No fever  Associated symptoms: dizziness, cough, drainage, headaches, neck pain, rhinorrhea and sore throat    Fall  Fall occurred[place] sidewalk [place]. Pain location: ribs. Associated symptoms include headaches.    Patient states she fell because she was dizzy.       Allergies   Allergen Reactions    Diazepam Other (See Comments)     Made her violent    Codeine Nausea And Vomiting         OBJECTIVE:  Vitals:    05/22/25 1342   BP: 100/65   BP Location: Left arm   Patient Position: Sitting   Cuff Size: Large Adult   Pulse: 57   Resp: 18   Temp: 97.9 °F (36.6 °C)   TempSrc: Temporal   SpO2: 99%   Weight: 91.4 kg (201 lb 6.4 oz)   Height: 154.9 cm (61\")     Physical Exam  Vitals and nursing note reviewed.   Constitutional:       Appearance: Normal appearance.   HENT:      Right Ear: Tympanic membrane normal.      Left Ear: A middle ear effusion is present. Tympanic membrane is erythematous.   Cardiovascular:      Rate and Rhythm: Normal rate and regular rhythm.   Pulmonary:      Breath sounds: Normal breath sounds. No wheezing or rhonchi.   Abdominal:      General: Bowel sounds are normal.   Musculoskeletal:         General: Normal range of motion.   Skin:     General: Skin is warm and dry.   Neurological:      Mental Status: She is alert and oriented to person, place, and time.   Psychiatric:         Mood and Affect: Mood normal.         Behavior: Behavior normal.                    ASSESSMENT/ PLAN:    Diagnoses and all orders for this visit:    1. Acute left otitis media (Primary)  -     cefdinir (OMNICEF) 300 MG capsule; Take 1 capsule by mouth 2 (Two) Times a Day.  Dispense: 20 capsule; Refill: 0    2. Gait instability  -     " Walker  Walker Folding with Wheels  -     Discontinue: Misc. Devices (Adjust Bath/Shower Seat) misc; Use daily  Dispense: 1 each; Refill: 0  -     Misc. Devices (Adjust Bath/Shower Seat) misc; Use daily  Dispense: 1 each; Refill: 0    3. Syncope, unspecified syncope type  -     Walker  Walker Folding with Wheels  -     Discontinue: Misc. Devices (Adjust Bath/Shower Seat) misc; Use daily  Dispense: 1 each; Refill: 0  -     Misc. Devices (Adjust Bath/Shower Seat) misc; Use daily  Dispense: 1 each; Refill: 0    4. Fall, initial encounter      Procedures       Follow-up: Return if symptoms worsen or fail to improve.      Silvia Fierro, APRN 5/22/2025 14:44 CDT  This note was electronically signed.

## 2025-05-22 NOTE — PROGRESS NOTES
"    Subjective:     Encounter Date:05/22/2025      Patient ID: Priscilla Santos is a 74 y.o. female.    Chief Complaint:\"fatigue\"  Shortness of Breath  This is a recurrent problem. The current episode started 1 to 4 weeks ago. The problem occurs daily. The problem has been worsening. Associated symptoms include syncope. Pertinent negatives include no chest pain, leg swelling, orthopnea, PND, rash, sputum production or wheezing. Her past medical history is significant for CAD.   Coronary Artery Disease  Presents for follow-up visit. Pertinent negatives include no chest pain, dizziness, leg swelling, palpitations, shortness of breath or weight gain. The symptoms have been stable.   Hypertension  This is a chronic problem. The current episode started more than 1 year ago. The problem has been stable since onset. The problem is controlled. Associated symptoms include malaise/fatigue and peripheral edema. Pertinent negatives include no chest pain, orthopnea, palpitations, PND or shortness of breath.   Leg Swelling  Symptoms are new.   Onset was 1 to 4 weeks.   Symptoms occur daily.   Symptoms have been unchanged since onset.   Pertinent negative symptoms include no chest pain, no cough, no rash and no weakness.     Patient presents today as a follow up after testing and medication changes. She was referred to Dr. Milner in 6/2023 to re-establish care with a cardiologist. She has CAD with previous 3v CABG in 2008 in [place] with subsequent stenting to the RCA in 2015 per Dr. Reaves and to the LCX in 2019 per Dr. Dikc. She also has small vessel disease with stable angina.     She was last seen 2 weeks ago with complaints of worsening IDNG and swelling in BLE that started a few weeks prior to her visit. She was sent for labs and had a 2D echo. Her BNP was normal at 609 but was higher than 1 yr ago at 189 and 2D echo was normal with normal LVEF and diastolic function. She was advised of her results but was started on " "Aldactone and HCTZ was stopped.     Since her last visit, [place]. [place], the sidewalk ledge \"just disappeared\". Workup in the ER was normal with the exception of 9th right rib fracture. She has had no further syncope since then. She denies lightheadedness, dizziness and lightheadedness. She does not monitor her BP or weight at home. She reports her DING is better and swelling has subsided since her medications were changed. She is unsure if she continues to take HCTZ but will check her medications at home. Her only complaint today is fatigue.    The following portions of the patient's history were reviewed and updated as appropriate: allergies, current medications, past family history, past medical history, past social history, past surgical history and problem list.    Allergies   Allergen Reactions    Diazepam Other (See Comments)     Made her violent    Codeine Nausea And Vomiting       Current Outpatient Medications:     aluminum-magnesium hydroxide-simethicone (MAALOX MAX) 400-400-40 MG/5ML suspension, Take 15 mL by mouth Every 6 (Six) Hours As Needed for Indigestion or Heartburn., Disp: , Rfl:     Ascorbic Acid (VITAMIN C PO), Take 1 tablet by mouth Daily., Disp: , Rfl:     aspirin 81 MG EC tablet, Take 1 tablet by mouth Daily., Disp: , Rfl:     atorvastatin (LIPITOR) 80 MG tablet, TAKE ONE TABLET BY MOUTH EVERY EVENING AT BEDTIME, Disp: 90 tablet, Rfl: 1    Blood Pressure kit, Check blood pressure daily., Disp: 1 each, Rfl: 0    carbamide peroxide (Debrox) 6.5 % otic solution, Administer 5 drops to the right ear 2 (Two) Times a Day., Disp: 15 mL, Rfl: 0    carvedilol (COREG) 3.125 MG tablet, Take 1 tablet by mouth 2 (Two) Times a Day., Disp: 60 tablet, Rfl: 11    cetirizine (zyrTEC) 10 MG tablet, Take 1 tablet by mouth Daily., Disp: , Rfl:     cholecalciferol (VITAMIN D3) 25 MCG (1000 UT) tablet, Take 1 tablet by mouth Daily., " Disp: , Rfl:     clopidogrel (PLAVIX) 75 MG tablet, TAKE ONE TABLET BY MOUTH EVERY DAY, Disp: 90 tablet, Rfl: 1    cyclobenzaprine (FLEXERIL) 10 MG tablet, TAKE ONE TABLET BY MOUTH EVERY DAY AT BEDTIME, Disp: 30 tablet, Rfl: 5    gabapentin (NEURONTIN) 100 MG capsule, Take 1 capsule by mouth Every Night., Disp: , Rfl:     indapamide (LOZOL) 2.5 MG tablet, Take 1 tablet by mouth Every Morning., Disp: , Rfl:     losartan (Cozaar) 50 MG tablet, Take 1 tablet by mouth Daily., Disp: 90 tablet, Rfl: 1    multivitamin with minerals (Multivitamin Women 50+) tablet tablet, Take 1 tablet by mouth Daily., Disp: , Rfl:     nitroglycerin (NITROSTAT) 0.4 MG SL tablet, PLACE ONE TABLET UNDER THE TONGUE EVERY 5 MINUTES AS NEEDED FOR CHEST PAIN FOR UP TO 3 DOSES, IF NO RELIEF AFTER FIRST DOSE, CALL 911, Disp: 50 tablet, Rfl: 1    pantoprazole (PROTONIX) 40 MG EC tablet, Take 1 tablet by mouth Daily., Disp: 30 tablet, Rfl: 11    ranolazine (RANEXA) 1000 MG 12 hr tablet, Take 1 tablet by mouth Every 12 (Twelve) Hours., Disp: 60 tablet, Rfl: 0    spironolactone (ALDACTONE) 25 MG tablet, Take 1 tablet by mouth Daily., Disp: 30 tablet, Rfl: 11    Turmeric Curcumin 500 MG capsule, Take 1 capsule by mouth Daily., Disp: , Rfl:     vitamin B-12 (CYANOCOBALAMIN) 100 MCG tablet, Take 1 tablet by mouth Daily., Disp: , Rfl:     Krill Oil (Omega-3) 500 MG capsule, Take 1 tablet by mouth Daily. (Patient not taking: Reported on 5/22/2025), Disp: , Rfl:   Past Medical History:   Diagnosis Date    Arthritis     CHF (congestive heart failure)     Coronary artery disease     Diverticulosis     Fatty liver     History of adenomatous polyp of colon     History of transfusion     Hyperlipidemia     Hypertension     Sleep apnea     UTI (urinary tract infection)        Social History     Socioeconomic History    Marital status:    Tobacco Use    Smoking status: Never     Passive exposure: Never    Smokeless tobacco: Never   Vaping Use    Vaping  status: Never Used   Substance and Sexual Activity    Alcohol use: Never    Drug use: Never    Sexual activity: Defer       Review of Systems   Constitutional: Positive for malaise/fatigue. Negative for weight gain and weight loss.   Cardiovascular:  Positive for syncope. Negative for chest pain, dyspnea on exertion, irregular heartbeat, leg swelling, near-syncope, orthopnea, palpitations and paroxysmal nocturnal dyspnea.   Respiratory:  Negative for cough, shortness of breath, sleep disturbances due to breathing, sputum production and wheezing.    Skin:  Negative for dry skin, flushing, itching and rash.   Gastrointestinal:  Negative for hematemesis and hematochezia.   Neurological:  Negative for dizziness, light-headedness, loss of balance and weakness.   All other systems reviewed and are negative.         Objective:     Vitals reviewed.   Constitutional:       General: Not in acute distress.     Appearance: Healthy appearance. Well-developed. Not diaphoretic.   Eyes:      General: No scleral icterus.     Conjunctiva/sclera: Conjunctivae normal.      Pupils: Pupils are equal, round, and reactive to light.   HENT:      Head: Normocephalic.    Mouth/Throat:      Pharynx: No oropharyngeal exudate.   Neck:      Vascular: No JVR.   Pulmonary:      Effort: Pulmonary effort is normal. No respiratory distress.      Breath sounds: Normal breath sounds. No wheezing. No rhonchi. No rales.   Chest:      Chest wall: Not tender to palpatation.   Cardiovascular:      Bradycardia present. Regular rhythm.   Pulses:     Intact distal pulses.   Edema:     Peripheral edema absent.   Abdominal:      General: Bowel sounds are normal. There is no distension.      Palpations: Abdomen is soft.      Tenderness: There is no abdominal tenderness.   Musculoskeletal: Normal range of motion.      Cervical back: Normal range of motion and neck supple. Skin:     General: Skin is warm and dry.      Coloration: Skin is not pale.      Findings: No  "erythema or rash.   Neurological:      Mental Status: Alert, oriented to person, place, and time and oriented to person, place and time.      Deep Tendon Reflexes: Reflexes are normal and symmetric.   Psychiatric:         Behavior: Behavior normal.           Procedures  BP 94/48 (BP Location: Right arm, Patient Position: Sitting, Cuff Size: Adult)   Pulse 64   Ht 154.9 cm (61\")   Wt 91.2 kg (201 lb)   SpO2 98%   BMI 37.98 kg/m²     Lab Review:   I have reviewed previous office notes, recent labs and recent cardiac testing.     Lab Results   Component Value Date    CHOL 68 02/07/2025    CHLPL 113 11/15/2024    TRIG 36 02/07/2025    HDL 38 (L) 02/07/2025    LDL 19 02/07/2025     Results for orders placed during the hospital encounter of 05/13/25    Adult Transthoracic Echo Complete w/ Color, Spectral and Contrast if necessary per protocol    Interpretation Summary    Left ventricular systolic function is normal. Left ventricular ejection fraction appears to be 66 - 70%.    Left ventricular diastolic function was normal.    Normal right ventricular cavity size and systolic function noted.    There is aortic valve sclerosis without significant stenosis.    Estimated right ventricular systolic pressure from tricuspid regurgitation is normal (<35 mmHg).          Assessment:          Diagnosis Plan   1. DING (dyspnea on exertion)        2. Swelling        3. Primary hypertension  Cuffed BP Gauge ANEROID ANEROID Gauge Adult Large      4. Syncope and collapse  Holter Monitor - 72 Hour Up To 15 Days      5. Coronary artery disease involving native heart, unspecified vessel or lesion type, unspecified whether angina present        6. Mixed hyperlipidemia        7. Class 2 severe obesity due to excess calories with serious comorbidity and body mass index (BMI) of 37.0 to 37.9 in adult                   Plan:       1 DING- improved after starting aldactone. Continue Aldactone 25mg daily  2. Edema- subsided with addition of " Aldactone.  Continue Aldactone 25mg daily  3. Syncope- unknown etiology. Will reduce Coreg to 3.125mg BID as she his hypotensive and bradycardic today. Will also place 14day holter.   4. CAD- stable. S/p 3v CABG with occluded SVG x2 and patent LIMA per cath in 2019 with subsequent stenting to RCA in 2015 and to LCX in 2019. Low risk lexiscan in Feb. Flat trending troponins at the time of her fall. Continue ASA 81mg, Plavix 75mg daily, reducing coreg to 3.125mg BID, losartan 50mg daily, lipitor 80mg daily.   5. HTN- low today. Was no low at time of syncope. May be volume depleted if she is still taking HCTZ. She is to check her medications and stop HCTZ if she is taking it. Also, reducing Coreg to 3.125mg BID   6. HLD- controlled with LDL 19 in 2/2025. Continue statin   7. BMI- Class 2 Severe Obesity (BMI >=35 and <=39.9). Obesity-related health conditions include the following: hypertension, coronary heart disease, and dyslipidemias. Obesity is improving with lifestyle modifications. BMI is is above average; BMI management plan is completed. We discussed portion control and increasing exercise.        Follow up in 3 months or sooner if symptoms worsen. Will call with results.    I spent 30 minutes caring for Priscilla on this date of service. This time includes time spent by me in the following activities:preparing for the visit, reviewing tests, obtaining and/or reviewing a separately obtained history, performing a medically appropriate examination and/or evaluation , counseling and educating the patient/family/caregiver, and documenting information in the medical record

## 2025-05-27 ENCOUNTER — LAB (OUTPATIENT)
Dept: FAMILY MEDICINE CLINIC | Facility: CLINIC | Age: 75
End: 2025-05-27
Payer: MEDICARE

## 2025-05-27 DIAGNOSIS — E11.65 TYPE 2 DIABETES MELLITUS WITH HYPERGLYCEMIA, UNSPECIFIED WHETHER LONG TERM INSULIN USE: ICD-10-CM

## 2025-05-27 DIAGNOSIS — I10 HYPERTENSION, WELL CONTROLLED: ICD-10-CM

## 2025-05-27 DIAGNOSIS — R53.83 FATIGUE, UNSPECIFIED TYPE: ICD-10-CM

## 2025-05-27 DIAGNOSIS — E78.2 MIXED HYPERLIPIDEMIA: Primary | ICD-10-CM

## 2025-05-28 ENCOUNTER — OFFICE VISIT (OUTPATIENT)
Dept: FAMILY MEDICINE CLINIC | Facility: CLINIC | Age: 75
End: 2025-05-28
Payer: MEDICARE

## 2025-05-28 VITALS
BODY MASS INDEX: 38.06 KG/M2 | RESPIRATION RATE: 16 BRPM | WEIGHT: 201.6 LBS | SYSTOLIC BLOOD PRESSURE: 95 MMHG | DIASTOLIC BLOOD PRESSURE: 63 MMHG | TEMPERATURE: 98.7 F | HEIGHT: 61 IN | HEART RATE: 87 BPM | OXYGEN SATURATION: 97 %

## 2025-05-28 DIAGNOSIS — E78.2 MIXED HYPERLIPIDEMIA: ICD-10-CM

## 2025-05-28 DIAGNOSIS — E83.10 IRON DISORDER: ICD-10-CM

## 2025-05-28 DIAGNOSIS — E11.65 TYPE 2 DIABETES MELLITUS WITH HYPERGLYCEMIA, UNSPECIFIED WHETHER LONG TERM INSULIN USE: ICD-10-CM

## 2025-05-28 DIAGNOSIS — I10 PRIMARY HYPERTENSION: ICD-10-CM

## 2025-05-28 DIAGNOSIS — I10 HYPERTENSION, WELL CONTROLLED: ICD-10-CM

## 2025-05-28 DIAGNOSIS — H66.92 ACUTE LEFT OTITIS MEDIA: Primary | ICD-10-CM

## 2025-05-28 LAB
ALBUMIN SERPL-MCNC: 3.4 G/DL (ref 3.8–4.8)
ALP SERPL-CCNC: 68 IU/L (ref 44–121)
ALT SERPL-CCNC: 71 IU/L (ref 0–32)
AST SERPL-CCNC: 102 IU/L (ref 0–40)
BASOPHILS # BLD AUTO: 0 X10E3/UL (ref 0–0.2)
BASOPHILS NFR BLD AUTO: 1 %
BILIRUB SERPL-MCNC: 0.8 MG/DL (ref 0–1.2)
BUN SERPL-MCNC: 24 MG/DL (ref 8–27)
BUN/CREAT SERPL: 26 (ref 12–28)
CALCIUM SERPL-MCNC: 9.8 MG/DL (ref 8.7–10.3)
CHLORIDE SERPL-SCNC: 101 MMOL/L (ref 96–106)
CHOLEST SERPL-MCNC: 103 MG/DL (ref 100–199)
CO2 SERPL-SCNC: 21 MMOL/L (ref 20–29)
CREAT SERPL-MCNC: 0.94 MG/DL (ref 0.57–1)
EGFRCR SERPLBLD CKD-EPI 2021: 64 ML/MIN/1.73
EOSINOPHIL # BLD AUTO: 0.2 X10E3/UL (ref 0–0.4)
EOSINOPHIL NFR BLD AUTO: 3 %
ERYTHROCYTE [DISTWIDTH] IN BLOOD BY AUTOMATED COUNT: 11.8 % (ref 11.7–15.4)
GLOBULIN SER CALC-MCNC: 4.8 G/DL (ref 1.5–4.5)
GLUCOSE SERPL-MCNC: 99 MG/DL (ref 70–99)
HBA1C MFR BLD: 5.4 % (ref 4.8–5.6)
HCT VFR BLD AUTO: 39.4 % (ref 34–46.6)
HDLC SERPL-MCNC: 43 MG/DL
HGB BLD-MCNC: 13.1 G/DL (ref 11.1–15.9)
IMM GRANULOCYTES # BLD AUTO: 0.1 X10E3/UL (ref 0–0.1)
IMM GRANULOCYTES NFR BLD AUTO: 1 %
LDLC SERPL CALC-MCNC: 46 MG/DL (ref 0–99)
LDLC/HDLC SERPL: 1.1 RATIO (ref 0–3.2)
LYMPHOCYTES # BLD AUTO: 1.8 X10E3/UL (ref 0.7–3.1)
LYMPHOCYTES NFR BLD AUTO: 32 %
MCH RBC QN AUTO: 32.6 PG (ref 26.6–33)
MCHC RBC AUTO-ENTMCNC: 33.2 G/DL (ref 31.5–35.7)
MCV RBC AUTO: 98 FL (ref 79–97)
MONOCYTES # BLD AUTO: 0.5 X10E3/UL (ref 0.1–0.9)
MONOCYTES NFR BLD AUTO: 9 %
NEUTROPHILS # BLD AUTO: 3.1 X10E3/UL (ref 1.4–7)
NEUTROPHILS NFR BLD AUTO: 54 %
PLATELET # BLD AUTO: 145 X10E3/UL (ref 150–450)
POTASSIUM SERPL-SCNC: 4.9 MMOL/L (ref 3.5–5.2)
PROT SERPL-MCNC: 8.2 G/DL (ref 6–8.5)
RBC # BLD AUTO: 4.02 X10E6/UL (ref 3.77–5.28)
SODIUM SERPL-SCNC: 132 MMOL/L (ref 134–144)
T4 SERPL-MCNC: 9.8 UG/DL (ref 4.5–12)
TRIGL SERPL-MCNC: 64 MG/DL (ref 0–149)
TSH SERPL DL<=0.005 MIU/L-ACNC: 1.24 UIU/ML (ref 0.45–4.5)
VLDLC SERPL CALC-MCNC: 14 MG/DL (ref 5–40)
WBC # BLD AUTO: 5.7 X10E3/UL (ref 3.4–10.8)

## 2025-05-28 PROCEDURE — 3078F DIAST BP <80 MM HG: CPT | Performed by: NURSE PRACTITIONER

## 2025-05-28 PROCEDURE — 99213 OFFICE O/P EST LOW 20 MIN: CPT | Performed by: NURSE PRACTITIONER

## 2025-05-28 PROCEDURE — 1159F MED LIST DOCD IN RCRD: CPT | Performed by: NURSE PRACTITIONER

## 2025-05-28 PROCEDURE — 3044F HG A1C LEVEL LT 7.0%: CPT | Performed by: NURSE PRACTITIONER

## 2025-05-28 PROCEDURE — 1160F RVW MEDS BY RX/DR IN RCRD: CPT | Performed by: NURSE PRACTITIONER

## 2025-05-28 PROCEDURE — 1125F AMNT PAIN NOTED PAIN PRSNT: CPT | Performed by: NURSE PRACTITIONER

## 2025-05-28 PROCEDURE — 3074F SYST BP LT 130 MM HG: CPT | Performed by: NURSE PRACTITIONER

## 2025-05-28 NOTE — PROGRESS NOTES
"Chief Complaint   Patient presents with    Earache     Recheck     check up     6 mth        Subjective   Priscilla Santos is a 74 y.o. female who presents today for the following: follow up 6 month labs and ear pain    HPI   Patient has had bilateral ear pain x 3 months.She is finishing her last round of antibiotics     Allergies   Allergen Reactions    Diazepam Other (See Comments)     Made her violent    Codeine Nausea And Vomiting         OBJECTIVE:  Vitals:    05/28/25 1002   BP: 95/63   BP Location: Left arm   Patient Position: Sitting   Cuff Size: Large Adult   Pulse: 87   Resp: 16   Temp: 98.7 °F (37.1 °C)   TempSrc: Infrared   SpO2: 97%   Weight: 91.4 kg (201 lb 9.6 oz)   Height: 154.9 cm (61\")   Patient has been erroneously marked as diabetic. Based on the available clinical information, she does not have diabetes and should therefore be excluded from diabetic health maintenance and quality measures for the remainder of the reporting period.   Physical Exam  Vitals and nursing note reviewed.   Constitutional:       Appearance: Normal appearance. She is obese.   Cardiovascular:      Rate and Rhythm: Normal rate and regular rhythm.      Pulses: Normal pulses.      Heart sounds: Normal heart sounds.   Pulmonary:      Effort: Pulmonary effort is normal.      Breath sounds: Normal breath sounds.   Skin:     General: Skin is warm and dry.   Neurological:      General: No focal deficit present.      Mental Status: She is alert and oriented to person, place, and time.   Psychiatric:         Mood and Affect: Mood normal.         Behavior: Behavior normal.         Thought Content: Thought content normal.         Judgment: Judgment normal.                CMP          4/16/2025    07:56 5/13/2025    15:19 5/27/2025    08:40   CMP   Glucose 116  139  99    BUN 18  21  24    Creatinine 0.89  0.79  0.94    EGFR 68  79.1  64    Sodium 135  136  132    Potassium 3.7  3.8  4.9    Chloride 101  103  101    Calcium 9.7  9.1  9.8 "    Total Protein 7.3  7.6  8.2    Albumin 3.2  3.1  3.4    Globulin 4.1  4.5  4.8    Total Bilirubin 0.9  0.8  0.8    Alkaline Phosphatase 65  57  68    AST (SGOT) 84  122  102    ALT (SGPT) 73  107  71    Albumin/Globulin Ratio  0.7     BUN/Creatinine Ratio 20  26.6  26    Anion Gap  9.0       CBC w/diff          4/16/2025    07:56 5/13/2025    15:19 5/27/2025    08:40   CBC w/Diff   WBC 5.7  5.49  5.7    RBC 3.58  3.72  4.02    Hemoglobin 11.8  12.1  13.1    Hematocrit 34.8  35.7  39.4    MCV 97  96.0  98    MCH 33.0  32.5  32.6    MCHC 33.9  33.9  33.2    RDW 12.4  13.2  11.8    Platelets 109  108  145    Neutrophil Rel % 59  65.3  54    Immature Granulocyte Rel %  1.3     Lymphocyte Rel % 30  23.9  32    Monocyte Rel % 8  7.5  9    Eosinophil Rel % 1  1.3  3    Basophil Rel % 1  0.7  1      Lipid Panel          11/15/2024    07:30 2/7/2025    05:44 5/27/2025    08:40   Lipid Panel   Total Cholesterol  68     Total Cholesterol 113   103    Triglycerides 81  36  64    HDL Cholesterol 53  38  43    VLDL Cholesterol 16  11  14    LDL Cholesterol  44  19  46    LDL/HDL Ratio 0.8  0.60  1.1      TSH          11/15/2024    07:30 2/7/2025    05:44 5/27/2025    08:40   TSH   TSH 1.530  0.678  1.240      Most Recent A1C          5/27/2025    08:40   HGBA1C Most Recent   Hemoglobin A1C 5.4      Microalbumin          3/27/2025    13:56   Microalbumin   Microalbumin, Urine 8.4    Blood work reviewed and discussed with patient  Patient requests iron levels.      ASSESSMENT/ PLAN:    Diagnoses and all orders for this visit:    1. Acute left otitis media (Primary)  -     amoxicillin-clavulanate (AUGMENTIN) 875-125 MG per tablet; Take 1 tablet by mouth 2 (Two) Times a Day for 10 days.  Dispense: 20 tablet; Refill: 0    2. Iron disorder  -     Iron and TIBC  -     Ferritin    3. Mixed hyperlipidemia    4. Type 2 diabetes mellitus with hyperglycemia, unspecified whether long term insulin use    5. Hypertension, well  controlled    6. Primary hypertension      Procedures     Management Plan:   Complete all antibiotics. A referral may be needed with continued ear infections.   An After Visit Summary was printed and given to the patient at discharge.    Follow-up: Return if symptoms worsen or fail to improve.         Reza Charles, APRN 5/29/2025 11:13 CDT  This note was electronically signed.

## 2025-06-01 ENCOUNTER — APPOINTMENT (OUTPATIENT)
Dept: CT IMAGING | Facility: HOSPITAL | Age: 75
End: 2025-06-01
Payer: MEDICARE

## 2025-06-01 ENCOUNTER — APPOINTMENT (OUTPATIENT)
Dept: GENERAL RADIOLOGY | Facility: HOSPITAL | Age: 75
End: 2025-06-01
Payer: MEDICARE

## 2025-06-01 ENCOUNTER — HOSPITAL ENCOUNTER (OUTPATIENT)
Facility: HOSPITAL | Age: 75
Setting detail: OBSERVATION
Discharge: HOME | End: 2025-06-03
Attending: FAMILY MEDICINE | Admitting: INTERNAL MEDICINE
Payer: MEDICARE

## 2025-06-01 DIAGNOSIS — E87.1 HYPONATREMIA: ICD-10-CM

## 2025-06-01 DIAGNOSIS — R55 SYNCOPE, UNSPECIFIED SYNCOPE TYPE: Primary | ICD-10-CM

## 2025-06-01 DIAGNOSIS — R42 DIZZINESS: ICD-10-CM

## 2025-06-01 DIAGNOSIS — R53.1 GENERAL WEAKNESS: ICD-10-CM

## 2025-06-01 DIAGNOSIS — E86.9 VOLUME DEPLETION: ICD-10-CM

## 2025-06-01 DIAGNOSIS — Z74.09 IMPAIRED FUNCTIONAL MOBILITY AND ACTIVITY TOLERANCE: ICD-10-CM

## 2025-06-01 LAB
ALBUMIN SERPL-MCNC: 3 G/DL (ref 3.5–5.2)
ALBUMIN/GLOB SERPL: 0.6 G/DL
ALP SERPL-CCNC: 67 U/L (ref 39–117)
ALT SERPL W P-5'-P-CCNC: 83 U/L (ref 1–33)
ANION GAP SERPL CALCULATED.3IONS-SCNC: 8 MMOL/L (ref 5–15)
APTT PPP: 29.2 SECONDS (ref 24.5–36)
AST SERPL-CCNC: 120 U/L (ref 1–32)
BASOPHILS # BLD AUTO: 0.03 10*3/MM3 (ref 0–0.2)
BASOPHILS NFR BLD AUTO: 0.5 % (ref 0–1.5)
BILIRUB SERPL-MCNC: 0.7 MG/DL (ref 0–1.2)
BUN SERPL-MCNC: 26.1 MG/DL (ref 8–23)
BUN/CREAT SERPL: 25.6 (ref 7–25)
CALCIUM SPEC-SCNC: 9.2 MG/DL (ref 8.6–10.5)
CHLORIDE SERPL-SCNC: 101 MMOL/L (ref 98–107)
CK SERPL-CCNC: 331 U/L (ref 20–180)
CO2 SERPL-SCNC: 21 MMOL/L (ref 22–29)
CREAT SERPL-MCNC: 1.02 MG/DL (ref 0.57–1)
D-LACTATE SERPL-SCNC: 1.4 MMOL/L (ref 0.5–2)
DEPRECATED RDW RBC AUTO: 46.3 FL (ref 37–54)
EGFRCR SERPLBLD CKD-EPI 2021: 58.2 ML/MIN/1.73
EOSINOPHIL # BLD AUTO: 0.08 10*3/MM3 (ref 0–0.4)
EOSINOPHIL NFR BLD AUTO: 1.3 % (ref 0.3–6.2)
ERYTHROCYTE [DISTWIDTH] IN BLOOD BY AUTOMATED COUNT: 13 % (ref 12.3–15.4)
FERRITIN SERPL-MCNC: 348 NG/ML (ref 15–150)
GEN 5 1HR TROPONIN T REFLEX: 36 NG/L
GLOBULIN UR ELPH-MCNC: 4.7 GM/DL
GLUCOSE SERPL-MCNC: 131 MG/DL (ref 65–99)
HCT VFR BLD AUTO: 32 % (ref 34–46.6)
HGB BLD-MCNC: 11 G/DL (ref 12–15.9)
IMM GRANULOCYTES # BLD AUTO: 0.06 10*3/MM3 (ref 0–0.05)
IMM GRANULOCYTES NFR BLD AUTO: 1 % (ref 0–0.5)
INR PPP: 1.37 (ref 0.91–1.09)
IRON SATN MFR SERPL: 41 % (ref 15–55)
IRON SERPL-MCNC: 98 UG/DL (ref 27–139)
LYMPHOCYTES # BLD AUTO: 1.55 10*3/MM3 (ref 0.7–3.1)
LYMPHOCYTES NFR BLD AUTO: 26 % (ref 19.6–45.3)
Lab: NORMAL
MAGNESIUM SERPL-MCNC: 1.7 MG/DL (ref 1.6–2.4)
MCH RBC QN AUTO: 33 PG (ref 26.6–33)
MCHC RBC AUTO-ENTMCNC: 34.4 G/DL (ref 31.5–35.7)
MCV RBC AUTO: 96.1 FL (ref 79–97)
MONOCYTES # BLD AUTO: 0.6 10*3/MM3 (ref 0.1–0.9)
MONOCYTES NFR BLD AUTO: 10.1 % (ref 5–12)
NEUTROPHILS NFR BLD AUTO: 3.64 10*3/MM3 (ref 1.7–7)
NEUTROPHILS NFR BLD AUTO: 61.1 % (ref 42.7–76)
NRBC BLD AUTO-RTO: 0 /100 WBC (ref 0–0.2)
PLATELET # BLD AUTO: 106 10*3/MM3 (ref 140–450)
PMV BLD AUTO: 9.4 FL (ref 6–12)
POTASSIUM SERPL-SCNC: 4.5 MMOL/L (ref 3.5–5.2)
PROT SERPL-MCNC: 7.7 G/DL (ref 6–8.5)
PROTHROMBIN TIME: 17.6 SECONDS (ref 11.8–14.8)
RBC # BLD AUTO: 3.33 10*6/MM3 (ref 3.77–5.28)
SODIUM SERPL-SCNC: 130 MMOL/L (ref 136–145)
TIBC SERPL-MCNC: 239 UG/DL (ref 250–450)
TROPONIN T % DELTA: -8
TROPONIN T NUMERIC DELTA: -3 NG/L
TROPONIN T SERPL HS-MCNC: 39 NG/L
UIBC SERPL-MCNC: 141 UG/DL (ref 118–369)
WBC NRBC COR # BLD AUTO: 5.96 10*3/MM3 (ref 3.4–10.8)
WRITTEN AUTHORIZATION: NORMAL

## 2025-06-01 PROCEDURE — 71045 X-RAY EXAM CHEST 1 VIEW: CPT

## 2025-06-01 PROCEDURE — 85610 PROTHROMBIN TIME: CPT | Performed by: FAMILY MEDICINE

## 2025-06-01 PROCEDURE — 82550 ASSAY OF CK (CPK): CPT | Performed by: FAMILY MEDICINE

## 2025-06-01 PROCEDURE — G0378 HOSPITAL OBSERVATION PER HR: HCPCS

## 2025-06-01 PROCEDURE — 93010 ELECTROCARDIOGRAM REPORT: CPT | Performed by: INTERNAL MEDICINE

## 2025-06-01 PROCEDURE — 80053 COMPREHEN METABOLIC PANEL: CPT | Performed by: FAMILY MEDICINE

## 2025-06-01 PROCEDURE — 83735 ASSAY OF MAGNESIUM: CPT | Performed by: FAMILY MEDICINE

## 2025-06-01 PROCEDURE — 83605 ASSAY OF LACTIC ACID: CPT | Performed by: FAMILY MEDICINE

## 2025-06-01 PROCEDURE — 70450 CT HEAD/BRAIN W/O DYE: CPT

## 2025-06-01 PROCEDURE — 84484 ASSAY OF TROPONIN QUANT: CPT | Performed by: FAMILY MEDICINE

## 2025-06-01 PROCEDURE — 93005 ELECTROCARDIOGRAM TRACING: CPT | Performed by: FAMILY MEDICINE

## 2025-06-01 PROCEDURE — 25010000002 MAGNESIUM SULFATE IN D5W 1G/100ML (PREMIX) 1-5 GM/100ML-% SOLUTION: Performed by: INTERNAL MEDICINE

## 2025-06-01 PROCEDURE — 99285 EMERGENCY DEPT VISIT HI MDM: CPT | Performed by: FAMILY MEDICINE

## 2025-06-01 PROCEDURE — 96365 THER/PROPH/DIAG IV INF INIT: CPT

## 2025-06-01 PROCEDURE — 96361 HYDRATE IV INFUSION ADD-ON: CPT

## 2025-06-01 PROCEDURE — 85730 THROMBOPLASTIN TIME PARTIAL: CPT | Performed by: FAMILY MEDICINE

## 2025-06-01 PROCEDURE — 85025 COMPLETE CBC W/AUTO DIFF WBC: CPT | Performed by: FAMILY MEDICINE

## 2025-06-01 PROCEDURE — 36415 COLL VENOUS BLD VENIPUNCTURE: CPT

## 2025-06-01 PROCEDURE — 25810000003 SODIUM CHLORIDE 0.9 % SOLUTION: Performed by: FAMILY MEDICINE

## 2025-06-01 RX ORDER — POLYETHYLENE GLYCOL 3350 17 G/17G
17 POWDER, FOR SOLUTION ORAL DAILY PRN
Status: DISCONTINUED | OUTPATIENT
Start: 2025-06-01 | End: 2025-06-03 | Stop reason: HOSPADM

## 2025-06-01 RX ORDER — CARVEDILOL 3.12 MG/1
3.12 TABLET ORAL 2 TIMES DAILY
Status: DISCONTINUED | OUTPATIENT
Start: 2025-06-01 | End: 2025-06-03

## 2025-06-01 RX ORDER — MULTIVITAMIN WITH IRON
500 TABLET ORAL DAILY
Status: DISCONTINUED | OUTPATIENT
Start: 2025-06-02 | End: 2025-06-03 | Stop reason: HOSPADM

## 2025-06-01 RX ORDER — SODIUM CHLORIDE 0.9 % (FLUSH) 0.9 %
10 SYRINGE (ML) INJECTION EVERY 12 HOURS SCHEDULED
Status: DISCONTINUED | OUTPATIENT
Start: 2025-06-01 | End: 2025-06-03 | Stop reason: HOSPADM

## 2025-06-01 RX ORDER — ACETAMINOPHEN 325 MG/1
650 TABLET ORAL EVERY 4 HOURS PRN
Status: DISCONTINUED | OUTPATIENT
Start: 2025-06-01 | End: 2025-06-03 | Stop reason: HOSPADM

## 2025-06-01 RX ORDER — UBIDECARENONE 75 MG
100 CAPSULE ORAL DAILY
Status: DISCONTINUED | OUTPATIENT
Start: 2025-06-02 | End: 2025-06-01

## 2025-06-01 RX ORDER — ASPIRIN 81 MG/1
81 TABLET ORAL DAILY
Status: DISCONTINUED | OUTPATIENT
Start: 2025-06-01 | End: 2025-06-03 | Stop reason: HOSPADM

## 2025-06-01 RX ORDER — NITROGLYCERIN 0.4 MG/1
0.4 TABLET SUBLINGUAL
Status: DISCONTINUED | OUTPATIENT
Start: 2025-06-01 | End: 2025-06-03 | Stop reason: HOSPADM

## 2025-06-01 RX ORDER — SODIUM CHLORIDE 0.9 % (FLUSH) 0.9 %
10 SYRINGE (ML) INJECTION AS NEEDED
Status: DISCONTINUED | OUTPATIENT
Start: 2025-06-01 | End: 2025-06-03 | Stop reason: HOSPADM

## 2025-06-01 RX ORDER — MAGNESIUM SULFATE 1 G/100ML
1 INJECTION INTRAVENOUS ONCE
Status: COMPLETED | OUTPATIENT
Start: 2025-06-01 | End: 2025-06-01

## 2025-06-01 RX ORDER — INDAPAMIDE 2.5 MG/1
2.5 TABLET ORAL EVERY MORNING
Status: ON HOLD | COMMUNITY
End: 2025-06-02

## 2025-06-01 RX ORDER — ATORVASTATIN CALCIUM 40 MG/1
80 TABLET, FILM COATED ORAL NIGHTLY
Status: DISCONTINUED | OUTPATIENT
Start: 2025-06-01 | End: 2025-06-03 | Stop reason: HOSPADM

## 2025-06-01 RX ORDER — CETIRIZINE HYDROCHLORIDE 10 MG/1
10 TABLET ORAL DAILY
Status: DISCONTINUED | OUTPATIENT
Start: 2025-06-02 | End: 2025-06-03 | Stop reason: HOSPADM

## 2025-06-01 RX ORDER — LOSARTAN POTASSIUM 50 MG/1
50 TABLET ORAL DAILY
COMMUNITY
End: 2025-06-03 | Stop reason: HOSPADM

## 2025-06-01 RX ORDER — CLOPIDOGREL BISULFATE 75 MG/1
75 TABLET ORAL DAILY
Status: DISCONTINUED | OUTPATIENT
Start: 2025-06-02 | End: 2025-06-03 | Stop reason: HOSPADM

## 2025-06-01 RX ORDER — PANTOPRAZOLE SODIUM 40 MG/1
40 TABLET, DELAYED RELEASE ORAL
Status: DISCONTINUED | OUTPATIENT
Start: 2025-06-02 | End: 2025-06-03 | Stop reason: HOSPADM

## 2025-06-01 RX ORDER — RANOLAZINE 500 MG/1
1000 TABLET, EXTENDED RELEASE ORAL EVERY 12 HOURS
Status: DISCONTINUED | OUTPATIENT
Start: 2025-06-01 | End: 2025-06-03 | Stop reason: HOSPADM

## 2025-06-01 RX ORDER — GABAPENTIN 100 MG/1
100 CAPSULE ORAL NIGHTLY
Status: DISCONTINUED | OUTPATIENT
Start: 2025-06-01 | End: 2025-06-03 | Stop reason: HOSPADM

## 2025-06-01 RX ORDER — BISACODYL 10 MG
10 SUPPOSITORY, RECTAL RECTAL DAILY PRN
Status: DISCONTINUED | OUTPATIENT
Start: 2025-06-01 | End: 2025-06-03 | Stop reason: HOSPADM

## 2025-06-01 RX ORDER — SODIUM CHLORIDE 9 MG/ML
40 INJECTION, SOLUTION INTRAVENOUS AS NEEDED
Status: DISCONTINUED | OUTPATIENT
Start: 2025-06-01 | End: 2025-06-03 | Stop reason: HOSPADM

## 2025-06-01 RX ORDER — ONDANSETRON 2 MG/ML
4 INJECTION INTRAMUSCULAR; INTRAVENOUS EVERY 6 HOURS PRN
Status: DISCONTINUED | OUTPATIENT
Start: 2025-06-01 | End: 2025-06-03 | Stop reason: HOSPADM

## 2025-06-01 RX ORDER — SPIRONOLACTONE 25 MG/1
25 TABLET ORAL DAILY
Status: DISCONTINUED | OUTPATIENT
Start: 2025-06-02 | End: 2025-06-02

## 2025-06-01 RX ORDER — ACETAMINOPHEN 650 MG/1
650 SUPPOSITORY RECTAL EVERY 4 HOURS PRN
Status: DISCONTINUED | OUTPATIENT
Start: 2025-06-01 | End: 2025-06-03 | Stop reason: HOSPADM

## 2025-06-01 RX ORDER — BISACODYL 5 MG/1
5 TABLET, DELAYED RELEASE ORAL DAILY PRN
Status: DISCONTINUED | OUTPATIENT
Start: 2025-06-01 | End: 2025-06-03 | Stop reason: HOSPADM

## 2025-06-01 RX ORDER — CHOLECALCIFEROL (VITAMIN D3) 25 MCG
1000 TABLET ORAL DAILY
Status: DISCONTINUED | OUTPATIENT
Start: 2025-06-02 | End: 2025-06-03 | Stop reason: HOSPADM

## 2025-06-01 RX ORDER — ACETAMINOPHEN 160 MG/5ML
650 SOLUTION ORAL EVERY 4 HOURS PRN
Status: DISCONTINUED | OUTPATIENT
Start: 2025-06-01 | End: 2025-06-03 | Stop reason: HOSPADM

## 2025-06-01 RX ORDER — AMOXICILLIN 250 MG
2 CAPSULE ORAL 2 TIMES DAILY PRN
Status: DISCONTINUED | OUTPATIENT
Start: 2025-06-01 | End: 2025-06-03 | Stop reason: HOSPADM

## 2025-06-01 RX ORDER — HYDRALAZINE HYDROCHLORIDE 25 MG/1
12.5 TABLET, FILM COATED ORAL DAILY
Status: ON HOLD | COMMUNITY
End: 2025-06-02

## 2025-06-01 RX ORDER — ALUMINA, MAGNESIA, AND SIMETHICONE 2400; 2400; 240 MG/30ML; MG/30ML; MG/30ML
15 SUSPENSION ORAL EVERY 6 HOURS PRN
Status: DISCONTINUED | OUTPATIENT
Start: 2025-06-01 | End: 2025-06-03 | Stop reason: HOSPADM

## 2025-06-01 RX ORDER — SODIUM CHLORIDE 9 MG/ML
75 INJECTION, SOLUTION INTRAVENOUS CONTINUOUS
Status: DISCONTINUED | OUTPATIENT
Start: 2025-06-01 | End: 2025-06-02

## 2025-06-01 RX ADMIN — SODIUM CHLORIDE 125 ML/HR: 9 INJECTION, SOLUTION INTRAVENOUS at 16:52

## 2025-06-01 RX ADMIN — MAGNESIUM SULFATE IN DEXTROSE 1 G: 10 INJECTION, SOLUTION INTRAVENOUS at 19:49

## 2025-06-01 RX ADMIN — GABAPENTIN 100 MG: 100 CAPSULE ORAL at 20:15

## 2025-06-01 RX ADMIN — ATORVASTATIN CALCIUM 80 MG: 40 TABLET, FILM COATED ORAL at 20:15

## 2025-06-01 RX ADMIN — CARVEDILOL 3.12 MG: 3.12 TABLET, FILM COATED ORAL at 20:15

## 2025-06-01 RX ADMIN — Medication 10 ML: at 21:09

## 2025-06-01 RX ADMIN — SODIUM CHLORIDE 1000 ML: 9 INJECTION, SOLUTION INTRAVENOUS at 15:26

## 2025-06-01 RX ADMIN — RANOLAZINE 1000 MG: 500 TABLET, FILM COATED, EXTENDED RELEASE ORAL at 20:15

## 2025-06-01 RX ADMIN — ASPIRIN 81 MG: 81 TABLET, COATED ORAL at 20:15

## 2025-06-01 NOTE — ED PROVIDER NOTES
HPI:     Patient is a 74-year-old white female presents to the emergency room after a syncopal episode.  She was at [facility] in a wheelchair and she stood up and when she stood up she states that she passed out she states that her heart was beating fast when she stood up and left the last thing she remembered but her grandson and family members caught her before she hit the ground hard.  She denies any injury from the fall.  States that this has happened before 2 times in the past.  Currently no chest pain.  No headache or visual change and no shortness of breath but patient states she cannot recall anything around that time.      REVIEW OF SYSTEMS    CONSTITUTIONAL:  No complaints of fever, chills,or weakness  EYES:  No complaints of discharge   ENT: No complaints of sore throat or ear pain  CARDIOVASCULAR: Positive for palpitations.  RESPIRATORY:  No complaints of cough or shortness of breath  GI: Positive for nausea when she sits up going from laying position.  No vomiting or diarrhea.  MUSCULOSKELETAL:  No complaints of back pain  SKIN:  No complaints of rash  NEUROLOGIC: Positive for dizziness and syncope  ENDOCRINE:  No complaints of polyuria or polydipsia  LYMPHATIC:  No complaints of swollen glands  GENITOURINARY: No complaints of urinary frequency or hematuria        PAST MEDICAL HISTORY  Past Medical History:   Diagnosis Date    Arthritis     CHF (congestive heart failure)     Coronary artery disease     Diverticulosis     Fatty liver     History of adenomatous polyp of colon     History of transfusion     Hyperlipidemia     Hypertension     Sleep apnea     UTI (urinary tract infection)        FAMILY HISTORY  Family History   Problem Relation Age of Onset    Diabetes Mother     Cancer Father     Heart disease Father     No Known Problems Maternal Grandmother     Diabetes Maternal Grandfather     No Known Problems Paternal Grandmother     Cancer Paternal Grandfather     Colon cancer Neg Hx     Colon polyps  Neg Hx     Esophageal cancer Neg Hx     Liver disease Neg Hx     Stomach cancer Neg Hx     Rectal cancer Neg Hx     Liver cancer Neg Hx        SOCIAL HISTORY  Social History     Socioeconomic History    Marital status:    Tobacco Use    Smoking status: Never     Passive exposure: Never    Smokeless tobacco: Never   Vaping Use    Vaping status: Never Used   Substance and Sexual Activity    Alcohol use: Never    Drug use: Never    Sexual activity: Defer       IMMUNIZATION HISTORY  Deferred to primary care physician.    SURGICAL HISTORY  Past Surgical History:   Procedure Laterality Date    APPENDECTOMY      CARDIAC SURGERY  2008    Tripple bypass    CAROTID STENT      CARPAL TUNNEL RELEASE Bilateral     CHOLECYSTECTOMY      COLON SURGERY      COLONOSCOPY N/A 02/27/2025    Diverticulosis in the left colon; One 7mm tubular adenomatous polyp in the transverse colon; One 4mm tubular adenomatous polyp in the rectum-Clip (MR conditional) was placed; Repeat 5 years    CORONARY STENT PLACEMENT  2021    total of 4-5 heart stents in past    ENDOSCOPY N/A 02/27/2025    Erosive gastropathy with stigmata of recent bleeding-biopsied; Normal examined duodenum; Repeat 3 years    HERNIA REPAIR      JOINT REPLACEMENT      KNEE SURGERY Bilateral     TONSILLECTOMY AND ADENOIDECTOMY      TUBAL ABDOMINAL LIGATION  1977    UMBILICAL HERNIA REPAIR N/A 02/02/2023    Procedure: UMBILICAL HERNIA REPAIR with mesh;  Surgeon: Lara Conley MD;  Location: [facility] OR;  Service: General;  Laterality: N/A;       CURRENT MEDICATIONS    Current Facility-Administered Medications:     sodium chloride 0.9 % infusion, 125 mL/hr, Intravenous, Continuous, Mack Dey Jr., MD    Current Outpatient Medications:     aluminum-magnesium hydroxide-simethicone (MAALOX MAX) 400-400-40 MG/5ML suspension, Take 15 mL by mouth Every 6 (Six) Hours As Needed for Indigestion or Heartburn., Disp: , Rfl:     amoxicillin-clavulanate (AUGMENTIN) 875-125 MG  per tablet, Take 1 tablet by mouth 2 (Two) Times a Day for 10 days., Disp: 20 tablet, Rfl: 0    Ascorbic Acid (VITAMIN C PO), Take 1 tablet by mouth Daily., Disp: , Rfl:     aspirin 81 MG EC tablet, Take 1 tablet by mouth Daily., Disp: , Rfl:     atorvastatin (LIPITOR) 80 MG tablet, TAKE ONE TABLET BY MOUTH EVERY EVENING AT BEDTIME, Disp: 90 tablet, Rfl: 1    Blood Pressure kit, Check blood pressure daily., Disp: 1 each, Rfl: 0    carbamide peroxide (Debrox) 6.5 % otic solution, Administer 5 drops to the right ear 2 (Two) Times a Day., Disp: 15 mL, Rfl: 0    carvedilol (COREG) 3.125 MG tablet, Take 1 tablet by mouth 2 (Two) Times a Day., Disp: 60 tablet, Rfl: 11    cefdinir (OMNICEF) 300 MG capsule, Take 1 capsule by mouth 2 (Two) Times a Day., Disp: 20 capsule, Rfl: 0    cetirizine (zyrTEC) 10 MG tablet, Take 1 tablet by mouth Daily., Disp: , Rfl:     cholecalciferol (VITAMIN D3) 25 MCG (1000 UT) tablet, Take 1 tablet by mouth Daily., Disp: , Rfl:     clopidogrel (PLAVIX) 75 MG tablet, TAKE ONE TABLET BY MOUTH EVERY DAY, Disp: 90 tablet, Rfl: 1    cyclobenzaprine (FLEXERIL) 10 MG tablet, TAKE ONE TABLET BY MOUTH EVERY DAY AT BEDTIME, Disp: 30 tablet, Rfl: 5    fluticasone (FLONASE) 50 MCG/ACT nasal spray, USE TWO SPRAYS INTO THE NOSTRILS AS DIRECTED BY PROVIDER DAILY, Disp: 16 g, Rfl: 5    gabapentin (NEURONTIN) 100 MG capsule, Take 1 capsule by mouth Every Night., Disp: , Rfl:     indapamide (LOZOL) 2.5 MG tablet, Take 1 tablet by mouth Every Morning., Disp: , Rfl:     Krill Oil (Omega-3) 500 MG capsule, Take 1 tablet by mouth Daily., Disp: , Rfl:     losartan (Cozaar) 50 MG tablet, Take 1 tablet by mouth Daily., Disp: 90 tablet, Rfl: 1    Misc. Devices (Adjust Bath/Shower Seat) misc, Use daily, Disp: 1 each, Rfl: 0    multivitamin with minerals (Multivitamin Women 50+) tablet tablet, Take 1 tablet by mouth Daily., Disp: , Rfl:     nitroglycerin (NITROSTAT) 0.4 MG SL tablet, PLACE ONE TABLET UNDER THE TONGUE EVERY  "5 MINUTES AS NEEDED FOR CHEST PAIN FOR UP TO 3 DOSES, IF NO RELIEF AFTER FIRST DOSE, CALL 911, Disp: 50 tablet, Rfl: 1    pantoprazole (PROTONIX) 40 MG EC tablet, Take 1 tablet by mouth Daily., Disp: 30 tablet, Rfl: 11    ranolazine (RANEXA) 1000 MG 12 hr tablet, Take 1 tablet by mouth Every 12 (Twelve) Hours., Disp: 60 tablet, Rfl: 0    spironolactone (ALDACTONE) 25 MG tablet, Take 1 tablet by mouth Daily., Disp: 30 tablet, Rfl: 11    Turmeric Curcumin 500 MG capsule, Take 1 capsule by mouth Daily., Disp: , Rfl:     vitamin B-12 (CYANOCOBALAMIN) 100 MCG tablet, Take 1 tablet by mouth Daily., Disp: , Rfl:     ALLERGIES  Allergies   Allergen Reactions    Diazepam Other (See Comments)     Made her violent    Codeine Nausea And Vomiting           Cardiac exam    VITAL SIGNS:  /80   Pulse 62   Temp 98.1 °F (36.7 °C) (Oral)   Resp 15   Ht 154.9 cm (61\")   Wt 103 kg (226 lb 3.2 oz)   SpO2 98%   BMI 42.74 kg/m²     Constitutional: Patient is alert and in no distress.  Patient with generalized body discomfort.    ENT: There is a normal pharynx with no acute erythema or exudate and oral mucosa is moist.  Nose is clear with no drainage.  Tympanic membranes intact and nonerythemic    Respiratory: Patient is clear to auscultation bilaterally with no wheezing or rhonchi.  Chest wall is nontender.  There are no external lesions on the chest.  There is no crepitance    Cardiovascular: S1-S2 regular rate and rhythm no murmurs or regular    Abdomen: Soft, nontender, and  bowel sounds are normal in all 4 quadrants.  There is no rebound or guarding noted.  There is no abdominal distention or hepatosplenomegaly.    Genitourinary: Patient is voiding appropriately.    Integument: No acute lesions noted and color appears to be normal.    [place] Coma Scale: Total score 15    Neurological: Patient is alert and oriented x4 and no acute findings noted.  Speech is fluent and cognition is normal.  No evidence of acute CVA.  " Cranial nerves II through XII intact.  Patient with normal motor function as well as reflexes and sensation      RADIOLOGY/PROCEDURES      XR Chest 1 View   Final Result   1. No acute disease.       This report was signed and finalized on 6/1/2025 4:02 PM by Dr. Mt Lara MD.          CT Head Without Contrast   Final Result       1. Mild cerebral and cerebellar atrophy with chronic microvascular   disease but no evidence of acute intracranial process.               This report was signed and finalized on 6/1/2025 3:09 PM by Dr. Mt Lara MD.                 FUTURE APPOINTMENTS     Future Appointments   Date Time Provider Department Center   7/22/2025 10:45 AM Carlyn Whitney APRN [facility] GE [facility]   8/22/2025 10:00 AM Ana Sam APRN [facility] CD [facility]          EKG (reviewed and interpreted by me): Normal sinus bradycardia rhythm. No acute ischemia. Heart rate 58 with no acute ST segment elevation or depression noted        HEART SCORE     Patient history  1       Moderately suspicious (1 point)    2   ECG     Nonspecific repolarization disturbance (1 point)    3   Patient age     Equal or higher than 65 (2 points)    4   Risk factors (Hypercholesterolemia, Hypertension, diabetes, smoking, obesity)     More than 3 risk factors or atherosclerosis history (2 points)    5   Troponin       1 to 3 times higher than normal (1 point)      6     TOTAL RISK NUMBER: 7    The three risk categories are described below:  Heart score MACE risk Recommendation  0 - 3 Low (1.7%) Discharge can be an option.  4 - 6 Intermediate (20.3%) Clinical observation and further investigations.  7 - 10 High (72.2%) Immediate invasive treatment        COURSE & MEDICAL DECISION MAKING       Patient's partial differential diagnosis can include:    Syncope, acute kidney failure, dehydration, vasovagal syncope, orthostatic syncope, unstable angina, angina, non-STEMI, arrhythmia, pneumothorax, pulmonary embolism, electrolyte  abnormality,  Prinzmetal angina, costochondritis pleurisy, pleural effusion, pulmonary edema, panic attack, esophageal spasm, GERD, gastritis, chest spasms, and others      CBC, CMP, magnesium, lactic acid, troponin, PT and PTT will be ordered as well as a chest x-ray and a CT scan of the head    Attempted ambulation patient is unsteady and nearly falls.    Despite getting a bolus of fluids 1 L patient dizziness is improved however she is still unsteady with walking.  Due to the send I feel that the patient should continue to receive some fluids overnight as well as further evaluation for her syncope.      Spoke with Jory Palma nurse practitioner hospitalist group who states the patient to be admitted to Dr. Blanco on telemetry observation status    Patient's level of risk: Moderate        CRITICAL CARE    CRITICAL CARE: No  CRITICAL CARE TIME: none      Recent Results (from the past 24 hours)   ECG 12 Lead Syncope    Collection Time: 06/01/25  2:28 PM   Result Value Ref Range    QT Interval 458 ms    QTC Interval 449 ms   Comprehensive Metabolic Panel    Collection Time: 06/01/25  2:47 PM    Specimen: Arm, Right; Blood   Result Value Ref Range    Glucose 131 (H) 65 - 99 mg/dL    BUN 26.1 (H) 8.0 - 23.0 mg/dL    Creatinine 1.02 (H) 0.57 - 1.00 mg/dL    Sodium 130 (L) 136 - 145 mmol/L    Potassium 4.5 3.5 - 5.2 mmol/L    Chloride 101 98 - 107 mmol/L    CO2 21.0 (L) 22.0 - 29.0 mmol/L    Calcium 9.2 8.6 - 10.5 mg/dL    Total Protein 7.7 6.0 - 8.5 g/dL    Albumin 3.0 (L) 3.5 - 5.2 g/dL    ALT (SGPT) 83 (H) 1 - 33 U/L    AST (SGOT) 120 (H) 1 - 32 U/L    Alkaline Phosphatase 67 39 - 117 U/L    Total Bilirubin 0.7 0.0 - 1.2 mg/dL    Globulin 4.7 gm/dL    A/G Ratio 0.6 g/dL    BUN/Creatinine Ratio 25.6 (H) 7.0 - 25.0    Anion Gap 8.0 5.0 - 15.0 mmol/L    eGFR 58.2 (L) >60.0 mL/min/1.73   Protime-INR    Collection Time: 06/01/25  2:47 PM    Specimen: Arm, Right; Blood   Result Value Ref Range    Protime 17.6 (H) 11.8 -  14.8 Seconds    INR 1.37 (H) 0.91 - 1.09   aPTT    Collection Time: 06/01/25  2:47 PM    Specimen: Arm, Right; Blood   Result Value Ref Range    PTT 29.2 24.5 - 36.0 seconds   High Sensitivity Troponin T    Collection Time: 06/01/25  2:47 PM    Specimen: Arm, Right; Blood   Result Value Ref Range    HS Troponin T 39 (H) <14 ng/L   CK    Collection Time: 06/01/25  2:47 PM    Specimen: Arm, Right; Blood   Result Value Ref Range    Creatine Kinase 331 (H) 20 - 180 U/L   Magnesium    Collection Time: 06/01/25  2:47 PM    Specimen: Arm, Right; Blood   Result Value Ref Range    Magnesium 1.7 1.6 - 2.4 mg/dL   CBC Auto Differential    Collection Time: 06/01/25  2:47 PM    Specimen: Arm, Right; Blood   Result Value Ref Range    WBC 5.96 3.40 - 10.80 10*3/mm3    RBC 3.33 (L) 3.77 - 5.28 10*6/mm3    Hemoglobin 11.0 (L) 12.0 - 15.9 g/dL    Hematocrit 32.0 (L) 34.0 - 46.6 %    MCV 96.1 79.0 - 97.0 fL    MCH 33.0 26.6 - 33.0 pg    MCHC 34.4 31.5 - 35.7 g/dL    RDW 13.0 12.3 - 15.4 %    RDW-SD 46.3 37.0 - 54.0 fl    MPV 9.4 6.0 - 12.0 fL    Platelets 106 (L) 140 - 450 10*3/mm3    Neutrophil % 61.1 42.7 - 76.0 %    Lymphocyte % 26.0 19.6 - 45.3 %    Monocyte % 10.1 5.0 - 12.0 %    Eosinophil % 1.3 0.3 - 6.2 %    Basophil % 0.5 0.0 - 1.5 %    Immature Grans % 1.0 (H) 0.0 - 0.5 %    Neutrophils, Absolute 3.64 1.70 - 7.00 10*3/mm3    Lymphocytes, Absolute 1.55 0.70 - 3.10 10*3/mm3    Monocytes, Absolute 0.60 0.10 - 0.90 10*3/mm3    Eosinophils, Absolute 0.08 0.00 - 0.40 10*3/mm3    Basophils, Absolute 0.03 0.00 - 0.20 10*3/mm3    Immature Grans, Absolute 0.06 (H) 0.00 - 0.05 10*3/mm3    nRBC 0.0 0.0 - 0.2 /100 WBC   Lactic Acid, Plasma    Collection Time: 06/01/25  2:53 PM    Specimen: Arm, Left; Blood   Result Value Ref Range    Lactate 1.4 0.5 - 2.0 mmol/L   High Sensitivity Troponin T 1Hr    Collection Time: 06/01/25  4:03 PM    Specimen: Arm, Right; Blood   Result Value Ref Range    HS Troponin T 36 (H) <14 ng/L    Troponin T  Numeric Delta -3 ng/L    Troponin T % Delta -8 Abnormal if >/= 20%            Old charts were reviewed per [facility] EMR.  Pertinent details are summarized above.  All laboratory, radiologic, and EKG studies that were performed in the Emergency Department were a necessary part of the evaluation needed to exclude unstable or  emergent medical conditions.     Patient was hemodynamically and neurologically stable in the ED.   Pertinent studies were reviewed as above.     The patient received:  Medications   sodium chloride 0.9 % infusion (has no administration in time range)   sodium chloride 0.9 % bolus 1,000 mL (0 mL Intravenous Stopped 6/1/25 1546)            ED Disposition       ED Disposition   Decision to Admit    Condition   --    Comment   Level of Care: Telemetry [5]   Diagnosis: Syncope [458039]   Admitting Physician: SHIRA ACEVEDO [[id number]]   Attending Physician: SHIRA ACEVEDO [[id number]]                   Dragon disclaimer:  Part of this note may be an electronic transcription/translation of spoken language to printed text using the Dragon Dictation System.    I have reviewed the patient’s prescription history via a prescription monitoring program.  This information is consistent with my knowledge of the patient’s controlled substance use history.    Patient evaluated during Coronavirus Pandemic. Isolation practices followed according to [facility] policy.     FINAL IMPRESSION   Diagnosis Plan   1. Syncope, unspecified syncope type        2. Hyponatremia        3. Volume depletion        4. Dizziness        5. General weakness              MD Tiburcio Oliveira Jr, Thomas Mark Jr., MD  06/01/25 [id number]

## 2025-06-01 NOTE — PLAN OF CARE
Goal Outcome Evaluation:  Plan of Care Reviewed With: patient        Progress: no change     Pt arrived to unit and oriented. Safety measures in place.

## 2025-06-02 LAB
ANION GAP SERPL CALCULATED.3IONS-SCNC: 7 MMOL/L (ref 5–15)
BASOPHILS # BLD AUTO: 0.03 10*3/MM3 (ref 0–0.2)
BASOPHILS NFR BLD AUTO: 0.5 % (ref 0–1.5)
BUN SERPL-MCNC: 19.5 MG/DL (ref 8–23)
BUN/CREAT SERPL: 21.4 (ref 7–25)
CALCIUM SPEC-SCNC: 9.1 MG/DL (ref 8.6–10.5)
CHLORIDE SERPL-SCNC: 103 MMOL/L (ref 98–107)
CO2 SERPL-SCNC: 23 MMOL/L (ref 22–29)
CREAT SERPL-MCNC: 0.91 MG/DL (ref 0.57–1)
DEPRECATED RDW RBC AUTO: 46.5 FL (ref 37–54)
EGFRCR SERPLBLD CKD-EPI 2021: 66.8 ML/MIN/1.73
EOSINOPHIL # BLD AUTO: 0.07 10*3/MM3 (ref 0–0.4)
EOSINOPHIL NFR BLD AUTO: 1.2 % (ref 0.3–6.2)
ERYTHROCYTE [DISTWIDTH] IN BLOOD BY AUTOMATED COUNT: 13 % (ref 12.3–15.4)
GLUCOSE SERPL-MCNC: 97 MG/DL (ref 65–99)
HCT VFR BLD AUTO: 31.7 % (ref 34–46.6)
HGB BLD-MCNC: 10.7 G/DL (ref 12–15.9)
IMM GRANULOCYTES # BLD AUTO: 0.05 10*3/MM3 (ref 0–0.05)
IMM GRANULOCYTES NFR BLD AUTO: 0.9 % (ref 0–0.5)
LYMPHOCYTES # BLD AUTO: 1.43 10*3/MM3 (ref 0.7–3.1)
LYMPHOCYTES NFR BLD AUTO: 25.4 % (ref 19.6–45.3)
MAGNESIUM SERPL-MCNC: 1.9 MG/DL (ref 1.6–2.4)
MCH RBC QN AUTO: 32.8 PG (ref 26.6–33)
MCHC RBC AUTO-ENTMCNC: 33.8 G/DL (ref 31.5–35.7)
MCV RBC AUTO: 97.2 FL (ref 79–97)
MONOCYTES # BLD AUTO: 0.66 10*3/MM3 (ref 0.1–0.9)
MONOCYTES NFR BLD AUTO: 11.7 % (ref 5–12)
NEUTROPHILS NFR BLD AUTO: 3.4 10*3/MM3 (ref 1.7–7)
NEUTROPHILS NFR BLD AUTO: 60.3 % (ref 42.7–76)
NRBC BLD AUTO-RTO: 0 /100 WBC (ref 0–0.2)
PLATELET # BLD AUTO: 105 10*3/MM3 (ref 140–450)
PMV BLD AUTO: 9.7 FL (ref 6–12)
POTASSIUM SERPL-SCNC: 4.5 MMOL/L (ref 3.5–5.2)
QT INTERVAL: 458 MS
QTC INTERVAL: 449 MS
RBC # BLD AUTO: 3.26 10*6/MM3 (ref 3.77–5.28)
SODIUM SERPL-SCNC: 133 MMOL/L (ref 136–145)
WBC NRBC COR # BLD AUTO: 5.64 10*3/MM3 (ref 3.4–10.8)

## 2025-06-02 PROCEDURE — 99214 OFFICE O/P EST MOD 30 MIN: CPT | Performed by: NURSE PRACTITIONER

## 2025-06-02 PROCEDURE — 25810000003 SODIUM CHLORIDE 0.9 % SOLUTION: Performed by: NURSE PRACTITIONER

## 2025-06-02 PROCEDURE — 97161 PT EVAL LOW COMPLEX 20 MIN: CPT

## 2025-06-02 PROCEDURE — 83735 ASSAY OF MAGNESIUM: CPT | Performed by: INTERNAL MEDICINE

## 2025-06-02 PROCEDURE — 97165 OT EVAL LOW COMPLEX 30 MIN: CPT | Performed by: OCCUPATIONAL THERAPIST

## 2025-06-02 PROCEDURE — 80048 BASIC METABOLIC PNL TOTAL CA: CPT | Performed by: INTERNAL MEDICINE

## 2025-06-02 PROCEDURE — 96361 HYDRATE IV INFUSION ADD-ON: CPT

## 2025-06-02 PROCEDURE — 25010000002 ONDANSETRON PER 1 MG: Performed by: INTERNAL MEDICINE

## 2025-06-02 PROCEDURE — 96375 TX/PRO/DX INJ NEW DRUG ADDON: CPT

## 2025-06-02 PROCEDURE — G0378 HOSPITAL OBSERVATION PER HR: HCPCS

## 2025-06-02 PROCEDURE — 85025 COMPLETE CBC W/AUTO DIFF WBC: CPT | Performed by: INTERNAL MEDICINE

## 2025-06-02 RX ORDER — HYDROCHLOROTHIAZIDE 12.5 MG/1
12.5 TABLET ORAL DAILY
COMMUNITY
End: 2025-06-03 | Stop reason: HOSPADM

## 2025-06-02 RX ORDER — CLOPIDOGREL BISULFATE 75 MG/1
75 TABLET ORAL DAILY
COMMUNITY

## 2025-06-02 RX ORDER — ATORVASTATIN CALCIUM 80 MG/1
80 TABLET, FILM COATED ORAL DAILY
COMMUNITY

## 2025-06-02 RX ORDER — NITROGLYCERIN 0.4 MG/1
0.4 TABLET SUBLINGUAL
COMMUNITY

## 2025-06-02 RX ORDER — FLUTICASONE PROPIONATE 50 MCG
2 SPRAY, SUSPENSION (ML) NASAL DAILY
COMMUNITY

## 2025-06-02 RX ORDER — SODIUM CHLORIDE 9 MG/ML
75 INJECTION, SOLUTION INTRAVENOUS CONTINUOUS
Status: DISPENSED | OUTPATIENT
Start: 2025-06-02 | End: 2025-06-02

## 2025-06-02 RX ORDER — CYCLOBENZAPRINE HCL 10 MG
10 TABLET ORAL NIGHTLY
COMMUNITY

## 2025-06-02 RX ORDER — SODIUM CHLORIDE 9 MG/ML
75 INJECTION, SOLUTION INTRAVENOUS CONTINUOUS
Status: DISCONTINUED | OUTPATIENT
Start: 2025-06-02 | End: 2025-06-02

## 2025-06-02 RX ADMIN — RANOLAZINE 1000 MG: 500 TABLET, FILM COATED, EXTENDED RELEASE ORAL at 20:00

## 2025-06-02 RX ADMIN — CETIRIZINE HYDROCHLORIDE 10 MG: 10 TABLET, FILM COATED ORAL at 08:40

## 2025-06-02 RX ADMIN — CYANOCOBALAMIN TAB 500 MCG 500 MCG: 500 TAB at 08:40

## 2025-06-02 RX ADMIN — GABAPENTIN 100 MG: 100 CAPSULE ORAL at 20:00

## 2025-06-02 RX ADMIN — ONDANSETRON 4 MG: 2 INJECTION INTRAMUSCULAR; INTRAVENOUS at 22:42

## 2025-06-02 RX ADMIN — Medication 10 ML: at 09:03

## 2025-06-02 RX ADMIN — ATORVASTATIN CALCIUM 80 MG: 40 TABLET, FILM COATED ORAL at 20:00

## 2025-06-02 RX ADMIN — Medication 10 ML: at 20:00

## 2025-06-02 RX ADMIN — RANOLAZINE 1000 MG: 500 TABLET, FILM COATED, EXTENDED RELEASE ORAL at 08:39

## 2025-06-02 RX ADMIN — CLOPIDOGREL BISULFATE 75 MG: 75 TABLET, FILM COATED ORAL at 08:39

## 2025-06-02 RX ADMIN — Medication 1000 UNITS: at 08:40

## 2025-06-02 RX ADMIN — PANTOPRAZOLE SODIUM 40 MG: 40 TABLET, DELAYED RELEASE ORAL at 06:12

## 2025-06-02 RX ADMIN — ASPIRIN 81 MG: 81 TABLET, COATED ORAL at 08:39

## 2025-06-02 RX ADMIN — SODIUM CHLORIDE 75 ML/HR: 9 INJECTION, SOLUTION INTRAVENOUS at 10:31

## 2025-06-02 NOTE — PLAN OF CARE
Goal Outcome Evaluation:  Plan of Care Reviewed With: patient        Progress: improving  Outcome Evaluation: PT bar completed.  Pt pleasant and agreeable to therapy.  Pt reports she has numbness that comes and goes in her feet per her report due to sciatica, however pt reports yesterday she had numbness in B feet, hands and in her mouth.  Reports this resolved now.  Reports MD aware.   Reports she has passed out 3 times over the last 2 wks, and when she comes to, it takes a bit for her legs to start working.  Nsg staff w/ pt upon therapist entering room.  BP taken in supine 118/51, HR 61 bpm, in sitting 123/70, HR 68, and in standing 103/62 w/ HR 75.  Performs tfers w/ CGA, gait w/ CGA 30 ft w/ noted decrease gait speed, step length and heel strike demonstrating forward head and rounded shlds.  Pt will benefit from continued PT services to improve activity tolerance, overall strength, balance and I w/ functional mobility.  Will follow for progress and needs.

## 2025-06-02 NOTE — THERAPY EVALUATION
Patient Name: Priscilla Santos  : 1950    MRN: [id number]                              Today's Date: 2025       Admit Date: 2025    Visit Dx:     ICD-10-CM ICD-9-CM   1. Syncope, unspecified syncope type  R55 780.2   2. Hyponatremia  E87.1 276.1   3. Volume depletion  E86.9 276.50   4. Dizziness  R42 780.4   5. General weakness  R53.1 780.79   6. 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 with stable angina pectoris    Thrombocytopenia    Nausea    Chest pain    Abnormal finding on GI tract imaging    Anticoagulated    Personal history of adenomatous and serrated colon polyps    CHF (congestive heart failure)    Syncope     Past Medical History:   Diagnosis Date    Arthritis     CHF (congestive heart failure)     Coronary artery disease     Diverticulosis     Fatty liver     History of adenomatous polyp of colon     History of transfusion     Hyperlipidemia     Hypertension     Sleep apnea     UTI (urinary tract infection)      Past Surgical History:   Procedure Laterality Date    APPENDECTOMY      CARDIAC SURGERY      Tripple bypass    CAROTID STENT      CARPAL TUNNEL RELEASE Bilateral     CHOLECYSTECTOMY      COLON SURGERY      COLONOSCOPY N/A 2025    Diverticulosis in the left colon; One 7mm tubular adenomatous polyp in the transverse colon; One 4mm tubular adenomatous polyp in the rectum-Clip (MR conditional) was  placed; Repeat 5 years    CORONARY STENT PLACEMENT  2021    total of 4-5 heart stents in past    ENDOSCOPY N/A 02/27/2025    Erosive gastropathy with stigmata of recent bleeding-biopsied; Normal examined duodenum; Repeat 3 years    HERNIA REPAIR      JOINT REPLACEMENT      KNEE SURGERY Bilateral     TONSILLECTOMY AND ADENOIDECTOMY      TUBAL ABDOMINAL LIGATION  1977    UMBILICAL HERNIA REPAIR N/A 02/02/2023    Procedure: UMBILICAL HERNIA REPAIR with mesh;  Surgeon: Lara Conley MD;  Location: [facility] OR;  Service: General;  Laterality: N/A;      General Information       Row Name 06/02/25 1400          OT Time and Intention    Document Type evaluation  -     Mode of Treatment occupational therapy  -       Row Name 06/02/25 1400          General Information    Patient Profile Reviewed yes  -     Prior Level of Function independent:;all household mobility;community mobility;ADL's;driving;shopping;cooking;cleaning  -     Existing Precautions/Restrictions fall;other (see comments)  -     Barriers to Rehab medically complex  -       Row Name 06/02/25 1400          Living Environment    Current Living Arrangements home  -     People in Home spouse;grandchild(khari)  -       Row Name 06/02/25 1400          Home Main Entrance    Number of Stairs, Main Entrance three  -     Stair Railings, Main Entrance none  -       Row Name 06/02/25 1400          Stairs Within Home, Primary    Stairs, Within Home, Primary to basement  -     Number of Stairs, Within Home, Primary ten  -       Row Name 06/02/25 1400          Cognition    Orientation Status (Cognition) oriented x 4  -       Row Name 06/02/25 1400          Safety Issues/Impairments Affecting Functional Mobility    Impairments Affecting Function (Mobility) balance;endurance/activity tolerance;sensation/sensory awareness  -               User Key  (r) = Recorded By, (t) = Taken By, (c) = Cosigned By      Initials Name Provider Type     Terry  Bee DU, OTR/L Occupational Therapist                     Mobility/ADL's       [facility] Name 06/02/25 1400          Bed Mobility    Bed Mobility supine-sit  -     Supine-Sit [place] (Bed Mobility) supervision  -[facility] Name 06/02/25 1400          Transfers    Transfers sit-stand transfer;stand-sit transfer  -[facility] Name 06/02/25 1400          Sit-Stand Transfer    Sit-Stand [place] (Transfers) standby assist  -[facility] Name 06/02/25 1400          Stand-Sit Transfer    Stand-Sit [place] (Transfers) standby assist  -[facility] Name 06/02/25 1400          Activities of Daily Living    BADL Assessment/Intervention lower body dressing  -CH       Row Name 06/02/25 1400          Lower Body Dressing Assessment/Training    [place] Level (Lower Body Dressing) set up;don;shoes/slippers  -     Position (Lower Body Dressing) edge of bed sitting  -     Comment, (Lower Body Dressing) slip on shoes  -               User Key  (r) = Recorded By, (t) = Taken By, (c) = Cosigned By      Initials Name Provider Type     Bee Stewart, OTR/L Occupational Therapist                   Obj/Interventions       [facility] Name 06/02/25 1400          Vision Assessment/Intervention    Visual Impairment/Limitations corrective lenses full-time  -[facility] Name 06/02/25 1400          Range of Motion Comprehensive    General Range of Motion no range of motion deficits identified  -[facility] Name 06/02/25 1400          Strength Comprehensive (MMT)    General Manual Muscle Testing (MMT) Assessment no strength deficits identified  -CH       Row Name 06/02/25 1400          Balance    Balance Assessment sitting static balance;sitting dynamic balance;sit to stand dynamic balance;standing static balance;standing dynamic balance  -     Static Sitting Balance independent  -     Dynamic Sitting Balance independent  -     Position, Sitting Balance unsupported;sitting edge of bed  -     Sit to Stand Dynamic  Balance contact guard  -     Static Standing Balance standby assist  -CH     Dynamic Standing Balance standby assist  -CH     Position/Device Used, Standing Balance unsupported  -               User Key  (r) = Recorded By, (t) = Taken By, (c) = Cosigned By      Initials Name Provider Type    CH Bee Stewart, OTR/L Occupational Therapist                   Goals/Plan       Row Name 06/02/25 1400          Transfer Goal 1 (OT)    Activity/Assistive Device (Transfer Goal 1, OT) sit-to-stand/stand-to-sit;bed-to-chair/chair-to-bed;toilet;shower chair  -     [place] Level/Cues Needed (Transfer Goal 1, OT) modified independence  -CH     Time Frame (Transfer Goal 1, OT) long term goal (LTG);by discharge  -     Progress/Outcome (Transfer Goal 1, OT) new [facility]  -       Row Name 06/02/25 1400          Bathing Goal 1 (OT)    Activity/Device (Bathing Goal 1, OT) bathing skills, all;upper body bathing;lower body bathing  -     [place] Level/Cues Needed (Bathing Goal 1, OT) supervision required  -CH     Time Frame (Bathing Goal 1, OT) long term goal (LTG);by discharge  -     Progress/Outcomes (Bathing Goal 1, OT) new goal  -       Row Name 06/02/25 1400          Dressing Goal 1 (OT)    Activity/Device (Dressing Goal 1, OT) lower body dressing  -     [place]/Cues Needed (Dressing Goal 1, OT) set-up required  -CH     Time Frame (Dressing Goal 1, OT) long term goal (LTG);10 days;by discharge  -     Progress/Outcome (Dressing Goal 1, OT) new [facility]  -       Row Name 06/02/25 1400          Therapy Assessment/Plan (OT)    Planned Therapy Interventions (OT) activity tolerance training;adaptive equipment training;BADL retraining;edema control/reduction;functional balance retraining;IADL retraining;occupation/activity based interventions;patient/caregiver education/training;ROM/therapeutic exercise;transfer/mobility retraining  -               User Key  (r) = Recorded By, (t) = Taken By, (c) = Cosigned  By      Initials Name Provider Type     Bee Stewart, OTR/L Occupational Therapist                   Clinical Impression       Row Name 06/02/25 1400          Pain Assessment    Pretreatment Pain Rating 0/10 - no pain  -     Posttreatment Pain Rating 0/10 - no pain  -       Row Name 06/02/25 1400          Plan of Care Review    Plan of Care Reviewed With patient  -     Progress improving  -     Outcome Evaluation OT eval complete.  Pt. is AxO x 4 & pleasant.  She is able to come to EOB at S and stand at SBA during [place] BPs.  Set up for LBD to KANG slip on shoes.  No major change in BP and pt reports no symptoms with change of position.  Ms. Santos would benefit from cont'd OT tx for safety edu, endurance training, & balance challenges.  Tx to focus on fall risk mgmt.  Anticipate DC home wiht family.  -       Row Name 06/02/25 1400          Therapy Assessment/Plan (OT)    Patient/Family Therapy Goal Statement (OT) return home  -     Rehab Potential (OT) good  -     Criteria for Skilled Therapeutic Interventions Met (OT) yes;skilled treatment is necessary  -     Therapy Frequency (OT) 3 times/wk  -     Predicted Duration of Therapy Intervention (OT) 10 days  -       Row Name 06/02/25 1400          Therapy Plan Review/Discharge Plan (OT)    Anticipated Discharge Disposition (OT) home with assist  -       Row Name 06/02/25 1400          Vital Signs    Pre Systolic BP Rehab 96  -CH     Pre Treatment Diastolic BP 41  -CH     Intra Systolic BP Rehab 108  -CH     Intra Treatment Diastolic BP 55  -CH     Post Systolic BP Rehab 99  -CH     Post Treatment Diastolic BP 52  -CH     Pretreatment Heart Rate (beats/min) 64  -CH     Intratreatment Heart Rate (beats/min) 63  -CH     Posttreatment Heart Rate (beats/min) 72  -CH     Pre SpO2 (%) 99  -CH     O2 Delivery Pre Treatment room air  -     Pre Patient Position Supine  -       Row Name 06/02/25 1400          Positioning and Restraints     Pre-Treatment Position in bed  -CH     Post Treatment Position bed  -CH     In Bed fowlers;call light within reach;encouraged to call for assist;side rails up x3;[place] [facility] [place]  -               User Key  (r) = Recorded By, (t) = Taken By, (c) = Cosigned By      Initials Name Provider Type    Bee Cervantes, OTR/L Occupational Therapist                   Outcome Measures       Row Name 06/02/25 1400          How much help from another is currently needed...    Putting on and taking off regular lower body clothing? 3  -CH     Bathing (including washing, rinsing, and drying) 3  -CH     Toileting (which includes using toilet bed pan or urinal) 3  -CH     Putting on and taking off regular upper body clothing 4  -CH     Taking care of personal grooming (such as brushing teeth) 4  -CH     Eating meals 4  -CH     AM-PAC 6 Clicks Score (OT) 21  -CH       Row Name 06/02/25 1148          How much help from another person do you currently need...    Turning from your back to your side while in flat bed without using bedrails? 4  -JE     Moving from lying on back to sitting on the side of a flat bed without bedrails? 4  -JE     Moving to and from a bed to a chair (including a wheelchair)? 3  -JE     Standing up from a chair using your arms (e.g., wheelchair, bedside chair)? 3  -JE     Climbing 3-5 steps with a railing? 3  -JE     To walk in hospital room? 3  -JE     AM-PAC 6 Clicks Score (PT) 20  -JE     Highest Level of Mobility Goal Walk 10 Steps or More-6  -JE       Row Name 06/02/25 1400 06/02/25 1148       Functional Assessment    Outcome Measure [facility] AM-PAC 6 Clicks Daily Activity (OT)  - AM-PAC 6 Clicks Basic Mobility (PT)  -              User Key  (r) = Recorded By, (t) = Taken By, (c) = Cosigned By      Initials Name Provider Type    Bee Cervantes, OTR/L Occupational Therapist    Bettye Jj, PT Physical Therapist                    Occupational Therapy Education       Title: PT OT SLP Therapies  (In Progress)       Topic: Occupational Therapy (In Progress)       Point: ADL training (Done)       Learning Progress Summary            Patient Acceptance, E, VU by  at 6/2/2025 1428                      Point: Precautions (Done)       Learning Progress Summary            Patient Acceptance, E, VU by  at 6/2/2025 1428                      Point: Body mechanics (Done)       Learning Progress Summary            Patient Acceptance, E, VU by  at 6/2/2025 1428                                      User Key       Initials Effective Dates Name Provider Type Discipline     07/11/23 -  Bee Stewart, OTR/L Occupational Therapist OT                  OT Recommendation and Plan  Planned Therapy Interventions (OT): activity tolerance training, adaptive equipment training, BADL retraining, edema control/reduction, functional balance retraining, IADL retraining, occupation/activity based interventions, patient/caregiver education/training, ROM/therapeutic exercise, transfer/mobility retraining  Therapy Frequency (OT): 3 times/wk  Plan of Care Review  Plan of Care Reviewed With: patient  Progress: improving  Outcome Evaluation: OT eval complete.  Pt. is AxO x 4 & pleasant.  She is able to come to EOB at S and stand at SBA during [place] BPs.  Set up for LBD to KANG slip on shoes.  No major change in BP and pt reports no symptoms with change of position.  Ms. Santos would benefit from cont'd OT tx for safety edu, endurance training, & balance challenges.  Tx to focus on fall risk mgmt.  Anticipate DC home wiht family.     Time Calculation:         Time Calculation- OT       Row Name 06/02/25 1400             Time Calculation- OT    OT Start Time 1400  -CH      OT Stop Time 1439  -CH      OT Time Calculation (min) 39 min  -CH      OT Received On 06/02/25  -      OT Goal Re-Cert Due Date 06/12/25  -         Untimed Charges    OT Eval/Re-eval Minutes 39  -CH         Total Minutes    Untimed Charges Total Minutes 39  -CH        Total Minutes 39  -CH                User Key  (r) = Recorded By, (t) = Taken By, (c) = Cosigned By      Initials Name Provider Type     Bee Stewart OTR/L Occupational Therapist                  Therapy Charges for Today       Code Description Service Date Service Provider Modifiers Qty    [id number] HC OT EVAL LOW COMPLEXITY 3 6/2/2025 Bee Stewart OTR/L GO 1                 MARISSA Banda/ISABELLA  6/2/2025

## 2025-06-02 NOTE — CONSULTS
[facility] HEART GROUP CONSULT NOTE    Referring Provider: No ref. provider found    Reason for Consultation: syncope    Chief complaint:   Chief Complaint   Patient presents with    Syncope       Subjective     History of present illness:  Priscilla Santos is a 74 y.o. female with CAD s/p previous CABG and stenting who presented to the ER for further evaluation of syncope. She has been seen in my clinic and has been treated for DING and edema. She was started on Aldactone 25mg daily on 5/7 following a slightly higher than normal BNP. She had a syncopal episode and went to the ER on 5/13 with workup being benign. She was seen in my office on 5/22 and placed in a holter monitor to assess for arrhythmia induced syncope which she currently still has in place. She reports she had a syncopal episode at [place] after she got up from her wheelchair. She also reports a syncopal episode the night prior after she got up from the toilet in the middle of the night. She reports a total of 3 syncopal episodes in the past week, all of which have occurred after position change. She has had negative orthostatic BPs in the recent past. She denies syncope or near syncope when sitting or laying down. She denies chest pain, palpitations, edema and dyspnea. Labs on arrival revealed hyponatremia and creatinine of 1.02 with baseline 0.7-0.9. BP has ranged 102-153/50-70s      History  Past Medical History:   Diagnosis Date    Arthritis     CHF (congestive heart failure)     Coronary artery disease     Diverticulosis     Fatty liver     History of adenomatous polyp of colon     History of transfusion     Hyperlipidemia     Hypertension     Sleep apnea     UTI (urinary tract infection)    ,   Past Surgical History:   Procedure Laterality Date    APPENDECTOMY      CARDIAC SURGERY  2008    Tripple bypass    CAROTID STENT      CARPAL TUNNEL RELEASE Bilateral     CHOLECYSTECTOMY      COLON SURGERY      COLONOSCOPY N/A 02/27/2025     Diverticulosis in the left colon; One 7mm tubular adenomatous polyp in the transverse colon; One 4mm tubular adenomatous polyp in the rectum-Clip (MR conditional) was placed; Repeat 5 years    CORONARY STENT PLACEMENT  2021    total of 4-5 heart stents in past    ENDOSCOPY N/A 02/27/2025    Erosive gastropathy with stigmata of recent bleeding-biopsied; Normal examined duodenum; Repeat 3 years    HERNIA REPAIR      JOINT REPLACEMENT      KNEE SURGERY Bilateral     TONSILLECTOMY AND ADENOIDECTOMY      TUBAL ABDOMINAL LIGATION  1977    UMBILICAL HERNIA REPAIR N/A 02/02/2023    Procedure: UMBILICAL HERNIA REPAIR with mesh;  Surgeon: Lara Conley MD;  Location: [facility] OR;  Service: General;  Laterality: N/A;   ,   Family History   Problem Relation Age of Onset    Diabetes Mother     Cancer Father     Heart disease Father     No Known Problems Maternal Grandmother     Diabetes Maternal Grandfather     No Known Problems Paternal Grandmother     Cancer Paternal Grandfather     Colon cancer Neg Hx     Colon polyps Neg Hx     Esophageal cancer Neg Hx     Liver disease Neg Hx     Stomach cancer Neg Hx     Rectal cancer Neg Hx     Liver cancer Neg Hx    ,   Social History     Tobacco Use    Smoking status: Never     Passive exposure: Never    Smokeless tobacco: Never   Vaping Use    Vaping status: Never Used   Substance Use Topics    Alcohol use: Never    Drug use: Never   ,     Medications    Prior to Admission medications    Medication Sig Start Date End Date Taking? Authorizing Provider   aluminum-magnesium hydroxide-simethicone (MAALOX MAX) 400-400-40 MG/5ML suspension Take 15 mL by mouth Every 6 (Six) Hours As Needed for Indigestion or Heartburn. 2/8/25   Jory Palma APRN   amoxicillin-clavulanate (AUGMENTIN) 875-125 MG per tablet Take 1 tablet by mouth 2 (Two) Times a Day for 10 days. 5/29/25 6/8/25  Reza Charles APRN   Ascorbic Acid (VITAMIN C PO) Take 1 tablet by mouth Daily.    Provider, MD Saul    aspirin 81 MG EC tablet Take 1 tablet by mouth Daily.    Saul Perez MD   atorvastatin (LIPITOR) 80 MG tablet TAKE ONE TABLET BY MOUTH EVERY EVENING AT BEDTIME 3/13/25   Silvia Fierro APRN   Blood Pressure kit Check blood pressure daily. 5/8/25   Silvia Fierro APRN   carbamide peroxide (Debrox) 6.5 % otic solution Administer 5 drops to the right ear 2 (Two) Times a Day. 5/8/25   Silvia Fierro APRN   carvedilol (COREG) 3.125 MG tablet Take 1 tablet by mouth 2 (Two) Times a Day. 5/22/25   Ana Sam APRN   cefdinir (OMNICEF) 300 MG capsule Take 1 capsule by mouth 2 (Two) Times a Day. 5/22/25   Silvia Fierro APRN   cetirizine (zyrTEC) 10 MG tablet Take 1 tablet by mouth Daily.    Saul Perez MD   cholecalciferol (VITAMIN D3) 25 MCG (1000 UT) tablet Take 1 tablet by mouth Daily.    Saul Perez MD   clopidogrel (PLAVIX) 75 MG tablet TAKE ONE TABLET BY MOUTH EVERY DAY 3/13/25   Silvia Fierro APRN   cyclobenzaprine (FLEXERIL) 10 MG tablet TAKE ONE TABLET BY MOUTH EVERY DAY AT BEDTIME 3/6/25   Silvia Fierro APRN   fluticasone (FLONASE) 50 MCG/ACT nasal spray USE TWO SPRAYS INTO THE NOSTRILS AS DIRECTED BY PROVIDER DAILY 5/22/25   Silvia Fierro APRN   gabapentin (NEURONTIN) 100 MG capsule Take 1 capsule by mouth Every Night. 2/25/25   Saul Perez MD   hydrALAZINE (APRESOLINE) 25 MG tablet Take 0.5 tablets by mouth Daily.    Saul Perez MD   indapamide (LOZOL) 2.5 MG tablet Take 1 tablet by mouth Every Morning.    Saul Perez MD   indapamide (LOZOL) 2.5 MG tablet Take 1 tablet by mouth Every Morning.    Saul Perez MD   Krill Oil (Omega-3) 500 MG capsule Take 1 tablet by mouth Daily.    Saul Perez MD   losartan (Cozaar) 50 MG tablet Take 1 tablet by mouth Daily. 12/20/24   Silvia Fierro APRN   losartan (COZAAR) 50 MG tablet Take 1 tablet by mouth Daily.    Provider, MD Saul    Misc. Devices (Adjust Bath/Shower Seat) misc Use daily 5/22/25   Silvia Fierro APRN   multivitamin with minerals (Multivitamin Women 50+) tablet tablet Take 1 tablet by mouth Daily.    ProviderSaul MD   nitroglycerin (NITROSTAT) 0.4 MG SL tablet PLACE ONE TABLET UNDER THE TONGUE EVERY 5 MINUTES AS NEEDED FOR CHEST PAIN FOR UP TO 3 DOSES, IF NO RELIEF AFTER FIRST DOSE, CALL 911 5/5/25   Reza Charles APRN   pantoprazole (PROTONIX) 40 MG EC tablet Take 1 tablet by mouth Daily. 5/12/25   Carlyn Whitney APRN   ranolazine (RANEXA) 1000 MG 12 hr tablet Take 1 tablet by mouth Every 12 (Twelve) Hours. 3/27/25   Reza Charles APRN   spironolactone (ALDACTONE) 25 MG tablet Take 1 tablet by mouth Daily. 5/12/25   Ana Sam APRN   Turmeric Curcumin 500 MG capsule Take 1 capsule by mouth Daily.    ProviderSaul MD   vitamin B-12 (CYANOCOBALAMIN) 100 MCG tablet Take 1 tablet by mouth Daily.    ProviderSaul MD       Current Facility-Administered Medications   Medication Dose Route Frequency Provider Last Rate Last Admin    acetaminophen (TYLENOL) tablet 650 mg  650 mg Oral Q4H PRN Evelio Blanco MD        Or    acetaminophen (TYLENOL) 160 MG/5ML oral solution 650 mg  650 mg Oral Q4H PRN Evelio Blanco MD        Or    acetaminophen (TYLENOL) suppository 650 mg  650 mg Rectal Q4H PRN Evelio Blanco MD        aluminum-magnesium hydroxide-simethicone (MAALOX MAX) 400-400-40 MG/5ML suspension 15 mL  15 mL Oral Q6H PRN Evelio Blanco MD        aspirin EC tablet 81 mg  81 mg Oral Daily Evelio Blanco MD   81 mg at 06/02/25 0839    atorvastatin (LIPITOR) tablet 80 mg  80 mg Oral Nightly Evelio Blanco MD   80 mg at 06/01/25 2015    sennosides-docusate (PERICOLACE) 8.6-50 MG per tablet 2 tablet  2 tablet Oral BID PRN Evelio Blanco MD        And    polyethylene glycol (MIRALAX) packet 17 g  17 g Oral Daily PRN Evelio Blanco MD        And     bisacodyl (DULCOLAX) EC tablet 5 mg  5 mg Oral Daily PRN Evelio Blanco MD        And    bisacodyl (DULCOLAX) suppository 10 mg  10 mg Rectal Daily PRN Evelio Blanco MD        carvedilol (COREG) tablet 3.125 mg  3.125 mg Oral BID Evelio Blanco MD   3.125 mg at 06/01/25 2015    cetirizine (zyrTEC) tablet 10 mg  10 mg Oral Daily Evelio Blanco MD   10 mg at 06/02/25 0840    cholecalciferol (VITAMIN D3) tablet 1,000 Units  1,000 Units Oral Daily Evelio Blanco MD   1,000 Units at 06/02/25 0840    clopidogrel (PLAVIX) tablet 75 mg  75 mg Oral Daily Evelio Blanco MD   75 mg at 06/02/25 0839    gabapentin (NEURONTIN) capsule 100 mg  100 mg Oral Nightly Evelio Blanco MD   100 mg at 06/01/25 2015    nitroglycerin (NITROSTAT) SL tablet 0.4 mg  0.4 mg Sublingual Q5 Min PRN Evelio Blanco MD        ondansetron (ZOFRAN) injection 4 mg  4 mg Intravenous Q6H PRN Evelio Blanco MD        pantoprazole (PROTONIX) EC tablet 40 mg  40 mg Oral Q AM Evelio Blanco MD   40 mg at 06/02/25 0612    ranolazine (RANEXA) 12 hr tablet 1,000 mg  1,000 mg Oral Q12H Evelio Blanco MD   1,000 mg at 06/02/25 0839    sodium chloride 0.9 % flush 10 mL  10 mL Intravenous Q12H Evelio Blanco MD   10 mL at 06/02/25 0903    sodium chloride 0.9 % flush 10 mL  10 mL Intravenous PRN Evelio Blanco MD        sodium chloride 0.9 % infusion 40 mL  40 mL Intravenous PRN Evelio Blanco MD        vitamin B-12 (CYANOCOBALAMIN) tablet 500 mcg  500 mcg Oral Daily Evelio Blanco MD   500 mcg at 06/02/25 0840       Allergies:  Diazepam and Codeine    Review of Systems  Review of Systems    Objective     Physical Exam:  Patient Vitals for the past 24 hrs:   BP Temp Temp src Pulse Resp SpO2 Height Weight   06/02/25 0849 111/51 -- -- -- -- -- -- --   06/02/25 0703 102/53 98.3 °F (36.8 °C) Oral 58 16 100 % -- --   06/02/25 0321 117/55 97.7 °F (36.5 °C) Oral 61 16 100 % -- --   06/02/25 0013  "107/48 -- -- 62 16 100 % -- --   06/01/25 2005 143/72 97.7 °F (36.5 °C) Axillary 96 16 99 % -- --   06/01/25 1813 153/70 97.6 °F (36.4 °C) Oral 61 16 100 % -- 96.6 kg (212 lb 15.4 oz)   06/01/25 1731 143/72 -- -- 61 -- 100 % -- --   06/01/25 1701 141/70 97.6 °F (36.4 °C) Oral 79 16 100 % -- --   06/01/25 1646 141/73 -- -- 59 -- 100 % -- --   06/01/25 1641 153/80 -- -- 62 -- 98 % -- --   06/01/25 1638 126/80 -- -- 65 -- -- -- --   06/01/25 1637 142/78 -- -- 64 -- -- -- --   06/01/25 1636 122/60 -- -- 59 -- -- -- --   06/01/25 1442 123/74 -- -- 58 -- 94 % -- --   06/01/25 1441 114/61 -- -- 59 -- 96 % -- --   06/01/25 1439 114/61 -- -- 58 -- -- -- --   06/01/25 1426 106/40 98.1 °F (36.7 °C) Oral 58 15 93 % 154.9 cm (61\") 103 kg (226 lb 3.2 oz)     Physical Exam    Results Review:   I reviewed the patient's new clinical results.    Lab Results (last 72 hours)       Procedure Component Value Units Date/Time    Basic Metabolic Panel [[id number]]  (Abnormal) Collected: 06/02/25 0409    Specimen: Blood Updated: 06/02/25 0525     Glucose 97 mg/dL      BUN 19.5 mg/dL      Creatinine 0.91 mg/dL      Sodium 133 mmol/L      Potassium 4.5 mmol/L      Chloride 103 mmol/L      CO2 23.0 mmol/L      Calcium 9.1 mg/dL      BUN/Creatinine Ratio 21.4     Anion Gap 7.0 mmol/L      eGFR 66.8 mL/min/1.73     Narrative:      GFR Categories in Chronic Kidney Disease (CKD)              GFR Category          GFR (mL/min/1.73)    Interpretation  G1                    90 or greater        Normal or high (1)  G2                    60-89                Mild decrease (1)  G3a                   45-59                Mild to moderate decrease  G3b                   30-44                Moderate to severe decrease  G4                    15-29                Severe decrease  G5                    14 or less           Kidney failure    (1)In the absence of evidence of kidney disease, neither GFR category G1 or G2 fulfill the criteria for CKD.    eGFR " calculation 2021 CKD-EPI creatinine equation, which does not include race as a factor    Magnesium [[id number]]  (Normal) Collected: 06/02/25 0409    Specimen: Blood Updated: 06/02/25 0525     Magnesium 1.9 mg/dL     CBC & Differential [[id number]]  (Abnormal) Collected: 06/02/25 0409    Specimen: Blood Updated: 06/02/25 0504    Narrative:      The following orders were created for panel order CBC & Differential.  Procedure                               Abnormality         Status                     ---------                               -----------         ------                     CBC Auto Differential[[id number]]        Abnormal            Final result                 Please view results for these tests on the individual orders.    CBC Auto Differential [[id number]]  (Abnormal) Collected: 06/02/25 0409    Specimen: Blood Updated: 06/02/25 0504     WBC 5.64 10*3/mm3      RBC 3.26 10*6/mm3      Hemoglobin 10.7 g/dL      Hematocrit 31.7 %      MCV 97.2 fL      MCH 32.8 pg      MCHC 33.8 g/dL      RDW 13.0 %      RDW-SD 46.5 fl      MPV 9.7 fL      Platelets 105 10*3/mm3      Neutrophil % 60.3 %      Lymphocyte % 25.4 %      Monocyte % 11.7 %      Eosinophil % 1.2 %      Basophil % 0.5 %      Immature Grans % 0.9 %      Neutrophils, Absolute 3.40 10*3/mm3      Lymphocytes, Absolute 1.43 10*3/mm3      Monocytes, Absolute 0.66 10*3/mm3      Eosinophils, Absolute 0.07 10*3/mm3      Basophils, Absolute 0.03 10*3/mm3      Immature Grans, Absolute 0.05 10*3/mm3      nRBC 0.0 /100 WBC     High Sensitivity Troponin T 1Hr [[id number]]  (Abnormal) Collected: 06/01/25 1603    Specimen: Blood from Arm, Right Updated: 06/01/25 1628     HS Troponin T 36 ng/L      Troponin T Numeric Delta -3 ng/L      Troponin T % Delta -8    Narrative:      High Sensitive Troponin T Reference Range:  <14.0 ng/L- Negative Female for AMI  <22.0 ng/L- Negative Male for AMI  >=14 - Abnormal Female indicating possible myocardial injury.  >=22 - Abnormal  Male indicating possible myocardial injury.   Clinicians would have to utilize clinical acumen, EKG, Troponin, and serial changes to determine if it is an Acute Myocardial Infarction or myocardial injury due to an underlying chronic condition.         Protime-INR [[id number]]  (Abnormal) Collected: 06/01/25 1447    Specimen: Blood from Arm, Right Updated: 06/01/25 1519     Protime 17.6 Seconds      INR 1.37    aPTT [[id number]]  (Normal) Collected: 06/01/25 1447    Specimen: Blood from Arm, Right Updated: 06/01/25 1519     PTT 29.2 seconds     Narrative:      PTT = The equivalent PTT values for the therapeutic range of heparin levels at 0.3 to 0.7 U/ml are 77 - 99 seconds.    Lactic Acid, Plasma [[id number]]  (Normal) Collected: 06/01/25 1453    Specimen: Blood from Arm, Left Updated: 06/01/25 1515     Lactate 1.4 mmol/L     Comprehensive Metabolic Panel [[id number]]  (Abnormal) Collected: 06/01/25 1447    Specimen: Blood from Arm, Right Updated: 06/01/25 1514     Glucose 131 mg/dL      BUN 26.1 mg/dL      Creatinine 1.02 mg/dL      Sodium 130 mmol/L      Potassium 4.5 mmol/L      Chloride 101 mmol/L      CO2 21.0 mmol/L      Calcium 9.2 mg/dL      Total Protein 7.7 g/dL      Albumin 3.0 g/dL      ALT (SGPT) 83 U/L      AST (SGOT) 120 U/L      Alkaline Phosphatase 67 U/L      Total Bilirubin 0.7 mg/dL      Globulin 4.7 gm/dL      A/G Ratio 0.6 g/dL      BUN/Creatinine Ratio 25.6     Anion Gap 8.0 mmol/L      eGFR 58.2 mL/min/1.73     Narrative:      GFR Categories in Chronic Kidney Disease (CKD)              GFR Category          GFR (mL/min/1.73)    Interpretation  G1                    90 or greater        Normal or high (1)  G2                    60-89                Mild decrease (1)  G3a                   45-59                Mild to moderate decrease  G3b                   30-44                Moderate to severe decrease  G4                    15-29                Severe decrease  G5                    14 or less            Kidney failure    (1)In the absence of evidence of kidney disease, neither GFR category G1 or G2 fulfill the criteria for CKD.    eGFR calculation 2021 CKD-EPI creatinine equation, which does not include race as a factor    Magnesium [[id number]]  (Normal) Collected: 06/01/25 1447    Specimen: Blood from Arm, Right Updated: 06/01/25 1514     Magnesium 1.7 mg/dL     CK [[id number]]  (Abnormal) Collected: 06/01/25 1447    Specimen: Blood from Arm, Right Updated: 06/01/25 1514     Creatine Kinase 331 U/L     High Sensitivity Troponin T [[id number]]  (Abnormal) Collected: 06/01/25 1447    Specimen: Blood from Arm, Right Updated: 06/01/25 1512     HS Troponin T 39 ng/L     Narrative:      High Sensitive Troponin T Reference Range:  <14.0 ng/L- Negative Female for AMI  <22.0 ng/L- Negative Male for AMI  >=14 - Abnormal Female indicating possible myocardial injury.  >=22 - Abnormal Male indicating possible myocardial injury.   Clinicians would have to utilize clinical acumen, EKG, Troponin, and serial changes to determine if it is an Acute Myocardial Infarction or myocardial injury due to an underlying chronic condition.         CBC & Differential [[id number]]  (Abnormal) Collected: 06/01/25 1447    Specimen: Blood from Arm, Right Updated: 06/01/25 1455    Narrative:      The following orders were created for panel order CBC & Differential.  Procedure                               Abnormality         Status                     ---------                               -----------         ------                     CBC Auto Differential[[id number]]        Abnormal            Final result                 Please view results for these tests on the individual orders.    CBC Auto Differential [[id number]]  (Abnormal) Collected: 06/01/25 1447    Specimen: Blood from Arm, Right Updated: 06/01/25 1455     WBC 5.96 10*3/mm3      RBC 3.33 10*6/mm3      Hemoglobin 11.0 g/dL      Hematocrit 32.0 %      MCV 96.1 fL      MCH 33.0 pg   "    MCHC 34.4 g/dL      RDW 13.0 %      RDW-SD 46.3 fl      MPV 9.4 fL      Platelets 106 10*3/mm3      Neutrophil % 61.1 %      Lymphocyte % 26.0 %      Monocyte % 10.1 %      Eosinophil % 1.3 %      Basophil % 0.5 %      Immature Grans % 1.0 %      Neutrophils, Absolute 3.64 10*3/mm3      Lymphocytes, Absolute 1.55 10*3/mm3      Monocytes, Absolute 0.60 10*3/mm3      Eosinophils, Absolute 0.08 10*3/mm3      Basophils, Absolute 0.03 10*3/mm3      Immature Grans, Absolute 0.06 10*3/mm3      nRBC 0.0 /100 WBC             No results found for: \"ECHOEFEST\"    Imaging Results (Last 72 Hours)       Procedure Component Value Units Date/Time    XR Chest 1 View [[id number]] Collected: 06/01/25 1601     Updated: 06/01/25 1605    Narrative:      EXAMINATION: XR CHEST 1 VW-  6/1/2025 4:01 PM     HISTORY: Syncope.     FINDINGS: Upright frontal projection of the chest is compared to  previous study dated 5/13/2025. The lungs are clear. No consolidative  pneumonia or effusion. The thoracic aorta is ectatic.       Impression:      1. No acute disease.     This report was signed and finalized on 6/1/2025 4:02 PM by Dr. Mt Lara MD.       CT Head Without Contrast [[id number]] Collected: 06/01/25 1508     Updated: 06/01/25 1513    Narrative:      CT HEAD WO CONTRAST- 6/1/2025 1:40 PM     HISTORY: Syncope     COMPARISON: 5/13/2025     DLP: 674.4 mGy.cm. All CT scans are performed using dose optimization  techniques as appropriate to the performed exam and including at least  one of the following: Automated exposure control, adjustment of the mA  and/or kV according to size, and the use of the iterative reconstruction  technique.     TECHNIQUE: Serial axial tomographic images of the brain were obtained  without the use of intravenous contrast.     FINDINGS:  The midline structures are nondisplaced. There is mild cerebral and  cerebellar atrophy, with an associated increase in the prominence of the  ventricles and sulci. The " basilar cisterns are normal in size and  configuration. There is no evidence of intracranial hemorrhage or  mass-effect. There is low attenuation in the periventricular white  matter, consistent with chronic ischemic change. There are no abnormal  extra-axial fluid collections. There is no evidence of tonsillar  herniation.     The included orbits and their contents are unremarkable. The visualized  paranasal sinuses, mastoid air cells and middle ear cavities are clear.  The visualized osseous structures and overlying soft tissues of the  skull and face are intact.       Impression:         1. Mild cerebral and cerebellar atrophy with chronic microvascular  disease but no evidence of acute intracranial process.           This report was signed and finalized on 6/1/2025 3:09 PM by Dr. Mt Lara MD.             Assessment & Plan       Syncope    Hypertension, well controlled    Coronary artery disease of bypass graft of native heart with stable angina pectoris      Plan:  Syncope- symptoms sound typical for orthostatic syncope or hypovolemia which started after initiation of aldactone. Aldactone has been held- recommend this be stopped completely at discharge. Discussed possible tilt table however she would like to make medication changes rather than undergo a tilt table as she ahs had them in the past and does not care for the symptoms it causes. Should she continue to have syncope following cessation of Aldactone, will consider reducing Ranexa. Will give her an additional 500ml NS as her BP remains soft after am medications.     HTN: controlled. Does have some lower readings at time.     CAD: flat trending troponins with no signs of ischemia. Continue ASA, plavix, statin     Further orders per Dr. Milner     Thank you for asking us to follow this patient with you.     Electronically signed by KENDRA Lopez, 06/02/25, 9:58 AM CDT.    Please note this cardiology consultation note is the result of a  face to face consultation with the patient, in addition to reviewing medical records at length by myself, KENDRA Navarro     Time spent: 50 minutes

## 2025-06-02 NOTE — PLAN OF CARE
Goal Outcome Evaluation:           Progress: no change        Patient A/O x 4, up to BSC with assist of 1, Zio patch to L upper chest applied 5/22 and to be taken off Friday, voiding without difficulty, 22 ga inserted to RFA, bruise to upper left to mid portion of back post fall, scabs to/around L knee and R outer calf, external hemmorhoids, SR 60-70, only c/o dizzyness at beginning of shift when changed position to get OOB to BSC, denied dizziness all other times with .

## 2025-06-02 NOTE — PROGRESS NOTES
[facility] Medicine Services  INPATIENT PROGRESS NOTE    Patient Name: Priscilla Santos  Date of Admission: 6/1/2025  Today's Date: 06/02/25  Length of Stay: 0  Primary Care Physician: Reza Charles APRN    Subjective   Chief Complaint: Syncope    Syncope       Patient denies lightheadedness at this time.  She is quite anxious to know the cause of her syncopal episodes.    Review of Systems   Cardiovascular:  Positive for syncope.      All pertinent negatives and positives are as above. All other systems have been reviewed and are negative unless otherwise stated.     Objective    Temp:  [97.6 °F (36.4 °C)-98.3 °F (36.8 °C)] 97.8 °F (36.6 °C)  Heart Rate:  [58-96] 75  Resp:  [16] 16  BP: (102-153)/(48-80) 103/62  Physical Exam  Constitutional:       General: She is not in acute distress.     Appearance: She is not ill-appearing or diaphoretic.   HENT:      Head: Normocephalic and atraumatic.      Right Ear: External ear normal.      Left Ear: External ear normal.      Nose: No congestion or rhinorrhea.      Mouth/Throat:      Mouth: Mucous membranes are moist.      Pharynx: No oropharyngeal exudate or posterior oropharyngeal erythema.   Eyes:      General: No scleral icterus.     Extraocular Movements: Extraocular movements intact.      Conjunctiva/sclera: Conjunctivae normal.   Cardiovascular:      Rate and Rhythm: Normal rate and regular rhythm.      Heart sounds: Normal heart sounds. No murmur heard.  Pulmonary:      Effort: Pulmonary effort is normal. No respiratory distress.      Breath sounds: Normal breath sounds. No wheezing, rhonchi or rales.   Abdominal:      General: Abdomen is flat. There is no distension.      Palpations: Abdomen is soft.      Tenderness: There is no abdominal tenderness. There is no guarding.   Musculoskeletal:         General: No swelling, tenderness or deformity.      Cervical back: Neck supple. No rigidity. No muscular tenderness.      Right  "lower leg: No edema.      Left lower leg: No edema.   Lymphadenopathy:      Cervical: No cervical adenopathy.   Skin:     General: Skin is warm and dry.   Neurological:      General: No focal deficit present.      Mental Status: She is alert and oriented to person, place, and time.      Cranial Nerves: No cranial nerve deficit.      Motor: No weakness.   Psychiatric:         Mood and Affect: Mood normal.         Behavior: Behavior normal.         Thought Content: Thought content normal.         Results Review:  I have reviewed the labs, radiology results, and diagnostic studies.    Laboratory Data:   Results from last 7 days   Lab Units 06/02/25  0409 06/01/25  1447 05/27/25  0840   WBC 10*3/mm3 5.64 5.96 5.7   HEMOGLOBIN g/dL 10.7* 11.0* 13.1   HEMATOCRIT % 31.7* 32.0* 39.4   PLATELETS 10*3/mm3 105* 106* 145*        Results from last 7 days   Lab Units 06/02/25  0409 06/01/25  1447 05/27/25  0840   SODIUM mmol/L 133* 130* 132*   POTASSIUM mmol/L 4.5 4.5 4.9   CHLORIDE mmol/L 103 101 101   CO2 mmol/L 23.0 21.0* 21   BUN mg/dL 19.5 26.1* 24   CREATININE mg/dL 0.91 1.02* 0.94   CALCIUM mg/dL 9.1 9.2 9.8   BILIRUBIN mg/dL  --  0.7 0.8   ALK PHOS U/L  --  67 68   ALT (SGPT) U/L  --  83* 71*   AST (SGOT) U/L  --  120* 102*   GLUCOSE mg/dL 97 131* 99       Culture Data:   No results found for: \"BLOODCX\", \"URINECX\", \"WOUNDCX\", \"MRSACX\", \"RESPCX\", \"STOOLCX\"    Radiology Data:   Imaging Results (Last 24 Hours)       Procedure Component Value Units Date/Time    XR Chest 1 View [[id number]] Collected: 06/01/25 1601     Updated: 06/01/25 1605    Narrative:      EXAMINATION: XR CHEST 1 VW-  6/1/2025 4:01 PM     HISTORY: Syncope.     FINDINGS: Upright frontal projection of the chest is compared to  previous study dated 5/13/2025. The lungs are clear. No consolidative  pneumonia or effusion. The thoracic aorta is ectatic.       Impression:      1. No acute disease.     This report was signed and finalized on 6/1/2025 4:02 PM by " Mt Lraa MD.       CT Head Without Contrast [[id number]] Collected: 06/01/25 1508     Updated: 06/01/25 1513    Narrative:      CT HEAD WO CONTRAST- 6/1/2025 1:40 PM     HISTORY: Syncope     COMPARISON: 5/13/2025     DLP: 674.4 mGy.cm. All CT scans are performed using dose optimization  techniques as appropriate to the performed exam and including at least  one of the following: Automated exposure control, adjustment of the mA  and/or kV according to size, and the use of the iterative reconstruction  technique.     TECHNIQUE: Serial axial tomographic images of the brain were obtained  without the use of intravenous contrast.     FINDINGS:  The midline structures are nondisplaced. There is mild cerebral and  cerebellar atrophy, with an associated increase in the prominence of the  ventricles and sulci. The basilar cisterns are normal in size and  configuration. There is no evidence of intracranial hemorrhage or  mass-effect. There is low attenuation in the periventricular white  matter, consistent with chronic ischemic change. There are no abnormal  extra-axial fluid collections. There is no evidence of tonsillar  herniation.     The included orbits and their contents are unremarkable. The visualized  paranasal sinuses, mastoid air cells and middle ear cavities are clear.  The visualized osseous structures and overlying soft tissues of the  skull and face are intact.       Impression:         1. Mild cerebral and cerebellar atrophy with chronic microvascular  disease but no evidence of acute intracranial process.           This report was signed and finalized on 6/1/2025 3:09 PM by Dr. Mt Lara MD.               I have reviewed the patient's current medications.     Assessment/Plan   Assessment  Active Hospital Problems    Diagnosis     **Syncope     Coronary artery disease of bypass graft of native heart with stable angina pectoris     Hypertension, well controlled      Treatment Plan  Patient  denies syncope or lightheadedness since admission.  Orthostatic vital signs repeated today are negative.  Suspect her syncopal episodes were from dehydration given her recent Aldactone.  Will discontinue this medication.  Hold losartan while on admission as blood pressures are borderline soft. Continue Coreg and Ranexa for coronary artery disease.    Will give IV normal saline 5 cc infusion.  Monitor vital signs overnight and plan to discharge in the next 24 hours if stable.  She will follow-up with cardiology for further monitoring.    Continue aspirin Plavix and statin for coronary artery disease    DVT prophylaxis with SCDs    CODE STATUS is full code    Medical Decision Making  Number and Complexity of problems: 3  Differential Diagnosis: None    Conditions and Status        Condition is improving.     [facility] Data  External documents reviewed: None  Cardiac tracing (EKG, telemetry) interpretation: None  Radiology interpretation: None  Labs reviewed: CBC, BMP reviewed by me  Any tests that were considered but not ordered: None     Decision rules/scores evaluated (example [id number], Wells, etc): N/A     Discussed with: Patient     Care Planning  Shared decision making: Patient  Code status and discussions: CODE STATUS is full code    Disposition  Social Determinants of Health that impact treatment or disposition: None  I expect the patient to be discharged to home in the next 24 hours        Electronically signed by Evelio Blanco MD, 06/02/25, 15:06 CDT.

## 2025-06-02 NOTE — PLAN OF CARE
Goal Outcome Evaluation:  Plan of Care Reviewed With: patient        Progress: improving  Outcome Evaluation: No co pain. IV fluids infusing. Ortho bp was neg today. She had good urine output. No co dizzness. She is on RA. Cont to monitor.

## 2025-06-02 NOTE — THERAPY EVALUATION
Patient Name: Priscilla Santos  : 1950    MRN: [id number]                              Today's Date: 2025       Admit Date: 2025    Visit Dx:     ICD-10-CM ICD-9-CM   1. Syncope, unspecified syncope type  R55 780.2   2. Hyponatremia  E87.1 276.1   3. Volume depletion  E86.9 276.50   4. Dizziness  R42 780.4   5. General weakness  R53.1 780.79   6. 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 with stable angina pectoris    Thrombocytopenia    Nausea    Chest pain    Abnormal finding on GI tract imaging    Anticoagulated    Personal history of adenomatous and serrated colon polyps    CHF (congestive heart failure)    Syncope     Past Medical History:   Diagnosis Date    Arthritis     CHF (congestive heart failure)     Coronary artery disease     Diverticulosis     Fatty liver     History of adenomatous polyp of colon     History of transfusion     Hyperlipidemia     Hypertension     Sleep apnea     UTI (urinary tract infection)      Past Surgical History:   Procedure Laterality Date    APPENDECTOMY      CARDIAC SURGERY      Tripple bypass    CAROTID STENT      CARPAL TUNNEL RELEASE Bilateral     CHOLECYSTECTOMY      COLON SURGERY      COLONOSCOPY N/A 2025    Diverticulosis in the left colon; One 7mm tubular adenomatous polyp in the transverse colon; One 4mm tubular adenomatous polyp in the rectum-Clip (MR conditional) was  placed; Repeat 5 years    CORONARY STENT PLACEMENT  2021    total of 4-5 heart stents in past    ENDOSCOPY N/A 02/27/2025    Erosive gastropathy with stigmata of recent bleeding-biopsied; Normal examined duodenum; Repeat 3 years    HERNIA REPAIR      JOINT REPLACEMENT      KNEE SURGERY Bilateral     TONSILLECTOMY AND ADENOIDECTOMY      TUBAL ABDOMINAL LIGATION  1977    UMBILICAL HERNIA REPAIR N/A 02/02/2023    Procedure: UMBILICAL HERNIA REPAIR with mesh;  Surgeon: Lara Conley MD;  Location: [facility] OR;  Service: General;  Laterality: N/A;      General Information       Row Name 06/02/25 1148          Physical Therapy Time and Intention    Document Type evaluation;other (see comments)  see MAR  -JE     Mode of Treatment physical therapy  -JE       Row Name 06/02/25 1148          General Information    Patient Profile Reviewed yes  -JE     Prior Level of Function independent:;all household mobility;community mobility;cooking;cleaning;driving;shopping;using stairs;ADL's  -JE     Existing Precautions/Restrictions fall;other (see comments)  recent syncope  -JE     Barriers to Rehab medically complex;impaired sensation  -JE       Row Name 06/02/25 1148          Living Environment    Current Living Arrangements home  -JE     People in Home spouse;grandchild(khari)  -JE     Name(s) of People in Home Juan C - 31 yr old grandson; spouse  -JE       Row Name 06/02/25 1148          Home Main Entrance    Number of Stairs, Main Entrance three  -JE     Stair Railings, Main Entrance none  -JE       Row Name 06/02/25 1148          Stairs Within Home, Primary    Stairs, Within Home, Primary basement (10 steps)  -JE     Stair Railings, Within Home, Primary railing on left side (ascending);other (see comments)  partially on R  -JE       Row Name 06/02/25 1148          Cognition    Orientation Status (Cognition) oriented x 4  -JE       Row Name 06/02/25 1148          Safety Issues/Impairments Affecting Functional Mobility    Safety  Issues Affecting Function (Mobility) safety precaution awareness  -     Impairments Affecting Function (Mobility) balance;endurance/activity tolerance;sensation/sensory awareness  -               User Key  (r) = Recorded By, (t) = Taken By, (c) = Cosigned By      Initials Name Provider Type    Bettye Jj, PT Physical Therapist                   Mobility       Row Name 06/02/25 1148          Bed Mobility    Comment, (Bed Mobility) sitting on edge of bed w/ nurse aide upon therapist entered room  -       Row Name 06/02/25 1148          Sit-Stand Transfer    Sit-Stand [place] (Transfers) contact guard  -       Row Name 06/02/25 1148          Gait/Stairs (Locomotion)    [place] Level (Gait) contact guard;verbal cues  -     Distance in Feet (Gait) 30  -     Deviations/Abnormal Patterns (Gait) gait speed decreased;stride length decreased  -     Bilateral Gait Deviations heel strike decreased  -     Comment, (Gait/Stairs) forward head, rounded shlds  -               User Key  (r) = Recorded By, (t) = Taken By, (c) = Cosigned By      Initials Name Provider Type    Bettye Jj, PT Physical Therapist                   Obj/Interventions       Row Name 06/02/25 1148          Range of Motion Comprehensive    Comment, General Range of Motion AROM all 4 extremities WFLs  -       Row Name 06/02/25 1148          Strength Comprehensive (MMT)    Comment, General Manual Muscle Testing (MMT) Assessment grossly 4+/5 throughout  -[facility] Name 06/02/25 1148          Balance    Balance Assessment sitting static balance;sitting dynamic balance;standing static balance;standing dynamic balance  -     Static Sitting Balance independent  -     Dynamic Sitting Balance independent  -     Position, Sitting Balance supported;unsupported;sitting edge of bed  -     Static Standing Balance contact guard  -     Dynamic Standing Balance contact guard;verbal cues  -     Position/Device Used,  Standing Balance unsupported  -       Row Name 06/02/25 1148          Sensory Assessment (Somatosensory)    Sensory Assessment (Somatosensory) other (see comments)  reports c/os numbness that comes and goes in her feet due to  a sciatica  -               User Key  (r) = Recorded By, (t) = Taken By, (c) = Cosigned By      Initials Name Provider Type    Bettye Jj, PT Physical Therapist                   Goals/Plan       Row Name 06/02/25 1148          Bed Mobility Goal 1 (PT)    Activity/Assistive Device (Bed Mobility Goal 1, PT) bed mobility activities, all  -     [place] Level/Cues Needed (Bed Mobility Goal 1, PT) independent  -JE     Time Frame (Bed Mobility Goal 1, PT) long term goal (LTG);10 days  -JE     Progress/Outcomes (Bed Mobility Goal 1, PT) goal ongoing  -       Row Name 06/02/25 1148          Transfer Goal 1 (PT)    Activity/Assistive Device (Transfer Goal 1, PT) sit-to-stand/stand-to-sit;bed-to-chair/chair-to-bed  -     [place] Level/Cues Needed (Transfer Goal 1, PT) standby assist;independent  -JE     Time Frame (Transfer Goal 1, PT) long term goal (LTG);10 days  -JE     Progress/Outcome (Transfer Goal 1, PT) goal ongoing  -       Row Name 06/02/25 1148          Gait Training Goal 1 (PT)    Activity/Assistive Device (Gait Training Goal 1, PT) gait (walking locomotion);decrease fall risk;improve balance and speed;increase endurance/gait distance;increase energy conservation  -     [place] Level (Gait Training Goal 1, PT) standby assist  -     Distance (Gait Training Goal 1, PT) 100 ft  -     Time Frame (Gait Training Goal 1, PT) long term goal (LTG);10 days  -JE     Progress/Outcome (Gait Training Goal 1, PT) goal ongoing  -       Row Name 06/02/25 1148          Stairs Goal 1 (PT)    Activity/Assistive Device (Stairs Goal 1, PT) ascending stairs;descending stairs  -     [place] Level/Cues Needed (Stairs Goal 1, PT) standby assist  -     Number of  Stairs (Stairs Goal 1, PT) 3  -JE     Time Frame (Stairs Goal 1, PT) long term goal (LTG);10 days  -     Progress/Outcome (Stairs Goal 1, PT) goal ongoing  -       Row Name 06/02/25 1148          Therapy Assessment/Plan (PT)    Planned Therapy Interventions (PT) balance training;bed mobility training;gait training;home exercise program;patient/family education;postural re-education;stair training;strengthening;transfer training;other (see comments)  safety/falls prevention, energy conservation tech  -               User Key  (r) = Recorded By, (t) = Taken By, (c) = Cosigned By      Initials Name Provider Type    Bettye Jj, PT Physical Therapist                   Clinical Impression       Row Name 06/02/25 1148          Pain    Pretreatment Pain Rating 0/10 - no pain  -     Posttreatment Pain Rating 0/10 - no pain  -       Row Name 06/02/25 1148          Plan of Care Review    Plan of Care Reviewed With patient  -     Progress improving  -     Outcome Evaluation PT eval completed.  Pt pleasant and agreeable to therapy.  Pt reports she has numbness that comes and goes in her feet per her report due to sciatica, however pt reports yesterday she had numbness in B feet, hands and in her mouth.  Reports this resolved now.  Reports MD aware.   Reports she has passed out 3 times over the last 2 wks, and when she comes to, it takes a bit for her legs to start working.  Nsg staff w/ pt upon therapist entering room.  BP taken in supine 118/51, HR 61 bpm, in sitting 123/70, HR 68, and in standing 103/62 w/ HR 75.  Performs tfers w/ CGA, gait w/ CGA 30 ft w/ noted decrease gait speed, step length and heel strike demonstrating forward head and rounded shlds.  Pt will benefit from continued PT services to improve activity tolerance, overall strength, balance and I w/ functional mobility.  Will follow for progress and needs.  -       Row Name 06/02/25 1148          Therapy Assessment/Plan (PT)     Patient/Family Therapy Goals Statement (PT) to determine why she is passing out  -JE     Rehab Potential (PT) good  -JE     Criteria for Skilled Interventions Met (PT) yes;meets criteria;skilled treatment is necessary  -JE     Therapy Frequency (PT) 2 times/day  -JE     Predicted Duration of Therapy Intervention (PT) until discharge or goals achieved  -RENAN       Row Name 06/02/25 1148          Vital Signs    Intratreatment Heart Rate (beats/min) 70  -JE     Posttreatment Heart Rate (beats/min) 68  -JE     O2 Delivery Pre Treatment room air  -JE     Intra SpO2 (%) 97  -JE     O2 Delivery Intra Treatment room air  -JE     Post SpO2 (%) 99  -JE     O2 Delivery Post Treatment room air  -JE     Pre Patient Position Sitting  -JE     Intra Patient Position Standing  -JE     Post Patient Position Supine  -JE       Row Name 06/02/25 1148          Positioning and Restraints    Pre-Treatment Position in bed  -JE     Post Treatment Position bed  -JE     In Bed fowlers;call light within reach;encouraged to call for assist;side rails up x2;legs elevated;SCD pump applied  -               User Key  (r) = Recorded By, (t) = Taken By, (c) = Cosigned By      Initials Name Provider Type    Bettye Jj, PT Physical Therapist                   Outcome Measures       Row Name 06/02/25 1142          How much help from another person do you currently need...    Turning from your back to your side while in flat bed without using bedrails? 4  -JE     Moving from lying on back to sitting on the side of a flat bed without bedrails? 4  -JE     Moving to and from a bed to a chair (including a wheelchair)? 3  -JE     Standing up from a chair using your arms (e.g., wheelchair, bedside chair)? 3  -JE     Climbing 3-5 steps with a railing? 3  -JE     To walk in hospital room? 3  -JE     AM-PAC 6 Clicks Score (PT) 20  -     Highest Level of Mobility Goal Walk 10 Steps or More-6  -JE       Row Name 06/02/25 1148          Functional  Assessment    Outcome Measure Options AM-PAC 6 Clicks Basic Mobility (PT)  -               User Key  (r) = Recorded By, (t) = Taken By, (c) = Cosigned By      Initials Name Provider Type    Bettye Jj, PT Physical Therapist                                 Physical Therapy Education       Title: PT OT SLP Therapies (In Progress)       Topic: Physical Therapy (In Progress)       Point: Mobility training (Done)       Learning Progress Summary            Patient Acceptance, E,TB,D, VU,NR by RENAN at 6/2/2025 1414    Comment: Education re: purpose of PT/importance of activity, safety/falls prevention                      Point: Home exercise program (Not Started)       Learner Progress:  Not documented in this visit.              Point: Precautions (Done)       Learning Progress Summary            Patient Acceptance, E,TB,D, VU,NR by RENAN at 6/2/2025 1414    Comment: Education re: purpose of PT/importance of activity, safety/falls prevention                                      User Key       Initials Effective Dates Name Provider Type Discipline     08/02/18 -  Bettye Simpson PT Physical Therapist PT                  PT Recommendation and Plan  Planned Therapy Interventions (PT): balance training, bed mobility training, gait training, home exercise program, patient/family education, postural re-education, stair training, strengthening, transfer training, other (see comments) (safety/falls prevention, energy conservation tech)  Progress: improving  Outcome Evaluation: PT eval completed.  Pt pleasant and agreeable to therapy.  Pt reports she has numbness that comes and goes in her feet per her report due to sciatica, however pt reports yesterday she had numbness in B feet, hands and in her mouth.  Reports this resolved now.  Reports MD aware.   Reports she has passed out 3 times over the last 2 wks, and when she comes to, it takes a bit for her legs to start working.  Nsg staff w/ pt upon therapist entering  room.  BP taken in supine 118/51, HR 61 bpm, in sitting 123/70, HR 68, and in standing 103/62 w/ HR 75.  Performs tfers w/ CGA, gait w/ CGA 30 ft w/ noted decrease gait speed, step length and heel strike demonstrating forward head and rounded shlds.  Pt will benefit from continued PT services to improve activity tolerance, overall strength, balance and I w/ functional mobility.  Will follow for progress and needs.     Time Calculation:         PT Charges       Row Name 06/02/25 1324             Time Calculation    Start Time 1148  -      Stop Time 1229  -      Time Calculation (min) 41 min  -      PT Received On 06/02/25  -      PT Goal Re-Cert Due Date 06/12/25  -                User Key  (r) = Recorded By, (t) = Taken By, (c) = Cosigned By      Initials Name Provider Type    Bettye Jj, PT Physical Therapist                  Therapy Charges for Today       Code Description Service Date Service Provider Modifiers Qty    [id number] HC PT EVAL LOW COMPLEXITY 3 6/2/2025 Bettye Simpson, PT GP 1            PT G-Codes  Outcome Measure Options: AM-PAC 6 Clicks Basic Mobility (PT)  AM-PAC 6 Clicks Score (PT): 20       Bettye Simpson PT  6/2/2025

## 2025-06-02 NOTE — PLAN OF CARE
Problem: Adult Inpatient Plan of Care  Goal: Plan of Care Review     Goal Outcome Evaluation:  Plan of Care Reviewed With: patient        Progress: improving  Outcome Evaluation: OT eval complete.  Pt. is AxO x 4 & pleasant.  She is able to come to EOB at S and stand at SBA during OH BPs.  Set up for LBD to KANG slip on shoes.  No major change in BP and pt reports no symptoms with change of position.  Ms. Santos would benefit from cont'd OT tx for safety edu, endurance training, & balance challenges.  Tx to focus on fall risk mgmt.  Anticipate DC home wiht family.    Anticipated Discharge Disposition (OT): home with assist

## 2025-06-02 NOTE — CASE MANAGEMENT/SOCIAL WORK
"Discharge Planning Assessment  [facility]     Patient Name: Priscilla Santos  MRN: [id number]  Today's Date: 6/2/2025    Admit Date: 6/1/2025        Discharge Needs Assessment       Row Name 06/02/25 1034       Living Environment    People in Home spouse;grandchild(khari)    Current Living Arrangements home    Potentially Unsafe Housing Conditions none    Primary Care Provided by self    Provides Primary Care For no one    Family Caregiver if Needed grandchild(khari), adult    Quality of Family Relationships helpful;involved;supportive    Able to Return to Prior Arrangements yes       Resource/Environmental Concerns    Resource/Environmental Concerns none       Transition Planning    Patient/Family Anticipates Transition to home with family    Patient/Family Anticipated Services at Transition none    Transportation Anticipated family or friend will provide       Discharge Needs Assessment    Readmission Within the Last 30 Days no previous admission in last 30 days    Equipment Currently Used at Home none    Concerns to be Addressed adjustment to diagnosis/illness    Anticipated Changes Related to Illness none    Equipment Needed After Discharge none    Discharge Coordination/Progress Spoke with pt about d/c needs. She lives at home with her family and plans to return home at d/c. She does not use dme at home at this time. She is needing a bp cuff and shower chair but insurance does not pay for those. Her grandson is going to help with getting them. Discussed a walker and she says she has been told she needs one but she does not want one at this time. \"I don't want to become incapacitated.\" She does have rx coverage for her meds. She denies needs at this time.                   Discharge Plan    No documentation.                      Demographic Summary    No documentation.                  Functional Status    No documentation.                  Psychosocial    No documentation.                  Abuse/Neglect    No " documentation.                  Legal    No documentation.                  Substance Abuse    No documentation.                  Patient Forms    No documentation.                     YULISSA Evans

## 2025-06-03 ENCOUNTER — READMISSION MANAGEMENT (OUTPATIENT)
Dept: CALL CENTER | Facility: HOSPITAL | Age: 75
End: 2025-06-03
Payer: MEDICARE

## 2025-06-03 VITALS
WEIGHT: 212.96 LBS | HEIGHT: 61 IN | RESPIRATION RATE: 16 BRPM | TEMPERATURE: 97.5 F | DIASTOLIC BLOOD PRESSURE: 65 MMHG | HEART RATE: 65 BPM | SYSTOLIC BLOOD PRESSURE: 101 MMHG | OXYGEN SATURATION: 97 % | BODY MASS INDEX: 40.21 KG/M2

## 2025-06-03 LAB
ANION GAP SERPL CALCULATED.3IONS-SCNC: 6 MMOL/L (ref 5–15)
BASOPHILS # BLD AUTO: 0.04 10*3/MM3 (ref 0–0.2)
BASOPHILS NFR BLD AUTO: 0.6 % (ref 0–1.5)
BUN SERPL-MCNC: 15.8 MG/DL (ref 8–23)
BUN/CREAT SERPL: 19.5 (ref 7–25)
CALCIUM SPEC-SCNC: 9.3 MG/DL (ref 8.6–10.5)
CHLORIDE SERPL-SCNC: 104 MMOL/L (ref 98–107)
CO2 SERPL-SCNC: 22 MMOL/L (ref 22–29)
CREAT SERPL-MCNC: 0.81 MG/DL (ref 0.57–1)
DEPRECATED RDW RBC AUTO: 46.7 FL (ref 37–54)
EGFRCR SERPLBLD CKD-EPI 2021: 76.8 ML/MIN/1.73
EOSINOPHIL # BLD AUTO: 0.08 10*3/MM3 (ref 0–0.4)
EOSINOPHIL NFR BLD AUTO: 1.3 % (ref 0.3–6.2)
ERYTHROCYTE [DISTWIDTH] IN BLOOD BY AUTOMATED COUNT: 13.1 % (ref 12.3–15.4)
GLUCOSE SERPL-MCNC: 102 MG/DL (ref 65–99)
HCT VFR BLD AUTO: 31.4 % (ref 34–46.6)
HGB BLD-MCNC: 10.5 G/DL (ref 12–15.9)
IMM GRANULOCYTES # BLD AUTO: 0.06 10*3/MM3 (ref 0–0.05)
IMM GRANULOCYTES NFR BLD AUTO: 1 % (ref 0–0.5)
LYMPHOCYTES # BLD AUTO: 1.72 10*3/MM3 (ref 0.7–3.1)
LYMPHOCYTES NFR BLD AUTO: 27.9 % (ref 19.6–45.3)
MAGNESIUM SERPL-MCNC: 1.5 MG/DL (ref 1.6–2.4)
MCH RBC QN AUTO: 32.3 PG (ref 26.6–33)
MCHC RBC AUTO-ENTMCNC: 33.4 G/DL (ref 31.5–35.7)
MCV RBC AUTO: 96.6 FL (ref 79–97)
MONOCYTES # BLD AUTO: 0.64 10*3/MM3 (ref 0.1–0.9)
MONOCYTES NFR BLD AUTO: 10.4 % (ref 5–12)
NEUTROPHILS NFR BLD AUTO: 3.63 10*3/MM3 (ref 1.7–7)
NEUTROPHILS NFR BLD AUTO: 58.8 % (ref 42.7–76)
PLATELET # BLD AUTO: 114 10*3/MM3 (ref 140–450)
PMV BLD AUTO: 9.7 FL (ref 6–12)
POTASSIUM SERPL-SCNC: 4.7 MMOL/L (ref 3.5–5.2)
RBC # BLD AUTO: 3.25 10*6/MM3 (ref 3.77–5.28)
SODIUM SERPL-SCNC: 132 MMOL/L (ref 136–145)
WBC NRBC COR # BLD AUTO: 6.17 10*3/MM3 (ref 3.4–10.8)

## 2025-06-03 PROCEDURE — G0378 HOSPITAL OBSERVATION PER HR: HCPCS

## 2025-06-03 PROCEDURE — 99214 OFFICE O/P EST MOD 30 MIN: CPT

## 2025-06-03 PROCEDURE — 25010000002 MAGNESIUM SULFATE IN D5W 1G/100ML (PREMIX) 1-5 GM/100ML-% SOLUTION: Performed by: INTERNAL MEDICINE

## 2025-06-03 PROCEDURE — 96366 THER/PROPH/DIAG IV INF ADDON: CPT

## 2025-06-03 PROCEDURE — 83735 ASSAY OF MAGNESIUM: CPT | Performed by: INTERNAL MEDICINE

## 2025-06-03 PROCEDURE — 80048 BASIC METABOLIC PNL TOTAL CA: CPT | Performed by: INTERNAL MEDICINE

## 2025-06-03 PROCEDURE — 85025 COMPLETE CBC W/AUTO DIFF WBC: CPT | Performed by: INTERNAL MEDICINE

## 2025-06-03 PROCEDURE — 97110 THERAPEUTIC EXERCISES: CPT

## 2025-06-03 RX ORDER — METOPROLOL SUCCINATE 25 MG/1
25 TABLET, EXTENDED RELEASE ORAL
Status: DISCONTINUED | OUTPATIENT
Start: 2025-06-03 | End: 2025-06-03 | Stop reason: HOSPADM

## 2025-06-03 RX ORDER — METOPROLOL SUCCINATE 25 MG/1
25 TABLET, EXTENDED RELEASE ORAL
Qty: 30 TABLET | Refills: 0 | Status: SHIPPED | OUTPATIENT
Start: 2025-06-04

## 2025-06-03 RX ORDER — MAGNESIUM SULFATE 1 G/100ML
1 INJECTION INTRAVENOUS ONCE
Status: COMPLETED | OUTPATIENT
Start: 2025-06-03 | End: 2025-06-03

## 2025-06-03 RX ADMIN — Medication 10 ML: at 08:04

## 2025-06-03 RX ADMIN — CLOPIDOGREL BISULFATE 75 MG: 75 TABLET, FILM COATED ORAL at 08:04

## 2025-06-03 RX ADMIN — ASPIRIN 81 MG: 81 TABLET, COATED ORAL at 08:04

## 2025-06-03 RX ADMIN — CETIRIZINE HYDROCHLORIDE 10 MG: 10 TABLET, FILM COATED ORAL at 08:04

## 2025-06-03 RX ADMIN — RANOLAZINE 1000 MG: 500 TABLET, FILM COATED, EXTENDED RELEASE ORAL at 08:04

## 2025-06-03 RX ADMIN — PANTOPRAZOLE SODIUM 40 MG: 40 TABLET, DELAYED RELEASE ORAL at 05:34

## 2025-06-03 RX ADMIN — CARVEDILOL 3.12 MG: 3.12 TABLET, FILM COATED ORAL at 08:04

## 2025-06-03 RX ADMIN — CYANOCOBALAMIN TAB 500 MCG 500 MCG: 500 TAB at 08:04

## 2025-06-03 RX ADMIN — MAGNESIUM SULFATE IN DEXTROSE 1 G: 10 INJECTION, SOLUTION INTRAVENOUS at 09:23

## 2025-06-03 RX ADMIN — Medication 1000 UNITS: at 08:04

## 2025-06-03 NOTE — THERAPY TREATMENT NOTE
Acute Care - Physical Therapy Treatment Note  [facility]     Patient Name: Priscilla Santos  : 1950  MRN: [id number]  Today's Date: 6/3/2025      Visit Dx:     ICD-10-CM ICD-9-CM   1. Syncope, unspecified syncope type  R55 780.2   2. Hyponatremia  E87.1 276.1   3. Volume depletion  E86.9 276.50   4. Dizziness  R42 780.4   5. General weakness  R53.1 780.79   6. 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 with stable angina pectoris    Thrombocytopenia    Nausea    Chest pain    Abnormal finding on GI tract imaging    Anticoagulated    Personal history of adenomatous and serrated colon polyps    CHF (congestive heart failure)    Syncope     Past Medical History:   Diagnosis Date    Arthritis     CHF (congestive heart failure)     Coronary artery disease     Diverticulosis     Fatty liver     History of adenomatous polyp of colon     History of transfusion     Hyperlipidemia     Hypertension     Sleep apnea     UTI (urinary tract infection)      Past Surgical History:   Procedure Laterality Date    APPENDECTOMY      CARDIAC SURGERY      Tripple bypass    CAROTID STENT      CARPAL TUNNEL RELEASE Bilateral     CHOLECYSTECTOMY      COLON SURGERY      COLONOSCOPY N/A 2025    Diverticulosis in the left colon; One 7mm tubular adenomatous polyp in the transverse colon; One 4mm tubular adenomatous polyp in the rectum-Clip (MR conditional)  was placed; Repeat 5 years    CORONARY STENT PLACEMENT  2021    total of 4-5 heart stents in past    ENDOSCOPY N/A 02/27/2025    Erosive gastropathy with stigmata of recent bleeding-biopsied; Normal examined duodenum; Repeat 3 years    HERNIA REPAIR      JOINT REPLACEMENT      KNEE SURGERY Bilateral     TONSILLECTOMY AND ADENOIDECTOMY      TUBAL ABDOMINAL LIGATION  1977    UMBILICAL HERNIA REPAIR N/A 02/02/2023    Procedure: UMBILICAL HERNIA REPAIR with mesh;  Surgeon: Lara Conley MD;  Location: [facility];  Service: General;  Laterality: N/A;     PT Assessment (Last 12 Hours)       PT Evaluation and Treatment       Row Name 06/03/25 1020          Physical Therapy Time and Intention    Subjective Information complains of;numbness  -AE     Document Type therapy note (daily note)  -AE     Mode of Treatment physical therapy  -AE       Row Name 06/03/25 1020          General Information    Existing Precautions/Restrictions fall  -AE       Row Name 06/03/25 1020          Motor Skills    Therapeutic Exercise --  Pt educated on home safety and piriformis stretching.  -AE       Row Name 06/03/25 1020          Positioning and Restraints    Pre-Treatment Position in bed  -AE     Post Treatment Position bed  -AE     In Bed fowlers;call light within reach  -AE               User Key  (r) = Recorded By, (t) = Taken By, (c) = Cosigned By      Initials Name Provider Type    AE Aileen Warner, PTA Physical Therapist Assistant                    Physical Therapy Education       Title: PT OT SLP Therapies (In Progress)       Topic: Physical Therapy (In Progress)       Point: Mobility training (Done)       Learning Progress Summary            Patient Acceptance, E,TB,D, VU,NR by RENAN at 6/2/2025 6334    Comment: Education re: purpose of PT/importance of activity, safety/falls prevention                      Point: Home exercise program (Not Started)       Learner Progress:  Not documented in this visit.              Point:  Precautions (Done)       Learning Progress Summary            Patient Acceptance, E,TB,D, VU,NR by RENAN at 6/2/2025 1414    Comment: Education re: purpose of PT/importance of activity, safety/falls prevention                                      User Key       Initials Effective Dates Name Provider Type Discipline     08/02/18 -  Bettye Simpson, PT Physical Therapist PT                  PT Recommendation and Plan             Time Calculation:    PT Charges       Row Name 06/03/25 1108             Time Calculation    Start Time 1020  -AE      Stop Time 1028  -AE      Time Calculation (min) 8 min  -AE      PT Received On 06/03/25  -AE         Time Calculation- PT    Total Timed Code Minutes- PT 8 minute(s)  -AE         Timed Charges    88656 - PT Therapeutic Exercise Minutes 8  -AE         Total Minutes    Timed Charges Total Minutes 8  -AE       Total Minutes 8  -AE                User Key  (r) = Recorded By, (t) = Taken By, (c) = Cosigned By      Initials Name Provider Type    AE Aileen Warner PTA Physical Therapist Assistant                  Therapy Charges for Today       Code Description Service Date Service Provider Modifiers Qty    [id number] HC PT THER PROC EA 15 MIN 6/3/2025 Aileen Warner PTA GP 1            PT G-Codes  Outcome Measure Options: AM-PAC 6 Clicks Daily Activity (OT)  AM-PAC 6 Clicks Score (PT): 20  AM-PAC 6 Clicks Score (OT): 21    Aileen Warner PTA  6/3/2025

## 2025-06-03 NOTE — PLAN OF CARE
Goal Outcome Evaluation:  Plan of Care Reviewed With: patient        Progress: no change  Outcome Evaluation: Pt A/O x 4. RA. SB/NSR 57-66. BP soft. Coreg held and MD notified. Prn zofran given x 1. Adequate urine output. Na 132.
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
## 2025-06-03 NOTE — PROGRESS NOTES
[facility] -  Progress Note     LOS: 0 days   Patient Care Team:  Reza Charles APRN as PCP - General (Family Medicine)  Max Milner MD as Cardiologist (Cardiology)  Ana Sam APRN as Nurse Practitioner (Cardiology)    Chief Complaint: Syncope    Subjective     Interval History:     Patient Complaints: Upon examination the patient is sitting comfortably in bed.  She reports that overnight she had no more dizziness, presyncope, or syncopal episodes.  She reports that she is feeling well at this time and would like to go home.  Her magnesium level was low at 1.5 on morning labs, and is being replaced via IV infusion at this time.  Encouraged patient to follow-up with her PCP to have this rechecked, and to discuss any replacement needs.  She reports that since discontinuing the Aldactone she appears to have had no more issues with presyncope, syncope, or dizziness.  She is ambulating in the room and back-and-forth to the bathroom with no difficulty.  She still currently has a Holter monitor in place.    Review of Systems:     Review of Systems   Constitutional:  Negative for activity change, fatigue and unexpected weight change.   Respiratory:  Negative for cough, chest tightness, shortness of breath and wheezing.    Cardiovascular:  Negative for chest pain.   Neurological:  Negative for dizziness, syncope, weakness and light-headedness.     Objective     Vital Sign Min/Max for last 24 hours  Temp  Min: 97.4 °F (36.3 °C)  Max: 98.6 °F (37 °C)   BP  Min: 101/65  Max: 123/70   Pulse  Min: 58  Max: 75   Resp  Min: 16  Max: 18   SpO2  Min: 95 %  Max: 100 %   No data recorded   No data recorded         06/01/25  1813   Weight: 96.6 kg (212 lb 15.4 oz)       Telemetry: Sinus epi to sinus rhythm - 57-66      Physical Exam:    Vitals reviewed.   Constitutional:       Appearance: Normal appearance.   Pulmonary:      Effort: No accessory muscle usage.      Breath sounds: Normal  breath sounds.   Cardiovascular:      PMI at left midclavicular line. Normal rate. Regular rhythm.      No gallop.       Comments: No JVD  Pulses:     Intact distal pulses.   Edema:     Peripheral edema absent.   Skin:     General: Skin is warm and dry.      Coloration: Skin is not pale.   Neurological:      Mental Status: Alert and oriented to person, place, and time.      Motor: Motor function is intact.      Coordination: Coordination is intact.      Gait: Gait is intact.   Psychiatric:         Attention and Perception: Attention normal.         Mood and Affect: Mood normal.         Speech: Speech normal.         Behavior: Behavior is cooperative.       Results Review:   Lab Results (last 72 hours)       Procedure Component Value Units Date/Time    Basic Metabolic Panel [[id number]]  (Abnormal) Collected: 06/03/25 0239    Specimen: Blood Updated: 06/03/25 0401     Glucose 102 mg/dL      BUN 15.8 mg/dL      Creatinine 0.81 mg/dL      Sodium 132 mmol/L      Potassium 4.7 mmol/L      Chloride 104 mmol/L      CO2 22.0 mmol/L      Calcium 9.3 mg/dL      BUN/Creatinine Ratio 19.5     Anion Gap 6.0 mmol/L      eGFR 76.8 mL/min/1.73     Narrative:      GFR Categories in Chronic Kidney Disease (CKD)              GFR Category          GFR (mL/min/1.73)    Interpretation  G1                    90 or greater        Normal or high (1)  G2                    60-89                Mild decrease (1)  G3a                   45-59                Mild to moderate decrease  G3b                   30-44                Moderate to severe decrease  G4                    15-29                Severe decrease  G5                    14 or less           Kidney failure    (1)In the absence of evidence of kidney disease, neither GFR category G1 or G2 fulfill the criteria for CKD.    eGFR calculation 2021 CKD-EPI creatinine equation, which does not include race as a factor    Magnesium [[id number]]  (Abnormal) Collected: 06/03/25 0239    Specimen:  Blood Updated: 06/03/25 0401     Magnesium 1.5 mg/dL     CBC & Differential [[id number]]  (Abnormal) Collected: 06/03/25 0239    Specimen: Blood Updated: 06/03/25 0332    Narrative:      The following orders were created for panel order CBC & Differential.  Procedure                               Abnormality         Status                     ---------                               -----------         ------                     CBC Auto Differential[[id number]]        Abnormal            Final result                 Please view results for these tests on the individual orders.    CBC Auto Differential [[id number]]  (Abnormal) Collected: 06/03/25 0239    Specimen: Blood Updated: 06/03/25 0332     WBC 6.17 10*3/mm3      RBC 3.25 10*6/mm3      Hemoglobin 10.5 g/dL      Hematocrit 31.4 %      MCV 96.6 fL      MCH 32.3 pg      MCHC 33.4 g/dL      RDW 13.1 %      RDW-SD 46.7 fl      MPV 9.7 fL      Platelets 114 10*3/mm3      Neutrophil % 58.8 %      Lymphocyte % 27.9 %      Monocyte % 10.4 %      Eosinophil % 1.3 %      Basophil % 0.6 %      Immature Grans % 1.0 %      Neutrophils, Absolute 3.63 10*3/mm3      Lymphocytes, Absolute 1.72 10*3/mm3      Monocytes, Absolute 0.64 10*3/mm3      Eosinophils, Absolute 0.08 10*3/mm3      Basophils, Absolute 0.04 10*3/mm3      Immature Grans, Absolute 0.06 10*3/mm3     Basic Metabolic Panel [[id number]]  (Abnormal) Collected: 06/02/25 0409    Specimen: Blood Updated: 06/02/25 0525     Glucose 97 mg/dL      BUN 19.5 mg/dL      Creatinine 0.91 mg/dL      Sodium 133 mmol/L      Potassium 4.5 mmol/L      Chloride 103 mmol/L      CO2 23.0 mmol/L      Calcium 9.1 mg/dL      BUN/Creatinine Ratio 21.4     Anion Gap 7.0 mmol/L      eGFR 66.8 mL/min/1.73     Narrative:      GFR Categories in Chronic Kidney Disease (CKD)              GFR Category          GFR (mL/min/1.73)    Interpretation  G1                    90 or greater        Normal or high (1)  G2                    60-89                 Mild decrease (1)  G3a                   45-59                Mild to moderate decrease  G3b                   30-44                Moderate to severe decrease  G4                    15-29                Severe decrease  G5                    14 or less           Kidney failure    (1)In the absence of evidence of kidney disease, neither GFR category G1 or G2 fulfill the criteria for CKD.    eGFR calculation 2021 CKD-EPI creatinine equation, which does not include race as a factor    Magnesium [[id number]]  (Normal) Collected: 06/02/25 0409    Specimen: Blood Updated: 06/02/25 0525     Magnesium 1.9 mg/dL     CBC & Differential [[id number]]  (Abnormal) Collected: 06/02/25 0409    Specimen: Blood Updated: 06/02/25 0504    Narrative:      The following orders were created for panel order CBC & Differential.  Procedure                               Abnormality         Status                     ---------                               -----------         ------                     CBC Auto Differential[[id number]]        Abnormal            Final result                 Please view results for these tests on the individual orders.    CBC Auto Differential [[id number]]  (Abnormal) Collected: 06/02/25 0409    Specimen: Blood Updated: 06/02/25 0504     WBC 5.64 10*3/mm3      RBC 3.26 10*6/mm3      Hemoglobin 10.7 g/dL      Hematocrit 31.7 %      MCV 97.2 fL      MCH 32.8 pg      MCHC 33.8 g/dL      RDW 13.0 %      RDW-SD 46.5 fl      MPV 9.7 fL      Platelets 105 10*3/mm3      Neutrophil % 60.3 %      Lymphocyte % 25.4 %      Monocyte % 11.7 %      Eosinophil % 1.2 %      Basophil % 0.5 %      Immature Grans % 0.9 %      Neutrophils, Absolute 3.40 10*3/mm3      Lymphocytes, Absolute 1.43 10*3/mm3      Monocytes, Absolute 0.66 10*3/mm3      Eosinophils, Absolute 0.07 10*3/mm3      Basophils, Absolute 0.03 10*3/mm3      Immature Grans, Absolute 0.05 10*3/mm3      nRBC 0.0 /100 WBC     High Sensitivity Troponin T 1Hr [[id number]]   (Abnormal) Collected: 06/01/25 1603    Specimen: Blood from Arm, Right Updated: 06/01/25 1628     HS Troponin T 36 ng/L      Troponin T Numeric Delta -3 ng/L      Troponin T % Delta -8    Narrative:      High Sensitive Troponin T Reference Range:  <14.0 ng/L- Negative Female for AMI  <22.0 ng/L- Negative Male for AMI  >=14 - Abnormal Female indicating possible myocardial injury.  >=22 - Abnormal Male indicating possible myocardial injury.   Clinicians would have to utilize clinical acumen, EKG, Troponin, and serial changes to determine if it is an Acute Myocardial Infarction or myocardial injury due to an underlying chronic condition.         Protime-INR [[id number]]  (Abnormal) Collected: 06/01/25 1447    Specimen: Blood from Arm, Right Updated: 06/01/25 1519     Protime 17.6 Seconds      INR 1.37    aPTT [[id number]]  (Normal) Collected: 06/01/25 1447    Specimen: Blood from Arm, Right Updated: 06/01/25 1519     PTT 29.2 seconds     Narrative:      PTT = The equivalent PTT values for the therapeutic range of heparin levels at 0.3 to 0.7 U/ml are 77 - 99 seconds.    Lactic Acid, Plasma [[id number]]  (Normal) Collected: 06/01/25 1453    Specimen: Blood from Arm, Left Updated: 06/01/25 1515     Lactate 1.4 mmol/L     Comprehensive Metabolic Panel [[id number]]  (Abnormal) Collected: 06/01/25 1447    Specimen: Blood from Arm, Right Updated: 06/01/25 1514     Glucose 131 mg/dL      BUN 26.1 mg/dL      Creatinine 1.02 mg/dL      Sodium 130 mmol/L      Potassium 4.5 mmol/L      Chloride 101 mmol/L      CO2 21.0 mmol/L      Calcium 9.2 mg/dL      Total Protein 7.7 g/dL      Albumin 3.0 g/dL      ALT (SGPT) 83 U/L      AST (SGOT) 120 U/L      Alkaline Phosphatase 67 U/L      Total Bilirubin 0.7 mg/dL      Globulin 4.7 gm/dL      A/G Ratio 0.6 g/dL      BUN/Creatinine Ratio 25.6     Anion Gap 8.0 mmol/L      eGFR 58.2 mL/min/1.73     Narrative:      GFR Categories in Chronic Kidney Disease (CKD)              GFR Category           GFR (mL/min/1.73)    Interpretation  G1                    90 or greater        Normal or high (1)  G2                    60-89                Mild decrease (1)  G3a                   45-59                Mild to moderate decrease  G3b                   30-44                Moderate to severe decrease  G4                    15-29                Severe decrease  G5                    14 or less           Kidney failure    (1)In the absence of evidence of kidney disease, neither GFR category G1 or G2 fulfill the criteria for CKD.    eGFR calculation 2021 CKD-EPI creatinine equation, which does not include race as a factor    Magnesium [[id number]]  (Normal) Collected: 06/01/25 1447    Specimen: Blood from Arm, Right Updated: 06/01/25 1514     Magnesium 1.7 mg/dL     CK [[id number]]  (Abnormal) Collected: 06/01/25 1447    Specimen: Blood from Arm, Right Updated: 06/01/25 1514     Creatine Kinase 331 U/L     High Sensitivity Troponin T [[id number]]  (Abnormal) Collected: 06/01/25 1447    Specimen: Blood from Arm, Right Updated: 06/01/25 1512     HS Troponin T 39 ng/L     Narrative:      High Sensitive Troponin T Reference Range:  <14.0 ng/L- Negative Female for AMI  <22.0 ng/L- Negative Male for AMI  >=14 - Abnormal Female indicating possible myocardial injury.  >=22 - Abnormal Male indicating possible myocardial injury.   Clinicians would have to utilize clinical acumen, EKG, Troponin, and serial changes to determine if it is an Acute Myocardial Infarction or myocardial injury due to an underlying chronic condition.         CBC & Differential [[id number]]  (Abnormal) Collected: 06/01/25 1447    Specimen: Blood from Arm, Right Updated: 06/01/25 1455    Narrative:      The following orders were created for panel order CBC & Differential.  Procedure                               Abnormality         Status                     ---------                               -----------         ------                     CBC Auto  Differential[[id number]]        Abnormal            Final result                 Please view results for these tests on the individual orders.    CBC Auto Differential [[id number]]  (Abnormal) Collected: 06/01/25 1447    Specimen: Blood from Arm, Right Updated: 06/01/25 1455     WBC 5.96 10*3/mm3      RBC 3.33 10*6/mm3      Hemoglobin 11.0 g/dL      Hematocrit 32.0 %      MCV 96.1 fL      MCH 33.0 pg      MCHC 34.4 g/dL      RDW 13.0 %      RDW-SD 46.3 fl      MPV 9.4 fL      Platelets 106 10*3/mm3      Neutrophil % 61.1 %      Lymphocyte % 26.0 %      Monocyte % 10.1 %      Eosinophil % 1.3 %      Basophil % 0.5 %      Immature Grans % 1.0 %      Neutrophils, Absolute 3.64 10*3/mm3      Lymphocytes, Absolute 1.55 10*3/mm3      Monocytes, Absolute 0.60 10*3/mm3      Eosinophils, Absolute 0.08 10*3/mm3      Basophils, Absolute 0.03 10*3/mm3      Immature Grans, Absolute 0.06 10*3/mm3      nRBC 0.0 /100 WBC                 Echo EF Estimated  Results for orders placed during the hospital encounter of 05/13/25    Adult Transthoracic Echo Complete w/ Color, Spectral and Contrast if necessary per protocol    Interpretation Summary    Left ventricular systolic function is normal. Left ventricular ejection fraction appears to be 66 - 70%.    Left ventricular diastolic function was normal.    Normal right ventricular cavity size and systolic function noted.    There is aortic valve sclerosis without significant stenosis.    Estimated right ventricular systolic pressure from tricuspid regurgitation is normal (<35 mmHg).       Medication Review: yes  Current Facility-Administered Medications   Medication Dose Route Frequency Provider Last Rate Last Admin    acetaminophen (TYLENOL) tablet 650 mg  650 mg Oral Q4H PRN Evelio Blanco MD        Or    acetaminophen (TYLENOL) 160 MG/5ML oral solution 650 mg  650 mg Oral Q4H PRN Evelio Blanco MD        Or    acetaminophen (TYLENOL) suppository 650 mg  650 mg Rectal Q4H PRN  Evelio Blanco MD        aluminum-magnesium hydroxide-simethicone (MAALOX MAX) 400-400-40 MG/5ML suspension 15 mL  15 mL Oral Q6H PRN Evelio Blanco MD        aspirin EC tablet 81 mg  81 mg Oral Daily Evelio Blanco MD   81 mg at 06/03/25 0804    atorvastatin (LIPITOR) tablet 80 mg  80 mg Oral Nightly Evelio Blanco MD   80 mg at 06/02/25 2000    sennosides-docusate (PERICOLACE) 8.6-50 MG per tablet 2 tablet  2 tablet Oral BID PRN Evelio Blanco MD        And    polyethylene glycol (MIRALAX) packet 17 g  17 g Oral Daily PRN Evelio Blanco MD        And    bisacodyl (DULCOLAX) EC tablet 5 mg  5 mg Oral Daily PRN Evelio Blanco MD        And    bisacodyl (DULCOLAX) suppository 10 mg  10 mg Rectal Daily PRN Evelio Blanco MD        cetirizine (zyrTEC) tablet 10 mg  10 mg Oral Daily Evelio Blanco MD   10 mg at 06/03/25 0804    cholecalciferol (VITAMIN D3) tablet 1,000 Units  1,000 Units Oral Daily Evelio Blanco MD   1,000 Units at 06/03/25 0804    clopidogrel (PLAVIX) tablet 75 mg  75 mg Oral Daily Evelio Blanco MD   75 mg at 06/03/25 0804    gabapentin (NEURONTIN) capsule 100 mg  100 mg Oral Nightly Evelio Blanco MD   100 mg at 06/02/25 2000    metoprolol succinate XL (TOPROL-XL) 24 hr tablet 25 mg  25 mg Oral Q24H Ana Sam APRN        nitroglycerin (NITROSTAT) SL tablet 0.4 mg  0.4 mg Sublingual Q5 Min PRN Evelio Blanco MD        ondansetron (ZOFRAN) injection 4 mg  4 mg Intravenous Q6H PRN Evelio Blanco MD   4 mg at 06/02/25 2242    pantoprazole (PROTONIX) EC tablet 40 mg  40 mg Oral Q AM Evelio Blanco MD   40 mg at 06/03/25 0534    ranolazine (RANEXA) 12 hr tablet 1,000 mg  1,000 mg Oral Q12H Evelio Blanco MD   1,000 mg at 06/03/25 0804    sodium chloride 0.9 % flush 10 mL  10 mL Intravenous Q12H Evelio Blanco MD   10 mL at 06/03/25 0804    sodium chloride 0.9 % flush 10 mL  10 mL Intravenous PRN Evelio Blanco  MD        sodium chloride 0.9 % infusion 40 mL  40 mL Intravenous PRN Evelio Blanco MD        vitamin B-12 (CYANOCOBALAMIN) tablet 500 mcg  500 mcg Oral Daily Evelio Blanco MD   500 mcg at 06/03/25 0804     Assessment & Plan    Syncope    Hypertension, well controlled    Coronary artery disease of bypass graft of native heart with stable angina pectoris        Plan:  Syncope -following discontinuation of Aldactone the patient has had no more incidents of syncope or presyncope.  Aldactone will not be restarted due to these reasons.  Her blood pressure was still slightly soft this morning.  Medication changes made this morning and orders per KENDRA Navarro.  Holter monitor still in place.  Patient will return this and follow-up with cardiology in office to discuss results if necessary.    HTN: controlled.  She has some lower readings at time.  This morning her Coreg was held due to this reason.  Coreg was discontinued this morning by KENDRA Navarro, and Toprol-XL 25 mg daily was added.  This dose was held by the RN this morning due to already having it administered Coreg on morning med rounds.     CAD: No signs of ischemia at this time. Continue ASA 81 mg daily, plavix 75 mg daily, atorvastatin 80 mg nightly.     We feel that she is stable from a cardiology standpoint and agree that she is suitable for discharge at the discretion of her admitting physician.    Further orders per Dr. Milner      Thank you for asking us to follow this patient with you.         Electronically signed by KENDRA Acosta, 06/03/25, 10:01 AM CDT.

## 2025-06-03 NOTE — OUTREACH NOTE
Prep Survey      Flowsheet Row Responses   [facility] patient discharged from? [place]   Is LACE score < 7 ? No   Eligibility [facility]   Date of Admission 06/01/25   Date of Discharge 06/03/25   Discharge diagnosis Syncope   Does the patient have one of the following disease processes/diagnoses(primary or secondary)? Other   Prep survey completed? Yes            Anita WOOD - Registered Nurse

## 2025-06-04 ENCOUNTER — TRANSITIONAL CARE MANAGEMENT TELEPHONE ENCOUNTER (OUTPATIENT)
Dept: CALL CENTER | Facility: HOSPITAL | Age: 75
End: 2025-06-04
Payer: MEDICARE

## 2025-06-04 NOTE — OUTREACH NOTE
Call Center TCM Note      Flowsheet Row Responses   [facility] patient discharged from? [place]   Does the patient have one of the following disease processes/diagnoses(primary or secondary)? Other   TCM attempt successful? Yes   Call start time 1546   Call end time 1551   Meds reviewed with patient/caregiver? Yes   Is the patient having any side effects they believe may be caused by any medication additions or changes? No   Does the patient have all medications ordered at discharge? Yes   Is the patient taking all medications as directed (includes completed medication regime)? Yes   Comments PCP hospital f/u appt on 06/09/25. GI appt 06/16/25.   Does the patient have an appointment with their PCP within 7-14 days of discharge? Yes   Has home health visited the patient within 72 hours of discharge? N/A   Psychosocial issues? No   Did the patient receive a copy of their discharge instructions? Yes   Nursing interventions Reviewed instructions with patient   What is the patient's perception of their health status since discharge? Improving   Is the patient/caregiver able to teach back signs and symptoms related to disease process for when to call PCP? Yes   Is the patient/caregiver able to teach back signs and symptoms related to disease process for when to call 911? Yes   Is the patient/caregiver able to teach back the hierarchy of who to call/visit for symptoms/problems? PCP, Specialist, Home health nurse, Urgent Care, ED, 911 Yes   If the patient is a current smoker, are they able to teach back resources for cessation? Not a smoker   Additional teach back comments Encouraged to monitor BP/HR twice per day, and keep record for appts.   TCM call completed? Yes   Wrap up additional comments Patient states is improving. Denies any further syncope. Denies any edema or SOA. Denies any needs/concerns today. TCM complete.   Call end time 1551   Would this patient benefit from a Referral to [facility] Social Work? No   Is  the patient interested in additional calls from an ambulatory ? No            Lori WOOD - Registered Nurse    6/4/2025, 15:52 CDT

## 2025-06-04 NOTE — THERAPY DISCHARGE NOTE
Acute Care - Occupational Therapy Discharge Summary  [facility]     Patient Name: Priscilla Santos  : 1950  MRN: [id number]    Today's Date: 2025                 Admit Date: 2025        OT Recommendation and Plan    Visit Dx:    ICD-10-CM ICD-9-CM   1. Syncope, unspecified syncope type  R55 780.2   2. Hyponatremia  E87.1 276.1   3. Volume depletion  E86.9 276.50   4. Dizziness  R42 780.4   5. General weakness  R53.1 780.79   6. Impaired functional mobility and activity tolerance [Z74.09]  Z74.09 V49.89                OT Rehab Goals       Row Name 25 0900             Transfer Goal 1 (OT)    Activity/Assistive Device (Transfer Goal 1, OT) sit-to-stand/stand-to-sit;bed-to-chair/chair-to-bed;toilet;shower chair  -      Bent Level/Cues Needed (Transfer Goal 1, OT) modified independence  -      Time Frame (Transfer Goal 1, OT) long term goal (LTG);by discharge  -      Progress/Outcome (Transfer Goal 1, OT) goal not met  -         Bathing Goal 1 (OT)    Activity/Device (Bathing Goal 1, OT) bathing skills, all;upper body bathing;lower body bathing  -      Bent Level/Cues Needed (Bathing Goal 1, OT) supervision required  -      Time Frame (Bathing Goal 1, OT) long term goal (LTG);by discharge  -      Progress/Outcomes (Bathing Goal 1, OT) goal not met  -         Dressing Goal 1 (OT)    Activity/Device (Dressing Goal 1, OT) lower body dressing  -      Bent/Cues Needed (Dressing Goal 1, OT) set-up required  -      Time Frame (Dressing Goal 1, OT) long term goal (LTG);10 days;by discharge  -      Progress/Outcome (Dressing Goal 1, OT) goal not met  -                User Key  (r) = Recorded By, (t) = Taken By, (c) = Cosigned By      Initials Name Provider Type Discipline    Little Fitzpatrick, OTR/L, CSRS Occupational Therapist OT                                OT Discharge Summary  Anticipated Discharge Disposition (OT): home with assist  Reason for  Discharge: Discharge from facility  Outcomes Achieved: Refer to plan of care for updates on goals achieved  Discharge Destination: Home with assist      MARISSA Vivar/L, CSRS  6/4/2025

## 2025-06-04 NOTE — THERAPY DISCHARGE NOTE
Acute Care - Physical Therapy Discharge Summary  [facility]       Patient Name: Priscilla Santos  : 1950  MRN: [id number]    Today's Date: 2025                 Admit Date: 2025      PT Recommendation and Plan    Visit Dx:    ICD-10-CM ICD-9-CM   1. Syncope, unspecified syncope type  R55 780.2   2. Hyponatremia  E87.1 276.1   3. Volume depletion  E86.9 276.50   4. Dizziness  R42 780.4   5. General weakness  R53.1 780.79   6. Impaired functional mobility and activity tolerance [Z74.09]  Z74.09 V49.89                PT Rehab Goals       Row Name 25 0700             Bed Mobility Goal 1 (PT)    Activity/Assistive Device (Bed Mobility Goal 1, PT) bed mobility activities, all  -AB      [place] Level/Cues Needed (Bed Mobility Goal 1, PT) independent  -AB      Time Frame (Bed Mobility Goal 1, PT) long term goal (LTG);10 days  -AB      Progress/Outcomes (Bed Mobility Goal 1, PT) goal not met  -AB         Transfer Goal 1 (PT)    Activity/Assistive Device (Transfer Goal 1, PT) sit-to-stand/stand-to-sit;bed-to-chair/chair-to-bed  -AB      [place] Level/Cues Needed (Transfer Goal 1, PT) standby assist;independent  -AB      Time Frame (Transfer Goal 1, PT) long term goal (LTG);10 days  -AB      Progress/Outcome (Transfer Goal 1, PT) goal partially met  -AB         Gait Training Goal 1 (PT)    Activity/Assistive Device (Gait Training Goal 1, PT) gait (walking locomotion);decrease fall risk;improve balance and speed;increase endurance/gait distance;increase energy conservation  -AB      [place] Level (Gait Training Goal 1, PT) standby assist  -AB      Distance (Gait Training Goal 1, PT) 100 ft  -AB      Time Frame (Gait Training Goal 1, PT) long term goal (LTG);10 days  -AB      Progress/Outcome (Gait Training Goal 1, PT) goal not met  -AB         Stairs Goal 1 (PT)    Activity/Assistive Device (Stairs Goal 1, PT) ascending stairs;descending stairs  -AB      [place] Level/Cues Needed (Stairs  Goal 1, PT) standby assist  -AB      Number of Stairs (Stairs Goal 1, PT) 3  -AB      Time Frame (Stairs Goal 1, PT) long term goal (LTG);10 days  -AB      Progress/Outcome (Stairs Goal 1, PT) goal not met  -AB                User Key  (r) = Recorded By, (t) = Taken By, (c) = Cosigned By      Initials Name Provider Type Discipline    Rin Cosby, PTA Physical Therapist Assistant PT                        PT Discharge Summary  Anticipated Discharge Disposition (PT): home  Reason for Discharge: Discharge from facility  Outcomes Achieved: Refer to plan of care for updates on goals achieved  Discharge Destination: Home      Rin Hyman PTA   6/4/2025

## 2025-06-04 NOTE — OUTREACH NOTE
Call Center TCM Note      Flowsheet Row Responses   [facility] patient discharged from? [place]   Does the patient have one of the following disease processes/diagnoses(primary or secondary)? Other   TCM attempt successful? No   Unsuccessful attempts Attempt 1   Call Status Voice mail issues            Lori Rothman Registered Nurse    6/4/2025, 11:38 CDT

## 2025-06-09 ENCOUNTER — OFFICE VISIT (OUTPATIENT)
Dept: FAMILY MEDICINE CLINIC | Facility: CLINIC | Age: 75
End: 2025-06-09
Payer: MEDICARE

## 2025-06-09 VITALS
DIASTOLIC BLOOD PRESSURE: 58 MMHG | SYSTOLIC BLOOD PRESSURE: 98 MMHG | HEART RATE: 74 BPM | WEIGHT: 200 LBS | TEMPERATURE: 97.9 F | BODY MASS INDEX: 37.76 KG/M2 | RESPIRATION RATE: 16 BRPM | OXYGEN SATURATION: 99 % | HEIGHT: 61 IN

## 2025-06-09 DIAGNOSIS — E83.42 HYPOMAGNESEMIA: Chronic | ICD-10-CM

## 2025-06-09 DIAGNOSIS — K75.4 AUTOIMMUNE HEPATITIS: Chronic | ICD-10-CM

## 2025-06-09 DIAGNOSIS — Z92.89 HISTORY OF RECENT HOSPITALIZATION: Primary | ICD-10-CM

## 2025-06-09 DIAGNOSIS — Z78.0 POST-MENOPAUSAL: Chronic | ICD-10-CM

## 2025-06-09 DIAGNOSIS — Z12.31 ENCOUNTER FOR SCREENING MAMMOGRAM FOR MALIGNANT NEOPLASM OF BREAST: ICD-10-CM

## 2025-06-09 DIAGNOSIS — E87.1 HYPONATREMIA: Chronic | ICD-10-CM

## 2025-06-09 PROCEDURE — 1111F DSCHRG MED/CURRENT MED MERGE: CPT | Performed by: NURSE PRACTITIONER

## 2025-06-09 PROCEDURE — 1125F AMNT PAIN NOTED PAIN PRSNT: CPT | Performed by: NURSE PRACTITIONER

## 2025-06-09 PROCEDURE — 99496 TRANSJ CARE MGMT HIGH F2F 7D: CPT | Performed by: NURSE PRACTITIONER

## 2025-06-09 PROCEDURE — 3044F HG A1C LEVEL LT 7.0%: CPT | Performed by: NURSE PRACTITIONER

## 2025-06-09 PROCEDURE — 3078F DIAST BP <80 MM HG: CPT | Performed by: NURSE PRACTITIONER

## 2025-06-09 PROCEDURE — 3074F SYST BP LT 130 MM HG: CPT | Performed by: NURSE PRACTITIONER
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
## 2025-06-10 LAB
ALBUMIN SERPL-MCNC: 3.3 G/DL (ref 3.8–4.8)
ALP SERPL-CCNC: 79 IU/L (ref 44–121)
ALT SERPL-CCNC: 133 IU/L (ref 0–32)
AST SERPL-CCNC: 214 IU/L (ref 0–40)
BILIRUB SERPL-MCNC: 0.9 MG/DL (ref 0–1.2)
BUN SERPL-MCNC: 16 MG/DL (ref 8–27)
BUN/CREAT SERPL: 19 (ref 12–28)
CALCIUM SERPL-MCNC: 9.3 MG/DL (ref 8.7–10.3)
CHLORIDE SERPL-SCNC: 98 MMOL/L (ref 96–106)
CO2 SERPL-SCNC: 21 MMOL/L (ref 20–29)
CREAT SERPL-MCNC: 0.84 MG/DL (ref 0.57–1)
EGFRCR SERPLBLD CKD-EPI 2021: 73 ML/MIN/1.73
GLOBULIN SER CALC-MCNC: 4.7 G/DL (ref 1.5–4.5)
GLUCOSE SERPL-MCNC: 128 MG/DL (ref 70–99)
MAGNESIUM SERPL-MCNC: 1.5 MG/DL (ref 1.6–2.3)
POTASSIUM SERPL-SCNC: 4 MMOL/L (ref 3.5–5.2)
PROT SERPL-MCNC: 8 G/DL (ref 6–8.5)
SODIUM SERPL-SCNC: 130 MMOL/L (ref 134–144)

## 2025-06-11 ENCOUNTER — OFFICE VISIT (OUTPATIENT)
Dept: FAMILY MEDICINE CLINIC | Facility: CLINIC | Age: 75
End: 2025-06-11
Payer: MEDICARE

## 2025-06-11 VITALS
RESPIRATION RATE: 16 BRPM | DIASTOLIC BLOOD PRESSURE: 66 MMHG | HEIGHT: 61 IN | WEIGHT: 203 LBS | BODY MASS INDEX: 38.33 KG/M2 | SYSTOLIC BLOOD PRESSURE: 104 MMHG | TEMPERATURE: 97.8 F | HEART RATE: 71 BPM | OXYGEN SATURATION: 97 %

## 2025-06-11 DIAGNOSIS — E78.2 MIXED HYPERLIPIDEMIA: Primary | ICD-10-CM

## 2025-06-11 DIAGNOSIS — I20.89 ANGINA OF EFFORT: ICD-10-CM

## 2025-06-11 DIAGNOSIS — E83.42 HYPOMAGNESEMIA: ICD-10-CM

## 2025-06-11 DIAGNOSIS — I50.9 CONGESTIVE HEART FAILURE, UNSPECIFIED HF CHRONICITY, UNSPECIFIED HEART FAILURE TYPE: ICD-10-CM

## 2025-06-11 DIAGNOSIS — I10 PRIMARY HYPERTENSION: ICD-10-CM

## 2025-06-11 DIAGNOSIS — I10 HYPERTENSION, WELL CONTROLLED: ICD-10-CM

## 2025-06-11 DIAGNOSIS — I48.11 LONGSTANDING PERSISTENT ATRIAL FIBRILLATION: ICD-10-CM

## 2025-06-11 DIAGNOSIS — E11.65 TYPE 2 DIABETES MELLITUS WITH HYPERGLYCEMIA, UNSPECIFIED WHETHER LONG TERM INSULIN USE: ICD-10-CM

## 2025-06-11 DIAGNOSIS — E87.1 HYPONATREMIA: ICD-10-CM

## 2025-06-11 RX ORDER — RANOLAZINE 1000 MG/1
1 TABLET, EXTENDED RELEASE ORAL EVERY 12 HOURS
Qty: 180 TABLET | Refills: 1 | Status: SHIPPED | OUTPATIENT
Start: 2025-06-11

## 2025-06-11 NOTE — PROGRESS NOTES
"Chief Complaint   Patient presents with    lab results        Subjective   Priscilla Santos is a 74 y.o. female who presents today for the following: follow up labs and needs cpap supplies    HPI   Patient is here after hospitalization. Labs were drawn on the last visit and she is here to review those.   Patient needs new cpap supplies. It is working well for her. She had been snoring quite a bit and quits breathing.    Allergies   Allergen Reactions    Diazepam Other (See Comments)     Made her violent    Codeine Nausea And Vomiting         OBJECTIVE:  Vitals:    06/11/25 0854   BP: 104/66   BP Location: Left arm   Patient Position: Sitting   Cuff Size: Adult   Pulse: 71   Resp: 16   Temp: 97.8 °F (36.6 °C)   TempSrc: Infrared   SpO2: 97%   Weight: 92.1 kg (203 lb)   Height: 154.9 cm (61\")     Physical Exam  Vitals and nursing note reviewed.   Constitutional:       Appearance: Normal appearance.   Cardiovascular:      Rate and Rhythm: Normal rate and regular rhythm.      Pulses: Normal pulses.      Heart sounds: Normal heart sounds.   Pulmonary:      Effort: Pulmonary effort is normal.      Breath sounds: Normal breath sounds.   Skin:     General: Skin is warm and dry.   Neurological:      General: No focal deficit present.      Mental Status: She is alert and oriented to person, place, and time.   Psychiatric:         Mood and Affect: Mood normal.         Behavior: Behavior normal.         Thought Content: Thought content normal.         Judgment: Judgment normal.                CMP          6/2/2025    04:09 6/3/2025    02:39 6/9/2025    09:56   CMP   Glucose 97  102  128    BUN 19.5  15.8  16    Creatinine 0.91  0.81  0.84    EGFR 66.8  76.8  73    Sodium 133  132  130    Potassium 4.5  4.7  4.0    Chloride 103  104  98    Calcium 9.1  9.3  9.3    Total Protein   8.0    Albumin   3.3    Globulin   4.7    Total Bilirubin   0.9    Alkaline Phosphatase   79    AST (SGOT)   214    ALT (SGPT)   133    BUN/Creatinine " Ratio 21.4  19.5  19    Anion Gap 7.0  6.0     Blood work reviewed and discussed with patient      ASSESSMENT/ PLAN:    Diagnoses and all orders for this visit:    1. Mixed hyperlipidemia (Primary)  -     Ambulatory Referral to Cardiology for Atrial Fibrillation, Chest Pain, Hypertension    2. Type 2 diabetes mellitus with hyperglycemia, unspecified whether long term insulin use  -     Ambulatory Referral to Cardiology for Atrial Fibrillation, Chest Pain, Hypertension    3. Hypertension, well controlled  -     Ambulatory Referral to Cardiology for Atrial Fibrillation, Chest Pain, Hypertension    4. Primary hypertension  -     Ambulatory Referral to Cardiology for Atrial Fibrillation, Chest Pain, Hypertension    5. Congestive heart failure, unspecified HF chronicity, unspecified heart failure type  -     Ambulatory Referral to Cardiology for Atrial Fibrillation, Chest Pain, Hypertension    6. Angina of effort  -     Ambulatory Referral to Cardiology for Atrial Fibrillation, Chest Pain, Hypertension    7. Longstanding persistent atrial fibrillation  -     Ambulatory Referral to Cardiology for Atrial Fibrillation, Chest Pain, Hypertension    8. Hypomagnesemia    9. Hyponatremia      Procedures     Management Plan:   Encouraged to start taking slo mag and consume a normal amount of sodium.  Patient is going to make an appointment for her mamogram and dexa at [place].   An After Visit Summary was printed and given to the patient at discharge.    Follow-up: Return in about 4 weeks (around 7/9/2025) for Recheck labs for  CHF, CMP, MG++.    I spent 34 minutes caring for Priscilla on this date of service. This time includes time spent by me in the following activities: preparing for the visit, reviewing tests, performing a medically appropriate examination and/or evaluation, counseling and educating the patient/family/caregiver, documenting information in the medical record, independently interpreting results and  communicating that information with the patient/family/caregiver, care coordination, and ordering test(s).      KENDRA Epperson 6/11/2025 10:05 CDT  This note was electronically signed.

## 2025-06-13 ENCOUNTER — READMISSION MANAGEMENT (OUTPATIENT)
Dept: CALL CENTER | Facility: HOSPITAL | Age: 75
End: 2025-06-13
Payer: MEDICARE

## 2025-06-13 NOTE — OUTREACH NOTE
Medical Week 2 Survey      Flowsheet Row Responses   [facility] patient discharged from? [place]   Does the patient have one of the following disease processes/diagnoses(primary or secondary)? Other   Week 2 attempt successful? Yes   Call start time 1341   Discharge diagnosis Syncope   Call end time 1351   Meds reviewed with patient/caregiver? Yes   Is the patient having any side effects they believe may be caused by any medication additions or changes? No   Does the patient have all medications ordered at discharge? Yes   Is the patient taking all medications as directed (includes completed medication regime)? Yes   Does the patient have a primary care provider?  Yes   Comments regarding PCP Patient states she saw PCP this week   Psychosocial issues? No   What is the patient's perception of their health status since discharge? Improving   Is the patient/caregiver able to teach back signs and symptoms related to disease process for when to call PCP? Yes   Is the patient/caregiver able to teach back signs and symptoms related to disease process for when to call 911? Yes   Is the patient/caregiver able to teach back the hierarchy of who to call/visit for symptoms/problems? PCP, Specialist, Home health nurse, Urgent Care, ED, 911 Yes   Week 2 Call Completed? Yes   Graduated Yes   Did the patient feel the follow up calls were helpful during their recovery period? Yes   Was the number of calls appropriate? Yes   Is the patient interested in additional calls from an ambulatory ? No   Would this patient benefit from a Referral to Amb Social Work? No   Wrap up additional comments Patient reports feeling better. No questions or concerns at this time.   Call end time 1351            KENNEDY PRUITT - Registered Nurse

## 2025-06-16 ENCOUNTER — TELEPHONE (OUTPATIENT)
Age: 75
End: 2025-06-16
Payer: MEDICARE

## 2025-06-17 RX ORDER — ATORVASTATIN CALCIUM 20 MG/1
20 TABLET, FILM COATED ORAL DAILY
Qty: 90 TABLET | Refills: 0 | Status: SHIPPED | OUTPATIENT
Start: 2025-06-17

## 2025-06-17 RX ORDER — METOPROLOL SUCCINATE 25 MG/1
25 TABLET, EXTENDED RELEASE ORAL
Qty: 30 TABLET | Refills: 0 | Status: SHIPPED | OUTPATIENT
Start: 2025-06-17

## 2025-06-17 NOTE — TELEPHONE ENCOUNTER
Pt was asked by her liver dr why she was taken such a high dose of the atorvastatin (80mg currently). Spoke with Silvia and she was going to change to 20mg and recheck cholesterol  in 3 months.

## 2025-06-18 ENCOUNTER — TELEPHONE (OUTPATIENT)
Age: 75
End: 2025-06-18
Payer: MEDICARE

## 2025-06-23 ENCOUNTER — RESULTS FOLLOW-UP (OUTPATIENT)
Dept: CARDIOLOGY | Facility: CLINIC | Age: 75
End: 2025-06-23
Payer: MEDICARE

## 2025-06-23 NOTE — TELEPHONE ENCOUNTER
Called to schedule mammo and dexa. Patient will call back to schedule.    Is This A New Presentation, Or A Follow-Up?: Rash

## 2025-06-27 ENCOUNTER — TELEPHONE (OUTPATIENT)
Dept: CARDIOLOGY | Facility: CLINIC | Age: 75
End: 2025-06-27

## 2025-06-27 NOTE — TELEPHONE ENCOUNTER
Caller: Priscilla Santos    Relationship to patient: Self    Best call back number: [phone]    Chief complaint: GAINED 5 POUNDS IN THE LAST FEW DAYS; FEET SWELLING    Type of visit: NEW PT     Requested date: ASAP     If rescheduling, when is the original appointment: 7.15.25     Additional notes: PT ASKING IF SHE CAN BE WORKED IN ANY SOONER THAN 7.15.25, STATES SHE WILL SEE ANY PROVIDER. TOLD PT I COULD SEND A MESSAGE AND ADD HER TO A CANCELLATION LIST BUT WE WOULD RECOMMEND HER FOLLOWING UP WITH PCP UNTIL SHE CAN SEE US OR GOING TO THE ER IF SYMPTOMS WORSEN OR SHE IS CONCERNED.

## 2025-06-30 ENCOUNTER — OFFICE VISIT (OUTPATIENT)
Dept: FAMILY MEDICINE CLINIC | Facility: CLINIC | Age: 75
End: 2025-06-30
Payer: MEDICARE

## 2025-06-30 VITALS
BODY MASS INDEX: 39.42 KG/M2 | HEIGHT: 61 IN | DIASTOLIC BLOOD PRESSURE: 65 MMHG | WEIGHT: 208.8 LBS | HEART RATE: 55 BPM | OXYGEN SATURATION: 100 % | TEMPERATURE: 97 F | SYSTOLIC BLOOD PRESSURE: 123 MMHG

## 2025-06-30 DIAGNOSIS — I50.20 SYSTOLIC CONGESTIVE HEART FAILURE, UNSPECIFIED HF CHRONICITY: Primary | ICD-10-CM

## 2025-06-30 DIAGNOSIS — I20.89 ANGINA OF EFFORT: ICD-10-CM

## 2025-06-30 DIAGNOSIS — I10 PRIMARY HYPERTENSION: ICD-10-CM

## 2025-06-30 DIAGNOSIS — R60.0 PERIPHERAL EDEMA: ICD-10-CM

## 2025-06-30 PROCEDURE — 99213 OFFICE O/P EST LOW 20 MIN: CPT | Performed by: NURSE PRACTITIONER

## 2025-06-30 PROCEDURE — 3074F SYST BP LT 130 MM HG: CPT | Performed by: NURSE PRACTITIONER

## 2025-06-30 PROCEDURE — 3044F HG A1C LEVEL LT 7.0%: CPT | Performed by: NURSE PRACTITIONER

## 2025-06-30 PROCEDURE — 1159F MED LIST DOCD IN RCRD: CPT | Performed by: NURSE PRACTITIONER

## 2025-06-30 PROCEDURE — 1125F AMNT PAIN NOTED PAIN PRSNT: CPT | Performed by: NURSE PRACTITIONER

## 2025-06-30 PROCEDURE — 3078F DIAST BP <80 MM HG: CPT | Performed by: NURSE PRACTITIONER

## 2025-06-30 PROCEDURE — 1160F RVW MEDS BY RX/DR IN RCRD: CPT | Performed by: NURSE PRACTITIONER

## 2025-06-30 RX ORDER — RANOLAZINE 1000 MG/1
1 TABLET, EXTENDED RELEASE ORAL EVERY 12 HOURS
Qty: 180 TABLET | Refills: 1 | Status: SHIPPED | OUTPATIENT
Start: 2025-06-30

## 2025-06-30 RX ORDER — METOPROLOL SUCCINATE 25 MG/1
25 TABLET, EXTENDED RELEASE ORAL
Qty: 90 TABLET | Refills: 1 | Status: SHIPPED | OUTPATIENT
Start: 2025-06-30

## 2025-06-30 RX ORDER — LOSARTAN POTASSIUM 50 MG/1
50 TABLET ORAL DAILY
COMMUNITY

## 2025-06-30 RX ORDER — FUROSEMIDE 40 MG/1
40 TABLET ORAL DAILY
Qty: 7 TABLET | Refills: 0 | Status: SHIPPED | OUTPATIENT
Start: 2025-06-30

## 2025-06-30 NOTE — PROGRESS NOTES
"Chief Complaint   Patient presents with    Fluid Retention    Leg Pain        Subjective   Priscilla Santos is a 74 y.o. female who presents today for the following: bilateral leg edema.    HPI   Patient is here for bilateral leg edema x 2 weeks. She has an extensive cardiac history. They are not weeping. They do not seem to go down with elevation.She has not had a BNP recently. She states her legs hurt most of the time. She called her cardiologist and he sent her here.     Allergies   Allergen Reactions    Diazepam Other (See Comments)     Made her violent    Codeine Nausea And Vomiting         OBJECTIVE:  Vitals:    06/30/25 1057   BP: 123/65   BP Location: Left arm   Patient Position: Sitting   Cuff Size: Large Adult   Pulse: 55   Temp: 97 °F (36.1 °C)   TempSrc: Temporal   SpO2: 100%   Weight: 94.7 kg (208 lb 12.8 oz)   Height: 154.9 cm (61\")     Physical Exam  Vitals and nursing note reviewed.   Constitutional:       Appearance: Normal appearance.   Cardiovascular:      Rate and Rhythm: Normal rate and regular rhythm.      Pulses: Normal pulses.      Heart sounds: Normal heart sounds.   Pulmonary:      Effort: Pulmonary effort is normal.      Breath sounds: Normal breath sounds.   Musculoskeletal:      Right upper leg: Edema and tenderness present.      Left upper leg: Edema and tenderness present.      Right lower leg: Tenderness present. 2+ Edema present.      Left lower leg: Tenderness present. 2+ Edema present.   Skin:     General: Skin is warm and dry.   Neurological:      General: No focal deficit present.      Mental Status: She is alert and oriented to person, place, and time.   Psychiatric:         Mood and Affect: Mood normal.         Behavior: Behavior normal.         Thought Content: Thought content normal.         Judgment: Judgment normal.                    ASSESSMENT/ PLAN:    Diagnoses and all orders for this visit:    1. Systolic congestive heart failure, unspecified HF chronicity (Primary)  -    "  BNP    2. Peripheral edema  -     furosemide (LASIX) 40 MG tablet; Take 1 tablet by mouth Daily.  Dispense: 7 tablet; Refill: 0  -     Comprehensive metabolic panel      Procedures     Management Plan:   Take lasix in the morning every day and we will recheck with weight and exam in 1 week.   An After Visit Summary was printed and given to the patient at discharge.    Follow-up: Return in about 1 week (around 7/7/2025) for Recheck.         KENDRA Epperson 6/30/2025 11:25 CDT  This note was electronically signed.

## 2025-07-01 LAB
ALBUMIN SERPL-MCNC: 2.9 G/DL (ref 3.8–4.8)
ALP SERPL-CCNC: 70 IU/L (ref 44–121)
ALT SERPL-CCNC: 143 IU/L (ref 0–32)
AST SERPL-CCNC: 209 IU/L (ref 0–40)
BILIRUB SERPL-MCNC: 0.8 MG/DL (ref 0–1.2)
BNP SERPL-MCNC: 104.8 PG/ML (ref 0–100)
BUN SERPL-MCNC: 12 MG/DL (ref 8–27)
BUN/CREAT SERPL: 17 (ref 12–28)
CALCIUM SERPL-MCNC: 9.1 MG/DL (ref 8.7–10.3)
CHLORIDE SERPL-SCNC: 104 MMOL/L (ref 96–106)
CO2 SERPL-SCNC: 18 MMOL/L (ref 20–29)
CREAT SERPL-MCNC: 0.69 MG/DL (ref 0.57–1)
EGFRCR SERPLBLD CKD-EPI 2021: 92 ML/MIN/1.73
GLOBULIN SER CALC-MCNC: 4.8 G/DL (ref 1.5–4.5)
GLUCOSE SERPL-MCNC: 118 MG/DL (ref 70–99)
POTASSIUM SERPL-SCNC: 4.2 MMOL/L (ref 3.5–5.2)
PROT SERPL-MCNC: 7.7 G/DL (ref 6–8.5)
SODIUM SERPL-SCNC: 134 MMOL/L (ref 134–144)

## 2025-07-07 ENCOUNTER — OFFICE VISIT (OUTPATIENT)
Dept: FAMILY MEDICINE CLINIC | Facility: CLINIC | Age: 75
End: 2025-07-07
Payer: MEDICARE

## 2025-07-07 VITALS
BODY MASS INDEX: 39.27 KG/M2 | SYSTOLIC BLOOD PRESSURE: 118 MMHG | RESPIRATION RATE: 16 BRPM | HEART RATE: 61 BPM | TEMPERATURE: 98.1 F | DIASTOLIC BLOOD PRESSURE: 70 MMHG | WEIGHT: 208 LBS | OXYGEN SATURATION: 100 % | HEIGHT: 61 IN

## 2025-07-07 DIAGNOSIS — R60.0 PERIPHERAL EDEMA: Primary | ICD-10-CM

## 2025-07-07 PROCEDURE — 1125F AMNT PAIN NOTED PAIN PRSNT: CPT | Performed by: NURSE PRACTITIONER

## 2025-07-07 PROCEDURE — 99213 OFFICE O/P EST LOW 20 MIN: CPT | Performed by: NURSE PRACTITIONER

## 2025-07-07 PROCEDURE — 3074F SYST BP LT 130 MM HG: CPT | Performed by: NURSE PRACTITIONER

## 2025-07-07 PROCEDURE — 3044F HG A1C LEVEL LT 7.0%: CPT | Performed by: NURSE PRACTITIONER

## 2025-07-07 PROCEDURE — 3078F DIAST BP <80 MM HG: CPT | Performed by: NURSE PRACTITIONER

## 2025-07-07 RX ORDER — THIAMINE HCL 100 MG
1 TABLET ORAL DAILY
COMMUNITY
End: 2025-07-07

## 2025-07-07 NOTE — PROGRESS NOTES
"Chief Complaint   Patient presents with    Congestive Heart Failure     1 week f/u         Subjective   Priscilla Santos is a 74 y.o. female who presents today for the following: follow up peripheral edema    HPI   Patient does not have CHF. Lasix has not made any difference in lower extremity edema. Resting with elevation does not seem to help either. She has not had change in weight.     Allergies   Allergen Reactions    Diazepam Other (See Comments)     Made her violent    Codeine Nausea And Vomiting         OBJECTIVE:  Vitals:    07/07/25 1042   BP: 118/70   BP Location: Left arm   Patient Position: Sitting   Cuff Size: Adult   Pulse: 61   Resp: 16   Temp: 98.1 °F (36.7 °C)   TempSrc: Infrared   SpO2: 100%   Weight: 94.3 kg (208 lb)   Height: 154.9 cm (61\")     Physical Exam  Musculoskeletal:      Right lower leg: Tenderness present. 2+ Pitting Edema present.      Left lower leg: Tenderness present. 2+ Pitting Edema present.                    ASSESSMENT/ PLAN:    Diagnoses and all orders for this visit:    1. Peripheral edema (Primary)  -     Ambulatory Referral to Vascular Surgery      Procedures     Management Plan:   Continue to elevate legs. Try graduated compression hose.   An After Visit Summary was printed and given to the patient at discharge.    Follow-up: Return in 6 weeks (on 8/21/2025) for Recheck magnesium and lipids.         KENDRA Epperson 7/7/2025 15:34 CDT  This note was electronically signed.          " Pt ambulatory to triage, states he was walking on the roof & twisted his right ankle after stepping on an uneven surface.  Did NOT fall off roof.

## 2025-07-08 ENCOUNTER — PATIENT ROUNDING (BHMG ONLY) (OUTPATIENT)
Dept: VASCULAR SURGERY | Facility: CLINIC | Age: 75
End: 2025-07-08
Payer: MEDICARE

## 2025-07-08 ENCOUNTER — OFFICE VISIT (OUTPATIENT)
Dept: VASCULAR SURGERY | Facility: CLINIC | Age: 75
End: 2025-07-08
Payer: MEDICARE

## 2025-07-08 VITALS
DIASTOLIC BLOOD PRESSURE: 74 MMHG | SYSTOLIC BLOOD PRESSURE: 128 MMHG | OXYGEN SATURATION: 98 % | WEIGHT: 206 LBS | BODY MASS INDEX: 38.89 KG/M2 | HEIGHT: 61 IN | HEART RATE: 59 BPM

## 2025-07-08 DIAGNOSIS — I87.323 CHRONIC VENOUS HYPERTENSION WITH INFLAMMATION INVOLVING BOTH SIDES: Primary | ICD-10-CM

## 2025-07-08 DIAGNOSIS — I10 HYPERTENSION, WELL CONTROLLED: ICD-10-CM

## 2025-07-08 DIAGNOSIS — E78.2 MIXED HYPERLIPIDEMIA: ICD-10-CM

## 2025-07-08 DIAGNOSIS — I50.9 CONGESTIVE HEART FAILURE, UNSPECIFIED HF CHRONICITY, UNSPECIFIED HEART FAILURE TYPE: ICD-10-CM

## 2025-07-08 NOTE — PROGRESS NOTES
July 8, 2025    Hello, may I speak with Priscilla Santos?    My name is Merline LYNCH      I am  with [facility]  [address], [address]  [place] [address] [phone].    Before we get started may I verify your date of birth? 1950    I am calling to officially welcome you to our practice and ask about your recent visit. Is this a good time to talk? yes    Tell me about your visit with us. What things went well?  She really liked him . He was really professional. He didn't sugarcoat anything and she liked that. She asked him to tell her what she needed to do.       We're always looking for ways to make our patients' experiences even better. Do you have recommendations on ways we may improve?  no    Overall were you satisfied with your first visit to our practice? yes       I appreciate you taking the time to speak with me today. Is there anything else I can do for you? no      Thank you, and have a great day.

## 2025-07-08 NOTE — PROGRESS NOTES
07/08/2025      Reza Charles, KENDRA  [address] [place] [address]    Priscilla Santos  1950    Chief Complaint   Patient presents with    Peripheral edema     REFERRAL FROM REZA CHARLES    Has swelling in both legs, starts about [employer] up the lower part of leg and in feet. Was given a fluid pill previously and ended up getting dehydrated. Does have some discoloration. Has pain all the time she said. Both about the same but right is normally worse than the left.       Dear KENDRA Epperson    HPI  I had the pleasure of seeing your patient Priscilla Santos in the office today.  Thank you kindly for this consultation.  As you recall, Priscilla Santos is a 74 y.o.  female who you are currently following for routine health maintenance.  She has complaints of swelling to bilateral lower extremities, most of the time right greater than left.  She has previously taken diuretic but this caused her to be dehydrated.  She has a history of CHF.  She has purchased compression stockings.       Past Medical History:   Diagnosis Date    Arthritis     CHF (congestive heart failure)     Coronary artery disease     Diverticulosis     Fatty liver     History of adenomatous polyp of colon     History of transfusion     Hyperlipidemia     Hypertension     Sleep apnea     UTI (urinary tract infection)        Past Surgical History:   Procedure Laterality Date    APPENDECTOMY      CARDIAC SURGERY  2008    Tripple bypass    CAROTID STENT      CARPAL TUNNEL RELEASE Bilateral     CHOLECYSTECTOMY      COLON SURGERY      COLONOSCOPY N/A 02/27/2025    Diverticulosis in the left colon; One 7mm tubular adenomatous polyp in the transverse colon; One 4mm tubular adenomatous polyp in the rectum-Clip (MR conditional) was placed; Repeat 5 years    CORONARY STENT PLACEMENT  2021    total of 4-5 heart stents in past    ENDOSCOPY N/A 02/27/2025    Erosive gastropathy with stigmata of recent bleeding-biopsied; Normal examined duodenum;  Repeat 3 years    HERNIA REPAIR      JOINT REPLACEMENT      KNEE SURGERY Bilateral     TONSILLECTOMY AND ADENOIDECTOMY      TUBAL ABDOMINAL LIGATION  1977    UMBILICAL HERNIA REPAIR N/A 02/02/2023    Procedure: UMBILICAL HERNIA REPAIR with mesh;  Surgeon: Lara Conley MD;  Location: [facility] OR;  Service: General;  Laterality: N/A;       Family History   Problem Relation Age of Onset    Diabetes Mother     Cancer Father     Heart disease Father     No Known Problems Maternal Grandmother     Diabetes Maternal Grandfather     No Known Problems Paternal Grandmother     Cancer Paternal Grandfather     Colon cancer Neg Hx     Colon polyps Neg Hx     Esophageal cancer Neg Hx     Liver disease Neg Hx     Stomach cancer Neg Hx     Rectal cancer Neg Hx     Liver cancer Neg Hx        Social History     Socioeconomic History    Marital status:    Tobacco Use    Smoking status: Never     Passive exposure: Never    Smokeless tobacco: Never   Vaping Use    Vaping status: Never Used   Substance and Sexual Activity    Alcohol use: Never    Drug use: Never    Sexual activity: Defer       Allergies   Allergen Reactions    Diazepam Other (See Comments)     Made her violent    Codeine Nausea And Vomiting         Current Outpatient Medications:     aluminum-magnesium hydroxide-simethicone (MAALOX MAX) 400-400-40 MG/5ML suspension, Take 15 mL by mouth Every 6 (Six) Hours As Needed for Indigestion or Heartburn., Disp: , Rfl:     Ascorbic Acid (VITAMIN C PO), Take 1 tablet by mouth Daily., Disp: , Rfl:     aspirin 81 MG EC tablet, Take 1 tablet by mouth Daily., Disp: , Rfl:     atorvastatin (Lipitor) 20 MG tablet, Take 1 tablet by mouth Daily., Disp: 90 tablet, Rfl: 0    cholecalciferol (VITAMIN D3) 25 MCG (1000 UT) tablet, Take 1 tablet by mouth Daily., Disp: , Rfl:     clopidogrel (PLAVIX) 75 MG tablet, Take 1 tablet by mouth Daily., Disp: , Rfl:     cyclobenzaprine (FLEXERIL) 10 MG tablet, Take 1 tablet by mouth Every Night.,  "Disp: , Rfl:     fluticasone (FLONASE) 50 MCG/ACT nasal spray, Administer 2 sprays into the nostril(s) as directed by provider Daily., Disp: , Rfl:     furosemide (LASIX) 40 MG tablet, Take 1 tablet by mouth Daily., Disp: 7 tablet, Rfl: 0    gabapentin (NEURONTIN) 100 MG capsule, Take 1 capsule by mouth 3 (Three) Times a Day., Disp: , Rfl:     Krill Oil (Omega-3) 500 MG capsule, Take 1 tablet by mouth Daily., Disp: , Rfl:     losartan (COZAAR) 50 MG tablet, Take 1 tablet by mouth Daily., Disp: , Rfl:     metoprolol succinate XL (TOPROL-XL) 25 MG 24 hr tablet, Take 1 tablet by mouth Daily., Disp: 90 tablet, Rfl: 1    multivitamin with minerals (Multivitamin Women 50+) tablet tablet, Take 1 tablet by mouth Daily., Disp: , Rfl:     nitroglycerin (NITROSTAT) 0.4 MG SL tablet, Place 1 tablet under the tongue Every 5 (Five) Minutes As Needed for Chest Pain. Take no more than 3 doses in 15 minutes., Disp: , Rfl:     pantoprazole (PROTONIX) 40 MG EC tablet, Take 1 tablet by mouth Daily., Disp: 30 tablet, Rfl: 11    ranolazine (RANEXA) 1000 MG 12 hr tablet, Take 1 tablet by mouth Every 12 (Twelve) Hours., Disp: 180 tablet, Rfl: 1    Turmeric Curcumin 500 MG capsule, Take 1 capsule by mouth Daily., Disp: , Rfl:     vitamin B-12 (CYANOCOBALAMIN) 100 MCG tablet, Take 1 tablet by mouth Daily., Disp: , Rfl:     Review of Systems   Constitutional: Negative.    HENT: Negative.     Eyes: Negative.    Respiratory: Negative.     Cardiovascular:  Positive for leg swelling.   Gastrointestinal: Negative.    Endocrine: Negative.    Genitourinary: Negative.    Musculoskeletal: Negative.    Skin:  Positive for color change.   Allergic/Immunologic: Negative.    Neurological: Negative.    Hematological: Negative.    Psychiatric/Behavioral: Negative.     All other systems reviewed and are negative.    /74   Pulse 59   Ht 154.9 cm (61\")   Wt 93.4 kg (206 lb)   SpO2 98%   Breastfeeding No   BMI 38.92 kg/m²     Physical Exam  Vitals " and nursing note reviewed.   Constitutional:       Appearance: Normal appearance. She is well-developed. She is obese.   HENT:      Head: Normocephalic and atraumatic.   Eyes:      General: No scleral icterus.     Pupils: Pupils are equal, round, and reactive to light.   Neck:      Thyroid: No thyromegaly.      Vascular: No carotid bruit or JVD.   Cardiovascular:      Rate and Rhythm: Normal rate and regular rhythm.      Pulses:           Carotid pulses are 2+ on the right side and 2+ on the left side.       Femoral pulses are 2+ on the right side and 2+ on the left side.       Popliteal pulses are 2+ on the right side and 2+ on the left side.        Dorsalis pedis pulses are 2+ on the right side and 2+ on the left side.        Posterior tibial pulses are 2+ on the right side and 2+ on the left side.      Heart sounds: Normal heart sounds.   Pulmonary:      Effort: Pulmonary effort is normal.      Breath sounds: Normal breath sounds.   Abdominal:      General: Bowel sounds are normal. There is no distension or abdominal bruit.      Palpations: Abdomen is soft. There is no mass.      Tenderness: There is no abdominal tenderness.   Musculoskeletal:         General: Normal range of motion.      Cervical back: Neck supple.      Right lower leg: Edema present.      Left lower leg: Edema present.   Lymphadenopathy:      Cervical: No cervical adenopathy.   Skin:     General: Skin is warm and dry.      Comments: hyperpigmentation   Neurological:      Mental Status: She is alert and oriented to person, place, and time.      Cranial Nerves: No cranial nerve deficit.      Sensory: No sensory deficit.   Psychiatric:         Mood and Affect: Mood normal.         Behavior: Behavior normal.         Thought Content: Thought content normal.         Judgment: Judgment normal.         Patient Active Problem List   Diagnosis    Umbilical hernia without obstruction and without gangrene    Post-menopausal    Multiple joint pain     History of coronary artery stent placement    Hypertension, well controlled    Vitamin D deficiency    Mixed hyperlipidemia    Class 2 severe obesity due to excess calories with serious comorbidity and body mass index (BMI) of 39.0 to 39.9 in adult    History of bilateral knee replacement    Elevated LFTs    Epigastric pain    Fatty liver    Coronary artery disease of bypass graft of native heart with stable angina pectoris    Thrombocytopenia    Nausea    Chest pain    Abnormal finding on GI tract imaging    Anticoagulated    Personal history of adenomatous and serrated colon polyps    CHF (congestive heart failure)    Syncope    Hypomagnesemia    Hyponatremia    Autoimmune hepatitis        Diagnosis Plan   1. Chronic venous hypertension with inflammation involving both sides  US venous doppler lower extremity bilateral (duplex)      2. Hypertension, well controlled        3. Mixed hyperlipidemia        4. Congestive heart failure, unspecified HF chronicity, unspecified heart failure type            Plan: After thoroughly evaluating Priscilla Santos, I believe the best course of action is to initially remain conservative from a vascular standpoint.  I will give the patient a prescription for compression stockings in the 20-30 mm pressure gradient range.  I did instruct the patient on how to wear these on a daily basis.  I would like her to keep her legs elevated when she is not on them, and keep her legs well moisturized.  We will see the patient back in 3 weeks with a venous valvular insufficiency study. If the testing does show significant venous reflux, the patient may be a great candidate for endovenous closure as the patient's symptoms have significantly impacted their activities of daily living.  Her leg swelling is multifactorial related to her CHF, likely some venous insufficiency, and lymphedema. I did discuss vascular risk factors including controlling her hypertension and hyperlipidemia.  Her blood pressure  is stable on her current medications.  She should continue her aspirin 81 mg daily, Plavix 75 mg daily, and Lipitor 20 mg daily.  I do think she would be a great candidate for home lymphedema pumps.  I will have her see [facility] medical when she returns with testing.  Body mass index is 38.92 kg/m².   The patient is to continue taking their medications as previously discussed.   This was all discussed in full with complete understanding.  Thank you for allowing me to participate in the care of your patient.  Please do not hesitate to call with any questions or concerns.  We will keep you aware of any further encounters with Priscilla Santos.        Sincerely yours,         Chema Cosby, Reza Bradshaw, KENDRA

## 2025-07-08 NOTE — LETTER
July 8, 2025     KENDRA Epperson  [address] [address]    Patient: Priscilla Santos   YOB: 1950   Date of Visit: 7/8/2025     Dear KENDRA Epperson:       Thank you for referring Priscilla Santos to me for evaluation. Below are the relevant portions of my assessment and plan of care.    If you have questions, please do not hesitate to call me. I look forward to following Priscilla along with you.         Sincerely,        Chema Cosby DO        CC: No Recipients    Chema Cosby DO  07/08/25 1652  Sign when Signing Visit  07/08/2025      Reza Charles APRN  [address] [id number]    Priscilla Santos  1950    Chief Complaint   Patient presents with   • Peripheral edema     REFERRAL FROM REZA CHARLES    Has swelling in both legs, starts about [facility] up the lower part of leg and in feet. Was given a fluid pill previously and ended up getting dehydrated. Does have some discoloration. Has pain all the time she said. Both about the same but right is normally worse than the left.       Dear KENDRA Epperson    HPI  I had the pleasure of seeing your patient Priscilla Santos in the office today.  Thank you kindly for this consultation.  As you recall, Priscilla Santos is a 74 y.o.  female who you are currently following for routine health maintenance.  She has complaints of swelling to bilateral lower extremities, most of the time right greater than left.  She has previously taken diuretic but this caused her to be dehydrated.  She has a history of CHF.  She has purchased compression stockings.       Past Medical History:   Diagnosis Date   • Arthritis    • CHF (congestive heart failure)    • Coronary artery disease    • Diverticulosis    • Fatty liver    • History of adenomatous polyp of colon    • History of transfusion    • Hyperlipidemia    • Hypertension    • Sleep apnea    • UTI (urinary tract infection)        Past Surgical History:   Procedure  Laterality Date   • APPENDECTOMY     • CARDIAC SURGERY  2008    Tripple bypass   • CAROTID STENT     • CARPAL TUNNEL RELEASE Bilateral    • CHOLECYSTECTOMY     • COLON SURGERY     • COLONOSCOPY N/A 02/27/2025    Diverticulosis in the left colon; One 7mm tubular adenomatous polyp in the transverse colon; One 4mm tubular adenomatous polyp in the rectum-Clip (MR conditional) was placed; Repeat 5 years   • CORONARY STENT PLACEMENT  2021    total of 4-5 heart stents in past   • ENDOSCOPY N/A 02/27/2025    Erosive gastropathy with stigmata of recent bleeding-biopsied; Normal examined duodenum; Repeat 3 years   • HERNIA REPAIR     • JOINT REPLACEMENT     • KNEE SURGERY Bilateral    • TONSILLECTOMY AND ADENOIDECTOMY     • TUBAL ABDOMINAL LIGATION  1977   • UMBILICAL HERNIA REPAIR N/A 02/02/2023    Procedure: UMBILICAL HERNIA REPAIR with mesh;  Surgeon: Lara Conley MD;  Location: [facility];  Service: General;  Laterality: N/A;       Family History   Problem Relation Age of Onset   • Diabetes Mother    • Cancer Father    • Heart disease Father    • No Known Problems Maternal Grandmother    • Diabetes Maternal Grandfather    • No Known Problems Paternal Grandmother    • Cancer Paternal Grandfather    • Colon cancer Neg Hx    • Colon polyps Neg Hx    • Esophageal cancer Neg Hx    • Liver disease Neg Hx    • Stomach cancer Neg Hx    • Rectal cancer Neg Hx    • Liver cancer Neg Hx        Social History     Socioeconomic History   • Marital status:    Tobacco Use   • Smoking status: Never     Passive exposure: Never   • Smokeless tobacco: Never   Vaping Use   • Vaping status: Never Used   Substance and Sexual Activity   • Alcohol use: Never   • Drug use: Never   • Sexual activity: Defer       Allergies   Allergen Reactions   • Diazepam Other (See Comments)     Made her violent   • Codeine Nausea And Vomiting         Current Outpatient Medications:   •  aluminum-magnesium hydroxide-simethicone (MAALOX MAX) 400-400-40  MG/5ML suspension, Take 15 mL by mouth Every 6 (Six) Hours As Needed for Indigestion or Heartburn., Disp: , Rfl:   •  Ascorbic Acid (VITAMIN C PO), Take 1 tablet by mouth Daily., Disp: , Rfl:   •  aspirin 81 MG EC tablet, Take 1 tablet by mouth Daily., Disp: , Rfl:   •  atorvastatin (Lipitor) 20 MG tablet, Take 1 tablet by mouth Daily., Disp: 90 tablet, Rfl: 0  •  cholecalciferol (VITAMIN D3) 25 MCG (1000 UT) tablet, Take 1 tablet by mouth Daily., Disp: , Rfl:   •  clopidogrel (PLAVIX) 75 MG tablet, Take 1 tablet by mouth Daily., Disp: , Rfl:   •  cyclobenzaprine (FLEXERIL) 10 MG tablet, Take 1 tablet by mouth Every Night., Disp: , Rfl:   •  fluticasone (FLONASE) 50 MCG/ACT nasal spray, Administer 2 sprays into the nostril(s) as directed by provider Daily., Disp: , Rfl:   •  furosemide (LASIX) 40 MG tablet, Take 1 tablet by mouth Daily., Disp: 7 tablet, Rfl: 0  •  gabapentin (NEURONTIN) 100 MG capsule, Take 1 capsule by mouth 3 (Three) Times a Day., Disp: , Rfl:   •  Krill Oil (Omega-3) 500 MG capsule, Take 1 tablet by mouth Daily., Disp: , Rfl:   •  losartan (COZAAR) 50 MG tablet, Take 1 tablet by mouth Daily., Disp: , Rfl:   •  metoprolol succinate XL (TOPROL-XL) 25 MG 24 hr tablet, Take 1 tablet by mouth Daily., Disp: 90 tablet, Rfl: 1  •  multivitamin with minerals (Multivitamin Women 50+) tablet tablet, Take 1 tablet by mouth Daily., Disp: , Rfl:   •  nitroglycerin (NITROSTAT) 0.4 MG SL tablet, Place 1 tablet under the tongue Every 5 (Five) Minutes As Needed for Chest Pain. Take no more than 3 doses in 15 minutes., Disp: , Rfl:   •  pantoprazole (PROTONIX) 40 MG EC tablet, Take 1 tablet by mouth Daily., Disp: 30 tablet, Rfl: 11  •  ranolazine (RANEXA) 1000 MG 12 hr tablet, Take 1 tablet by mouth Every 12 (Twelve) Hours., Disp: 180 tablet, Rfl: 1  •  Turmeric Curcumin 500 MG capsule, Take 1 capsule by mouth Daily., Disp: , Rfl:   •  vitamin B-12 (CYANOCOBALAMIN) 100 MCG tablet, Take 1 tablet by mouth Daily.,  "Disp: , Rfl:     Review of Systems   Constitutional: Negative.    HENT: Negative.     Eyes: Negative.    Respiratory: Negative.     Cardiovascular:  Positive for leg swelling.   Gastrointestinal: Negative.    Endocrine: Negative.    Genitourinary: Negative.    Musculoskeletal: Negative.    Skin:  Positive for color change.   Allergic/Immunologic: Negative.    Neurological: Negative.    Hematological: Negative.    Psychiatric/Behavioral: Negative.     All other systems reviewed and are negative.    /74   Pulse 59   Ht 154.9 cm (61\")   Wt 93.4 kg (206 lb)   SpO2 98%   Breastfeeding No   BMI 38.92 kg/m²     Physical Exam  Vitals and nursing note reviewed.   Constitutional:       Appearance: Normal appearance. She is well-developed. She is obese.   HENT:      Head: Normocephalic and atraumatic.   Eyes:      General: No scleral icterus.     Pupils: Pupils are equal, round, and reactive to light.   Neck:      Thyroid: No thyromegaly.      Vascular: No carotid bruit or JVD.   Cardiovascular:      Rate and Rhythm: Normal rate and regular rhythm.      Pulses:           Carotid pulses are 2+ on the right side and 2+ on the left side.       Femoral pulses are 2+ on the right side and 2+ on the left side.       Popliteal pulses are 2+ on the right side and 2+ on the left side.        Dorsalis pedis pulses are 2+ on the right side and 2+ on the left side.        Posterior tibial pulses are 2+ on the right side and 2+ on the left side.      Heart sounds: Normal heart sounds.   Pulmonary:      Effort: Pulmonary effort is normal.      Breath sounds: Normal breath sounds.   Abdominal:      General: Bowel sounds are normal. There is no distension or abdominal bruit.      Palpations: Abdomen is soft. There is no mass.      Tenderness: There is no abdominal tenderness.   Musculoskeletal:         General: Normal range of motion.      Cervical back: Neck supple.      Right lower leg: Edema present.      Left lower leg: Edema " present.   Lymphadenopathy:      Cervical: No cervical adenopathy.   Skin:     General: Skin is warm and dry.      Comments: hyperpigmentation   Neurological:      Mental Status: She is alert and oriented to person, place, and time.      Cranial Nerves: No cranial nerve deficit.      Sensory: No sensory deficit.   Psychiatric:         Mood and Affect: Mood normal.         Behavior: Behavior normal.         Thought Content: Thought content normal.         Judgment: Judgment normal.         Patient Active Problem List   Diagnosis   • Umbilical hernia without obstruction and without gangrene   • Post-menopausal   • Multiple joint pain   • History of coronary artery stent placement   • Hypertension, well controlled   • Vitamin D deficiency   • Mixed hyperlipidemia   • Class 2 severe obesity due to excess calories with serious comorbidity and body mass index (BMI) of 39.0 to 39.9 in adult   • History of bilateral knee replacement   • Elevated LFTs   • Epigastric pain   • Fatty liver   • Coronary artery disease of bypass graft of native heart with stable angina pectoris   • Thrombocytopenia   • Nausea   • Chest pain   • Abnormal finding on GI tract imaging   • Anticoagulated   • Personal history of adenomatous and serrated colon polyps   • CHF (congestive heart failure)   • Syncope   • Hypomagnesemia   • Hyponatremia   • Autoimmune hepatitis        Diagnosis Plan   1. Chronic venous hypertension with inflammation involving both sides  US venous doppler lower extremity bilateral (duplex)      2. Hypertension, well controlled        3. Mixed hyperlipidemia        4. Congestive heart failure, unspecified HF chronicity, unspecified heart failure type            Plan: After thoroughly evaluating Priscilla Santos, I believe the best course of action is to initially remain conservative from a vascular standpoint.  I will give the patient a prescription for compression stockings in the 20-30 mm pressure gradient range.  I did  instruct the patient on how to wear these on a daily basis.  I would like her to keep her legs elevated when she is not on them, and keep her legs well moisturized.  We will see the patient back in 3 weeks with a venous valvular insufficiency study. If the testing does show significant venous reflux, the patient may be a great candidate for endovenous closure as the patient's symptoms have significantly impacted their activities of daily living.  Her leg swelling is multifactorial related to her CHF, likely some venous insufficiency, and lymphedema. I did discuss vascular risk factors including controlling her hypertension and hyperlipidemia.  Her blood pressure is stable on her current medications.  She should continue her aspirin 81 mg daily, Plavix 75 mg daily, and Lipitor 20 mg daily.  I do think she would be a great candidate for home lymphedema pumps.  I will have her see [facility] medical when she returns with testing.  Body mass index is 38.92 kg/m².   The patient is to continue taking their medications as previously discussed.   This was all discussed in full with complete understanding.  Thank you for allowing me to participate in the care of your patient.  Please do not hesitate to call with any questions or concerns.  We will keep you aware of any further encounters with Priscilla Magañazier.        Sincerely yours,         Chema Cosby, Reza Bradshaw APRN

## 2025-07-09 ENCOUNTER — TELEPHONE (OUTPATIENT)
Dept: VASCULAR SURGERY | Facility: CLINIC | Age: 75
End: 2025-07-09

## 2025-07-10 ENCOUNTER — PATIENT ROUNDING (BHMG ONLY) (OUTPATIENT)
Dept: VASCULAR SURGERY | Facility: CLINIC | Age: 75
End: 2025-07-10
Payer: MEDICARE

## 2025-07-10 NOTE — PROGRESS NOTES
July 10, 2025    Hello, may I speak with Priscilla Santos?    My name is ANT      I am  with [facility]  [address], [address]  [place] [address] [phone].    Before we get started may I verify your date of birth? 1950    I am calling to officially welcome you to our practice and ask about your recent visit. Is this a good time to talk? LEFT     Tell me about your visit with us. What things went well?  LEFT        We're always looking for ways to make our patients' experiences even better. Do you have recommendations on ways we may improve?  LEFT     Overall were you satisfied with your first visit to our practice? LEFT        I appreciate you taking the time to speak with me today. Is there anything else I can do for you? LEFT       Thank you, and have a great day.

## 2025-07-21 ENCOUNTER — OFFICE VISIT (OUTPATIENT)
Dept: CARDIOLOGY | Facility: CLINIC | Age: 75
End: 2025-07-21
Payer: MEDICARE

## 2025-07-21 VITALS
HEART RATE: 62 BPM | DIASTOLIC BLOOD PRESSURE: 66 MMHG | WEIGHT: 203 LBS | BODY MASS INDEX: 38.33 KG/M2 | OXYGEN SATURATION: 100 % | SYSTOLIC BLOOD PRESSURE: 122 MMHG | HEIGHT: 61 IN

## 2025-07-21 DIAGNOSIS — R60.0 BILATERAL LOWER EXTREMITY EDEMA: ICD-10-CM

## 2025-07-21 DIAGNOSIS — E78.2 MIXED HYPERLIPIDEMIA: ICD-10-CM

## 2025-07-21 DIAGNOSIS — I10 ESSENTIAL HYPERTENSION: ICD-10-CM

## 2025-07-21 DIAGNOSIS — I25.810 CORONARY ARTERY DISEASE INVOLVING CORONARY BYPASS GRAFT OF NATIVE HEART WITHOUT ANGINA PECTORIS: Primary | ICD-10-CM

## 2025-07-21 PROBLEM — Z79.01 ANTICOAGULATED: Status: RESOLVED | Noted: 2025-02-12 | Resolved: 2025-07-21

## 2025-07-21 PROBLEM — R07.9 CHEST PAIN: Status: RESOLVED | Noted: 2025-02-06 | Resolved: 2025-07-21

## 2025-07-21 PROBLEM — R11.0 NAUSEA: Status: RESOLVED | Noted: 2025-01-29 | Resolved: 2025-07-21

## 2025-07-21 PROBLEM — E87.1 HYPONATREMIA: Status: RESOLVED | Noted: 2025-06-09 | Resolved: 2025-07-21

## 2025-07-21 PROCEDURE — 3074F SYST BP LT 130 MM HG: CPT | Performed by: INTERNAL MEDICINE

## 2025-07-21 PROCEDURE — G2211 COMPLEX E/M VISIT ADD ON: HCPCS | Performed by: INTERNAL MEDICINE

## 2025-07-21 PROCEDURE — 3078F DIAST BP <80 MM HG: CPT | Performed by: INTERNAL MEDICINE

## 2025-07-21 PROCEDURE — 99214 OFFICE O/P EST MOD 30 MIN: CPT | Performed by: INTERNAL MEDICINE

## 2025-07-21 NOTE — PROGRESS NOTES
Reason for Visit: cardiovascular follow up.    HPI:  Priscilla Santos is a 74 y.o. female is here today for follow-up.  She is having a lot of bruising, particularly from the compression stockings.  She has a lot of swelling in her legs.  She is dealing with liver issues and recently had a biopsy.   She is trying to cut back on the salt in her diet.  She does eat a lot of crackers.  She walks up and down her stairs for exercise and also cleans her house.  She denies any chest pain, palpitations, PND, or orthopnea.  She had previously had an episode of syncope in which she was admitted for in June that was felt to be related to orthostatic hypotension.  Coronary artery disease:        Previous Cardiac Testing and Procedures:  - [facility] (9/18/2015) severe two-vessel native disease, small but patent LIMA to LAD,  of SVG to OM and SVG to RCA, normal CT FFR to LCx  - Holter monitor (6/26/2023) benign monitor study with rare PACs and PVCs with a 7 beat run of nonsustained SVT, symptoms correlated with sinus rhythm  - Echo (12/14/2023) EF 61-65%, normal diastolic function, mild LA enlargement, aortic valve calcification, mild dilation of aortic root and proximal ascending aorta  - Carotid ultrasound (10/15/2024) less than 50% bilaterally  - Nuclear stress (2/7/2025) normal myocardial perfusion with no evidence of ischemia, EF 65%, breast insinuation artifact  - Echo (5/13/2025) EF 66-70%, normal diastolic function, normal RV size and function, aortic valve sclerosis, normal RVSP  - Holter monitor (5/22/2025) rare PACs and PVCs with 6 beat run of nonsustained SVT, benign monitor study    Lab data:  - Hemoglobin A1c (5/27/2025) 5.4%  - Lipid panel (5/27/2025) total cholesterol 103, HDL 43, LDL 46, triglycerides 64  - BNP (6/30/2025) 104.8  - BMP (7/11/2025) creatinine 0.69, potassium 4.1, sodium 141    Patient Active Problem List   Diagnosis    Umbilical hernia without obstruction and without gangrene    Post-menopausal     Multiple joint pain    History of coronary artery stent placement    Essential hypertension    Vitamin D deficiency    Mixed hyperlipidemia    Class 2 severe obesity due to excess calories with serious comorbidity and body mass index (BMI) of 38.0 to 38.9 in adult    History of bilateral knee replacement    Elevated LFTs    Epigastric pain    Fatty liver    Coronary artery disease involving coronary bypass graft of native heart without angina pectoris    Thrombocytopenia    Abnormal finding on GI tract imaging    Personal history of adenomatous and serrated colon polyps    Syncope    Hypomagnesemia    Autoimmune hepatitis       Social History     Tobacco Use    Smoking status: Never     Passive exposure: Never    Smokeless tobacco: Never   Vaping Use    Vaping status: Never Used   Substance Use Topics    Alcohol use: Never    Drug use: Never       Family History   Problem Relation Age of Onset    Diabetes Mother     Cancer Father     Heart disease Father     No Known Problems Maternal Grandmother     Diabetes Maternal Grandfather     No Known Problems Paternal Grandmother     Cancer Paternal Grandfather     Colon cancer Neg Hx     Colon polyps Neg Hx     Esophageal cancer Neg Hx     Liver disease Neg Hx     Stomach cancer Neg Hx     Rectal cancer Neg Hx     Liver cancer Neg Hx        The following portions of the patient's history were reviewed and updated as appropriate: allergies, current medications, past family history, past medical history, past social history, past surgical history, and problem list.      Current Outpatient Medications:     aluminum-magnesium hydroxide-simethicone (MAALOX MAX) 400-400-40 MG/5ML suspension, Take 15 mL by mouth Every 6 (Six) Hours As Needed for Indigestion or Heartburn., Disp: , Rfl:     Ascorbic Acid (VITAMIN C PO), Take 1 tablet by mouth Daily., Disp: , Rfl:     aspirin 81 MG EC tablet, Take 1 tablet by mouth Daily., Disp: , Rfl:     atorvastatin (Lipitor) 20 MG tablet, Take 1  tablet by mouth Daily., Disp: 90 tablet, Rfl: 0    cholecalciferol (VITAMIN D3) 25 MCG (1000 UT) tablet, Take 1 tablet by mouth Daily., Disp: , Rfl:     cyclobenzaprine (FLEXERIL) 10 MG tablet, Take 1 tablet by mouth Every Night., Disp: , Rfl:     fluticasone (FLONASE) 50 MCG/ACT nasal spray, Administer 2 sprays into the nostril(s) as directed by provider Daily., Disp: , Rfl:     gabapentin (NEURONTIN) 100 MG capsule, Take 1 capsule by mouth 3 (Three) Times a Day., Disp: , Rfl:     Krill Oil (Omega-3) 500 MG capsule, Take 1 tablet by mouth Daily., Disp: , Rfl:     losartan (COZAAR) 50 MG tablet, Take 1 tablet by mouth Daily., Disp: , Rfl:     metoprolol succinate XL (TOPROL-XL) 25 MG 24 hr tablet, Take 1 tablet by mouth Daily., Disp: 90 tablet, Rfl: 1    multivitamin with minerals (Multivitamin Women 50+) tablet tablet, Take 1 tablet by mouth Daily., Disp: , Rfl:     pantoprazole (PROTONIX) 40 MG EC tablet, Take 1 tablet by mouth Daily., Disp: 30 tablet, Rfl: 11    ranolazine (RANEXA) 1000 MG 12 hr tablet, Take 1 tablet by mouth Every 12 (Twelve) Hours., Disp: 180 tablet, Rfl: 1    Turmeric Curcumin 500 MG capsule, Take 1 capsule by mouth Daily., Disp: , Rfl:     vitamin B-12 (CYANOCOBALAMIN) 100 MCG tablet, Take 1 tablet by mouth Daily., Disp: , Rfl:     nitroglycerin (NITROSTAT) 0.4 MG SL tablet, Place 1 tablet under the tongue Every 5 (Five) Minutes As Needed for Chest Pain. Take no more than 3 doses in 15 minutes. (Patient not taking: Reported on 7/21/2025), Disp: , Rfl:     Review of Systems   Constitutional: Negative for chills and fever.   Cardiovascular:  Positive for leg swelling. Negative for chest pain and paroxysmal nocturnal dyspnea.   Respiratory:  Negative for cough and shortness of breath.    Skin:  Negative for rash.   Gastrointestinal:  Negative for abdominal pain and heartburn.   Neurological:  Negative for dizziness and numbness.       Objective   /66 (BP Location: Left arm, Patient  "Position: Sitting, Cuff Size: Adult)   Pulse 62   Ht 154.9 cm (60.98\")   Wt 92.1 kg (203 lb)   SpO2 100%   BMI 38.38 kg/m²   Constitutional:       Appearance: Well-developed.   HENT:      Head: Normocephalic and atraumatic.   Pulmonary:      Effort: Pulmonary effort is normal.      Breath sounds: Normal breath sounds.   Cardiovascular:      Normal rate. Regular rhythm.   Edema:     Peripheral edema present.     Pretibial: bilateral 2+ edema of the pretibial area.     Ankle: bilateral 2+ edema of the ankle.  Skin:     General: Skin is warm and dry.   Neurological:      Mental Status: Alert and oriented to person, place, and time.       Procedures      ICD-10-CM ICD-9-CM   1. Coronary artery disease involving coronary bypass graft of native heart without angina pectoris  I25.810 414.05   2. Essential hypertension  I10 401.9   3. Mixed hyperlipidemia  E78.2 272.2   4. Bilateral lower extremity edema  R60.0 782.3         Assessment/Plan:  1.  Coronary artery disease: History of CABG.  Nuclear stress on 2/7/2025 showed normal myocardial perfusion with no evidence of ischemia.  No current chest pain symptoms.  Continue aspirin, atorvastatin, metoprolol, ranolazine, and nitroglycerin.    2.  Essential hypertension: Blood pressure is well-controlled today.  Continue metoprolol and losartan.    3.  Mixed hyperlipidemia: Lipid panel on 5/27/2025 showed good control.  Continue atorvastatin and krill oil.    4.  Bilateral lower extremity edema: No evidence of any cardiac etiology.  Normal cardiac structure and function on echo on 5/13/2025 with normal LV function, normal diastolic function, and normal RV size and function.  BNP on 6/30/2025 was normal.  Venous insufficiency, diet, and sedentary lifestyle are likely the primary etiologies.  Counseled on the importance of compression stockings, leg elevation, low-sodium diet, and weight loss.  Recommend minimizing diuretics due to recent orthostatic syncope.   "

## 2025-07-21 NOTE — LETTER
July 21, 2025     KENDRA Epperson  [address] [facility] [address]    Patient: Priscilla Santos   YOB: 1950   Date of Visit: 7/21/2025       Dear KENDRA Epperson    Priscilla Santos was in my office today. Below is a copy of my note.    If you have questions, please do not hesitate to call me. I look forward to following Priscilla along with you.         Sincerely,        Adan Buenrostro MD        CC: No Recipients      Reason for Visit: cardiovascular follow up.    HPI:  Priscilla Santos is a 74 y.o. female is here today for follow-up.  She is having a lot of bruising, particularly from the compression stockings.  She has a lot of swelling in her legs.  She is dealing with liver issues and recently had a biopsy.   She is trying to cut back on the salt in her diet.  She does eat a lot of crackers.  She walks up and down her stairs for exercise and also cleans her house.  She denies any chest pain, palpitations, PND, or orthopnea.  She had previously had an episode of syncope in which she was admitted for in June that was felt to be related to orthostatic hypotension.  Coronary artery disease:        Previous Cardiac Testing and Procedures:  - [facility] (9/18/2015) severe two-vessel native disease, small but patent LIMA to LAD,  of SVG to OM and SVG to RCA, normal CT FFR to LCx  - Holter monitor (6/26/2023) benign monitor study with rare PACs and PVCs with a 7 beat run of nonsustained SVT, symptoms correlated with sinus rhythm  - Echo (12/14/2023) EF 61-65%, normal diastolic function, mild LA enlargement, aortic valve calcification, mild dilation of aortic root and proximal ascending aorta  - Carotid ultrasound (10/15/2024) less than 50% bilaterally  - Nuclear stress (2/7/2025) normal myocardial perfusion with no evidence of ischemia, EF 65%, breast insinuation artifact  - Echo (5/13/2025) EF 66-70%, normal diastolic function, normal RV size and function, aortic valve sclerosis, normal RVSP  -  Holter monitor (5/22/2025) rare PACs and PVCs with 6 beat run of nonsustained SVT, benign monitor study    Lab data:  - Hemoglobin A1c (5/27/2025) 5.4%  - Lipid panel (5/27/2025) total cholesterol 103, HDL 43, LDL 46, triglycerides 64  - BNP (6/30/2025) 104.8  - BMP (7/11/2025) creatinine 0.69, potassium 4.1, sodium 141    Patient Active Problem List   Diagnosis   • Umbilical hernia without obstruction and without gangrene   • Post-menopausal   • Multiple joint pain   • History of coronary artery stent placement   • Essential hypertension   • Vitamin D deficiency   • Mixed hyperlipidemia   • Class 2 severe obesity due to excess calories with serious comorbidity and body mass index (BMI) of 38.0 to 38.9 in adult   • History of bilateral knee replacement   • Elevated LFTs   • Epigastric pain   • Fatty liver   • Coronary artery disease involving coronary bypass graft of native heart without angina pectoris   • Thrombocytopenia   • Abnormal finding on GI tract imaging   • Personal history of adenomatous and serrated colon polyps   • Syncope   • Hypomagnesemia   • Autoimmune hepatitis       Social History     Tobacco Use   • Smoking status: Never     Passive exposure: Never   • Smokeless tobacco: Never   Vaping Use   • Vaping status: Never Used   Substance Use Topics   • Alcohol use: Never   • Drug use: Never       Family History   Problem Relation Age of Onset   • Diabetes Mother    • Cancer Father    • Heart disease Father    • No Known Problems Maternal Grandmother    • Diabetes Maternal Grandfather    • No Known Problems Paternal Grandmother    • Cancer Paternal Grandfather    • Colon cancer Neg Hx    • Colon polyps Neg Hx    • Esophageal cancer Neg Hx    • Liver disease Neg Hx    • Stomach cancer Neg Hx    • Rectal cancer Neg Hx    • Liver cancer Neg Hx        The following portions of the patient's history were reviewed and updated as appropriate: allergies, current medications, past family history, past medical  history, past social history, past surgical history, and problem list.      Current Outpatient Medications:   •  aluminum-magnesium hydroxide-simethicone (MAALOX MAX) 400-400-40 MG/5ML suspension, Take 15 mL by mouth Every 6 (Six) Hours As Needed for Indigestion or Heartburn., Disp: , Rfl:   •  Ascorbic Acid (VITAMIN C PO), Take 1 tablet by mouth Daily., Disp: , Rfl:   •  aspirin 81 MG EC tablet, Take 1 tablet by mouth Daily., Disp: , Rfl:   •  atorvastatin (Lipitor) 20 MG tablet, Take 1 tablet by mouth Daily., Disp: 90 tablet, Rfl: 0  •  cholecalciferol (VITAMIN D3) 25 MCG (1000 UT) tablet, Take 1 tablet by mouth Daily., Disp: , Rfl:   •  cyclobenzaprine (FLEXERIL) 10 MG tablet, Take 1 tablet by mouth Every Night., Disp: , Rfl:   •  fluticasone (FLONASE) 50 MCG/ACT nasal spray, Administer 2 sprays into the nostril(s) as directed by provider Daily., Disp: , Rfl:   •  gabapentin (NEURONTIN) 100 MG capsule, Take 1 capsule by mouth 3 (Three) Times a Day., Disp: , Rfl:   •  Krill Oil (Omega-3) 500 MG capsule, Take 1 tablet by mouth Daily., Disp: , Rfl:   •  losartan (COZAAR) 50 MG tablet, Take 1 tablet by mouth Daily., Disp: , Rfl:   •  metoprolol succinate XL (TOPROL-XL) 25 MG 24 hr tablet, Take 1 tablet by mouth Daily., Disp: 90 tablet, Rfl: 1  •  multivitamin with minerals (Multivitamin Women 50+) tablet tablet, Take 1 tablet by mouth Daily., Disp: , Rfl:   •  pantoprazole (PROTONIX) 40 MG EC tablet, Take 1 tablet by mouth Daily., Disp: 30 tablet, Rfl: 11  •  ranolazine (RANEXA) 1000 MG 12 hr tablet, Take 1 tablet by mouth Every 12 (Twelve) Hours., Disp: 180 tablet, Rfl: 1  •  Turmeric Curcumin 500 MG capsule, Take 1 capsule by mouth Daily., Disp: , Rfl:   •  vitamin B-12 (CYANOCOBALAMIN) 100 MCG tablet, Take 1 tablet by mouth Daily., Disp: , Rfl:   •  nitroglycerin (NITROSTAT) 0.4 MG SL tablet, Place 1 tablet under the tongue Every 5 (Five) Minutes As Needed for Chest Pain. Take no more than 3 doses in 15 minutes.  "(Patient not taking: Reported on 7/21/2025), Disp: , Rfl:     Review of Systems   Constitutional: Negative for chills and fever.   Cardiovascular:  Positive for leg swelling. Negative for chest pain and paroxysmal nocturnal dyspnea.   Respiratory:  Negative for cough and shortness of breath.    Skin:  Negative for rash.   Gastrointestinal:  Negative for abdominal pain and heartburn.   Neurological:  Negative for dizziness and numbness.       Objective  /66 (BP Location: Left arm, Patient Position: Sitting, Cuff Size: Adult)   Pulse 62   Ht 154.9 cm (60.98\")   Wt 92.1 kg (203 lb)   SpO2 100%   BMI 38.38 kg/m²   Constitutional:       Appearance: Well-developed.   HENT:      Head: Normocephalic and atraumatic.   Pulmonary:      Effort: Pulmonary effort is normal.      Breath sounds: Normal breath sounds.   Cardiovascular:      Normal rate. Regular rhythm.   Edema:     Peripheral edema present.     Pretibial: bilateral 2+ edema of the pretibial area.     Ankle: bilateral 2+ edema of the ankle.  Skin:     General: Skin is warm and dry.   Neurological:      Mental Status: Alert and oriented to person, place, and time.       Procedures      ICD-10-CM ICD-9-CM   1. Coronary artery disease involving coronary bypass graft of native heart without angina pectoris  I25.810 414.05   2. Essential hypertension  I10 401.9   3. Mixed hyperlipidemia  E78.2 272.2   4. Bilateral lower extremity edema  R60.0 782.3         Assessment/Plan:  1.  Coronary artery disease: History of CABG.  Nuclear stress on 2/7/2025 showed normal myocardial perfusion with no evidence of ischemia.  No current chest pain symptoms.  Continue aspirin, atorvastatin, metoprolol, ranolazine, and nitroglycerin.    2.  Essential hypertension: Blood pressure is well-controlled today.  Continue metoprolol and losartan.    3.  Mixed hyperlipidemia: Lipid panel on 5/27/2025 showed good control.  Continue atorvastatin and krill oil.    4.  Bilateral lower " extremity edema: No evidence of any cardiac etiology.  Normal cardiac structure and function on echo on 5/13/2025 with normal LV function, normal diastolic function, and normal RV size and function.  BNP on 6/30/2025 was normal.  Venous insufficiency, diet, and sedentary lifestyle are likely the primary etiologies.  Counseled on the importance of compression stockings, leg elevation, low-sodium diet, and weight loss.  Recommend minimizing diuretics due to recent orthostatic syncope.

## 2025-07-29 DIAGNOSIS — R60.0 PERIPHERAL EDEMA: ICD-10-CM

## 2025-07-29 RX ORDER — METOPROLOL SUCCINATE 25 MG/1
25 TABLET, EXTENDED RELEASE ORAL
Qty: 30 TABLET | Refills: 0 | Status: SHIPPED | OUTPATIENT
Start: 2025-07-29

## 2025-08-01 ENCOUNTER — HOSPITAL ENCOUNTER (EMERGENCY)
Facility: HOSPITAL | Age: 75
Discharge: HOME | End: 2025-08-01
Attending: EMERGENCY MEDICINE
Payer: MEDICARE

## 2025-08-01 ENCOUNTER — APPOINTMENT (OUTPATIENT)
Dept: GENERAL RADIOLOGY | Facility: HOSPITAL | Age: 75
End: 2025-08-01
Payer: MEDICARE

## 2025-08-01 VITALS
RESPIRATION RATE: 20 BRPM | OXYGEN SATURATION: 96 % | DIASTOLIC BLOOD PRESSURE: 67 MMHG | BODY MASS INDEX: 37.17 KG/M2 | HEIGHT: 62 IN | WEIGHT: 202 LBS | TEMPERATURE: 98 F | HEART RATE: 60 BPM | SYSTOLIC BLOOD PRESSURE: 148 MMHG

## 2025-08-01 DIAGNOSIS — J06.9 UPPER RESPIRATORY TRACT INFECTION, UNSPECIFIED TYPE: Primary | ICD-10-CM

## 2025-08-01 DIAGNOSIS — I50.9 CONGESTIVE HEART FAILURE, UNSPECIFIED HF CHRONICITY, UNSPECIFIED HEART FAILURE TYPE: ICD-10-CM

## 2025-08-01 LAB
B PARAPERT DNA SPEC QL NAA+PROBE: NOT DETECTED
B PERT DNA SPEC QL NAA+PROBE: NOT DETECTED
C PNEUM DNA NPH QL NAA+NON-PROBE: NOT DETECTED
FLUAV SUBTYP SPEC NAA+PROBE: NOT DETECTED
FLUBV RNA NPH QL NAA+NON-PROBE: NOT DETECTED
HADV DNA SPEC NAA+PROBE: NOT DETECTED
HCOV 229E RNA SPEC QL NAA+PROBE: NOT DETECTED
HCOV HKU1 RNA SPEC QL NAA+PROBE: NOT DETECTED
HCOV NL63 RNA SPEC QL NAA+PROBE: NOT DETECTED
HCOV OC43 RNA SPEC QL NAA+PROBE: NOT DETECTED
HMPV RNA NPH QL NAA+NON-PROBE: NOT DETECTED
HPIV1 RNA ISLT QL NAA+PROBE: NOT DETECTED
HPIV2 RNA SPEC QL NAA+PROBE: NOT DETECTED
HPIV3 RNA NPH QL NAA+PROBE: NOT DETECTED
HPIV4 P GENE NPH QL NAA+PROBE: NOT DETECTED
M PNEUMO IGG SER IA-ACNC: NOT DETECTED
RHINOVIRUS RNA SPEC NAA+PROBE: NOT DETECTED
RSV RNA NPH QL NAA+NON-PROBE: NOT DETECTED
SARS-COV-2 RNA RESP QL NAA+PROBE: NOT DETECTED

## 2025-08-01 PROCEDURE — 0202U NFCT DS 22 TRGT SARS-COV-2: CPT | Performed by: EMERGENCY MEDICINE

## 2025-08-01 PROCEDURE — 71046 X-RAY EXAM CHEST 2 VIEWS: CPT

## 2025-08-01 PROCEDURE — 99283 EMERGENCY DEPT VISIT LOW MDM: CPT | Performed by: EMERGENCY MEDICINE

## 2025-08-01 RX ORDER — FUROSEMIDE 20 MG/1
20 TABLET ORAL DAILY
Qty: 7 TABLET | Refills: 0 | Status: SHIPPED | OUTPATIENT
Start: 2025-08-01

## 2025-08-01 NOTE — ED PROVIDER NOTES
Subjective   History of Present Illness  74-year-old female presents to the ED with complaint of cough, congestion, postnasal drip onset of symptoms over the past couple days.  History of hypertension, hyperlipidemia, coronary disease, CHF.    No reported fever or chills.  Does have mild back discomfort with deep breathing.  Denies significant shortness of breath or difficulty breathing.  No abnormal rashes bruising or bleeding.  No abdominal pain nausea, vomiting, diarrhea.  No unilateral leg pain or swelling, history of DVT or PE..     History provided by:  Patient      Review of Systems   All other systems reviewed and are negative.      Past Medical History:   Diagnosis Date    Arthritis     CHF (congestive heart failure)     Coronary artery disease     Diverticulosis     Fatty liver     History of adenomatous polyp of colon     History of transfusion     Hyperlipidemia     Hypertension     Sleep apnea     UTI (urinary tract infection)        Allergies   Allergen Reactions    Diazepam Other (See Comments)     Made her violent    Codeine Nausea And Vomiting       Past Surgical History:   Procedure Laterality Date    APPENDECTOMY      CARDIAC SURGERY  2008    Tripple bypass    CAROTID STENT      CARPAL TUNNEL RELEASE Bilateral     CHOLECYSTECTOMY      COLON SURGERY      COLONOSCOPY N/A 02/27/2025    Diverticulosis in the left colon; One 7mm tubular adenomatous polyp in the transverse colon; One 4mm tubular adenomatous polyp in the rectum-Clip (MR conditional) was placed; Repeat 5 years    CORONARY STENT PLACEMENT  2021    total of 4-5 heart stents in past    ENDOSCOPY N/A 02/27/2025    Erosive gastropathy with stigmata of recent bleeding-biopsied; Normal examined duodenum; Repeat 3 years    HERNIA REPAIR      JOINT REPLACEMENT      KNEE SURGERY Bilateral     TONSILLECTOMY AND ADENOIDECTOMY      TUBAL ABDOMINAL LIGATION  1977    UMBILICAL HERNIA REPAIR N/A 02/02/2023    Procedure: UMBILICAL HERNIA REPAIR with mesh;   Surgeon: Lara Conley MD;  Location:  [facility] OR;  Service: General;  Laterality: N/A;       Family History   Problem Relation Age of Onset    Diabetes Mother     Cancer Father     Heart disease Father     No Known Problems Maternal Grandmother     Diabetes Maternal Grandfather     No Known Problems Paternal Grandmother     Cancer Paternal Grandfather     Colon cancer Neg Hx     Colon polyps Neg Hx     Esophageal cancer Neg Hx     Liver disease Neg Hx     Stomach cancer Neg Hx     Rectal cancer Neg Hx     Liver cancer Neg Hx        Social History     Socioeconomic History    Marital status:    Tobacco Use    Smoking status: Never     Passive exposure: Never    Smokeless tobacco: Never   Vaping Use    Vaping status: Never Used   Substance and Sexual Activity    Alcohol use: Never    Drug use: Never    Sexual activity: Defer           Objective   Physical Exam  Vitals and nursing note reviewed.   Constitutional:       Appearance: Normal appearance. She is normal weight.   HENT:      Head: Normocephalic and atraumatic.      Nose:      Comments: Nasal mucosal injection     Mouth/Throat:      Pharynx: Posterior oropharyngeal erythema present. No oropharyngeal exudate.   Eyes:      Extraocular Movements: Extraocular movements intact.      Conjunctiva/sclera: Conjunctivae normal.      Pupils: Pupils are equal, round, and reactive to light.   Cardiovascular:      Rate and Rhythm: Normal rate and regular rhythm.      Heart sounds: Normal heart sounds. No murmur heard.  Pulmonary:      Effort: Pulmonary effort is normal.      Breath sounds: Normal breath sounds. No wheezing, rhonchi or rales.   Abdominal:      General: Abdomen is flat. Bowel sounds are normal.      Palpations: Abdomen is soft.      Tenderness: There is no abdominal tenderness. There is no guarding or rebound.   Musculoskeletal:      Right lower leg: Edema present.      Left lower leg: Edema present.   Skin:     General: Skin is warm and dry.       Capillary Refill: Capillary refill takes less than 2 seconds.   Neurological:      General: No focal deficit present.      Mental Status: She is alert and oriented to person, place, and time. Mental status is at baseline.         Procedures       Lab Results (last 24 hours)       Procedure Component Value Units Date/Time    Respiratory Panel PCR w/COVID-19(SARS-CoV-2) [facility] In-House, NP Swab in UTM/VTM, 2 HR TAT - Swab, Nasopharynx [[id number]]  (Normal) Collected: 08/01/25 1125    Specimen: Swab from Nasopharynx Updated: 08/01/25 1220     ADENOVIRUS, PCR Not Detected     Coronavirus 229E Not Detected     Coronavirus HKU1 Not Detected     Coronavirus NL63 Not Detected     Coronavirus OC43 Not Detected     COVID19 Not Detected     Human Metapneumovirus Not Detected     Human Rhinovirus/Enterovirus Not Detected     Influenza A PCR Not Detected     Influenza B PCR Not Detected     Parainfluenza Virus 1 Not Detected     Parainfluenza Virus 2 Not Detected     Parainfluenza Virus 3 Not Detected     Parainfluenza Virus 4 Not Detected     RSV, PCR Not Detected     Bordetella pertussis pcr Not Detected     Bordetella parapertussis PCR Not Detected     Chlamydophila pneumoniae PCR Not Detected     Mycoplasma pneumo by PCR Not Detected    Narrative:      In the setting of a positive respiratory panel with a viral infection PLUS a negative procalcitonin without other underlying concern for bacterial infection, consider observing off antibiotics or discontinuation of antibiotics and continue supportive care. If the respiratory panel is positive for atypical bacterial infection (Bordetella pertussis, Chlamydophila pneumoniae, or Mycoplasma pneumoniae), consider antibiotic de-escalation to target atypical bacterial infection.         XR Chest 2 View  Result Date: 8/1/2025  EXAMINATION: XR CHEST 2 VW-   HISTORY: cough and dyspnea  2 views of the chest are compared with the previous study dated 6/1/2025.  The lungs  are moderately well-expanded.  There are linear interstitial changes in the lungs bilaterally which are similar to the previous study which may represent pulmonary venous congestion/edema?.  There is no pleural effusion or pneumothorax.  There is moderate cardiomegaly. Atheromatous change of thoracic aorta.  No acute bony abnormality.      1. Pulmonary edema.   This report was signed and finalized on 8/1/2025 11:29 AM by Dr. Irasema Bernal MD.         ED Course  ED Course as of 08/01/25 1430   Fri Aug 01, 2025   1427 74-year-old female presents to the ED with upper respiratory symptoms.  Does have a history of CHF.  Sats are 100% on room air.  Respiratory panel negative for negative.  Two-view chest x-ray obtained, finding consistent with pulmonary vascular congestion.  Did have 1+ bilateral lower extremity pitting edema on exam.  States she had been on Lasix in the past but does not take regularly.  She has gained approximately 5 pounds over the past week.  Likely mild CHF exacerbation.  Not having a chest pain, abdominal pain.  Will give  a 1 week course of Lasix, recommend she follow-up with her cardiologist as an outpatient [AW]      ED Course User Index  [AW] Nazario Dietz MD                                                       Medical Decision Making  Amount and/or Complexity of Data Reviewed  Radiology: ordered.        Final diagnoses:   Upper respiratory tract infection, unspecified type   Congestive heart failure, unspecified HF chronicity, unspecified heart failure type       ED Disposition  ED Disposition       ED Disposition   Discharge    Condition   Stable    Comment   --               Reza Charles, APRN  [address] [facility] [id number] [phone]    Schedule an appointment as soon as possible for a visit   As needed         Medication List        New Prescriptions      furosemide 20 MG tablet  Commonly known as: LASIX  Take 1 tablet by mouth Daily.               Where to Get  Your Medications        These medications were sent to [facility] - [place], [place] - [address] - [phone]  - [phone] [address] [facility] [address]      Phone: [phone]   furosemide 20 MG tablet            Nazario Dietz MD  08/01/25 2167

## 2025-08-04 ENCOUNTER — OFFICE VISIT (OUTPATIENT)
Dept: FAMILY MEDICINE CLINIC | Facility: CLINIC | Age: 75
End: 2025-08-04
Payer: MEDICARE

## 2025-08-04 VITALS
BODY MASS INDEX: 37.36 KG/M2 | TEMPERATURE: 98.8 F | DIASTOLIC BLOOD PRESSURE: 72 MMHG | OXYGEN SATURATION: 99 % | HEIGHT: 62 IN | RESPIRATION RATE: 17 BRPM | HEART RATE: 67 BPM | SYSTOLIC BLOOD PRESSURE: 138 MMHG | WEIGHT: 203 LBS

## 2025-08-04 DIAGNOSIS — J44.0 COPD WITH LOWER RESPIRATORY INFECTION: Primary | ICD-10-CM

## 2025-08-04 DIAGNOSIS — J40 BRONCHITIS: ICD-10-CM

## 2025-08-04 PROCEDURE — 1125F AMNT PAIN NOTED PAIN PRSNT: CPT | Performed by: NURSE PRACTITIONER

## 2025-08-04 PROCEDURE — 3078F DIAST BP <80 MM HG: CPT | Performed by: NURSE PRACTITIONER

## 2025-08-04 PROCEDURE — 3075F SYST BP GE 130 - 139MM HG: CPT | Performed by: NURSE PRACTITIONER

## 2025-08-04 PROCEDURE — 99213 OFFICE O/P EST LOW 20 MIN: CPT | Performed by: NURSE PRACTITIONER

## 2025-08-04 PROCEDURE — 3044F HG A1C LEVEL LT 7.0%: CPT | Performed by: NURSE PRACTITIONER

## 2025-08-04 RX ORDER — ALBUTEROL SULFATE 0.83 MG/ML
2.5 SOLUTION RESPIRATORY (INHALATION) EVERY 4 HOURS PRN
Qty: 180 ML | Refills: 12 | Status: SHIPPED | OUTPATIENT
Start: 2025-08-04

## 2025-08-04 RX ORDER — ALBUTEROL SULFATE 0.83 MG/ML
2.5 SOLUTION RESPIRATORY (INHALATION) EVERY 4 HOURS PRN
Qty: 180 ML | Refills: 12 | Status: SHIPPED | OUTPATIENT
Start: 2025-08-04 | End: 2025-08-04

## 2025-08-07 ENCOUNTER — TELEPHONE (OUTPATIENT)
Dept: FAMILY MEDICINE CLINIC | Facility: CLINIC | Age: 75
End: 2025-08-07
Payer: MEDICARE

## 2025-08-25 ENCOUNTER — OFFICE VISIT (OUTPATIENT)
Dept: FAMILY MEDICINE CLINIC | Facility: CLINIC | Age: 75
End: 2025-08-25
Payer: MEDICARE

## 2025-08-25 VITALS
TEMPERATURE: 97.5 F | OXYGEN SATURATION: 97 % | SYSTOLIC BLOOD PRESSURE: 125 MMHG | WEIGHT: 209.4 LBS | DIASTOLIC BLOOD PRESSURE: 78 MMHG | HEIGHT: 62 IN | BODY MASS INDEX: 38.53 KG/M2 | HEART RATE: 56 BPM | RESPIRATION RATE: 18 BRPM

## 2025-08-25 DIAGNOSIS — I87.2 VENOUS INSUFFICIENCY OF BOTH LOWER EXTREMITIES: Primary | ICD-10-CM

## 2025-08-25 DIAGNOSIS — K75.4 AUTOIMMUNE HEPATITIS: ICD-10-CM

## 2025-08-25 DIAGNOSIS — I25.798: ICD-10-CM

## 2025-08-25 PROCEDURE — 3044F HG A1C LEVEL LT 7.0%: CPT | Performed by: NURSE PRACTITIONER

## 2025-08-25 PROCEDURE — 1125F AMNT PAIN NOTED PAIN PRSNT: CPT | Performed by: NURSE PRACTITIONER

## 2025-08-25 PROCEDURE — 99214 OFFICE O/P EST MOD 30 MIN: CPT | Performed by: NURSE PRACTITIONER

## 2025-08-25 PROCEDURE — 3074F SYST BP LT 130 MM HG: CPT | Performed by: NURSE PRACTITIONER

## 2025-08-25 PROCEDURE — 3078F DIAST BP <80 MM HG: CPT | Performed by: NURSE PRACTITIONER

## 2025-08-28 ENCOUNTER — OFFICE VISIT (OUTPATIENT)
Dept: FAMILY MEDICINE CLINIC | Facility: CLINIC | Age: 75
End: 2025-08-28
Payer: MEDICARE

## 2025-08-28 VITALS
HEART RATE: 81 BPM | TEMPERATURE: 98.6 F | SYSTOLIC BLOOD PRESSURE: 124 MMHG | OXYGEN SATURATION: 98 % | RESPIRATION RATE: 17 BRPM | BODY MASS INDEX: 38.16 KG/M2 | DIASTOLIC BLOOD PRESSURE: 68 MMHG | WEIGHT: 207.4 LBS | HEIGHT: 62 IN

## 2025-08-28 DIAGNOSIS — R07.81 RIB PAIN ON LEFT SIDE: Primary | ICD-10-CM
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
## (undated) DEVICE — BNDR ABD 4PANEL 12IN 46 TO 62IN

## (undated) DEVICE — Device: Brand: DEFENDO AIR/WATER/SUCTION AND BIOPSY VALVE

## (undated) DEVICE — ARGYLE YANKAUER BULB TIP WITH VENT: Brand: ARGYLE

## (undated) DEVICE — THE CHANNEL CLEANING BRUSH IS A NYLON FLEXI BRUSH ATTACHED TO A FLEXIBLE PLASTIC SHEATH DESIGNED TO SAFELY REMOVE DEBRIS FROM FLEXIBLE ENDOSCOPES.

## (undated) DEVICE — CONMED SCOPE SAVER BITE BLOCK, 20X27 MM: Brand: SCOPE SAVER

## (undated) DEVICE — CUFF,BP,DISP,1 TUBE,ADULT,HP: Brand: MEDLINE

## (undated) DEVICE — GLV SURG BIOGEL LTX PF 6 1/2

## (undated) DEVICE — DRSNG SURESITE WNDW 2.38X2.75

## (undated) DEVICE — 4-PORT MANIFOLD: Brand: NEPTUNE 2

## (undated) DEVICE — PAD MINOR UNIVERSAL: Brand: MEDLINE INDUSTRIES, INC.

## (undated) DEVICE — THE SINGLE USE ETRAP – POLYP TRAP IS USED FOR SUCTION RETRIEVAL OF ENDOSCOPICALLY REMOVED POLYPS.: Brand: ETRAP

## (undated) DEVICE — STRIP,CLOSURE,WOUND,MEDI-STRIP,1/2X4: Brand: MEDLINE

## (undated) DEVICE — SUT PROLN 0 MO6 30IN 8418H

## (undated) DEVICE — TRAP FLD MINIVAC MEGADYNE 100ML

## (undated) DEVICE — ANTIBACTERIAL UNDYED BRAIDED (POLYGLACTIN 910), SYNTHETIC ABSORBABLE SUTURE: Brand: COATED VICRYL

## (undated) DEVICE — ELECTRD BLD EZ CLN MOD XLNG 2.75IN

## (undated) DEVICE — SPNG GZ 2S 2X2 8PLY STRL PK/2

## (undated) DEVICE — FRCP BX RADJAW4 NDL 2.8 240 STD OG

## (undated) DEVICE — SENSR O2 OXIMAX FNGR A/ 18IN NONSTR

## (undated) DEVICE — ADHS LIQ MASTISOL 2/3ML

## (undated) DEVICE — VAGINAL PREP TRAY: Brand: MEDLINE INDUSTRIES, INC.

## (undated) DEVICE — MASK,OXYGEN,MED CONC,ADLT,7' TUB, UC: Brand: PENDING

## (undated) DEVICE — Device: Brand: SINGLE USE ELECTROSURGICAL SNARE SD-400

## (undated) DEVICE — BAPTIST TURNOVER KIT: Brand: MEDLINE INDUSTRIES, INC.